# Patient Record
Sex: FEMALE | Race: WHITE
[De-identification: names, ages, dates, MRNs, and addresses within clinical notes are randomized per-mention and may not be internally consistent; named-entity substitution may affect disease eponyms.]

---

## 2017-09-21 ENCOUNTER — HOSPITAL ENCOUNTER (OUTPATIENT)
Dept: HOSPITAL 75 - PREOP | Age: 57
End: 2017-09-21
Attending: SURGERY
Payer: COMMERCIAL

## 2017-09-21 VITALS — HEIGHT: 67 IN | BODY MASS INDEX: 41.59 KG/M2 | WEIGHT: 265 LBS

## 2017-09-26 ENCOUNTER — HOSPITAL ENCOUNTER (OUTPATIENT)
Dept: HOSPITAL 75 - ENDO | Age: 57
Discharge: HOME | End: 2017-09-26
Attending: SURGERY
Payer: COMMERCIAL

## 2017-09-26 VITALS — DIASTOLIC BLOOD PRESSURE: 84 MMHG | SYSTOLIC BLOOD PRESSURE: 133 MMHG

## 2017-09-26 VITALS — SYSTOLIC BLOOD PRESSURE: 133 MMHG | DIASTOLIC BLOOD PRESSURE: 84 MMHG

## 2017-09-26 VITALS — SYSTOLIC BLOOD PRESSURE: 124 MMHG | DIASTOLIC BLOOD PRESSURE: 76 MMHG

## 2017-09-26 VITALS — BODY MASS INDEX: 41.59 KG/M2 | HEIGHT: 67 IN | WEIGHT: 265 LBS

## 2017-09-26 VITALS — SYSTOLIC BLOOD PRESSURE: 122 MMHG | DIASTOLIC BLOOD PRESSURE: 77 MMHG

## 2017-09-26 DIAGNOSIS — I10: ICD-10-CM

## 2017-09-26 DIAGNOSIS — Z86.010: ICD-10-CM

## 2017-09-26 DIAGNOSIS — E66.01: ICD-10-CM

## 2017-09-26 DIAGNOSIS — K62.1: ICD-10-CM

## 2017-09-26 DIAGNOSIS — F17.210: ICD-10-CM

## 2017-09-26 DIAGNOSIS — Z09: Primary | ICD-10-CM

## 2017-09-26 DIAGNOSIS — Z79.899: ICD-10-CM

## 2017-09-26 PROCEDURE — 88305 TISSUE EXAM BY PATHOLOGIST: CPT

## 2017-09-26 NOTE — XMS REPORT
[facility]

 Created on: 2016



Shereen Shafer

External Reference #: [id number]

: 1960

Sex: Female



Demographics







 Address  [address] [place] [address]  [address]

 

 Home Phone  [phone]

 

 Preferred Language  Unknown

 

 Marital Status  Unknown

 

 Mosque Affiliation  Unknown

 

 Race  White

 

 Ethnic Group  Not  or 





Author







 Author  JAVON DE LOS SANTOS

 

 Organization  [employer]

 

 Address  Unknown

 

 Phone  Unavailable







Care Team Providers







 Care Team Member Name  Role  Phone

 

 JAVON DE LOS SANTOS  CP  Unavailable



                                                                



Allergies

          No Known Allergies                                                   
                                     



Problems

          





 Problem Type  Condition  Code  Onset Dates  Condition Status

 

 Problem  Anxiety state, unspecified  300.00     Active

 

 Problem  Screening for malignant neoplasm of the cervix  V76.2     Active

 

 Problem  Esophageal reflux  530.81     Active

 

 Problem  Cellulitis and abscess of unspecified site  682.9     Active

 

 Problem  Abdominal pain, right lower quadrant  789.03     Active

 

 Problem  Plantar fascial fibromatosis  728.71     Active

 

 Problem  Pain in soft tissues of limb  729.5     Active

 

 Problem  Overweight  278.02     Active

 

 Problem  Dysthymia  F34.1     Active

 

 Problem  Knee pain  M25.569     Active

 

 Problem  Chest pain, unspecified  786.50     Active

 

 Problem  Tension headache  307.81     Active

 

 Problem  Hypertension  I10     Active

 

 Problem  Symptomatic menopausal or female climacteric states  627.2     Active

 

 Problem  Persistent disorder of initiating or maintaining sleep  307.42     
Active

 

 Problem  Personal history of tobacco use, presenting hazards to health  V15.82
     Active

 

 Problem  Eustachian tube dysfunction  H69.80     Active

 

 Problem  Other seborrheic keratosis  702.19     Active

 

 Problem  Palpitations  785.1     Active

 

 Problem  Other dyspnea and respiratory abnormalities  786.09     Active

 

 Problem  Family history of diabetes mellitus  V18.0     Active

 

 Problem  Unspecified alopecia  704.00     Active

 

 Problem  Unspecified breast screening  V76.10     Active

 

 Problem  Special screening examination, human papillomavirus [HPV]  V73.81     
Active

 

 Problem  Unspecified otalgia  388.70     Active

 

 Problem  Other psoriasis  696.1     Active



                                                                               
                                                                               
                                                                               
                                                                               
                      



Medications

          





 Medication  Code System  Code  Instructions  Start Date  End Date  Status  
Dosage

 

 Alprazolam  NDC  [address]-10  1 MG Orally Twice a day, and 1 tablet at 
bedtime  2015        0.5 Tablet as needed



                                                                              



Results

          No Known Results                                                     
               



Summary Purpose

          [employer]Clinical[employer] Submission

## 2017-09-26 NOTE — XMS REPORT
[facility]

 Created on: 10/05/2015



Shereen Shafer

External Reference #: [id number]

: 1960

Sex: Female



Demographics







 Address  [address] [place] [address]  [address]

 

 Home Phone  [phone]

 

 Preferred Language  Unknown

 

 Marital Status  Unknown

 

 Baptism Affiliation  Unknown

 

 Race  White

 

 Ethnic Group  Not  or 





Author







 Author  PRADIP WADE

 

 [facility]  [employer]

 

 Address  Unknown

 

 Phone  Unavailable







Care Team Providers







 Care Team Member Name  Role  Phone

 

 PRADIP WADE  CP  Unavailable



                                                                



Allergies, Adverse Reactions, Alerts

          





 Substance  Reaction  Event Type

 

 N.K.D.A.  Info Not Available  Non Drug Allergy



                                                                               
         



Problems

          





 Problem Type  Condition  Code  Onset Dates  Condition Status

 

 Problem  Unspecified breast screening  V76.10     Active

 

 Problem  Screening for malignant neoplasm of the cervix  V76.2     Active

 

 Problem  Special screening examination, human papillomavirus [HPV]  V73.81     
Active

 

 Problem  Persistent disorder of initiating or maintaining sleep  307.42     
Active

 

 Problem  Esophageal reflux  530.81     Active

 

 Problem  Personal history of tobacco use, presenting hazards to health  V15.82
     Active

 

 Problem  Anxiety state, unspecified  300.00     Active

 

 Assessment  Allergic rhinitis  477.9     Active

 

 Problem  Other seborrheic keratosis  702.19     Active

 

 Problem  Plantar fascial fibromatosis  728.71     Active

 

 Problem  Cellulitis and abscess of unspecified site  682.9     Active

 

 Problem  Pain in soft tissues of limb  729.5     Active

 

 Problem  Overweight  278.02     Active

 

 Problem  Tension headache  307.81     Active

 

 Problem  Chest pain, unspecified  786.50     Active

 

 Problem  Abdominal pain, right lower quadrant  789.03     Active

 

 Problem  Symptomatic menopausal or female climacteric states  627.2     Active

 

 Problem  Palpitations  785.1     Active

 

 Problem  Other dyspnea and respiratory abnormalities  786.09     Active

 

 Problem  Family history of diabetes mellitus  V18.0     Active

 

 Problem  Unspecified otalgia  388.70     Active

 

 Assessment  Chronic serous otitis media of both ears  381.10     Active

 

 Problem  Unspecified alopecia  704.00     Active

 

 Problem  Other psoriasis  696.1     Active



                                                                               
                                                                               
                                                                               
                                                                               
  



Medications

          





 Medication  Code System  Code  Instructions  Start Date  End Date  Status  
Dosage

 

 ProAir HFA  NDC  [address]-76  108 (90 Base) MCG/ACT Inhalation every 4 hrs  
Aug 08, 2015        2 puffs as needed

 

 Prozac  NDC  29287635785  20 MG             3 capsule by Oral route 1 time per 
day

 

 propranolol  NDC  0  20 mg    Oct 23, 2014        1 tablet by Oral route 2 
times per day

 

 tramadol  NDC  0  50 mg    2014        take 1 tablet by Oral route 
every 8 hours as needed  PRN pain

 

 Alprazolam  NDC  [address]-10  1 MG    2015        0.5 Tablet by 
Oral route 2 times per day 1/2 tab daily prn and 1mg at bedtime for sleep every 
nite



                                                                               
                                                 



Procedures

          





 Procedure  Coding System  Code  Date

 

 Office Visit, Est Pt., Level 3  CPT-4  78953  Sept 15, 2015



                                                                               
                   



Vital Signs

          





 Date/Time:  Sept 15, 2015

 

 Temperature  98.4 F

 

 Weight  258 lbs

 

 Height  67 in

 

 BMI  40.40 Index

 

 Blood Pressure Diastolic  82 mmHg

 

 Blood Pressure Systolic  120 mmHg

 

 Cardiac Monitoring Heart Rate  86 bpm



                                                                              



Results

          No Known Results                                                     
               



Summary Purpose

          [employer]Clinical[employer] Submission

## 2017-09-26 NOTE — XMS REPORT
[facility]

 Created on: 2016



Shereen Shafer

External Reference #: [id number]

: 1960

Sex: Female



Demographics







 Address  [address] [place] [address]  [address]

 

 Home Phone  [phone]

 

 Preferred Language  Unknown

 

 Marital Status  Unknown

 

 Sikh Affiliation  Unknown

 

 Race  White

 

 Ethnic Group  Not  or 





Author







 Author  JAVON DE LOS SANTOS

 

 Organization  [employer]

 

 Address  Unknown

 

 Phone  Unavailable







Care Team Providers







 Care Team Member Name  Role  Phone

 

 JAVON DE LOS SANTOS  CP  Unavailable



                                                                



Allergies

          No Known Allergies                                                   
                                     



Problems

          





 Problem Type  Condition  Code  Onset Dates  Condition Status

 

 Problem  Unspecified breast screening  V76.10     Active

 

 Problem  Screening for malignant neoplasm of the cervix  V76.2     Active

 

 Problem  Special screening examination, human papillomavirus [HPV]  V73.81     
Active

 

 Problem  Persistent disorder of initiating or maintaining sleep  307.42     
Active

 

 Problem  Esophageal reflux  530.81     Active

 

 Problem  Personal history of tobacco use, presenting hazards to health  V15.82
     Active

 

 Problem  Anxiety state, unspecified  300.00     Active

 

 Problem  Other seborrheic keratosis  702.19     Active

 

 Problem  Plantar fascial fibromatosis  728.71     Active

 

 Problem  Cellulitis and abscess of unspecified site  682.9     Active

 

 Problem  Pain in soft tissues of limb  729.5     Active

 

 Problem  Overweight  278.02     Active

 

 Problem  Tension headache  307.81     Active

 

 Problem  Chest pain, unspecified  786.50     Active

 

 Problem  Abdominal pain, right lower quadrant  789.03     Active

 

 Problem  Symptomatic menopausal or female climacteric states  627.2     Active

 

 Problem  Palpitations  785.1     Active

 

 Problem  Other dyspnea and respiratory abnormalities  786.09     Active

 

 Problem  Family history of diabetes mellitus  V18.0     Active

 

 Problem  Unspecified otalgia  388.70     Active

 

 Problem  Unspecified alopecia  704.00     Active

 

 Problem  Other psoriasis  696.1     Active



                                                                               
                                                                               
                                                                               
                                                             



Medications

          No Known Medications                                                 
                             



Results

          No Known Results                                                     
               



Summary Purpose

          eClinicalWorks Submission

## 2017-09-26 NOTE — XMS REPORT
[facility]

 Created on: 2015



Shereen Shafer

External Reference #: [id number]

: 1960

Sex: Female



Demographics







 Address  [address] [place] [address]  [address]

 

 Home Phone  [phone]

 

 Preferred Language  Unknown

 

 Marital Status  Unknown

 

 Congregation Affiliation  Unknown

 

 Race  White

 

 Ethnic Group  Not  or 





Author







 Author  JAVON DE LOS SANTOS

 

 Organization  [employer]

 

 Address  Unknown

 

 Phone  Unavailable







Care Team Providers







 Care Team Member Name  Role  Phone

 

 JAVON DE LOS SANTOS  CP  Unavailable



                                                                



Allergies

          No Known Allergies                                                   
                                     



Problems

          





 Problem Type  Condition  Code  Onset Dates  Condition Status

 

 Problem  Unspecified breast screening  V76.10     Active

 

 Problem  Screening for malignant neoplasm of the cervix  V76.2     Active

 

 Problem  Special screening examination, human papillomavirus [HPV]  V73.81     
Active

 

 Problem  Persistent disorder of initiating or maintaining sleep  307.42     
Active

 

 Problem  Esophageal reflux  530.81     Active

 

 Problem  Personal history of tobacco use, presenting hazards to health  V15.82
     Active

 

 Problem  Anxiety state, unspecified  300.00     Active

 

 Problem  Other seborrheic keratosis  702.19     Active

 

 Problem  Plantar fascial fibromatosis  728.71     Active

 

 Problem  Cellulitis and abscess of unspecified site  682.9     Active

 

 Problem  Pain in soft tissues of limb  729.5     Active

 

 Problem  Overweight  278.02     Active

 

 Problem  Tension headache  307.81     Active

 

 Problem  Chest pain, unspecified  786.50     Active

 

 Problem  Abdominal pain, right lower quadrant  789.03     Active

 

 Problem  Symptomatic menopausal or female climacteric states  627.2     Active

 

 Problem  Palpitations  785.1     Active

 

 Problem  Other dyspnea and respiratory abnormalities  786.09     Active

 

 Problem  Family history of diabetes mellitus  V18.0     Active

 

 Problem  Unspecified otalgia  388.70     Active

 

 Problem  Unspecified alopecia  704.00     Active

 

 Problem  Other psoriasis  696.1     Active



                                                                               
                                                                               
                                                                               
                                                             



Medications

          No Known Medications                                                 
                             



Results

          No Known Results                                                     
               



Summary Purpose

          eClinicalWorks Submission

## 2017-09-26 NOTE — XMS REPORT
[facility]

 Created on: 2015



Shereen Shafer

External Reference #: [id number]

: 1960

Sex: Female



Demographics







 Address  [address] [place] [address]  [address]

 

 Home Phone  [phone]

 

 Preferred Language  Unknown

 

 Marital Status  Unknown

 

 Holiness Affiliation  Unknown

 

 Race  White

 

 Ethnic Group  Not  or 





Author







 Author  JAVON DE LOS SANTOS

 

 Organization  [employer]

 

 Address  Unknown

 

 Phone  Unavailable







Care Team Providers







 Care Team Member Name  Role  Phone

 

 JAVON DE LOS SANTOS  CP  Unavailable



                                                                



Allergies

          No Known Allergies                                                   
                                     



Problems

          





 Problem Type  Condition  Code  Onset Dates  Condition Status

 

 Problem  Unspecified breast screening  V76.10     Active

 

 Problem  Screening for malignant neoplasm of the cervix  V76.2     Active

 

 Problem  Special screening examination, human papillomavirus [HPV]  V73.81     
Active

 

 Problem  Persistent disorder of initiating or maintaining sleep  307.42     
Active

 

 Problem  Esophageal reflux  530.81     Active

 

 Problem  Personal history of tobacco use, presenting hazards to health  V15.82
     Active

 

 Problem  Anxiety state, unspecified  300.00     Active

 

 Problem  Other seborrheic keratosis  702.19     Active

 

 Problem  Plantar fascial fibromatosis  728.71     Active

 

 Problem  Cellulitis and abscess of unspecified site  682.9     Active

 

 Problem  Pain in soft tissues of limb  729.5     Active

 

 Problem  Overweight  278.02     Active

 

 Problem  Tension headache  307.81     Active

 

 Problem  Chest pain, unspecified  786.50     Active

 

 Problem  Abdominal pain, right lower quadrant  789.03     Active

 

 Problem  Symptomatic menopausal or female climacteric states  627.2     Active

 

 Problem  Palpitations  785.1     Active

 

 Problem  Other dyspnea and respiratory abnormalities  786.09     Active

 

 Problem  Family history of diabetes mellitus  V18.0     Active

 

 Problem  Unspecified otalgia  388.70     Active

 

 Problem  Unspecified alopecia  704.00     Active

 

 Problem  Other psoriasis  696.1     Active



                                                                               
                                                                               
                                                                               
                                                             



Medications

          





 Medication  Code System  Code  Instructions  Start Date  End Date  Status  
Dosage

 

 Fluoxetine HCl  NDC  [address]-04  20 MG             TAKE 3 CAPSULES BY MOUTH 
DAILY



                                                                              



Results

          No Known Results                                                     
               



Summary Purpose

          [employer]ClinicalWorks Submission

## 2017-09-26 NOTE — PROGRESS NOTE-PRE OPERATIVE
Pre-Operative Progress Note


H&P Reviewed


The H&P was reviewed, patient examined and no changes noted.


Date Seen by Provider:  Sep 26, 2017


Time Seen by Provider:  10:29


Date H&P Reviewed:  Sep 26, 2017


Time H&P Reviewed:  10:29


Pre-Operative Diagnosis:  tubular adenoma history polyps











HUNTER HAMMONDS DO Sep 26, 2017 10:30 am

## 2017-09-26 NOTE — XMS REPORT
[facility]

 Created on: 01/15/2016



Shereen Shafer

External Reference #: [id number]

: 1960

Sex: Female



Demographics







 Address  [address] [place] [address]  [address]

 

 Home Phone  [phone]

 

 Preferred Language  Unknown

 

 Marital Status  Unknown

 

 Sabianism Affiliation  Unknown

 

 Race  White

 

 Ethnic Group  Not  or 





Author







 Author  JAVON DE LOS SANTOS

 

 Organization  [employer]

 

 Address  Unknown

 

 Phone  Unavailable







Care Team Providers







 Care Team Member Name  Role  Phone

 

 JAVON DE LOS SANTOS  CP  Unavailable



                                                                



Allergies

          No Known Allergies                                                   
                                     



Problems

          





 Problem Type  Condition  Code  Onset Dates  Condition Status

 

 Problem  Unspecified breast screening  V76.10     Active

 

 Problem  Screening for malignant neoplasm of the cervix  V76.2     Active

 

 Problem  Special screening examination, human papillomavirus [HPV]  V73.81     
Active

 

 Problem  Persistent disorder of initiating or maintaining sleep  307.42     
Active

 

 Problem  Esophageal reflux  530.81     Active

 

 Problem  Personal history of tobacco use, presenting hazards to health  V15.82
     Active

 

 Problem  Anxiety state, unspecified  300.00     Active

 

 Problem  Other seborrheic keratosis  702.19     Active

 

 Problem  Plantar fascial fibromatosis  728.71     Active

 

 Problem  Cellulitis and abscess of unspecified site  682.9     Active

 

 Problem  Pain in soft tissues of limb  729.5     Active

 

 Problem  Overweight  278.02     Active

 

 Problem  Tension headache  307.81     Active

 

 Problem  Chest pain, unspecified  786.50     Active

 

 Problem  Abdominal pain, right lower quadrant  789.03     Active

 

 Problem  Symptomatic menopausal or female climacteric states  627.2     Active

 

 Problem  Palpitations  785.1     Active

 

 Problem  Other dyspnea and respiratory abnormalities  786.09     Active

 

 Problem  Family history of diabetes mellitus  V18.0     Active

 

 Problem  Unspecified otalgia  388.70     Active

 

 Problem  Unspecified alopecia  704.00     Active

 

 Problem  Other psoriasis  696.1     Active



                                                                               
                                                                               
                                                                               
                                                             



Medications

          





 Medication  Code System  Code  Instructions  Start Date  End Date  Status  
Dosage

 

 Alprazolam  NDC  [address]-10  1 MG    2015        0.5 Tablet by 
Oral route 2 times per day 1/2 tab daily prn and 1mg at bedtime for sleep every 
nite



                                                                              



Results

          No Known Results                                                     
               



Summary Purpose

          eClinicalWorks Submission

## 2017-09-26 NOTE — XMS REPORT
[facility]

 Created on: 2016



Shereen Shafer

External Reference #: [id number]

: 1960

Sex: Female



Demographics







 Address  [address] [place] [address]  [address]

 

 Home Phone  [phone]

 

 Preferred Language  Unknown

 

 Marital Status  Unknown

 

 Alevism Affiliation  Unknown

 

 Race  White

 

 Ethnic Group  Not  or 





Author







 Author  JAVON DE LOS SANTOS

 

 Organization  [employer]

 

 Address  Unknown

 

 Phone  Unavailable







Care Team Providers







 Care Team Member Name  Role  Phone

 

 JAVON DE LOS SANTOS  CP  Unavailable



                                                                



Allergies

          No Known Allergies                                                   
                                     



Problems

          





 Problem Type  Condition  Code  Onset Dates  Condition Status

 

 Problem  Anxiety state, unspecified  300.00     Active

 

 Problem  Esophageal reflux  530.81     Active

 

 Problem  Cellulitis and abscess of unspecified site  682.9     Active

 

 Problem  Abdominal pain, right lower quadrant  789.03     Active

 

 Problem  Plantar fascial fibromatosis  728.71     Active

 

 Problem  Symptomatic menopausal or female climacteric states  627.2     Active

 

 Problem  Overweight  278.02     Active

 

 Problem  Personal history of tobacco use, presenting hazards to health  V15.82
     Active

 

 Problem  Pain in soft tissues of limb  729.5     Active

 

 Problem  Hypertension  I10     Active

 

 Problem  Dysthymia  F34.1     Active

 

 Problem  Family history of diabetes mellitus  V18.0     Active

 

 Problem  Chest pain, unspecified  786.50     Active

 

 Problem  Anxiety disorder, unspecified  F41.9     Active

 

 Problem  Tension headache  307.81     Active

 

 Problem  Other seborrheic keratosis  702.19     Active

 

 Problem  Persistent disorder of initiating or maintaining sleep  307.42     
Active

 

 Problem  Knee pain  M25.569     Active

 

 Problem  Eustachian tube dysfunction  H69.80     Active

 

 Problem  Other dyspnea and respiratory abnormalities  786.09     Active

 

 Problem  Unspecified otalgia  388.70     Active

 

 Problem  Unspecified alopecia  704.00     Active

 

 Problem  Palpitations  785.1     Active

 

 Problem  Special screening examination, human papillomavirus [HPV]  V73.81     
Active

 

 Problem  Screening for malignant neoplasm of the cervix  V76.2     Active

 

 Problem  Other psoriasis  696.1     Active

 

 Problem  Unspecified breast screening  V76.10     Active



                                                                               
                                                                               
                                                                               
                                                                               
                                



Medications

          No Known Medications                                                 
                             



Results

          No Known Results                                                     
               



Summary Purpose

          eClinicalWorks Submission

## 2017-09-26 NOTE — XMS REPORT
[facility]

 Created on: 2017



Shereen Shafer

External Reference #: [id number]

: 1960

Sex: Female



Demographics







 Address  [address] [place]  [address]

 

 Preferred Language  Unknown

 

 Marital Status  Unknown

 

 Gnosticism Affiliation  Unknown

 

 Race  Unknown

 

 Ethnic Group  Unknown





Author







 Author  JAVON DE LOS SANTOS

 

 [facility]

 

 Address  [address] [place]  [address]



 

 Phone  [phone]







Care Team Providers







 Care Team Member Name  Role  Phone

 

 JAVON DE LOS SANTOS  Unavailable  [phone]







PROBLEMS







 Type  Condition  ICD9-CM Code  AFO42-MY Code  Onset Dates  Condition Status  
SNOMED Code

 

 Problem  Pharyngitis, unspecified etiology     J02.9     Active  338289807

 

 Problem  Anxiety disorder, unspecified     F41.9     Active  544409856

 

 Problem  Eustachian tube dysfunction     H69.80     Active  21462732

 

 Problem  Knee pain     M25.569     Active  67292086

 

 Problem  Hypertension     I10     Active  00661931

 

 Problem  Dysthymia     F34.1     Active  05459117







ALLERGIES

Unknown Allergies



SOCIAL HISTORY

No smoking Hx information available



PLAN OF CARE





VITAL SIGNS





MEDICATIONS







 Medication  Instructions  Dosage  Frequency  Start Date  End Date  Duration  
Status

 

 Alprazolam 1 MG  Orally Twice a day, and 1 tablet at bedtime  0.5 Tablet as 
needed     03 Mar, 2015     28 days  Active







RESULTS

No Results



PROCEDURES

No Known procedures



IMMUNIZATIONS

No Known Immunizations

## 2017-09-26 NOTE — XMS REPORT
[facility]

 Created on: 2017



Shereen Shafer

External Reference #: [id number]

: 1960

Sex: Female



Demographics







 Address  [address]  [address]

 

 Preferred Language  Unknown

 

 Marital Status  Unknown

 

 Gnosticist Affiliation  Unknown

 

 Race  Unknown

 

 Ethnic Group  Unknown





Author







 Author  JAVON DE LOS SANTOS

 

 [facility]

 

 Address  [address] [place]  [address]



 

 Phone  [phone]







Care Team Providers







 Care Team Member Name  Role  Phone

 

 JAVON DE LOS SANTOS  Unavailable  [phone]







PROBLEMS







 Type  Condition  ICD9-CM Code  CVR68-PY Code  Onset Dates  Condition Status  
SNOMED Code

 

 Problem  Pharyngitis, unspecified etiology     J02.9     Active  849633890

 

 Problem  Anxiety disorder, unspecified     F41.9     Active  417853031

 

 Problem  Eustachian tube dysfunction     H69.80     Active  40919368

 

 Problem  Knee pain     M25.569     Active  61984085

 

 Problem  Hypertension     I10     Active  91187608

 

 Problem  Dysthymia     F34.1     Active  18905090







ALLERGIES

No Information



SOCIAL HISTORY

Never Assessed



PLAN OF CARE





VITAL SIGNS





MEDICATIONS







 Medication  Instructions  Dosage  Frequency  Start Date  End Date  Duration  
Status

 

 Alprazolam 1 MG  Orally Twice a day, and 1 tablet at bedtime  0.5 Tablet as 
needed     03 Mar, 2015     28 days  Active







RESULTS

No Results



PROCEDURES

No Known procedures



IMMUNIZATIONS

No Known Immunizations



MEDICAL (GENERAL) HISTORY







 Type  Description  Date

 

 Medical History  depression   

 

 Medical History  hx of anxiety   

 

 Medical History  mitral valve prolapse   

 

 Surgical History  tonsillectomy and adenoidectomy   

 

 Surgical History  partial hysterectomy   

 

 Surgical History  jaw surgery  1974

 

 Surgical History  colonoscopy  16

 

 Hospitalization History  for surgery

## 2017-09-26 NOTE — DISCHARGE INST-SIMPLE/STANDARD
Discharge Inst-Standard


Patient Instructions/Follow Up


Plan of Care/Instructions/FU:  


2 weeks bette


Activity as Tolerated:  Yes


Discharge Diet:  Regular Diet











HUNTER HAMMONDS DO Sep 26, 2017 11:56 am

## 2017-09-26 NOTE — XMS REPORT
[facility]

 Created on: 2016



Shereen Shafer

External Reference #: [id number]

: 1960

Sex: Female



Demographics







 Address  [address] [place] [address]  [address]

 

 Home Phone  [phone]

 

 Preferred Language  Unknown

 

 Marital Status  Unknown

 

 Nondenominational Affiliation  Unknown

 

 Race  White

 

 Ethnic Group  Not  or 





Author







 Author  JAVON DE LOS SANTOS

 

 [facility]  [employer]

 

 Address  Unknown

 

 Phone  Unavailable







Care Team Providers







 Care Team Member Name  Role  Phone

 

 JAVON DE LOS SANTOS  CP  Unavailable



                                                                



Allergies

          No Known Allergies                                                   
                                     



Problems

          





 Problem Type  Condition  Code  Onset Dates  Condition Status

 

 Assessment  Knee pain  M25.569     Active

 

 Problem  Anxiety state, unspecified  300.00     Active

 

 Problem  Esophageal reflux  530.81     Active

 

 Problem  Cellulitis and abscess of unspecified site  682.9     Active

 

 Problem  Abdominal pain, right lower quadrant  789.03     Active

 

 Problem  Plantar fascial fibromatosis  728.71     Active

 

 Problem  Symptomatic menopausal or female climacteric states  627.2     Active

 

 Problem  Overweight  278.02     Active

 

 Problem  Personal history of tobacco use, presenting hazards to health  V15.82
     Active

 

 Problem  Pain in soft tissues of limb  729.5     Active

 

 Problem  Hypertension  I10     Active

 

 Problem  Dysthymia  F34.1     Active

 

 Problem  Family history of diabetes mellitus  V18.0     Active

 

 Problem  Chest pain, unspecified  786.50     Active

 

 Problem  Anxiety disorder, unspecified  F41.9     Active

 

 Problem  Tension headache  307.81     Active

 

 Problem  Other seborrheic keratosis  702.19     Active

 

 Problem  Persistent disorder of initiating or maintaining sleep  307.42     
Active

 

 Problem  Knee pain  M25.569     Active

 

 Problem  Eustachian tube dysfunction  H69.80     Active

 

 Problem  Other dyspnea and respiratory abnormalities  786.09     Active

 

 Problem  Unspecified otalgia  388.70     Active

 

 Problem  Unspecified alopecia  704.00     Active

 

 Problem  Palpitations  785.1     Active

 

 Problem  Special screening examination, human papillomavirus [HPV]  V73.81     
Active

 

 Problem  Screening for malignant neoplasm of the cervix  V76.2     Active

 

 Problem  Other psoriasis  696.1     Active

 

 Problem  Unspecified breast screening  V76.10     Active



                                                                               
                                                                               
                                                                               
                                                                               
                                          



Medications

          





 Medication  Code System  Code  Instructions  Start Date  End Date  Status  
Dosage

 

 Alprazolam  NDC  [address]-10  1 MG Orally Twice a day, and 1 tablet at 
bedtime  2015        0.5 Tablet as needed

 

 Fluoxetine HCl  NDC  10220096280  20 MG             TAKE 3 CAPSULES BY MOUTH 
DAILY

 

 ProAir HFA  NDC  [address]-52  108 (90 Base) MCG/ACT Inhalation every 4 hrs  
Aug 08, 2015        2 puffs as needed

 

 tramadol  NDC  0  50 mg by oral route 3 times a day  2014        1 
tablet as needed

 

 Flonase Allergy Relief  NDC  [address]-27  50 MCG/ACT Nasally 2 times a day  
2016        1 spray in each nostril

 

 Propranolol HCl  NDC  [address]-77  20 MG Orally Twice a day  2016   
     1 tablet



                                                                               
                                                 



Results

          No Known Results                                                     
               



Summary Purpose

          [employer] Submission

## 2017-09-26 NOTE — XMS REPORT
[facility]

 Created on: 2016



Shereen Shafer

External Reference #: [id number]

: 1960

Sex: Female



Demographics







 Address  [address] [place] [address]  [address]

 

 Home Phone  [phone]

 

 Preferred Language  Unknown

 

 Marital Status  Unknown

 

 Latter-day Affiliation  Unknown

 

 Race  White

 

 Ethnic Group  Not  or 





Author







 Author  JAVON DE LOS SANTOS

 

 Organization  [employer]

 

 Address  Unknown

 

 Phone  Unavailable







Care Team Providers







 Care Team Member Name  Role  Phone

 

 JAVON DE LOS SANTOS  CP  Unavailable



                                                                



Allergies

          No Known Allergies                                                   
                                     



Problems

          





 Problem Type  Condition  Code  Onset Dates  Condition Status

 

 Problem  Anxiety state, unspecified  300.00     Active

 

 Problem  Esophageal reflux  530.81     Active

 

 Problem  Cellulitis and abscess of unspecified site  682.9     Active

 

 Problem  Abdominal pain, right lower quadrant  789.03     Active

 

 Problem  Plantar fascial fibromatosis  728.71     Active

 

 Problem  Symptomatic menopausal or female climacteric states  627.2     Active

 

 Problem  Overweight  278.02     Active

 

 Problem  Personal history of tobacco use, presenting hazards to health  V15.82
     Active

 

 Problem  Pain in soft tissues of limb  729.5     Active

 

 Problem  Hypertension  I10     Active

 

 Problem  Dysthymia  F34.1     Active

 

 Problem  Family history of diabetes mellitus  V18.0     Active

 

 Problem  Chest pain, unspecified  786.50     Active

 

 Problem  Anxiety disorder, unspecified  F41.9     Active

 

 Problem  Tension headache  307.81     Active

 

 Problem  Other seborrheic keratosis  702.19     Active

 

 Problem  Persistent disorder of initiating or maintaining sleep  307.42     
Active

 

 Problem  Knee pain  M25.569     Active

 

 Problem  Eustachian tube dysfunction  H69.80     Active

 

 Problem  Other dyspnea and respiratory abnormalities  786.09     Active

 

 Problem  Unspecified otalgia  388.70     Active

 

 Problem  Unspecified alopecia  704.00     Active

 

 Problem  Palpitations  785.1     Active

 

 Problem  Special screening examination, human papillomavirus [HPV]  V73.81     
Active

 

 Problem  Screening for malignant neoplasm of the cervix  V76.2     Active

 

 Problem  Other psoriasis  696.1     Active

 

 Problem  Unspecified breast screening  V76.10     Active



                                                                               
                                                                               
                                                                               
                                                                               
                                



Medications

          No Known Medications                                                 
                             



Results

          No Known Results                                                     
               



Summary Purpose

          [employer] Submission

## 2017-09-26 NOTE — XMS REPORT
[facility]

 Created on: 2015



Shereen Shafer

External Reference #: [id number]

: 1960

Sex: Female



Demographics







 Address  [address] [place] [address]  [address]

 

 Home Phone  [phone]

 

 Preferred Language  Unknown

 

 Marital Status  Unknown

 

 Sabianist Affiliation  Unknown

 

 Race  White

 

 Ethnic Group  Not  or 





Author







 Author  JAVON DE LOS SANTOS

 

 Organization  [employer]

 

 Address  Unknown

 

 Phone  Unavailable







Care Team Providers







 Care Team Member Name  Role  Phone

 

 JAVON DE LOS SANTOS  CP  Unavailable



                                                                



Allergies

          No Known Allergies                                                   
                                     



Problems

          





 Problem Type  Condition  ICD-9 Code  Onset Dates  Condition Status

 

 Problem  Unspecified breast screening  V76.10     Active

 

 Problem  Screening for malignant neoplasm of the cervix  V76.2     Active

 

 Problem  Special screening examination, human papillomavirus [HPV]  V73.81     
Active

 

 Problem  Persistent disorder of initiating or maintaining sleep  307.42     
Active

 

 Problem  Esophageal reflux  530.81     Active

 

 Problem  Personal history of tobacco use, presenting hazards to health  V15.82
     Active

 

 Problem  Anxiety state, unspecified  300.00     Active

 

 Problem  Other seborrheic keratosis  702.19     Active

 

 Problem  Plantar fascial fibromatosis  728.71     Active

 

 Problem  Cellulitis and abscess of unspecified site  682.9     Active

 

 Problem  Pain in soft tissues of limb  729.5     Active

 

 Problem  Overweight  278.02     Active

 

 Problem  Tension headache  307.81     Active

 

 Problem  Chest pain, unspecified  786.50     Active

 

 Problem  Abdominal pain, right lower quadrant  789.03     Active

 

 Problem  Symptomatic menopausal or female climacteric states  627.2     Active

 

 Problem  Palpitations  785.1     Active

 

 Problem  Other dyspnea and respiratory abnormalities  786.09     Active

 

 Problem  Family history of diabetes mellitus  V18.0     Active

 

 Problem  Unspecified otalgia  388.70     Active

 

 Problem  Unspecified alopecia  704.00     Active

 

 Problem  Other psoriasis  696.1     Active



                                                                               
                                                                               
                                                                               
                                                             



Medications

          





 Medication  Code System  Code  Instructions  Start Date  End Date  Status  
Dosage

 

 Alprazolam  NDC  [address]-10  1 MG    2015        0.5 Tablet by 
Oral route 2 times per day 1/2 tab daily prn and 1mg at bedtime for sleep every 
nite



                                                                              



Results

          No Known Results                                                     
               



Summary Purpose

          [employer] Submission

## 2017-09-26 NOTE — XMS REPORT
[facility]

 Created on: 2016



Shereen Shafer

External Reference #: [id number]

: 1960

Sex: Female



Demographics







 Address  [address] [place] [address]  [address]

 

 Home Phone  [phone]

 

 Preferred Language  Unknown

 

 Marital Status  Unknown

 

 Druze Affiliation  Unknown

 

 Race  White

 

 Ethnic Group  Not  or 





Author







 Author  JAVON DE LOS SANTOS

 

 Organization  [employer]

 

 Address  Unknown

 

 Phone  Unavailable







Care Team Providers







 Care Team Member Name  Role  Phone

 

 JAVON DE LOS SANTOS  CP  Unavailable



                                                                



Allergies

          No Known Allergies                                                   
                                     



Problems

          





 Problem Type  Condition  Code  Onset Dates  Condition Status

 

 Problem  Anxiety state, unspecified  300.00     Active

 

 Problem  Screening for malignant neoplasm of the cervix  V76.2     Active

 

 Problem  Esophageal reflux  530.81     Active

 

 Problem  Cellulitis and abscess of unspecified site  682.9     Active

 

 Problem  Abdominal pain, right lower quadrant  789.03     Active

 

 Problem  Plantar fascial fibromatosis  728.71     Active

 

 Problem  Pain in soft tissues of limb  729.5     Active

 

 Problem  Overweight  278.02     Active

 

 Problem  Dysthymia  F34.1     Active

 

 Problem  Knee pain  M25.569     Active

 

 Problem  Chest pain, unspecified  786.50     Active

 

 Problem  Tension headache  307.81     Active

 

 Problem  Hypertension  I10     Active

 

 Problem  Symptomatic menopausal or female climacteric states  627.2     Active

 

 Problem  Persistent disorder of initiating or maintaining sleep  307.42     
Active

 

 Problem  Personal history of tobacco use, presenting hazards to health  V15.82
     Active

 

 Problem  Eustachian tube dysfunction  H69.80     Active

 

 Problem  Other seborrheic keratosis  702.19     Active

 

 Problem  Palpitations  785.1     Active

 

 Problem  Other dyspnea and respiratory abnormalities  786.09     Active

 

 Problem  Family history of diabetes mellitus  V18.0     Active

 

 Problem  Unspecified alopecia  704.00     Active

 

 Problem  Unspecified breast screening  V76.10     Active

 

 Problem  Special screening examination, human papillomavirus [HPV]  V73.81     
Active

 

 Problem  Unspecified otalgia  388.70     Active

 

 Problem  Other psoriasis  696.1     Active



                                                                               
                                                                               
                                                                               
                                                                               
                      



Medications

          No Known Medications                                                 
                             



Results

          No Known Results                                                     
               



Summary Purpose

          [employer] Submission

## 2017-09-26 NOTE — XMS REPORT
[facility]

 Created on: 2016



Shereen Shafer

External Reference #: [id number]

: 1960

Sex: Female



Demographics







 Address  [address] [place] [address]  [address]

 

 Home Phone  [phone]

 

 Preferred Language  Unknown

 

 Marital Status  Unknown

 

 Gnosticism Affiliation  Unknown

 

 Race  White

 

 Ethnic Group  Not  or 





Author







 Author  JAVON DE LOS SANTOS

 

 Organization  [employer]

 

 Address  Unknown

 

 Phone  Unavailable







Care Team Providers







 Care Team Member Name  Role  Phone

 

 JAVON DE LOS SANTOS  CP  Unavailable



                                                                



Allergies

          No Known Allergies                                                   
                                     



Problems

          





 Problem Type  Condition  Code  Onset Dates  Condition Status

 

 Assessment  Anxiety disorder, unspecified  F41.9     Active

 

 Problem  Anxiety state, unspecified  300.00     Active

 

 Problem  Esophageal reflux  530.81     Active

 

 Problem  Cellulitis and abscess of unspecified site  682.9     Active

 

 Problem  Abdominal pain, right lower quadrant  789.03     Active

 

 Problem  Plantar fascial fibromatosis  728.71     Active

 

 Problem  Symptomatic menopausal or female climacteric states  627.2     Active

 

 Problem  Overweight  278.02     Active

 

 Problem  Personal history of tobacco use, presenting hazards to health  V15.82
     Active

 

 Problem  Pain in soft tissues of limb  729.5     Active

 

 Problem  Hypertension  I10     Active

 

 Problem  Dysthymia  F34.1     Active

 

 Problem  Family history of diabetes mellitus  V18.0     Active

 

 Problem  Chest pain, unspecified  786.50     Active

 

 Problem  Anxiety disorder, unspecified  F41.9     Active

 

 Problem  Tension headache  307.81     Active

 

 Problem  Other seborrheic keratosis  702.19     Active

 

 Problem  Persistent disorder of initiating or maintaining sleep  307.42     
Active

 

 Problem  Knee pain  M25.569     Active

 

 Problem  Eustachian tube dysfunction  H69.80     Active

 

 Problem  Other dyspnea and respiratory abnormalities  786.09     Active

 

 Problem  Unspecified otalgia  388.70     Active

 

 Problem  Unspecified alopecia  704.00     Active

 

 Problem  Palpitations  785.1     Active

 

 Problem  Special screening examination, human papillomavirus [HPV]  V73.81     
Active

 

 Problem  Screening for malignant neoplasm of the cervix  V76.2     Active

 

 Problem  Other psoriasis  696.1     Active

 

 Problem  Unspecified breast screening  V76.10     Active



                                                                               
                                                                               
                                                                               
                                                                               
                                          



Medications

          





 Medication  Code System  Code  Instructions  Start Date  End Date  Status  
Dosage

 

 Alprazolam  NDC  [address]-10  1 MG Orally Twice a day, and 1 tablet at 
bedtime  2015        0.5 Tablet as needed



                                                                              



Results

          No Known Results                                                     
               



Summary Purpose

          [employer] Submission

## 2017-09-26 NOTE — XMS REPORT
[facility]

 Created on: 2015



Shereen Shafer

External Reference #: [id number]

: 1960

Sex: Female



Demographics







 Address  [address] [place] [address]  [address]

 

 Home Phone  [phone]

 

 Preferred Language  Unknown

 

 Marital Status  Unknown

 

 Islam Affiliation  Unknown

 

 Race  White

 

 Ethnic Group  Not  or 





Author







 Author  JAVON DE LOS SANTOS

 

 Organization  [employer]

 

 Address  Unknown

 

 Phone  Unavailable







Care Team Providers







 Care Team Member Name  Role  Phone

 

 JAVON DE LOS SANTOS  CP  Unavailable



                                                                



Allergies

          No Known Allergies                                                   
                                     



Problems

          





 Problem Type  Condition  Code  Onset Dates  Condition Status

 

 Problem  Unspecified breast screening  V76.10     Active

 

 Problem  Screening for malignant neoplasm of the cervix  V76.2     Active

 

 Problem  Special screening examination, human papillomavirus [HPV]  V73.81     
Active

 

 Problem  Persistent disorder of initiating or maintaining sleep  307.42     
Active

 

 Problem  Esophageal reflux  530.81     Active

 

 Problem  Personal history of tobacco use, presenting hazards to health  V15.82
     Active

 

 Problem  Anxiety state, unspecified  300.00     Active

 

 Problem  Other seborrheic keratosis  702.19     Active

 

 Problem  Plantar fascial fibromatosis  728.71     Active

 

 Problem  Cellulitis and abscess of unspecified site  682.9     Active

 

 Problem  Pain in soft tissues of limb  729.5     Active

 

 Problem  Overweight  278.02     Active

 

 Problem  Tension headache  307.81     Active

 

 Problem  Chest pain, unspecified  786.50     Active

 

 Problem  Abdominal pain, right lower quadrant  789.03     Active

 

 Problem  Symptomatic menopausal or female climacteric states  627.2     Active

 

 Problem  Palpitations  785.1     Active

 

 Problem  Other dyspnea and respiratory abnormalities  786.09     Active

 

 Problem  Family history of diabetes mellitus  V18.0     Active

 

 Problem  Unspecified otalgia  388.70     Active

 

 Problem  Unspecified alopecia  704.00     Active

 

 Problem  Other psoriasis  696.1     Active



                                                                               
                                                                               
                                                                               
                                                             



Medications

          





 Medication  Code System  Code  Instructions  Start Date  End Date  Status  
Dosage

 

 Fluoxetine HCl  NDC  [address]-48  20 MG             TAKE 3 CAPSULES BY MOUTH 
DAILY



                                                                              



Results

          No Known Results                                                     
               



Summary Purpose

          [employer] Submission

## 2017-09-26 NOTE — XMS REPORT
[facility]

 Created on: 10/11/2016



Shereen Shafer

External Reference #: [id number]

: 1960

Sex: Female



Demographics







 Address  [address] [place] [address]  [address]

 

 Home Phone  [phone]

 

 Preferred Language  Unknown

 

 Marital Status  Unknown

 

 Buddhism Affiliation  Unknown

 

 Race  White

 

 Ethnic Group  Not  or 





Author







 Author  JAVON DE LOS SANTOS

 

 [facility]  eClinical[employer]

 

 Address  Unknown

 

 Phone  Unavailable







Care Team Providers







 Care Team Member Name  Role  Phone

 

 JAVON DE LOS SANTOS  CP  Unavailable



                                                                



Allergies, Adverse Reactions, Alerts

          





 Substance  Reaction  Event Type

 

 N.K.D.A.  Info Not Available  Non Drug Allergy



                                                                               
         



Problems

          





 Problem Type  Condition  Code  Onset Dates  Condition Status

 

 Assessment  Visual changes  H53.9     Active

 

 Assessment  Family history of diabetes mellitus  Z83.3     Active

 

 Assessment  Hypertension  I10     Active

 

 Assessment  Irritable bowel syndrome with diarrhea  K58.0     Active

 

 Problem  Anxiety state, unspecified  300.00     Active

 

 Problem  Esophageal reflux  530.81     Active

 

 Problem  Cellulitis and abscess of unspecified site  682.9     Active

 

 Problem  Abdominal pain, right lower quadrant  789.03     Active

 

 Problem  Plantar fascial fibromatosis  728.71     Active

 

 Problem  Symptomatic menopausal or female climacteric states  627.2     Active

 

 Problem  Overweight  278.02     Active

 

 Problem  Personal history of tobacco use, presenting hazards to health  V15.82
     Active

 

 Problem  Pain in soft tissues of limb  729.5     Active

 

 Problem  Hypertension  I10     Active

 

 Problem  Dysthymia  F34.1     Active

 

 Problem  Family history of diabetes mellitus  V18.0     Active

 

 Problem  Chest pain, unspecified  786.50     Active

 

 Problem  Anxiety disorder, unspecified  F41.9     Active

 

 Problem  Tension headache  307.81     Active

 

 Problem  Other seborrheic keratosis  702.19     Active

 

 Problem  Persistent disorder of initiating or maintaining sleep  307.42     
Active

 

 Problem  Knee pain  M25.569     Active

 

 Problem  Eustachian tube dysfunction  H69.80     Active

 

 Problem  Other dyspnea and respiratory abnormalities  786.09     Active

 

 Problem  Unspecified otalgia  388.70     Active

 

 Problem  Unspecified alopecia  704.00     Active

 

 Problem  Palpitations  785.1     Active

 

 Problem  Special screening examination, human papillomavirus [HPV]  V73.81     
Active

 

 Problem  Screening for malignant neoplasm of the cervix  V76.2     Active

 

 Problem  Other psoriasis  696.1     Active

 

 Problem  Unspecified breast screening  V76.10     Active



                                                                               
                                                                               
                                                                               
                                                                               
                                                                        



Medications

          





 Medication  Code System  Code  Instructions  Start Date  End Date  Status  
Dosage

 

 Bentyl  NDC  [address]-56  20 mg Orally 4 times a day before meals and 
bedtime  Aug 04, 2016        1 tablet

 

 Fluoxetine HCl  NDC  16500251867  20 MG             TAKE 3 CAPSULES BY MOUTH 
DAILY

 

 Fiber  NDC  0               not defined

 

 ProAir HFA  NDC  [address]-84  108 (90 Base) MCG/ACT Inhalation every 4 hrs  
Aug 08, 2015        2 puffs as needed

 

 Flonase Allergy Relief  NDC  [address]-58  50 MCG/ACT Nasally 2 times a day  
2016        1 spray in each nostril

 

 tramadol  NDC  0  50 mg by oral route 3 times a day  2014        1 
tablet as needed

 

 Alprazolam  NDC  [address]-10  1 MG Orally Twice a day, and 1 tablet at 
bedtime  2015        0.5 Tablet as needed

 

 Probiotic  NDC  19844-24573               not defined

 

 Propranolol HCl  NDC  [address]-87  20 MG Orally Twice a day  2016   
     1 tablet



                                                                               
                                                                               
          



Procedures

          





 Procedure  Coding System  Code  Date

 

 Office Visit, Est Pt., Level 3  CPT-4  32972  2016

 

 LIPID PANEL  CPT-4  98497  2016

 

 GLYCATED HEMOGLOBIN TEST  CPT-4  56989  2016

 

 VENIPUNCT, ROUTINE*  CPT-4  83748  2016

 

 COMPREHEN METABOLIC PANEL  CPT-4  60203  2016



                                                                               
                                                           



Vital Signs

          





 Date/Time:  2016

 

 Cardiac Monitoring Heart Rate  72 bpm

 

 Weight  260.2 lbs

 

 Height  67 in

 

 BMI  40.75 Index

 

 Blood Pressure Diastolic  86 mmHg

 

 Blood Pressure Systolic  122 mmHg



                                                                    



Results

          





 Name  Result  Date  Reference Range  Unit  Abnormality Flag

 

 CMP               

 

 ----Calcium, Serum  9.1  2016  8.7-10.2   mg/dL   

 

 ----Carbon Dioxide, Total  25  2016  18-29   mmol/L   

 

 ----ALT (SGPT)  17  2016  0-32   IU/L   

 

 ----Creatinine, Serum  0.61  2016  0.57-1.00   mg/dL   

 

 ----AST (SGOT)  15  2016  0-40   IU/L   

 

 ----eGFR If NonAfricn Am  102  [id number]      >59   mL/min/1.73   

 

 ----Alkaline Phosphatase, S  83  73063821     IU/L   

 

 ----eGFR If Africn Am  118  [id number]      >59   mL/min/1.73   

 

 ----Bilirubin, Total  0.9  [id number]  0.0-1.2   mg/dL   

 

 ----BUN/Creatinine Ratio  15  2016  9-23       

 

 ----A/G Ratio  1.7  2016  1.1-2.5       

 

 ----Sodium, Serum  140  2016  134-144   mmol/L   

 

 ----Globulin, Total  2.6  2016  1.5-4.5   g/dL   

 

 ----Potassium, Serum  4.2  2016  3.5-5.2   mmol/L   

 

 ----Glucose, Serum  91  2016  65-99   mg/dL   

 

 ----Chloride, Serum  98  2016     mmol/L   

 

 ----Albumin, Serum  4.4  2016  3.5-5.5   g/dL   

 

 ----BUN  9  2016  6-24   mg/dL   

 

 ----Protein, Total, Serum  7.0  2016  6.0-8.5   g/dL   

 

 No Test Indicated               

 

 A1C (IN HOUSE)               

 

 ----A1C IN HOUSE  4.9  2016  4.3 - 5.6 %       

 

 ----Previous A1c  N/A  2016         

 

 ----Lot   [id number]  [id number]         

 

 ----Exp date  2018         

 

 ROUTINE VENIPUNCTURE               

 

 LIPID PANEL               

 

 ----LDL Cholesterol Calc  101  2016  0-99   mg/dL  H

 

 ----VLDL Cholesterol Macario  19  2016  5-40   mg/dL   

 

 ----HDL Cholesterol  63  2016  >39   mg/dL   

 

 ----Triglycerides  94  2016  0-149   mg/dL   

 

 ----Cholesterol, Total  183  [id number]  100-199   mg/dL   



                                                                               
                             



Summary Purpose

          eClinicalWorks Submission

## 2017-09-26 NOTE — XMS REPORT
[facility]

 Created on: 10/30/2015



Shereen Shafer

External Reference #: [id number]

: 1960

Sex: Female



Demographics







 Address  [address] [place] [address]  [address]

 

 Home Phone  [phone]

 

 Preferred Language  Unknown

 

 Marital Status  Unknown

 

 Yarsani Affiliation  Unknown

 

 Race  White

 

 Ethnic Group  Not  or 





Author







 Author  JAVON DE LOS SANTOS

 

 Organization  [employer]

 

 Address  Unknown

 

 Phone  Unavailable







Care Team Providers







 Care Team Member Name  Role  Phone

 

 JAVON DE LOS SANTOS  CP  Unavailable



                                                                



Allergies

          No Known Allergies                                                   
                                     



Problems

          





 Problem Type  Condition  Code  Onset Dates  Condition Status

 

 Problem  Unspecified breast screening  V76.10     Active

 

 Problem  Screening for malignant neoplasm of the cervix  V76.2     Active

 

 Problem  Special screening examination, human papillomavirus [HPV]  V73.81     
Active

 

 Problem  Persistent disorder of initiating or maintaining sleep  307.42     
Active

 

 Problem  Esophageal reflux  530.81     Active

 

 Problem  Personal history of tobacco use, presenting hazards to health  V15.82
     Active

 

 Problem  Anxiety state, unspecified  300.00     Active

 

 Problem  Other seborrheic keratosis  702.19     Active

 

 Problem  Plantar fascial fibromatosis  728.71     Active

 

 Problem  Cellulitis and abscess of unspecified site  682.9     Active

 

 Problem  Pain in soft tissues of limb  729.5     Active

 

 Problem  Overweight  278.02     Active

 

 Problem  Tension headache  307.81     Active

 

 Problem  Chest pain, unspecified  786.50     Active

 

 Problem  Abdominal pain, right lower quadrant  789.03     Active

 

 Problem  Symptomatic menopausal or female climacteric states  627.2     Active

 

 Problem  Palpitations  785.1     Active

 

 Problem  Other dyspnea and respiratory abnormalities  786.09     Active

 

 Problem  Family history of diabetes mellitus  V18.0     Active

 

 Problem  Unspecified otalgia  388.70     Active

 

 Problem  Unspecified alopecia  704.00     Active

 

 Problem  Other psoriasis  696.1     Active



                                                                               
                                                                               
                                                                               
                                                             



Medications

          





 Medication  Code System  Code  Instructions  Start Date  End Date  Status  
Dosage

 

 Alprazolam  NDC  [address]-10  1 MG    2015        0.5 Tablet by 
Oral route 2 times per day 1/2 tab daily prn and 1mg at bedtime for sleep every 
nite



                                                                              



Results

          No Known Results                                                     
               



Summary Purpose

          eClinicalWorks Submission

## 2017-09-26 NOTE — PROGRESS NOTE-POST OPERATIVE
Post-Operative Progess Note


Surgeon (s)/Assistant (s)


Surgeon


HUNTER HAMMONDS DO


Assistant:  na





Pre-Operative Diagnosis


tubular adenoma history polyps





Post-Operative Diagnosis





rectal polyp x 2





Procedure & Operative Findings


Date of Procedure


9/26/17


Procedure Performed/Findings


colonoscopy c hot bx polypectomy x 2


Anesthesia Type


per crna





Estimated Blood Loss


Estimated blood loss (mL):  none





Specimens/Packing


Specimens Removed


rectal polyps











HUNTER HAMMONDS DO Sep 26, 2017 12:00 pm

## 2017-09-26 NOTE — XMS REPORT
[facility]

 Created on: 09/15/2017



Malathi Shaferanne

External Reference #: [id number]

: 1960

Sex: Female



Demographics







 Address  [address]  [address]

 

 Preferred Language  Unknown

 

 Marital Status  Unknown

 

 Quaker Affiliation  Unknown

 

 Race  Unknown

 

 Ethnic Group  Unknown





Author







 Author  PEGGY  EUGENIA

 

 Organization  [facility]

 

 Address  [address]  [place]  [address]



 

 Phone  [phone]







Care Team Providers







 Care Team Member Name  Role  Phone

 

 NASREEN WOODSE  Unavailable  [phone]







PROBLEMS







 Type  Condition  ICD9-CM Code  CKC90-EO Code  Onset Dates  Condition Status  
SNOMED Code

 

 Problem  Pharyngitis, unspecified etiology     J02.9     Active  771123439

 

 Problem  Anxiety disorder, unspecified     F41.9     Active  641730622

 

 Problem  Eustachian tube dysfunction     H69.80     Active  69453982

 

 Problem  Knee pain     M25.569     Active  21148247

 

 Problem  Hypertension     I10     Active  40288212

 

 Problem  Dysthymia     F34.1     Active  90166266







ALLERGIES







 Substance  Reaction  Event Type  Date  Status

 

 N.K.D.A.  Unknown  Non Drug Allergy    Unknown







SOCIAL HISTORY

No smoking Hx information available



PLAN OF CARE







 Activity  Details









  









 Follow Up  2 - 3 Days, prn Reason:







VITAL SIGNS







 Height  67 in  2017

 

 Weight  263.8 lbs  2017

 

 Temperature  98.2 degrees Fahrenheit  2017

 

 Heart Rate  84 bpm  2017

 

 Respiratory Rate  20   2017

 

 BMI  41.31 kg/m2  2017

 

 Blood pressure systolic  125 mmHg  2017

 

 Blood pressure diastolic  81 mmHg  2017







MEDICATIONS







 Medication  Instructions  Dosage  Frequency  Start Date  End Date  Duration  
Status

 

 Probiotic                    Active

 

 tramadol 50 mg  by oral route 3 times a day  1 tablet as needed  8h       28 days  Active

 

 ProAir  (90 Base) MCG/ACT  Inhalation every 4 hrs  2 puffs as needed  
4h  08 Aug, 2015        Active

 

 Fiber                    Active

 

 Azithromycin 250 MG  Orally Once a day  2 tablets  on the first day, then 1 
tablet daily for 4 days  24h    5 day(s)  Active

 

 Alprazolam 1 MG  Orally Twice a day, and 1 tablet at bedtime  0.5 Tablet as 
needed     03 Mar, 2015     28 days  Active

 

 Fluoxetine HCl 20 MG     TAKE 3 CAPSULES BY MOUTH ONCE DAILY           30  
Active

 

 Flonase Allergy Relief 50 MCG/ACT  Nasally 2 times a day  1 spray in each 
nostril  12h  25 2016        Active

 

 Propranolol HCl 20 MG     1 tablet Twice a day Orally           90  Active

 

 Bentyl 20 mg  Orally 4 times a day before meals and bedtime  1 tablet     04 
Aug, 2016        Active







RESULTS

No Results



PROCEDURES







 Procedure  Date Ordered  Related Diagnosis  Body Site

 

 Office Visit, Est Pt., Level 3  2017      







IMMUNIZATIONS

No Known Immunizations

## 2017-09-26 NOTE — XMS REPORT
[facility]

 Created on: 2017



Shereen Shafer

External Reference #: [id number]

: 1960

Sex: Female



Demographics







 Address  [address], [place]  [address]

 

 Preferred Language  Unknown

 

 Marital Status  Unknown

 

 Evangelical Affiliation  Unknown

 

 Race  Unknown

 

 Ethnic Group  Unknown





Author







 Author  JAVON DE LOS SANTOS

 

 [facility]

 

 Address  [address] [place]  [address]



 

 Phone  [phone]







Care Team Providers







 Care Team Member Name  Role  Phone

 

 JAVON DE LOS SANTOS  Unavailable  [phone]







PROBLEMS







 Type  Condition  ICD9-CM Code  UGE83-ND Code  Onset Dates  Condition Status  
SNOMED Code

 

 Problem  Anxiety state, unspecified  300.00        Active  892844319

 

 Problem  Esophageal reflux  530.81        Active  555296002

 

 Problem  Abdominal pain, right lower quadrant  789.03        Active  722893124

 

 Problem  Plantar fascial fibromatosis  728.71        Active  49464713

 

 Problem  Symptomatic menopausal or female climacteric states  627.2        
Active  34544112

 

 Problem  Overweight  278.02        Active  089609557

 

 Problem  Tension headache  307.81        Active  466700135

 

 Problem  Pain in soft tissues of limb  729.5        Active  78594094

 

 Problem  Persistent disorder of initiating or maintaining sleep  307.42        
Active  25236904

 

 Problem  Personal history of tobacco use, presenting hazards to health  V15.82
        Active  [id number]

 

 Problem  Anxiety disorder, unspecified     F41.9     Active  112024771

 

 Problem  Hypertension     I10     Active  65595334

 

 Problem  Unspecified alopecia  704.00        Active  89180963

 

 Problem  Family history of diabetes mellitus  V18.0        Active  729311457

 

 Problem  Chest pain, unspecified  786.50        Active  36615673

 

 Problem  Eustachian tube dysfunction     H69.80     Active  22634482

 

 Problem  Other seborrheic keratosis  702.19        Active  367799829

 

 Problem  Dysthymia     F34.1     Active  19425468

 

 Problem  Knee pain     M25.569     Active  16457602

 

 Problem  Unspecified otalgia  388.70        Active  58923615

 

 Problem  Other psoriasis  696.1        Active  4607629

 

 Problem  Palpitations  785.1        Active  76294606

 

 Problem  Other dyspnea and respiratory abnormalities  786.09        Active  
292390444

 

 Problem  Screening for malignant neoplasm of the cervix  V76.2        Active  
196391947

 

 Problem  Cellulitis and abscess of unspecified site  682.9        Active  
086481264

 

 Problem  Unspecified breast screening  V76.10        Active  639095733

 

 Problem  Special screening examination, human papillomavirus [HPV]  V73.81    
    Active  320700348







ALLERGIES

Unknown Allergies



SOCIAL HISTORY

No smoking Hx information available



PLAN OF CARE





VITAL SIGNS





MEDICATIONS







 Medication  Instructions  Dosage  Frequency  Start Date  End Date  Duration  
Status

 

 Alprazolam 1 MG  Orally Twice a day, and 1 tablet at bedtime  0.5 Tablet as 
needed     03 Mar, 2015        Active







RESULTS

No Results



PROCEDURES

No Known procedures



IMMUNIZATIONS

No Known Immunizations

## 2017-09-26 NOTE — XMS REPORT
[facility]

 Created on: 2016



Shereen Shafer

External Reference #: [id number]

: 1960

Sex: Female



Demographics







 Address  [address] [place] [address]  [address]

 

 Home Phone  [phone]

 

 Preferred Language  Unknown

 

 Marital Status  Unknown

 

 Christian Affiliation  Unknown

 

 Race  White

 

 Ethnic Group  Not  or 





Author







 Author  JAVON DE LOS SANTOS

 

 Organization  [employer]

 

 Address  Unknown

 

 Phone  Unavailable







Care Team Providers







 Care Team Member Name  Role  Phone

 

 JAVON DE LOS SANTOS  CP  Unavailable



                                                                



Allergies

          No Known Allergies                                                   
                                     



Problems

          





 Problem Type  Condition  Code  Onset Dates  Condition Status

 

 Assessment  Anxiety disorder, unspecified  F41.9     Active

 

 Problem  Anxiety state, unspecified  300.00     Active

 

 Problem  Esophageal reflux  530.81     Active

 

 Problem  Cellulitis and abscess of unspecified site  682.9     Active

 

 Problem  Abdominal pain, right lower quadrant  789.03     Active

 

 Problem  Plantar fascial fibromatosis  728.71     Active

 

 Problem  Symptomatic menopausal or female climacteric states  627.2     Active

 

 Problem  Overweight  278.02     Active

 

 Problem  Personal history of tobacco use, presenting hazards to health  V15.82
     Active

 

 Problem  Pain in soft tissues of limb  729.5     Active

 

 Problem  Hypertension  I10     Active

 

 Problem  Dysthymia  F34.1     Active

 

 Problem  Family history of diabetes mellitus  V18.0     Active

 

 Problem  Chest pain, unspecified  786.50     Active

 

 Problem  Anxiety disorder, unspecified  F41.9     Active

 

 Problem  Tension headache  307.81     Active

 

 Problem  Other seborrheic keratosis  702.19     Active

 

 Problem  Persistent disorder of initiating or maintaining sleep  307.42     
Active

 

 Problem  Knee pain  M25.569     Active

 

 Problem  Eustachian tube dysfunction  H69.80     Active

 

 Problem  Other dyspnea and respiratory abnormalities  786.09     Active

 

 Problem  Unspecified otalgia  388.70     Active

 

 Problem  Unspecified alopecia  704.00     Active

 

 Problem  Palpitations  785.1     Active

 

 Problem  Special screening examination, human papillomavirus [HPV]  V73.81     
Active

 

 Problem  Screening for malignant neoplasm of the cervix  V76.2     Active

 

 Problem  Other psoriasis  696.1     Active

 

 Problem  Unspecified breast screening  V76.10     Active



                                                                               
                                                                               
                                                                               
                                                                               
                                          



Medications

          





 Medication  Code System  Code  Instructions  Start Date  End Date  Status  
Dosage

 

 Alprazolam  NDC  [address]-10  1 MG Orally Twice a day, and 1 tablet at 
bedtime  2015        0.5 Tablet as needed



                                                                              



Results

          No Known Results                                                     
               



Summary Purpose

          eClinicalWorks Submission

## 2017-09-26 NOTE — XMS REPORT
Continuity of Care Document

 Created on: 2017



TAMEKA HOLM

External Reference #: [id number]

: 1960

Sex: Female



Demographics







 Address  [address] [place]  [address]

 

 Home Phone  [phone] x

 

 Preferred Language  Unknown

 

 Marital Status  Unknown

 

 Oriental orthodox Affiliation  Unknown

 

 Race  Unknown

 

 Ethnic Group  Unknown





Author







 Author  [facility]

 

 Organization  [facility]

 

 Address  Unknown

 

 Phone  Unavailable



                                                      



Allergies

                      





 Active                    Description                    Code                  
  Type                    Severity                    Reaction                  
  Onset                    Reported/Identified                    Relationship 
to Patient                    Clinical Status                

 

 Yes                    No Known Drug Allergies                    [id number]  
                  Drug Allergy                    Unknown                    N/
A                                         10/30/2014                           
                               



                                                                               
         



Medications

                                                                               
         



Problems

                      





 Date Dx Coded                    Attending                    Type            
        Code                    Diagnosis                    Diagnosed By      
          

 

 2008                    JAVON CHILEL                          
               401.1                    HYPERTENSION, BENIGN ESSENTIAL         
                            

 

 2008                    JAVON CHILEL S                          
               V58.69                    MEDICATION HIGH RISK                  
                   

 

 2008                                                              401.1 
                   HYPERTENSION, BENIGN ESSENTIAL                              
       

 

 2008                                                              V58.69
                    MEDICATION HIGH RISK                                     

 

 2008                    MARISELA CHILELA S                          
               401.1                    HYPERTENSION, BENIGN ESSENTIAL         
                            

 

 2008                    MARISELA CHILELA S                          
               V58.69                    MEDICATION HIGH RISK                  
                   

 

 2008                                                              401.1 
                   HYPERTENSION, BENIGN ESSENTIAL                              
       

 

 2008                                                              V58.69
                    MEDICATION HIGH RISK                                     

 

 2008                                                              401.1 
                   HYPERTENSION, BENIGN ESSENTIAL                              
       

 

 2008                                                              V58.69
                    MEDICATION HIGH RISK                                     

 

 2008                    ACEVES DO CECY K                                
         401.1                    HYPERTENSION, BENIGN ESSENTIAL               
                      

 

 2008                    ACEVES DO CECY K                                
         V58.69                    MEDICATION HIGH RISK                        
             

 

 2008                    ACEVES DO CECY K                                
         401.1                    HYPERTENSION, BENIGN ESSENTIAL               
                      

 

 2008                    ACEVES DO CECY K                                
         V58.69                    MEDICATION HIGH RISK                        
             

 

 2008                    MARISELA CHILELA S                          
               401.1                    HYPERTENSION, BENIGN ESSENTIAL         
                            

 

 2008                    MARISELA CHILELA S                          
               V58.69                    MEDICATION HIGH RISK                  
                   

 

 2008                    NAN CULLEN MD                                  
       401.1                    HYPERTENSION, BENIGN ESSENTIAL                 
                    

 

 2008                    NAN CULLEN MD                                  
       V58.69                    MEDICATION HIGH RISK                          
           

 

 2008                    ACEVES DO CECY K                                
         401.1                    HYPERTENSION, BENIGN ESSENTIAL               
                      

 

 2008                    ACEVES DO CECY K                                
         V58.69                    MEDICATION HIGH RISK                        
             

 

 2008                    MARISELA CHILELA S                          
               401.1                    HYPERTENSION, BENIGN ESSENTIAL         
                            

 

 2008                    FILIBERTORYLAN DELANEYHECTOR JAVON S                          
               V58.69                    MEDICATION HIGH RISK                  
                   

 

 2009                    FILIBERTORYLAN DELANEYREMA YOUNGNDA S                          
               528.9                    DISEASES OF THE ORAL SOFT TISSUES (
EXCEPT GINGIVA, TONGUE)                                     

 

 2009                    FILIBERTO REMA MYERSNDA S                          
               625.6                    FEMALE STRESS INCONTINENCE             
                        

 

 2009                    FILIBERTO REMA MYERSNDA S                          
               787.91                    diarrhea                              
       

 

 2009                    FILIBERTORYLAN DELANEYHECTOR JAVON S                          
               V72.3                    GYNECOLOGICAL EXAMINATION              
                       

 

 2009                                                              528.9 
                   DISEASES OF THE ORAL SOFT TISSUES (EXCEPT GINGIVA, TONGUE)  
                                   

 

 2009                                                              625.6 
                   FEMALE STRESS INCONTINENCE                                  
   

 

 2009                                                              787.91
                    diarrhea                                     

 

 2009                                                              V72.3 
                   GYNECOLOGICAL EXAMINATION                                   
  

 

 2009                    FILIBERTO REMA MYERSNDA S                          
               528.9                    DISEASES OF THE ORAL SOFT TISSUES (
EXCEPT GINGIVA, TONGUE)                                     

 

 2009                    MARISELA CHILELA S                          
               625.6                    FEMALE STRESS INCONTINENCE             
                        

 

 2009                    FILIBERTO APRREMA YOUNGNDA S                          
               787.91                    diarrhea                              
       

 

 2009                    FILIBERTORYLAN DELANEYHECTOR JAVON S                          
               V72.3                    GYNECOLOGICAL EXAMINATION              
                       

 

 2009                                                              528.9 
                   DISEASES OF THE ORAL SOFT TISSUES (EXCEPT GINGIVA, TONGUE)  
                                   

 

 2009                                                              625.6 
                   FEMALE STRESS INCONTINENCE                                  
   

 

 2009                                                              787.91
                    diarrhea                                     

 

 2009                                                              V72.3 
                   GYNECOLOGICAL EXAMINATION                                   
  

 

 2009                                                              528.9 
                   DISEASES OF THE ORAL SOFT TISSUES (EXCEPT GINGIVA, TONGUE)  
                                   

 

 2009                                                              625.6 
                   FEMALE STRESS INCONTINENCE                                  
   

 

 2009                                                              787.91
                    DIARRHEA                                     

 

 2009                                                              V72.3 
                   GYNECOLOGICAL EXAMINATION                                   
  

 

 2009                    ACEVES DO, CECY K                                
         528.9                    DISEASES OF THE ORAL SOFT TISSUES (EXCEPT 
GINGIVA, TONGUE)                                     

 

 2009                    ACEVES DO, CECY K                                
         625.6                    FEMALE STRESS INCONTINENCE                   
                  

 

 2009                    ACEVES DO, CECY K                                
         787.91                    DIARRHEA                                     

 

 2009                    ACEVES DO, CECY K                                
         V72.3                    GYNECOLOGICAL EXAMINATION                    
                 

 

 2009                    ACEVES DO, CECY K                                
         528.9                    DISEASES OF THE ORAL SOFT TISSUES (EXCEPT 
GINGIVA, TONGUE)                                     

 

 2009                    ACEVES DO, CECY K                                
         625.6                    FEMALE STRESS INCONTINENCE                   
                  

 

 2009                    ACEVES DO, CECY K                                
         787.91                    DIARRHEA                                     

 

 2009                    ACEVES DO, CECY K                                
         V72.3                    GYNECOLOGICAL EXAMINATION                    
                 

 

 2009                    REMA CHILELNDA S                          
               528.9                    DISEASES OF THE ORAL SOFT TISSUES (
EXCEPT GINGIVA, TONGUE)                                     

 

 2009                    FILIBERTO MYERS JAVON S                          
               625.6                    FEMALE STRESS INCONTINENCE             
                        

 

 2009                    FILIBERTO MYERS JAVON S                          
               787.91                    DIARRHEA                              
       

 

 2009                    FILIBERTO MYERS JAVON S                          
               V72.3                    GYNECOLOGICAL EXAMINATION              
                       

 

 2009                    ALYSE HODGES, ALI                                  
       528.9                    DISEASES OF THE ORAL SOFT TISSUES (EXCEPT 
GINGIVA, TONGUE)                                     

 

 2009                    NAN CULLEN MD                                  
       625.6                    FEMALE STRESS INCONTINENCE                     
                

 

 2009                    ALYSE HODGES ALI                                  
       787.91                    DIARRHEA                                     

 

 2009                    ALYSE HODGES ALI                                  
       V72.3                    GYNECOLOGICAL EXAMINATION                      
               

 

 2009                    ACEVES DO, CECY K                                
         528.9                    DISEASES OF THE ORAL SOFT TISSUES (EXCEPT 
GINGIVA, TONGUE)                                     

 

 2009                    ACEVES DO CECY K                                
         625.6                    FEMALE STRESS INCONTINENCE                   
                  

 

 2009                    ACEVES DO, CECY K                                
         787.91                    DIARRHEA                                     

 

 2009                    ACEVES DO, CECY K                                
         V72.3                    GYNECOLOGICAL EXAMINATION                    
                 

 

 2009                    REMA CHILELNDA S                          
               528.9                    DISEASES OF THE ORAL SOFT TISSUES (
EXCEPT GINGIVA, TONGUE)                                     

 

 2009                    REMA CHILELNDA S                          
               625.6                    FEMALE STRESS INCONTINENCE             
                        

 

 2009                    REMA HCILELNDA S                          
               787.91                    DIARRHEA                              
       

 

 2009                    FILIBERTO MYERS JAVON S                          
               V72.3                    GYNECOLOGICAL EXAMINATION              
                       

 

 04/15/2009                    REMA CHILELNDA S                          
               388.30                    TINNITUS UNSPECIFIED                  
                   

 

 04/15/2009                                                              388.30
                    TINNITUS UNSPECIFIED                                     

 

 04/15/2009                    FILIBERTO MYERS JAVON S                          
               388.30                    TINNITUS UNSPECIFIED                  
                   

 

 04/15/2009                                                              388.30
                    TINNITUS UNSPECIFIED                                     

 

 04/15/2009                                                              388.30
                    TINNITUS UNSPECIFIED                                     

 

 04/15/2009                    ACEVES DO CECY K                                
         388.30                    TINNITUS UNSPECIFIED                        
             

 

 04/15/2009                    ACEVES DO CECY K                                
         388.30                    TINNITUS UNSPECIFIED                        
             

 

 04/15/2009                    REMA CHILELNDA S                          
               388.30                    TINNITUS UNSPECIFIED                  
                   

 

 04/15/2009                    ALYSE HODGES, NAN                                  
       388.30                    TINNITUS UNSPECIFIED                          
           

 

 04/15/2009                    ACEVES DO, CECY K                                
         388.30                    TINNITUS UNSPECIFIED                        
             

 

 04/15/2009                    REMA CHILELNDA S                          
               388.30                    TINNITUS UNSPECIFIED                  
                   

 

 2009                    REMA CHILELNDA S                          
               706.2                    SEBACEOUS CYST                         
            

 

 2009                    REMA CHILELNDA S                          
               729.5                    PAIN IN LIMB                           
          

 

 2009                                                              706.2 
                   SEBACEOUS CYST                                     

 

 2009                                                              729.5 
                   PAIN IN LIMB                                     

 

 2009                    REMA CHILELNDA S                          
               706.2                    SEBACEOUS CYST                         
            

 

 2009                    REMA CHILELNDA S                          
               729.5                    PAIN IN LIMB                           
          

 

 2009                                                              706.2 
                   SEBACEOUS CYST                                     

 

 2009                                                              729.5 
                   PAIN IN LIMB                                     

 

 2009                                                              706.2 
                   SEBACEOUS CYST                                     

 

 2009                                                              729.5 
                   PAIN IN LIMB                                     

 

 2009                    ACEVES DO, CECY K                                
         706.2                    SEBACEOUS CYST                               
      

 

 2009                    ACEVES DO, CECY K                                
         729.5                    PAIN IN LIMB                                 
    

 

 2009                    ACEVES DO, CECY K                                
         706.2                    SEBACEOUS CYST                               
      

 

 2009                    ACEVES DO, CECY K                                
         729.5                    PAIN IN LIMB                                 
    

 

 2009                    REMA CHILELNDA S                          
               706.2                    SEBACEOUS CYST                         
            

 

 2009                    REMA CHILELNDA S                          
               729.5                    PAIN IN LIMB                           
          

 

 2009                    NAN CULLEN MD                                  
       706.2                    SEBACEOUS CYST                                 
    

 

 2009                    NAN CULLEN MD                                  
       729.5                    PAIN IN LIMB                                   
  

 

 2009                    ACEVES DO, CECY K                                
         706.2                    SEBACEOUS CYST                               
      

 

 2009                    ACEVES DO, CECY K                                
         729.5                    PAIN IN LIMB                                 
    

 

 2009                    REMA CHILELNDA S                          
               706.2                    SEBACEOUS CYST                         
            

 

 2009                    REMA CHILELNDA S                          
               729.5                    PAIN IN LIMB                           
          

 

 2010                    REMA CHILELNDA S                          
               333.94                    RESTLESS LEGS SYNDROME (RLS)          
                           

 

 2010                                                              333.94
                    RESTLESS LEGS SYNDROME (RLS)                               
      

 

 2010                    JAVON CHILEL S                          
               333.94                    RESTLESS LEGS SYNDROME (RLS)          
                           

 

 2010                                                              333.94
                    RESTLESS LEGS SYNDROME (RLS)                               
      

 

 2010                                                              333.94
                    RESTLESS LEGS SYNDROME (RLS)                               
      

 

 2010                    CECY ACEVES DO K                                
         333.94                    RESTLESS LEGS SYNDROME (RLS)                
                     

 

 2010                    CECY ACEVES DO K                                
         333.94                    RESTLESS LEGS SYNDROME (RLS)                
                     

 

 2010                    JAVON CHILEL S                          
               333.94                    RESTLESS LEGS SYNDROME (RLS)          
                           

 

 2010                    NAN CULLEN MD                                  
       333.94                    RESTLESS LEGS SYNDROME (RLS)                  
                   

 

 2010                    CECY ACEVES DO K                                
         333.94                    RESTLESS LEGS SYNDROME (RLS)                
                     

 

 2010                    JAVON CHILEL S                          
               333.94                    RESTLESS LEGS SYNDROME (RLS)          
                           

 

 2010                    JAVON CHILEL S                          
               681.10                    CELLULITIS AND ABSCESS OF TOE, 
UNSPECIFIED                                     

 

 2010                                                              681.10
                    CELLULITIS AND ABSCESS OF TOE, UNSPECIFIED                 
                    

 

 2010                    JAVON CHILEL S                          
               681.10                    CELLULITIS AND ABSCESS OF TOE, 
UNSPECIFIED                                     

 

 2010                                                              681.10
                    CELLULITIS AND ABSCESS OF TOE, UNSPECIFIED                 
                    

 

 2010                                                              681.10
                    CELLULITIS AND ABSCESS OF TOE, UNSPECIFIED                 
                    

 

 2010                    CECY ACEVES DO                                
         681.10                    CELLULITIS AND ABSCESS OF TOE, UNSPECIFIED  
                                   

 

 2010                    CECY ACEVES DO K                                
         681.10                    CELLULITIS AND ABSCESS OF TOE, UNSPECIFIED  
                                   

 

 2010                    JAVON CHILEL S                          
               681.10                    CELLULITIS AND ABSCESS OF TOE, 
UNSPECIFIED                                     

 

 2010                    NAN CULLEN MD                                  
       681.10                    CELLULITIS AND ABSCESS OF TOE, UNSPECIFIED    
                                 

 

 2010                    CECY ACEVES DO K                                
         681.10                    CELLULITIS AND ABSCESS OF TOE, UNSPECIFIED  
                                   

 

 2010                    JAVON CHILEL S                          
               681.10                    CELLULITIS AND ABSCESS OF TOE, 
UNSPECIFIED                                     

 

 05/10/2011                    JAVON CHILEL                          
               461.9                    SINUSITIS ACUTE                        
             

 

 05/10/2011                    JAVON CHILEL                          
               525.9                    UNSPECIFIED DISORDER OF THE TEETH AND 
SUPPORTING STRUCTURES                                     

 

 05/10/2011                    FILIBERTO APRN, JAVON S                          
               787.02                    NAUSEA ALONE                          
           

 

 05/10/2011                                                              461.9 
                   SINUSITIS ACUTE                                     

 

 05/10/2011                                                              525.9 
                   UNSPECIFIED DISORDER OF THE TEETH AND SUPPORTING STRUCTURES 
                                    

 

 05/10/2011                                                              787.02
                    NAUSEA ALONE                                     

 

 05/10/2011                    JAVON CHILEL S                          
               461.9                    SINUSITIS ACUTE                        
             

 

 05/10/2011                    JAVON CHILEL S                          
               525.9                    UNSPECIFIED DISORDER OF THE TEETH AND 
SUPPORTING STRUCTURES                                     

 

 05/10/2011                    JAVON CHILEL S                          
               787.02                    NAUSEA ALONE                          
           

 

 05/10/2011                                                              461.9 
                   SINUSITIS ACUTE                                     

 

 05/10/2011                                                              525.9 
                   UNSPECIFIED DISORDER OF THE TEETH AND SUPPORTING STRUCTURES 
                                    

 

 05/10/2011                                                              787.02
                    NAUSEA ALONE                                     

 

 05/10/2011                                                              461.9 
                   SINUSITIS ACUTE                                     

 

 05/10/2011                                                              525.9 
                   UNSPECIFIED DISORDER OF THE TEETH AND SUPPORTING STRUCTURES 
                                    

 

 05/10/2011                                                              787.02
                    NAUSEA ALONE                                     

 

 05/10/2011                    TROY ACEVES DOA K                                
         461.9                    SINUSITIS ACUTE                              
       

 

 05/10/2011                    CECY ACEVES DO K                                
         525.9                    UNSPECIFIED DISORDER OF THE TEETH AND 
SUPPORTING STRUCTURES                                     

 

 05/10/2011                    TROY ACEVES DOA K                                
         787.02                    NAUSEA ALONE                                
     

 

 05/10/2011                    TROY ACEVES DOA K                                
         461.9                    SINUSITIS ACUTE                              
       

 

 05/10/2011                    TROY ACEVES DOA K                                
         525.9                    UNSPECIFIED DISORDER OF THE TEETH AND 
SUPPORTING STRUCTURES                                     

 

 05/10/2011                    TROY ACEVES DOA K                                
         787.02                    NAUSEA ALONE                                
     

 

 05/10/2011                    JAVON CHILEL S                          
               461.9                    SINUSITIS ACUTE                        
             

 

 05/10/2011                    JAVON CHILEL S                          
               525.9                    UNSPECIFIED DISORDER OF THE TEETH AND 
SUPPORTING STRUCTURES                                     

 

 05/10/2011                    JAVON CHILEL S                          
               787.02                    NAUSEA ALONE                          
           

 

 05/10/2011                    NAN CULLEN MD                                  
       461.9                    SINUSITIS ACUTE                                
     

 

 05/10/2011                    NAN CULLEN MD                                  
       525.9                    UNSPECIFIED DISORDER OF THE TEETH AND 
SUPPORTING STRUCTURES                                     

 

 05/10/2011                    NAN CULLEN MD                                  
       787.02                    NAUSEA ALONE                                  
   

 

 05/10/2011                    KETAN MORENO CECY K                                
         461.9                    SINUSITIS ACUTE                              
       

 

 05/10/2011                    KETAN MORNEO CECY K                                
         525.9                    UNSPECIFIED DISORDER OF THE TEETH AND 
SUPPORTING STRUCTURES                                     

 

 05/10/2011                    TROY ACEVES DOA K                                
         787.02                    NAUSEA ALONE                                
     

 

 05/10/2011                    JAVON CHILEL S                          
               461.9                    SINUSITIS ACUTE                        
             

 

 05/10/2011                    JAVON CHILEL S                          
               525.9                    UNSPECIFIED DISORDER OF THE TEETH AND 
SUPPORTING STRUCTURES                                     

 

 05/10/2011                    MARISELA CHILELA S                          
               787.02                    NAUSEA ALONE                          
           

 

 2011                    MARISELA CHILELA S                          
               305.1                    current smoker                         
            

 

 2011                    JAVON CHILEL S                          
               382.00                    OTITIS MEDIA ACUTE SUPPURATIVE RIGHT 
EAR                                     

 

 2011                                                              305.1 
                   current smoker                                     

 

 2011                                                              382.00
                    OTITIS MEDIA ACUTE SUPPURATIVE RIGHT EAR                   
                  

 

 2011                    MARISELA CHILELA S                          
               305.1                    current smoker                         
            

 

 2011                    MARISELA CHILELA S                          
               382.00                    OTITIS MEDIA ACUTE SUPPURATIVE RIGHT 
EAR                                     

 

 2011                                                              305.1 
                   current smoker                                     

 

 2011                                                              382.00
                    OTITIS MEDIA ACUTE SUPPURATIVE RIGHT EAR                   
                  

 

 2011                                                              305.1 
                   current smoker                                     

 

 2011                                                              382.00
                    OTITIS MEDIA ACUTE SUPPURATIVE RIGHT EAR                   
                  

 

 2011                    KETAN MORENO CECY K                                
         305.1                    current smoker                               
      

 

 2011                    ACEVES DO, CECY K                                
         382.00                    OTITIS MEDIA ACUTE SUPPURATIVE RIGHT EAR    
                                 

 

 2011                    KETAN MORENO CECY K                                
         305.1                    current smoker                               
      

 

 2011                    KETAN MORENO CECY K                                
         382.00                    OTITIS MEDIA ACUTE SUPPURATIVE RIGHT EAR    
                                 

 

 2011                    MARISELA CHILELA S                          
               305.1                    current smoker                         
            

 

 2011                    JAVON CHILEL S                          
               382.00                    OTITIS MEDIA ACUTE SUPPURATIVE RIGHT 
EAR                                     

 

 2011                    NAN CULLEN MD                                  
       305.1                    current smoker                                 
    

 

 2011                    NAN CULLEN MD                                  
       382.00                    OTITIS MEDIA ACUTE SUPPURATIVE RIGHT EAR      
                               

 

 2011                    ACEVES DO CECY K                                
         305.1                    current smoker                               
      

 

 2011                    KETAN MORENO CECY K                                
         382.00                    OTITIS MEDIA ACUTE SUPPURATIVE RIGHT EAR    
                                 

 

 2011                    MARISELA CHILELA S                          
               305.1                    current smoker                         
            

 

 2011                    MARISELA CHILELA S                          
               382.00                    OTITIS MEDIA ACUTE SUPPURATIVE RIGHT 
EAR                                     

 

 2012                    JAVON CHILEL S                          
               300.00                    anxiety                               
      

 

 2012                    MARISELA CHILELA S                          
               530.81                    ESOPHAGEAL REFLUX                     
                

 

 2012                                                              300.00
                    anxiety                                     

 

 2012                                                              530.81
                    ESOPHAGEAL REFLUX                                     

 

 2012                    REMA CHILELNDA S                          
               300.00                    anxiety                               
      

 

 2012                    REMA CHILELNDA S                          
               530.81                    ESOPHAGEAL REFLUX                     
                

 

 2012                                                              300.00
                    anxiety                                     

 

 2012                                                              530.81
                    ESOPHAGEAL REFLUX                                     

 

 2012                                                              300.00
                    anxiety                                     

 

 2012                                                              530.81
                    ESOPHAGEAL REFLUX                                     

 

 2012                    ACEVES DO, CECY K                                
         300.00                    anxiety                                     

 

 2012                    ACEVES DO, CECY K                                
         530.81                    ESOPHAGEAL REFLUX                           
          

 

 2012                    ACEVES DO, CECY K                                
         300.00                    anxiety                                     

 

 2012                    ACEVES DO, CECY K                                
         530.81                    ESOPHAGEAL REFLUX                           
          

 

 2012                    REMA CHILELNDA S                          
               300.00                    anxiety                               
      

 

 2012                    REMA CHILELNDA S                          
               530.81                    ESOPHAGEAL REFLUX                     
                

 

 2012                    NAN CULLEN MD                                  
       300.00                    anxiety                                     

 

 2012                    NAN CULLEN MD                                  
       530.81                    ESOPHAGEAL REFLUX                             
        

 

 2012                    ACEVES DO, CECY K                                
         300.00                    anxiety                                     

 

 2012                    ACEVES DO, CECY K                                
         530.81                    ESOPHAGEAL REFLUX                           
          

 

 2012                    REMA CHILELNDA S                          
               300.00                    anxiety                               
      

 

 2012                    MARISELA CHILELA S                          
               530.81                    ESOPHAGEAL REFLUX                     
                

 

 2012                    MARISELA CHILELA S                          
               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            
                         

 

 2012                                                              702.19
                    OTHER SEBORRHEIC KERATOSIS                                 
    

 

 2012                    JAVON CHILEL S                          
               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            
                         

 

 2012                                                              702.19
                    OTHER SEBORRHEIC KERATOSIS                                 
    

 

 2012                                                              702.19
                    OTHER SEBORRHEIC KERATOSIS                                 
    

 

 2012                    ACEVES DO, CECY K                                
         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                  
                   

 

 2012                    ACEVES DO, CECY K                                
         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                  
                   

 

 2012                    MARISELA CHILELA S                          
               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            
                         

 

 2012                    NAN CULLEN MD                                  
       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                    
                 

 

 2012                    KETAN MORENO CECY K                                
         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                  
                   

 

 2012                    FILIBERTO APRN, JAVON S                          
               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            
                         

 

 2013                    FILIBERTO APRN, JAVON S                          
               682.9                    CELLULITIS AND ABSCESS OF UNSPECIFIED 
SITES                                     

 

 2013                    FILIBERTO APRHECTOR JAVON S                          
               728.71                    PLANTAR FASCIAL FIBROMATOSIS          
                           

 

 2013                                                              682.9 
                   CELLULITIS AND ABSCESS OF UNSPECIFIED SITES                 
                    

 

 2013                                                              728.71
                    PLANTAR FASCIAL FIBROMATOSIS                               
      

 

 2013                                                              682.9 
                   CELLULITIS AND ABSCESS OF UNSPECIFIED SITES                 
                    

 

 2013                                                              728.71
                    PLANTAR FASCIAL FIBROMATOSIS                               
      

 

 2013                    KETAN MORENO CECY K                                
         682.9                    CELLULITIS AND ABSCESS OF UNSPECIFIED SITES  
                                   

 

 2013                    KETAN MORENO CECY K                                
         728.71                    PLANTAR FASCIAL FIBROMATOSIS                
                     

 

 2013                    TROY ACEVES DOA K                                
         682.9                    CELLULITIS AND ABSCESS OF UNSPECIFIED SITES  
                                   

 

 2013                    TROY ACEVES DOA K                                
         728.71                    PLANTAR FASCIAL FIBROMATOSIS                
                     

 

 2013                    FILIBERTO APRHECTOR JAVON S                          
               682.9                    CELLULITIS AND ABSCESS OF UNSPECIFIED 
SITES                                     

 

 2013                    FILIBERTO APRHECTOR JAVON S                          
               728.71                    PLANTAR FASCIAL FIBROMATOSIS          
                           

 

 2013                    NAN CULLEN MD                                  
       682.9                    CELLULITIS AND ABSCESS OF UNSPECIFIED SITES    
                                 

 

 2013                    NAN CULLEN MD                                  
       728.71                    PLANTAR FASCIAL FIBROMATOSIS                  
                   

 

 2013                    TROY ACEVES DOA K                                
         682.9                    CELLULITIS AND ABSCESS OF UNSPECIFIED SITES  
                                   

 

 2013                    KETAN MORENO CECY K                                
         728.71                    PLANTAR FASCIAL FIBROMATOSIS                
                     

 

 2013                    FILIBERTO APRHECTRO JAVON S                          
               682.9                    CELLULITIS AND ABSCESS OF UNSPECIFIED 
SITES                                     

 

 2013                    FILIBERTO APRHECTOR JAVON S                          
               728.71                    PLANTAR FASCIAL FIBROMATOSIS          
                           

 

 2014                    CECY ACEVES DO K                                
         388.70                    OTALGIA                                     

 

 2014                    TROY ACEVES DOA K                                
         696.1                    PSORIASIS                                     

 

 2014                    CECY ACEVES DO K                                
         V73.81                    HPV SCREENING                               
      

 

 2014                    ACEVES DO, CECY K                                
         V76.10                    BREAST CANCER SCREENING                     
                

 

 2014                    ACEVES DO, CECY K                                
         V76.2                    CERVICAL CANCER SCREENING (PAP SMEAR)        
                             

 

 2014                    ACEVES DO, CECY K                                
         388.70                    OTALGIA                                     

 

 2014                    ACEVES DO, CECY K                                
         696.1                    PSORIASIS                                     

 

 2014                    ACEVES DO, CECY K                                
         V73.81                    HPV SCREENING                               
      

 

 2014                    ACEVES DO, CECY K                                
         V76.10                    BREAST CANCER SCREENING                     
                

 

 2014                    ACEVES DO, CECY K                                
         V76.2                    CERVICAL CANCER SCREENING (PAP SMEAR)        
                             

 

 2014                    FILIBERTO APRHECTOR JAVON S                          
               388.70                    OTALGIA                               
      

 

 2014                    FILIBERTO APRHECTOR JAVON S                          
               696.1                    PSORIASIS                              
       

 

 2014                    FILIBERTO APRHECTOR JAVON S                          
               V73.81                    HPV SCREENING                         
            

 

 2014                    FILIBERTO APRHECTOR JAVON S                          
               V76.10                    BREAST CANCER SCREENING               
                      

 

 2014                    FILIBERTO MYERS JAVON S                          
               V76.2                    CERVICAL CANCER SCREENING (PAP SMEAR)  
                                   

 

 2014                    ALYSE HODGES, ALI                                  
       388.70                    OTALGIA                                     

 

 2014                    ALYSE HODGES, NAN                                  
       696.1                    PSORIASIS                                     

 

 2014                    ALYSE HODGES, NAN                                  
       V73.81                    HPV SCREENING                                 
    

 

 2014                    ALYSE HODGES, NAN                                  
       V76.10                    BREAST CANCER SCREENING                       
              

 

 2014                    ALYSE HODGES, ALI                                  
       V76.2                    CERVICAL CANCER SCREENING (PAP SMEAR)          
                           

 

 2014                    ACEVES DO, CECY K                                
         388.70                    OTALGIA                                     

 

 2014                    ACEVES DO, CECY K                                
         696.1                    PSORIASIS                                     

 

 2014                    ACEVES DO, CECY K                                
         V73.81                    HPV SCREENING                               
      

 

 2014                    ACEVES DO, CECY K                                
         V76.10                    BREAST CANCER SCREENING                     
                

 

 2014                    ACEVES DO, CECY K                                
         V76.2                    CERVICAL CANCER SCREENING (PAP SMEAR)        
                             

 

 2014                    FILIBERTO APRHECTOR JAVON S                          
               388.70                    OTALGIA                               
      

 

 2014                    FILIBERTO APRHECTOR JAVON S                          
               696.1                    PSORIASIS                              
       

 

 2014                    FILIBERTO APRHECTOR JAVON S                          
               V73.81                    HPV SCREENING                         
            

 

 2014                    FILIBERTO APRHECTOR JAVON S                          
               V76.10                    BREAST CANCER SCREENING               
                      

 

 2014                    FILIBERTO APRN, JAVON S                          
               V76.2                    CERVICAL CANCER SCREENING (PAP SMEAR)  
                                   

 

 2014                    KETAN MORENO CECY K                                
         307.81                    TENSION HEADACHE                            
         

 

 2014                    KETAN MORENO CECY K                                
         704.00                    ALOPECIA UNSPECIFIED                        
             

 

 2014                    KETAN MORENO CECY K                                
         786.50                    CHEST PAIN                                  
   

 

 2014                    KETAN MORENO CECY K                                
         V18.0                    FAMILY HISTORY OF DIABETES MELLITUS          
                           

 

 2014                    REMA CHILELNDA S                          
               307.81                    TENSION HEADACHE                      
               

 

 2014                    REMA CHILELNDA S                          
               704.00                    ALOPECIA UNSPECIFIED                  
                   

 

 2014                    REMA CHILELNDA S                          
               786.50                    CHEST PAIN                            
         

 

 2014                    REMA CHILELNDA S                          
               V18.0                    FAMILY HISTORY OF DIABETES MELLITUS    
                                 

 

 2014                    NAN CULLEN MD                                  
       307.81                    TENSION HEADACHE                              
       

 

 2014                    ALYSE HODGES, NAN                                  
       704.00                    ALOPECIA UNSPECIFIED                          
           

 

 2014                    ALYSE HODGES, NAN                                  
       786.50                    CHEST PAIN                                     

 

 2014                    ALYSE HODGES, ALI                                  
       V18.0                    FAMILY HISTORY OF DIABETES MELLITUS            
                         

 

 2014                    KETAN TROY MORENOA K                                
         307.81                    TENSION HEADACHE                            
         

 

 2014                    KETAN MORENO CECY K                                
         704.00                    ALOPECIA UNSPECIFIED                        
             

 

 2014                    KETAN MORENO CECY K                                
         786.50                    CHEST PAIN                                  
   

 

 2014                    KETAN MORENO CECY K                                
         V18.0                    FAMILY HISTORY OF DIABETES MELLITUS          
                           

 

 2014                    REMA CHILELNDA S                          
               307.81                    TENSION HEADACHE                      
               

 

 2014                    REMA CHILELNDA S                          
               704.00                    ALOPECIA UNSPECIFIED                  
                   

 

 2014                    REMA CHILELNDA S                          
               786.50                    CHEST PAIN                            
         

 

 2014                    REMA CHILELNDA S                          
               V18.0                    FAMILY HISTORY OF DIABETES MELLITUS    
                                 

 

 09/10/2014                    REMA CHILELNDA S                          
               278.02                    OVERWEIGHT                            
         

 

 09/10/2014                    REMA CHILELNDA S                          
               307.42                    INSOMNIA, PSYCHOPHYSIOLOGICAL         
                            

 

 09/10/2014                    REMA CHILELNDA S                          
               729.5                    PAIN- LEG                              
       

 

 09/10/2014                    REMA CHILELNDA S                          
               V15.82                    NICOTINE ABUSE                        
             

 

 09/10/2014                    ALYSE HODGES, ALI                                  
       278.02                    OVERWEIGHT                                     

 

 09/10/2014                    NAN CULLEN MD                                  
       307.42                    INSOMNIA, PSYCHOPHYSIOLOGICAL                 
                    

 

 09/10/2014                    NAN CULLEN MD                                  
       729.5                    PAIN- LEG                                     

 

 09/10/2014                    NAN CULLEN MD                                  
       V15.82                    NICOTINE ABUSE                                
     

 

 09/10/2014                    KETAN MORENO CECY K                                
         278.02                    OVERWEIGHT                                  
   

 

 09/10/2014                    KETAN MORENO CECY K                                
         307.42                    INSOMNIA, PSYCHOPHYSIOLOGICAL               
                      

 

 09/10/2014                    KETAN MORENO CECY K                                
         729.5                    PAIN- LEG                                     

 

 09/10/2014                    ACEVES  CECY K                                
         V15.82                    NICOTINE ABUSE                              
       

 

 09/10/2014                    FILIBERTO MYERS JAVON S                          
               278.02                    OVERWEIGHT                            
         

 

 09/10/2014                    REMA CHILELNDA S                          
               307.42                    INSOMNIA, PSYCHOPHYSIOLOGICAL         
                            

 

 09/10/2014                    REMA CHILELNDA S                          
               729.5                    PAIN- LEG                              
       

 

 09/10/2014                    FILIBERTO MYERS JAVON S                          
               V15.82                    NICOTINE ABUSE                        
             

 

 10/15/2014                    NAN CULLEN MD                                  
       785.1                    PALPITATIONS                                   
  

 

 10/15/2014                    NAN CULLEN MD                                  
       786.09                    DYSPNEA                                     

 

 10/15/2014                    KETAN MORENOTRYOA K                                
         785.1                    PALPITATIONS                                 
    

 

 10/15/2014                    KETAN MORENO CECY K                                
         786.09                    DYSPNEA                                     

 

 10/15/2014                    FILIBERTO APRHECTOR, JAVON S                          
               785.1                    PALPITATIONS                           
          

 

 10/15/2014                    REMA CHILELNDA S                          
               786.09                    DYSPNEA                               
      

 

 2014                    ALYSE HODGES FACC, NAN FACP CCDS                    
Ot                    278.00                    OBESITY, NOS                   
                  

 

 2014                    ALYSE HODGES FACC, NAN FACP CCDS                    
Ot                    305.1                    TOBACCO USE DISORDER            
                         

 

 2014                    ALYSE HODGES FACC, NAN FACP CCDS                    
Ot                    785.1                    PALPITATIONS                    
                 

 

 2014                    ALYSE HODGES FACC, NAN FACP CCDS                    
Ot                    786.09                    RESPIRATORY ABNORM NEC         
                            

 

 2014                    ALYSE HODGES FACC, NAN FACP CCDS                    
Ot                    786.59                    CHEST PAIN NEC                 
                    

 

 2014                    ALYSE HODGES FACC, ALI FACP CCDS                    
Ot                    794.30                    ABN CARDIOVASC STUDY NOS       
                              

 

 2014                    ALYSE HODGES FACC, NAN FACP CCDS                    
Ot                    V17.3                    FAM HX-ISCHEM HEART DIS         
                            

 

 2014                    ALYSE HODGES FACC, NAN FACP CCDS                    
Ot                    V58.69                    OT MED,LT,CURRENT USE         
                            

 

 2014                    ALYSE HODGES FACC, ALI FACP CCDS                    
Ot                    V85.41                    BODY MASS INDEX 40.0-44.9, 
ADULT                                     

 

 2014                    JAVON CHILEL S                          
               627.2                    HOT FLASHES                            
         

 

 2014                    JAVON CHILEL S                          
               789.03                    ABDOMINAL PAIN RIGHT LOWER QUADRANT   
                                  

 

 2015                    JAVON DE LOS SANTOS ARNP                    Ot      
              789.03                                                          

 

 2015                    JAVON DE LOS SANTOS ARNP                    Ot      
              789.03                                                          

 

 2016                    IRENA LIU                    Ot       
             V76.12                    OTH SCREEN MAMMO-MALIGN NEOPLASM OF BRUNO
                                     

 

 2016                    ALYSE PRICEC, ALI FACP CCDS                    
Ot                    785.1                    PALPITATIONS                    
                 

 

 2016                    ALYSE PRICEC, ALI FACP CCDS                    
Ot                    786.09                    RESPIRATORY ABNORM NEC         
                            

 

 2016                    ALYSE HODGES FACC, ALI FACP CCDS                    
Ot                    785.1                    PALPITATIONS                    
                 

 

 2016                    ALYSE HODGES FACC, ALI FACP CCDS                    
Ot                    786.09                    RESPIRATORY ABNORM NEC         
                            

 

 2016                    JAVON DE LOS SANTOS ARNP                    Ot      
              789.03                    ABDOMINAL PAIN, RIGHT LOWER QUADRANT   
                                  

 

 2016                    JAVON DE LOS SANTOS ARNP                    Ot      
              789.03                    ABDOMINAL PAIN, RIGHT LOWER QUADRANT   
                                  

 

 2016                    JAVON DE LOS SANTOS ARNP                    Ot      
              R10.31                    RIGHT LOWER QUADRANT PAIN              
                       

 

 2016                    HAMMONDSHUNTER CRAWFORD DO D                    Ot        
            R19.7                    DIARRHEA, UNSPECIFIED                     
                

 

 2016                    HAMMONDS DOREMATT D                    Ot        
            Z01.818                    ENCOUNTER FOR OTHER PREPROCEDURAL EXAMIN
                                     

 

 2016                    HAMMONDS REMA MORENOTT D                    Ot        
            R19.7                    DIARRHEA, UNSPECIFIED                     
                

 

 2016                    HAMMONDS REMA MORENOTT D                    Ot        
            Z01.818                    ENCOUNTER FOR OTHER PREPROCEDURAL EXAMIN
                                     

 

 2016                    IRENA LIU                    Ot       
             V76.12                    OTH SCREEN MAMMO-MALIGN NEOPLASM OF BRUNO
                                     

 

 2016                    ALYSE HODGES FACC, ALI FACP CCDS                    
Ot                    785.1                    PALPITATIONS                    
                 

 

 2016                    ALYSE HODGES FACC, ALI FACP CCDS                    
Ot                    786.09                    RESPIRATORY ABNORM NEC         
                            

 

 2016                    ALYSE HODGES FACC, ALI FACP CCDS                    
Ot                    785.1                    PALPITATIONS                    
                 

 

 2016                    [facility]                    
Ot                    786.09                    RESPIRATORY ABNORM NEC         
                            

 

 2016                    JAVON DE LOS SANTOS ARNP                    Ot      
              789.03                    ABDOMINAL PAIN, RIGHT LOWER QUADRANT   
                                  

 

 2016                    JAVON DE LOS SANTOS ARNP                    Ot      
              789.03                    ABDOMINAL PAIN, RIGHT LOWER QUADRANT   
                                  

 

 2016                    JAVON DE LOS SANTOS ARNP                    Ot      
              R10.31                    RIGHT LOWER QUADRANT PAIN              
                       

 

 2016                    IRENA LIU                    Ot       
             V76.12                    OT SCREEN MAMMO-MALIGN NEOPLASM OF BRUNO
                                     

 

 2016                    [facility]                    
Ot                    785.1                    PALPITATIONS                    
                 

 

 2016                    ALYSE HODGES [facility], ALI FACP CCDS                    
Ot                    786.09                    RESPIRATORY ABNORM NEC         
                            

 

 2016                    ALYSE HODGES [facility], ALI FACP CCDS                    
Ot                    785.1                    PALPITATIONS                    
                 

 

 2016                    ALYSE HODGES [facility], ALI FACP CCDS                    
Ot                    786.09                    RESPIRATORY ABNORM NEC         
                            

 

 2016                    JAVON DE LOS SANTOS ARNP                    Ot      
              789.03                    ABDOMINAL PAIN, RIGHT LOWER QUADRANT   
                                  

 

 2016                    JAVON DE LOS SANTOS ARNP                    Ot      
              789.03                    ABDOMINAL PAIN, RIGHT LOWER QUADRANT   
                                  

 

 2016                    JAVON DE LOS SANTOS ARNP                    Ot      
              R10.31                    RIGHT LOWER QUADRANT PAIN              
                       

 

 2016                    JAVON DE LOS SANTOS ARNP                    Ot      
              789.03                    ABDOMINAL PAIN, RIGHT LOWER QUADRANT   
                                  

 

 2016                    HUNTER HAMMONDS DO                    Ot        
            D12.5                    BENIGN NEOPLASM OF SIGMOID COLON          
                           

 

 2016                    HUNTER HAMMONDS DO                    Ot        
            K52.9                    NONINFECTIVE GASTROENTERITIS AND COLITIS  
                                   

 

 2016                    HUNTER HAMMONDS DO                    Ot        
            K62.1                    RECTAL POLYP                              
       

 

 2016                    HUNTER HAMMONDS DO                    Ot        
            K63.5                    POLYP OF COLON                            
         

 

 2016                    JAVON DE LOS SANTOS                    Ot      
              789.03                    ABDOMINAL PAIN, RIGHT LOWER QUADRANT   
                                  

 

 2016                    HUNTER HAMMONDS DO                    Ot        
            R19.7                    DIARRHEA, UNSPECIFIED                     
                

 

 2016                    HUNTER HAMMONDS DO                    Ot        
            Z01.818                    ENCOUNTER FOR OTHER PREPROCEDURAL EXAMIN
                                     

 

 2016                    HUNTER HAMMONDS DO                    Ot        
            R19.7                    DIARRHEA, UNSPECIFIED                     
                

 

 2016                    HUNTER HAMMONDS DO                    Ot        
            Z01.818                    ENCOUNTER FOR OTHER PREPROCEDURAL EXAMIN
                                     

 

 2016                    CASSIUS MORENOHUNTER                    Ot        
            D12.5                    BENIGN NEOPLASM OF SIGMOID COLON          
                           

 

 2016                    HAMOMNDS HUNTER                    Ot        
            K52.9                    NONINFECTIVE GASTROENTERITIS AND COLITIS  
                                   

 

 2016                    HAMMONDS  HUNTER D                    Ot        
            K62.1                    RECTAL POLYP                              
       

 

 2016                    [employer] HUNTER                    Ot        
            K63.5                    POLYP OF COLON                            
         

 

 2016                    IRENA LIU                    Ot       
             V76.12                    OTH SCREEN MAMMO-MALIGN NEOPLASM OF BRUNO
                                     

 

 2016                    ALYSE HODGES FACC, NAN FACP CCDS                    
Ot                    785.1                    PALPITATIONS                    
                 

 

 2016                    ALYSE HODGES FACC, ALI FACP CCDS                    
Ot                    786.09                    RESPIRATORY ABNORM NEC         
                            

 

 2016                    ALYSE HODGES FACC, ALI FACP CCDS                    
Ot                    785.1                    PALPITATIONS                    
                 

 

 2016                    ALYSE HODGES FACC, ALI FACP CCDS                    
Ot                    786.09                    RESPIRATORY ABNORM NEC         
                            

 

 2016                    JAVON DE LOS SANTOS ARNP                    Ot      
              789.03                    ABDOMINAL PAIN, RIGHT LOWER QUADRANT   
                                  

 

 2016                    JAVON DE LOS SANTOS ARNP                    Ot      
              789.03                    ABDOMINAL PAIN, RIGHT LOWER QUADRANT   
                                  

 

 2016                    JAVON DE LOS SANTOS ARNP                    Ot      
              R10.31                    RIGHT LOWER QUADRANT PAIN              
                       

 

 2016                    FILIBERTOMARISELA FAGANA ARNP                    Ot      
              R10.31                    RIGHT LOWER QUADRANT PAIN              
                       

 

 2016                    FILIBERTOMARISELA FAGANA ARNP                    Ot      
              789.03                    ABDOMINAL PAIN, RIGHT LOWER QUADRANT   
                                  

 

 2017                    IRENA LIU                    Ot       
             V76.12                    OTH SCREEN MAMMO-MALIGN NEOPLASM OF BRUNO
                                     

 

 2017                    ALYSE HODGES FACC, NAN FACP CCDS                    
Ot                    785.1                    PALPITATIONS                    
                 

 

 2017                    ALYSE HODGES FACC, ALI FACP CCDS                    
Ot                    786.09                    RESPIRATORY ABNORM NEC         
                            

 

 2017                    ALYSE HODGES FACC, ALI FACP CCDS                    
Ot                    785.1                    PALPITATIONS                    
                 

 

 2017                    ALYSE HODGES FACC, ALI FACP CCDS                    
Ot                    786.09                    RESPIRATORY ABNORM NEC         
                            

 

 2017                    MARISELA DE LOS SANTOSA ARNP                    Ot      
              789.03                    ABDOMINAL PAIN, RIGHT LOWER QUADRANT   
                                  

 

 2017                    JAVON DE LOS SANTOS ARNP                    Ot      
              789.03                    ABDOMINAL PAIN, RIGHT LOWER QUADRANT   
                                  

 

 2017                    JAVON DE LOS SANTOS                    Ot      
              R10.31                    RIGHT LOWER QUADRANT PAIN              
                       



                                                                               
                                                                               
                                                                               
                                                                               
                                                                               
                                                                               
                                                                               
                                                                               
                                                                               
                                                                               
                                                                               
                                                                               
                                                                               
                                                                               
                                                                               
                                                                               
                                                                               
                                                                               
                                                                               
                                                                               
                                                                               
                                                                               
                                                                               
                                                                               
                                                                               
                                                                               
                                                                               
                                                                               
                                                                               
                                                                               
                                                                               
                                                                               
                                                                               
                                                                               
                                                                               
                                                                               
                                                                               
                                                                               
                                                                               
                                                                               
                                                                               
                                                                               
                                                                               
                                                                               
                                                                               
                                                                               
                                                                               
                                                                               
      



Procedures

                      





 Code                    Description                    Performed By           
         Performed On                

 

                                                                          
     P[employer] S[employer] OBTAIN SMEAR                                                    
       2014                

 

                     [id number]                                                     
     NUCLEAR STRESS TESTING                                                    
       2014                

 

                     [id number]                                                     
     ECHO 2D                                                           2014                

 

                     [id number]                                                     
     HEMOCCULT                                                           2014                

 

                     [id number]                                                     
     PAP SMEAR                                                           2014                

 

                     35106                                                     
     MAMMOGRAM, SCREENING                                                      
     2014                

 

                     58071                                                     
     EKG, TRACING (IN-HOUSE)                                                   
        2014                

 

                     48883                                                     
     UA LONG DIP                                                                           

 

                     24715                                                     
     A1C (IN-HOUSE)                                                                           

 

                     00252                                                     
     ROUTINE VENIPUNCTURE                                                      
     2014                

 

                     06429                                                     
     CBC                                                           2014  
              

 

                     [id number]                                                   
       GFR CALC (RESULT ONLY)                                                  
         2014                

 

                     [id number]                                                     
     CMP                                                           2014  
              

 

                     97854                                                     
     LIPID PANEL                                                                           

 

                     82150                                                     
     MAGNESIUM                                                           2014                

 

                     17554                                                     
     TSH                                                           2014  
              

 

                     25920                                                     
     INSULIN LEVEL                                                                           

 

                     CARDIOLOG                                                 
         NAN CULLEN                                                           
09/10/2014                

 

                     [id number]                                                     
     ROUTINE VENIPUNCTURE                                                      
     2014                

 

                     04960                                                     
     CT ABDOMEN AND PELVIS W/CONTRAST                                          
                 2014                

 

                     [id number]                                                     
     FSH                                                           2014  
              

 

                     75297                                                     
     US PELVIC COMPL (REFLEX CPT- 95482)                                       
                    2015                



                                                                               
                                                                               
                                                                               
                                                             



Results

                                                                    



Encounters

                      





 ACCT No.                    Visit Date/Time                    Discharge      
              Status                    Pt. Type                    Provider   
                 Facility                    Loc./Unit                    
Complaint                

 

 [id number]                    2017 08:30:00                    2017 23:59:59                    CLS                    Preadmit               
     HUNTER HAMMONDS DO                    [facility]    
                PREOP                    COLONOSCOPY                

 

 [id number]                    2016 09:46:00                    2016 15:00:00                    DIS                    Outpatient             
       HUNTER HAMMONDS DO                    [facility]  
                  SDC                    SCREENING                

 

 [id number]                    2016 05:39:00                    2016 10:46:00                    DIS                    Outpatient             
       HUNTER HAMMONDS DO                    [facility]  
                  PREOP                    SCREENING                

 

 [id number]                    08/15/2016 08:34:00                    08/15/
2016 23:59:59                    CLS                    Outpatient             
       JAVON DE LOS SANTOS                    [facility]
                    RAD                    RLQ ABD PAIN                

 

 [id number]                    2015 13:21:00                    01/12/
2015 23:59:59                    CLS                    Outpatient             
       JAVON DE LOS SANTOS                    [facility]
                    RAD                    ABD PAIN, RLQ                

 

 [id number]                    2015 11:11:00                    2015 23:59:59                    CLS                    Outpatient             
       JAVON DE LOS SANTOS                    [facility]
                    RAD                    RLQ PAIN                

 

 [id number]                    2014 08:16:00                    2014 17:11:00                    DIS                    Outpatient             
       NAN CULLEN MD, FACC, FACP CCDS                    [facility]                    CATH                    ABN STRESS TEST           
     

 

 [id number]                    10/30/2014 07:42:00                    10/30/
2014 23:59:59                    CLS                    Outpatient             
       NAN CULLEN MD, FACC, FACP CCDS                    [facility]                    CARD                    ANGINA,SOB,FATIGUE        
        

 

 [id number]                    10/23/2014 10:44:00                    10/23/
2014 23:59:59                    CLS                    Outpatient             
       NAN CULLEN MD, FACC, FACP CCDS                    [facility]                    CARD                    ANGINA,SOB,FATIGUE        
        

 

 [id number]                    2014 10:08:00                    2014 23:59:59                    CLS                    Outpatient             
       IRENA LIU                    [facility] 
                   RAD                    SCREENING                

 

 [id number]                    2017 11:00:00                           
              PEN                    Preadmit                    HUNTER HAMMONDS DO                    [facility]                    
ENDO                    HISTORY POLYPS                

 

 [id number]                    2014 16:18:00                    2014 23:
59:59                    CLS                    Outpatient                    
JAVON CHILEL                                                         
                      

 

 [id number]                    10/15/2014 08:55:00                    10/15/2014 23:
59:59                    CLS                    Outpatient                    
NAN CULLEN MD                                                                 
              

 

 [id number]                    09/10/2014 10:15:00                    09/10/2014 23:
59:59                    CLS                    Outpatient                    
JAVON CHILEL                                                         
                      

 

 [id number]                    2014 08:54:00                    2014 23:
59:59                    CLS                    Outpatient                    
CECY ACEVES DO                                                               
                

 

 [id number]                    2014 13:00:00                    2014 23:
59:59                    CLS                    Outpatient                    
CECY ACEVES DO                                                               
                

 

 [id number]                    2014 13:00:00                    2014 23:
59:59                    CLS                    Outpatient                    
CECY ACEVES DO                                                               
                

 

 [id number]                    2013 12:48:00                    2013 23:
59:59                    CLS                    Outpatient                     
                                                                               

 

 [id number]                    2013 10:09:00                    2013 23:
59:59                    CLS                    Outpatient                    
JAVON CHILEL                                                         
                      

 

 [id number]                    2012 13:33:00                    2012 23:
59:59                    CLS                    Outpatient                     
                                                                               

 

 [id number]                    2012 13:33:00                    2012 23:
59:59                    CLS                    Outpatient                    
JAVON CHILEL                                                         
                      

 

 [id number]                    2013 15:19:00                                 
                             Document Registration

## 2017-09-26 NOTE — XMS REPORT
[facility]

 Created on: 2015



Shereen Shafer

External Reference #: [id number]

: 1960

Sex: Female



Demographics







 Address  [address] [place] [address]  [address]

 

 Home Phone  [phone]

 

 Preferred Language  Unknown

 

 Marital Status  Unknown

 

 Nondenominational Affiliation  Unknown

 

 Race  White

 

 Ethnic Group  Not  or 





Author







 Author  JAVON DE LOS SANTOS

 

 Organization  [employer]

 

 Address  Unknown

 

 Phone  Unavailable







Care Team Providers







 Care Team Member Name  Role  Phone

 

 JAVON DE LOS SANTOS  CP  Unavailable



                                                                



Allergies

          No Known Allergies                                                   
                                     



Problems

          





 Problem Type  Condition  Code  Onset Dates  Condition Status

 

 Problem  Unspecified breast screening  V76.10     Active

 

 Problem  Screening for malignant neoplasm of the cervix  V76.2     Active

 

 Problem  Special screening examination, human papillomavirus [HPV]  V73.81     
Active

 

 Problem  Persistent disorder of initiating or maintaining sleep  307.42     
Active

 

 Problem  Esophageal reflux  530.81     Active

 

 Problem  Personal history of tobacco use, presenting hazards to health  V15.82
     Active

 

 Problem  Anxiety state, unspecified  300.00     Active

 

 Problem  Other seborrheic keratosis  702.19     Active

 

 Problem  Plantar fascial fibromatosis  728.71     Active

 

 Problem  Cellulitis and abscess of unspecified site  682.9     Active

 

 Problem  Pain in soft tissues of limb  729.5     Active

 

 Problem  Overweight  278.02     Active

 

 Problem  Tension headache  307.81     Active

 

 Problem  Chest pain, unspecified  786.50     Active

 

 Problem  Abdominal pain, right lower quadrant  789.03     Active

 

 Problem  Symptomatic menopausal or female climacteric states  627.2     Active

 

 Problem  Palpitations  785.1     Active

 

 Problem  Other dyspnea and respiratory abnormalities  786.09     Active

 

 Problem  Family history of diabetes mellitus  V18.0     Active

 

 Problem  Unspecified otalgia  388.70     Active

 

 Problem  Unspecified alopecia  704.00     Active

 

 Problem  Other psoriasis  696.1     Active



                                                                               
                                                                               
                                                                               
                                                             



Medications

          





 Medication  Code System  Code  Instructions  Start Date  End Date  Status  
Dosage

 

 Alprazolam  NDC  [address]-10  1 MG    2015        0.5 Tablet by 
Oral route 2 times per day 1/2 tab daily prn and 1mg at bedtime for sleep every 
nite



                                                                              



Results

          No Known Results                                                     
               



Summary Purpose

          [employer] Submission

## 2017-09-27 NOTE — OPERATIVE REPORT
DATE OF SERVICE:  09/26/2017



PREOPERATIVE DIAGNOSIS:

Tubular adenoma polyps, history of polyps.



POSTOPERATIVE DIAGNOSIS:

Rectal polyps times two.



PROCEDURE:

Colonoscopy with hot biopsy polypectomy times two.



SURGEON:

Hunter Brodreick DO



ANESTHESIA:

Per CRNA.



ESTIMATED BLOOD LOSS:

None.



COMPLICATIONS:

None.



INDICATIONS:

The patient is a 56-year-old female with history of polyps.  She understands the

risks and benefits of procedure and wished to proceed with procedure.  Consent

was signed and on the chart.



DESCRIPTION OF PROCEDURE:

The patient was taken to the endoscopy suite, placed in left lateral recumbent

position.  Timeout was performed.  Digital rectal exam was performed.  There

were no palpable polyps, masses or ulcerations.  Scope was inserted in the

rectum and advanced all the way to the cecum with minimal difficulty.  Prep was

adequate with irrigation and suction.  The scope was then slowly retracted back.

 There were no polyps, masses or ulcerations visualized within the cecum,

ascending, transverse, descending colon and sigmoid colon.  Once in the rectum,

there were two small polyps for which hot biopsy polypectomy was performed. 

Scope was also retroflexed within the rectum, noting no other pathology.  Scope

was returned to its normal position, slowly withdrawn until completely removed. 

The patient tolerated procedure well without any complications.  She was taken

to the recovery room in stable condition.



RECOMMENDATIONS:

The patient will follow up in my office in approximately two to three weeks. 

She would be recommended to repeat colonoscopy in 5 years unless she has any

changes prior to that and should be reevaluated at that time.





Job ID: [id number]

DocumentID: [id number]

Dictated Date:  09/26/2017 13:43:54

Transcription Date: 09/27/2017 05:17:26

Dictated By: HUNTER BRODERICK DO

## 2017-12-12 ENCOUNTER — HOSPITAL ENCOUNTER (OUTPATIENT)
Dept: HOSPITAL 75 - RAD | Age: 57
End: 2017-12-12
Attending: NURSE PRACTITIONER
Payer: SELF-PAY

## 2017-12-12 DIAGNOSIS — Z12.31: Primary | ICD-10-CM

## 2017-12-12 PROCEDURE — 77067 SCR MAMMO BI INCL CAD: CPT

## 2017-12-13 NOTE — DIAGNOSTIC IMAGING REPORT
EXAMINATION:

Bilateral screening mammogram 2D views with tomosynthesis. The

current study was also evaluated with a Computer Aided Detection

(CAD) system.



INDICATION: 

Screening.



PERSONAL HISTORY: 

No current complaints stated on the questionnaire.



COMPARISON: 

02/24/2014.



FINDINGS:

The breasts are composed of scattered fibroglandular densities.

Occasional benign-appearing calcifications are noted. Allowing

for technique and positional differences, no suspicious change is

seen.



IMPRESSION: 

No significant change.



ACR BI-RADS Category 2: Benign findings.

Result letter will be mailed to the patient.

Note: At least 10% of breast cancer is not imaged by mammography.



 



Dictated by: 



  Dictated on workstation # [id number]

## 2018-09-25 ENCOUNTER — HOSPITAL ENCOUNTER (OUTPATIENT)
Dept: HOSPITAL 75 - CARD | Age: 58
End: 2018-09-25
Attending: NURSE PRACTITIONER
Payer: COMMERCIAL

## 2018-09-25 VITALS — BODY MASS INDEX: 43.16 KG/M2 | WEIGHT: 275 LBS | HEIGHT: 67 IN

## 2018-09-25 VITALS — DIASTOLIC BLOOD PRESSURE: 75 MMHG | SYSTOLIC BLOOD PRESSURE: 146 MMHG

## 2018-09-25 DIAGNOSIS — I73.9: ICD-10-CM

## 2018-09-25 DIAGNOSIS — R06.09: ICD-10-CM

## 2018-09-25 DIAGNOSIS — Z72.0: ICD-10-CM

## 2018-09-25 DIAGNOSIS — I10: Primary | ICD-10-CM

## 2018-09-25 DIAGNOSIS — I25.119: ICD-10-CM

## 2018-09-25 DIAGNOSIS — R29.818: ICD-10-CM

## 2018-09-25 DIAGNOSIS — I34.0: ICD-10-CM

## 2018-09-25 PROCEDURE — 78452 HT MUSCLE IMAGE SPECT MULT: CPT

## 2018-09-25 PROCEDURE — 93017 CV STRESS TEST TRACING ONLY: CPT

## 2018-09-26 NOTE — STRESS TEST
DATE OF SERVICE:  09/25/2018



RESTING AND POST REGADENOSON TECHNETIUM-99M TETROFOSMIN SPECT CT IMAGING



ORDERING PHYSICIAN:

LOUIS Wang.



PRIMARY PHYSICIAN:

Dr. Samayoa.



OTHER PHYSICIAN:

LOUIS Burgos.



CLINICAL DIAGNOSIS:

Shortness of breath.



Baseline images were carried out after injection of 10.53 mCi of technetium-99m

Tetrofosmin.  This was followed by 0.4 mg of Regadenoson and 29.6 mCi of

technetium-99m Tetrofosmin for stress imaging.  The electrocardiogram showed

sinus rhythm with sinus arrhythmia at baseline.  The electrocardiogram did not

change significantly with the Regadenoson infusion.  Review of images at rest

and following her stress does not indicate any distinct perfusion defect

consistent with significant myocardial ischemia or infarction.  Some degree of

breast attenuation is seen in image acquisition both at rest and following

Regadenoson infusion.  Gated images show normal global left ventricular systolic

function and normal regional wall motion.  Left ventricular ejection fraction is

calculated to be 82%.  Left ventricular end diastolic volume is 51 mL.  TID is

absent (1.1).



CONCLUSIONS:

1.  No evidence of any significant myocardial ischemia or infarction on this

study.

2.  Normal regional wall motion.

3.  Normal global left ventricular systolic function with a calculated ejection

fraction of 82%.





Job ID: [id number]

DocumentID: [id number]

Dictated Date:  09/26/2018 08:55:26

Transcription Date: 09/26/2018 09:23:44

Dictated By: NAN CULLEN MD, MA, FACP, FACC,

## 2018-10-09 ENCOUNTER — HOSPITAL ENCOUNTER (OUTPATIENT)
Dept: HOSPITAL 75 - CARD | Age: 58
End: 2018-10-09
Attending: NURSE PRACTITIONER
Payer: COMMERCIAL

## 2018-10-09 DIAGNOSIS — I10: ICD-10-CM

## 2018-10-09 DIAGNOSIS — R06.09: Primary | ICD-10-CM

## 2018-10-09 DIAGNOSIS — I25.119: ICD-10-CM

## 2018-10-09 DIAGNOSIS — Z72.0: ICD-10-CM

## 2018-10-09 DIAGNOSIS — I34.0: ICD-10-CM

## 2018-10-09 PROCEDURE — 93306 TTE W/DOPPLER COMPLETE: CPT

## 2018-10-09 PROCEDURE — 93922 UPR/L XTREMITY ART 2 LEVELS: CPT

## 2018-10-17 ENCOUNTER — HOSPITAL ENCOUNTER (OUTPATIENT)
Dept: HOSPITAL 75 - RAD | Age: 58
End: 2018-10-17
Attending: NURSE PRACTITIONER
Payer: COMMERCIAL

## 2018-10-17 DIAGNOSIS — R06.02: Primary | ICD-10-CM

## 2018-10-17 DIAGNOSIS — R16.1: ICD-10-CM

## 2018-10-17 DIAGNOSIS — Z72.0: ICD-10-CM

## 2018-10-17 PROCEDURE — 71250 CT THORAX DX C-: CPT

## 2018-10-17 PROCEDURE — 94060 EVALUATION OF WHEEZING: CPT

## 2018-10-17 PROCEDURE — 94640 AIRWAY INHALATION TREATMENT: CPT

## 2018-10-17 PROCEDURE — 94726 PLETHYSMOGRAPHY LUNG VOLUMES: CPT

## 2018-10-17 PROCEDURE — 94729 DIFFUSING CAPACITY: CPT

## 2018-10-17 NOTE — DIAGNOSTIC IMAGING REPORT
PROCEDURE: CT chest without contrast.



TECHNIQUE: Multiple contiguous axial images were obtained through

the chest without the use of intravenous contrast.



INDICATION: Shortness of breath.



There are no prior studies available for comparison.



FINDINGS: The heart size is within normal limits. There are no

coronary artery calcifications identified. The aorta is not

abnormally dilated. There is no obvious mediastinal or hilar

adenopathy. There is a 0.8 cm calcified nodule in the left lobe

of the thyroid. This is most likely a benign process but

ultrasound would be recommended to better characterize this

density.



The lungs are generally clear. There is no evidence for failure,

pneumonia or for pleural effusion.



The sections through the upper abdomen show that the spleen is

mildly enlarged. The spleen is not visualized in its entirety but

measures approximately 11.3 x 8.7 x 15.6 cm in maximum

longitudinal transverse and AP dimensions. The liver is also

prominent.



The bone windows show no sign of a fracture or of a destructive

lesion.



There is no obvious breast mass.



IMPRESSION:

1. There is no evidence for an acute cardiopulmonary abnormality.

There is no parenchymal lung mass identified either.

2. Ultrasound would be recommended for further evaluation of the

calcified nodule in the left lobe of the thyroid.

3. There is mild splenomegaly. This is of uncertain etiology.



Dictated by: 



  Dictated on workstation # [id number]

## 2018-10-31 ENCOUNTER — HOSPITAL ENCOUNTER (OUTPATIENT)
Dept: HOSPITAL 75 - SLEEP | Age: 58
Discharge: HOME | End: 2018-10-31
Attending: NURSE PRACTITIONER
Payer: COMMERCIAL

## 2018-10-31 DIAGNOSIS — G47.10: ICD-10-CM

## 2018-10-31 DIAGNOSIS — G47.00: ICD-10-CM

## 2018-10-31 DIAGNOSIS — G47.50: ICD-10-CM

## 2018-10-31 DIAGNOSIS — G47.33: Primary | ICD-10-CM

## 2018-12-13 ENCOUNTER — HOSPITAL ENCOUNTER (OUTPATIENT)
Dept: HOSPITAL 75 - RAD | Age: 58
End: 2018-12-13
Attending: NURSE PRACTITIONER
Payer: COMMERCIAL

## 2018-12-13 DIAGNOSIS — E04.2: Primary | ICD-10-CM

## 2018-12-13 PROCEDURE — 76536 US EXAM OF HEAD AND NECK: CPT

## 2018-12-13 NOTE — DIAGNOSTIC IMAGING REPORT
INDICATION: 

Thyroid nodule.



TECHNIQUE: 

Thyroid sonography was performed in the routine fashion.



COMPARISON:  

There is no prior ultrasound for comparison. Correlation is made

to a chest CT of 10/17/2018.



FINDINGS:

The right thyroid lobe measures 4.5 x 1.5 x 1.6 cm. The right

thyroid lobe contains a small hypoechoic nodule measuring 4 x 6 x

3 mm. The thyroid isthmus measures 5 mm. The thyroid isthmus

shows a small nodule measuring 4 mm.  



The left thyroid lobe measures 5.1 x 1.9 x 2.3 cm. The left

thyroid lobe shows multiple nodules. There is a nodule in the

inferior portion of the left thyroid lobe measuring 1.9 x 1.5 x

1.0 cm. There is a nodule posteriorly measuring about 8 x 5 x 8

mm. There is a partially calcified nodule in the left thyroid

lobe in the midportion of the gland measuring 1.2 x 1.2 x 1.0 cm.



IMPRESSION:

Multiple left-sided thyroid nodules as described above. The

dominant nodule could be biopsied under ultrasound guidance as

clinically warranted. There are minimal nodules in the right

thyroid lobe and thyroid isthmus.



Dictated by: 



  Dictated on workstation # [id number]

## 2019-02-28 ENCOUNTER — HOSPITAL ENCOUNTER (OUTPATIENT)
Dept: HOSPITAL 75 - LAB | Age: 59
End: 2019-02-28
Attending: SURGERY
Payer: COMMERCIAL

## 2019-02-28 DIAGNOSIS — E04.1: Primary | ICD-10-CM

## 2019-02-28 LAB — T4 FREE SERPL-MCNC: 1.04 NG/DL (ref 0.7–1.48)

## 2019-02-28 PROCEDURE — 84439 ASSAY OF FREE THYROXINE: CPT

## 2019-02-28 PROCEDURE — 84443 ASSAY THYROID STIM HORMONE: CPT

## 2019-02-28 PROCEDURE — 36415 COLL VENOUS BLD VENIPUNCTURE: CPT

## 2019-03-05 ENCOUNTER — HOSPITAL ENCOUNTER (OUTPATIENT)
Dept: HOSPITAL 75 - PREOP | Age: 59
Discharge: HOME | End: 2019-03-05
Attending: SURGERY
Payer: COMMERCIAL

## 2019-03-05 VITALS — WEIGHT: 275 LBS | HEIGHT: 67 IN | BODY MASS INDEX: 43.16 KG/M2

## 2019-03-05 DIAGNOSIS — Z01.818: Primary | ICD-10-CM

## 2019-03-06 ENCOUNTER — HOSPITAL ENCOUNTER (INPATIENT)
Dept: HOSPITAL 75 - SDC | Age: 59
LOS: 2 days | Discharge: HOME | DRG: 982 | End: 2019-03-08
Attending: SURGERY | Admitting: SURGERY
Payer: COMMERCIAL

## 2019-03-06 VITALS — HEIGHT: 67 IN | WEIGHT: 272.5 LBS | BODY MASS INDEX: 42.77 KG/M2

## 2019-03-06 VITALS — DIASTOLIC BLOOD PRESSURE: 63 MMHG | SYSTOLIC BLOOD PRESSURE: 140 MMHG

## 2019-03-06 VITALS — DIASTOLIC BLOOD PRESSURE: 63 MMHG | SYSTOLIC BLOOD PRESSURE: 132 MMHG

## 2019-03-06 VITALS — DIASTOLIC BLOOD PRESSURE: 57 MMHG | SYSTOLIC BLOOD PRESSURE: 129 MMHG

## 2019-03-06 DIAGNOSIS — I10: ICD-10-CM

## 2019-03-06 DIAGNOSIS — R11.2: ICD-10-CM

## 2019-03-06 DIAGNOSIS — G47.33: ICD-10-CM

## 2019-03-06 DIAGNOSIS — I34.1: ICD-10-CM

## 2019-03-06 DIAGNOSIS — E66.01: ICD-10-CM

## 2019-03-06 DIAGNOSIS — E83.51: ICD-10-CM

## 2019-03-06 DIAGNOSIS — L76.32: ICD-10-CM

## 2019-03-06 DIAGNOSIS — E89.89: Primary | ICD-10-CM

## 2019-03-06 DIAGNOSIS — E04.1: ICD-10-CM

## 2019-03-06 DIAGNOSIS — F17.210: ICD-10-CM

## 2019-03-06 LAB
BASOPHILS # BLD AUTO: 0.1 10^3/UL (ref 0–0.1)
BASOPHILS NFR BLD AUTO: 2 % (ref 0–10)
BUN/CREAT SERPL: 19
CALCIUM SERPL-MCNC: 9.4 MG/DL (ref 8.5–10.1)
CHLORIDE SERPL-SCNC: 102 MMOL/L (ref 98–107)
CO2 SERPL-SCNC: 25 MMOL/L (ref 21–32)
CREAT SERPL-MCNC: 0.64 MG/DL (ref 0.6–1.3)
EOSINOPHIL # BLD AUTO: 0.2 10^3/UL (ref 0–0.3)
EOSINOPHIL NFR BLD AUTO: 3 % (ref 0–10)
ERYTHROCYTE [DISTWIDTH] IN BLOOD BY AUTOMATED COUNT: 17.6 % (ref 10–14.5)
GFR SERPLBLD BASED ON 1.73 SQ M-ARVRAT: > 60 ML/MIN
GLUCOSE SERPL-MCNC: 95 MG/DL (ref 70–105)
HCT VFR BLD CALC: 37 % (ref 35–52)
HGB BLD-MCNC: 13.1 G/DL (ref 11.5–16)
LYMPHOCYTES # BLD AUTO: 1.5 X 10^3 (ref 1–4)
LYMPHOCYTES NFR BLD AUTO: 23 % (ref 12–44)
MANUAL DIFFERENTIAL PERFORMED BLD QL: NO
MCH RBC QN AUTO: 31 PG (ref 25–34)
MCHC RBC AUTO-ENTMCNC: 35 G/DL (ref 32–36)
MCV RBC AUTO: 87 FL (ref 80–99)
MONOCYTES # BLD AUTO: 0.4 X 10^3 (ref 0–1)
MONOCYTES NFR BLD AUTO: 7 % (ref 0–12)
NEUTROPHILS # BLD AUTO: 4.5 X 10^3 (ref 1.8–7.8)
NEUTROPHILS NFR BLD AUTO: 67 % (ref 42–75)
PLATELET # BLD: 251 10^3/UL (ref 130–400)
PMV BLD AUTO: 8.9 FL (ref 7.4–10.4)
POTASSIUM SERPL-SCNC: 4.1 MMOL/L (ref 3.6–5)
SODIUM SERPL-SCNC: 135 MMOL/L (ref 135–145)
WBC # BLD AUTO: 6.8 10^3/UL (ref 4.3–11)

## 2019-03-06 PROCEDURE — 80048 BASIC METABOLIC PNL TOTAL CA: CPT

## 2019-03-06 PROCEDURE — 0GTH0ZZ RESECTION OF RIGHT THYROID GLAND LOBE, OPEN APPROACH: ICD-10-PCS | Performed by: SURGERY

## 2019-03-06 PROCEDURE — 85025 COMPLETE CBC W/AUTO DIFF WBC: CPT

## 2019-03-06 PROCEDURE — 90686 IIV4 VACC NO PRSV 0.5 ML IM: CPT

## 2019-03-06 PROCEDURE — 0GMM0ZZ REATTACHMENT OF LEFT SUPERIOR PARATHYROID GLAND, OPEN APPROACH: ICD-10-PCS | Performed by: SURGERY

## 2019-03-06 PROCEDURE — 88331 PATH CONSLTJ SURG 1 BLK 1SPC: CPT

## 2019-03-06 PROCEDURE — 88311 DECALCIFY TISSUE: CPT

## 2019-03-06 PROCEDURE — 0GTG0ZZ RESECTION OF LEFT THYROID GLAND LOBE, OPEN APPROACH: ICD-10-PCS | Performed by: SURGERY

## 2019-03-06 PROCEDURE — 82330 ASSAY OF CALCIUM: CPT

## 2019-03-06 PROCEDURE — 88307 TISSUE EXAM BY PATHOLOGIST: CPT

## 2019-03-06 PROCEDURE — 88305 TISSUE EXAM BY PATHOLOGIST: CPT

## 2019-03-06 PROCEDURE — 90471 IMMUNIZATION ADMIN: CPT

## 2019-03-06 PROCEDURE — 36415 COLL VENOUS BLD VENIPUNCTURE: CPT

## 2019-03-06 PROCEDURE — 87081 CULTURE SCREEN ONLY: CPT

## 2019-03-06 RX ADMIN — SODIUM CHLORIDE, SODIUM LACTATE, POTASSIUM CHLORIDE, AND CALCIUM CHLORIDE PRN MLS/HR: 600; 310; 30; 20 INJECTION, SOLUTION INTRAVENOUS at 13:00

## 2019-03-06 RX ADMIN — FENTANYL CITRATE PRN MCG: 50 INJECTION, SOLUTION INTRAMUSCULAR; INTRAVENOUS at 22:08

## 2019-03-06 RX ADMIN — FLUOXETINE SCH MG: 20 CAPSULE ORAL at 20:09

## 2019-03-06 RX ADMIN — FENTANYL CITRATE PRN MCG: 50 INJECTION, SOLUTION INTRAMUSCULAR; INTRAVENOUS at 23:47

## 2019-03-06 RX ADMIN — FENTANYL CITRATE PRN MCG: 50 INJECTION, SOLUTION INTRAMUSCULAR; INTRAVENOUS at 17:24

## 2019-03-06 RX ADMIN — SODIUM CHLORIDE, SODIUM LACTATE, POTASSIUM CHLORIDE, AND CALCIUM CHLORIDE PRN MLS/HR: 600; 310; 30; 20 INJECTION, SOLUTION INTRAVENOUS at 11:33

## 2019-03-06 RX ADMIN — SODIUM CHLORIDE, SODIUM LACTATE, POTASSIUM CHLORIDE, AND CALCIUM CHLORIDE SCH MLS/HR: 600; 310; 30; 20 INJECTION, SOLUTION INTRAVENOUS at 17:42

## 2019-03-06 RX ADMIN — ONDANSETRON PRN MG: 2 INJECTION, SOLUTION INTRAMUSCULAR; INTRAVENOUS at 20:22

## 2019-03-06 RX ADMIN — FENTANYL CITRATE PRN MCG: 50 INJECTION, SOLUTION INTRAMUSCULAR; INTRAVENOUS at 19:04

## 2019-03-06 NOTE — DISCHARGE INST-SIMPLE/STANDARD
Discharge Inst-Standard


Discharge Medications


New, Converted or Re-Newed RX:  RX on Chart





Patient Instructions/Follow Up


Plan of Care/Instructions/FU:  


Dressings off in a.m.  Follow-up in 2 weeks


Activity as Tolerated:  Yes


Discharge Diet:  No Restrictions











MICHELLE MENDIOLA MD Mar 6, 2019 14:54

## 2019-03-06 NOTE — PROGRESS NOTE-PRE OPERATIVE
Pre-Operative Progress Note


H&P Reviewed


The H&P was reviewed, patient examined and no changes noted.


Date Seen by Provider:  Feb 28, 2019


Time Seen by Provider:  11:00


Date H&P Reviewed:  Mar 6, 2019


Time H&P Reviewed:  12:11


Pre-Operative Diagnosis:  bilateral thyroid nodules











MICHELLE MENDIOLA MD Mar 6, 2019 12:12

## 2019-03-06 NOTE — OPERATIVE REPORT
Operative Report


Date of Procedure/Surgery


Mar 6, 2019


Surgeon (s)


MICHELLE MENDIOLA MD


Assistant (s):  Mee Jama ( Med Student III)





Post-Operative Diagnosis





Same





Procedure Performed





1.  Total thyroidectomy


2.  Intraoperative nerve monitoring


3.  Reimplantation of left superior parathyroid gland in sternomastoid


muscle





Description of Procedure


Anesthesia Type:  General


Estimated blood loss (mL):  50 mL


Specimen(s) collected/removed


Both lobes of thyroid


Description of the Procedure


Indication for the procedure: This lady was found to have bilateral, large and 

calcified thyroid nodules.  Fine needle aspiration was negative.  Initially, 

she was reassured with recommendations for a follow-up ultrasound in a year.  

However, she returned, requesting thyroidectomy to establish definitive 

diagnosis.  This appeared to be a reasonable approach.  Informed consent was 

obtained after reviewing the operative details and complications postoperative 

hematoma, transient hoarseness of voice and hypocalcemia.





Description of the procedure: She was placed supine on the operating table and 

general anesthesia induced.  2 g of Ancef were administered intravenously as 

prophylaxis against wound infection.  Sequential compression devices were 

placed around her legs, to minimize the risk of venous thrombosis.





Her neck and upper chest were prepared and draped in the usual sterile manner.  

Preemptive analgesia was established using 0.5 percent Marcaine with 

epinephrine.  A 5 cm transverse incision was made and platysma incised 

transversely.  Flaps were raised superiorly to the level of the thyroid 

cartilage and inferiorly to the sternal notch.  Cervical fascia was incised 

vertically and the strap muscles were retracted laterally.





I began the dissection on the left side.  Thyroid lobe was retracted medially 

displaying a distinct middle thyroid vein.  It was controlled using the 

Harmonic scalpel.  Superior thyroid artery was controlled between 0 silk sutures

, reinforced with a Ligaclip.  During this maneuver, superior parathyroid gland 

became devascularized and therefore, it was placed in saline with the wedge 

being sent for frozen section for confirmation.  The pathologist confirmed the 

tissue to be off parathyroid margin and at the end of the operation, the tissue 

was implanted in the sternocleidomastoid muscle.





Branches of the inferior thyroid artery were controlled using ligaclips and  

minimal use of Harmonic scalpel, identifying and protecting the recurrent 

laryngeal nerve.  The nerve was found coursing between the branches of the 

inferior thyroid artery and protected by constant visual inspection and 

intermittent nerve stimulation technique.  The inferior parathyroid gland was 

identified and preserved along with this blood supply.  The isthmus of the 

thyroid gland was transected using the Harmonic scalpel on the left lobe sent 

separately for histological examination.





On the right side, a similar dissection was performed.  The recurrent laryngeal 

nerve was found in its conventional position, densely adherent to the posterior 

surface of the thyroid lobe.  It was carefully  and confirmed to have 

satisfactory EMG signals.  Both parathyroid glands were identified and 

preserved along with their blood supply.  Right lobe was sent for histologic 

examination separately.








We had the CRNA conduct Valsalva maneuver, looking there wasn't any.  Thrombin 

was placed along the tracheoesophageal groove to optimize hemostasis.





Neck was then flexed, in preparation for closure.  Cervical fascia was 

approximated using 3-0 Vicryl and platysma using the same material.  Skin was 

closed using 4-0 Vicryl, in a subcuticular fashion.





Reimplantation of left superior parathyroid gland: The 20 gland was sliced into 

small fragments and placed into the left sternomastoid muscle, being marked 

with a 2-0 Prolene suture.





She tolerated the procedure well, was extubated in the operating room and taken 

to the recovery room in a stable condition.


Findings of the Procedure


See op report





Allergies and Home Medications


Allergies


Coded Allergies:  


     No Known Drug Allergies (Unverified , 10/30/14)





Home Medications


Fluoxetine HCl 20 Mg Tablet, 60 MG PO HS, (Reported)


   take 3 (20mg) tabs 


Gabapentin 400 Mg Capsule, 400 MG PO HS, (Reported)


Melatonin 10 Mg Tablet, 10 MG PO HS, (Reported)


Oxybutynin Chloride 5 Mg Tablet, 5 MG PO HS, (Reported)


Propranolol HCl 20 Mg Tablet, 20 MG PO BID, (Reported)


   med is bid but current prescription got changed to daily in error, talked 

with PCP and med is suppose to be bid and will be fixed with next prescription 





Patient Home Medication List


Home Medication List Reviewed:  Yes











MICHELLE MENDIOLA MD Mar 6, 2019 14:49

## 2019-03-07 VITALS — SYSTOLIC BLOOD PRESSURE: 134 MMHG | DIASTOLIC BLOOD PRESSURE: 63 MMHG

## 2019-03-07 VITALS — SYSTOLIC BLOOD PRESSURE: 143 MMHG | DIASTOLIC BLOOD PRESSURE: 66 MMHG

## 2019-03-07 VITALS — SYSTOLIC BLOOD PRESSURE: 151 MMHG | DIASTOLIC BLOOD PRESSURE: 84 MMHG

## 2019-03-07 VITALS — SYSTOLIC BLOOD PRESSURE: 116 MMHG | DIASTOLIC BLOOD PRESSURE: 72 MMHG

## 2019-03-07 VITALS — DIASTOLIC BLOOD PRESSURE: 77 MMHG | SYSTOLIC BLOOD PRESSURE: 132 MMHG

## 2019-03-07 VITALS — DIASTOLIC BLOOD PRESSURE: 81 MMHG | SYSTOLIC BLOOD PRESSURE: 147 MMHG

## 2019-03-07 LAB
BUN/CREAT SERPL: 20
CALCIUM SERPL-MCNC: 7.8 MG/DL (ref 8.5–10.1)
CHLORIDE SERPL-SCNC: 97 MMOL/L (ref 98–107)
CO2 SERPL-SCNC: 25 MMOL/L (ref 21–32)
CREAT SERPL-MCNC: 0.64 MG/DL (ref 0.6–1.3)
GFR SERPLBLD BASED ON 1.73 SQ M-ARVRAT: > 60 ML/MIN
GLUCOSE SERPL-MCNC: 114 MG/DL (ref 70–105)
POTASSIUM SERPL-SCNC: 3.7 MMOL/L (ref 3.6–5)
SODIUM SERPL-SCNC: 135 MMOL/L (ref 135–145)

## 2019-03-07 RX ADMIN — ACETAMINOPHEN AND CODEINE PHOSPHATE PRN TAB: 300; 30 TABLET ORAL at 14:08

## 2019-03-07 RX ADMIN — ONDANSETRON PRN MG: 2 INJECTION, SOLUTION INTRAMUSCULAR; INTRAVENOUS at 13:09

## 2019-03-07 RX ADMIN — FENTANYL CITRATE PRN MCG: 50 INJECTION, SOLUTION INTRAMUSCULAR; INTRAVENOUS at 02:38

## 2019-03-07 RX ADMIN — ONDANSETRON PRN MG: 2 INJECTION, SOLUTION INTRAMUSCULAR; INTRAVENOUS at 02:38

## 2019-03-07 RX ADMIN — FENTANYL CITRATE PRN MCG: 50 INJECTION, SOLUTION INTRAMUSCULAR; INTRAVENOUS at 06:38

## 2019-03-07 RX ADMIN — METOCLOPRAMIDE PRN MG: 5 INJECTION, SOLUTION INTRAMUSCULAR; INTRAVENOUS at 12:15

## 2019-03-07 RX ADMIN — PANTOPRAZOLE SODIUM SCH MG: 40 INJECTION, POWDER, FOR SOLUTION INTRAVENOUS at 09:36

## 2019-03-07 RX ADMIN — PROPRANOLOL HYDROCHLORIDE SCH MG: 20 TABLET ORAL at 19:42

## 2019-03-07 RX ADMIN — ACETAMINOPHEN AND CODEINE PHOSPHATE PRN TAB: 300; 30 TABLET ORAL at 19:43

## 2019-03-07 RX ADMIN — ONDANSETRON PRN MG: 2 INJECTION, SOLUTION INTRAMUSCULAR; INTRAVENOUS at 19:44

## 2019-03-07 RX ADMIN — SODIUM CHLORIDE, SODIUM LACTATE, POTASSIUM CHLORIDE, AND CALCIUM CHLORIDE SCH MLS/HR: 600; 310; 30; 20 INJECTION, SOLUTION INTRAVENOUS at 02:38

## 2019-03-07 RX ADMIN — ACETAMINOPHEN AND CODEINE PHOSPHATE PRN TAB: 300; 30 TABLET ORAL at 09:36

## 2019-03-07 RX ADMIN — PANTOPRAZOLE SODIUM SCH MG: 40 INJECTION, POWDER, FOR SOLUTION INTRAVENOUS at 19:43

## 2019-03-07 RX ADMIN — PROPRANOLOL HYDROCHLORIDE SCH MG: 20 TABLET ORAL at 09:36

## 2019-03-07 RX ADMIN — FLUOXETINE SCH MG: 20 CAPSULE ORAL at 19:42

## 2019-03-07 RX ADMIN — SODIUM CHLORIDE, SODIUM LACTATE, POTASSIUM CHLORIDE, AND CALCIUM CHLORIDE SCH MLS/HR: 600; 310; 30; 20 INJECTION, SOLUTION INTRAVENOUS at 13:05

## 2019-03-07 NOTE — PROGRESS NOTE-STANDARD
Standard Progress Note


Progress Notes/Assess & Plan


Date Seen by a Provider:  Mar 7, 2019


Time Seen by a Provider:  09:16


Progress/Assessment & Plan


Ongoing postoperative nausea vomiting all night.  I became aware of this about 6

:30 am when I called the night nurse.  Reglan has been used with some 

improvement.  Subcutaneous hematoma over the left side of the neck, soft.  No 

hoarseness of voice.  Calcium slightly decreased at 7.8.  Ionized calcium 

pending.  In view of vomiting and headache, reasonable to treat low, total 

calcium with IV calcium gluconate.  Due to unresolved postoperative vomiting 

and hypocalcemia, it is essential that she be kept in the hospital until 

symptoms improve.


Final Diagnosis


bilateral thyroid nodules.  Postoperative nausea and vomiting.  Postoperative 

hypocalcemia











MICHELLE MENDIOLA MD Mar 7, 2019 09:18

## 2019-03-07 NOTE — NUR
Patient ambulated to end of pantoja and back without difficulty. Walked her without oxygen for 
trial and pulse ox was 89% and then O2 per NC @1Liter the pulse ox was then 92%. Patient 
tolerated well.

## 2019-03-07 NOTE — ANESTHESIA-GENERAL POST-OP
General


Patient Condition


Mental Status/LOC:  Same as Preop


Cardiovascular:  Satisfactory


Nausea/Vomiting:  Absent


Respiratory:  Satisfactory


Pain:  Controlled


Complications:  Absent





Post Op Complications


Complications


None





Follow Up Care/Instructions


Patient Instructions


None needed.





Anesthesia/Patient Condition


Patient Condition


Patient is doing well, no complaints, stable vital signs, no apparent adverse 

anesthesia problems.   


No complications reported per nursing.











KUSH GREEN CRNA Mar 7, 2019 07:47

## 2019-03-08 VITALS — SYSTOLIC BLOOD PRESSURE: 157 MMHG | DIASTOLIC BLOOD PRESSURE: 74 MMHG

## 2019-03-08 VITALS — SYSTOLIC BLOOD PRESSURE: 134 MMHG | DIASTOLIC BLOOD PRESSURE: 65 MMHG

## 2019-03-08 VITALS — DIASTOLIC BLOOD PRESSURE: 68 MMHG | SYSTOLIC BLOOD PRESSURE: 140 MMHG

## 2019-03-08 VITALS — SYSTOLIC BLOOD PRESSURE: 138 MMHG | DIASTOLIC BLOOD PRESSURE: 67 MMHG

## 2019-03-08 LAB
BUN/CREAT SERPL: 19
CALCIUM SERPL-MCNC: 7.1 MG/DL (ref 8.5–10.1)
CHLORIDE SERPL-SCNC: 94 MMOL/L (ref 98–107)
CO2 SERPL-SCNC: 25 MMOL/L (ref 21–32)
CREAT SERPL-MCNC: 0.64 MG/DL (ref 0.6–1.3)
GFR SERPLBLD BASED ON 1.73 SQ M-ARVRAT: > 60 ML/MIN
GLUCOSE SERPL-MCNC: 109 MG/DL (ref 70–105)
POTASSIUM SERPL-SCNC: 3.8 MMOL/L (ref 3.6–5)
SODIUM SERPL-SCNC: 130 MMOL/L (ref 135–145)

## 2019-03-08 RX ADMIN — METOCLOPRAMIDE PRN MG: 5 INJECTION, SOLUTION INTRAMUSCULAR; INTRAVENOUS at 10:47

## 2019-03-08 RX ADMIN — ONDANSETRON PRN MG: 2 INJECTION, SOLUTION INTRAMUSCULAR; INTRAVENOUS at 08:44

## 2019-03-08 RX ADMIN — ACETAMINOPHEN AND CODEINE PHOSPHATE PRN TAB: 300; 30 TABLET ORAL at 10:47

## 2019-03-08 RX ADMIN — PROPRANOLOL HYDROCHLORIDE SCH MG: 20 TABLET ORAL at 08:32

## 2019-03-08 RX ADMIN — PANTOPRAZOLE SODIUM SCH MG: 40 INJECTION, POWDER, FOR SOLUTION INTRAVENOUS at 08:32

## 2019-03-08 NOTE — DISCHARGE SUMMARY
Diagnosis/Chief Complaint


Date of Admission


Mar 7, 2019 at 11:32


Date of Discharge


3/8/19


Discharge Date:  Mar 7, 2019


Discharge Time:  11:42


Admission Diagnosis


Admission Diagnosis


Bilateral thyroid nodules.





Discharge Diagnosis


Same plus subcutaneous hematoma





Reason Hospital Visit


Elective admission for total thyroidectomy. Developed severe post-op vomiting, 

requiring slightly longer hospital stay. Subcutaneous hematoma of no 

consequence. Responded to drug therapy and able to tolerate a soft diet.





Discharge Summary


Discharge Physical Examination


Allergies:  


Coded Allergies:  


     No Known Drug Allergies (Unverified , 10/30/14)


Vitals & I&Os





Vital Signs








  Date Time  Temp Pulse Resp B/P (MAP) Pulse Ox O2 Delivery O2 Flow Rate FiO2


 


3/8/19 08:00 97.0 82 20 157/74 (101) 93 Nasal Cannula 4.00 











Hospital Course


Labs (last 24 hrs)


Laboratory Tests


3/6/19 10:35: 


White Blood Count 6.8, Red Blood Count 4.27L, Hemoglobin 13.1, Hematocrit 37, 

Mean Corpuscular Volume 87, Mean Corpuscular Hemoglobin 31, Mean Corpuscular 

Hemoglobin Concent 35, Red Cell Distribution Width 17.6H, Platelet Count 251, 

Mean Platelet Volume 8.9, Neutrophils (%) (Auto) 67, Lymphocytes (%) (Auto) 23, 

Monocytes (%) (Auto) 7, Eosinophils (%) (Auto) 3, Basophils (%) (Auto) 2, 

Neutrophils # (Auto) 4.5, Lymphocytes # (Auto) 1.5, Monocytes # (Auto) 0.4, 

Eosinophils # (Auto) 0.2, Basophils # (Auto) 0.1, Sodium Level 135, Potassium 

Level 4.1, Chloride Level 102, Carbon Dioxide Level 25, Anion Gap 8, Blood Urea 

Nitrogen 12, Creatinine 0.64, Estimat Glomerular Filtration Rate > 60, BUN/

Creatinine Ratio 19, Glucose Level 95, Calcium Level 9.4


3/7/19 06:27: 


Sodium Level 135, Potassium Level 3.7, Chloride Level 97L, Carbon Dioxide Level 

25, Anion Gap 13, Blood Urea Nitrogen 13, Creatinine 0.64, Estimat Glomerular 

Filtration Rate > 60, BUN/Creatinine Ratio 20, Glucose Level 114H, Calcium 

Level 7.8L, Ionized Calcium (Measured) 0.94L, Ionized Calcium pH 7.45, Ionized 

Calcium (Corrected) 0.97L


3/8/19 05:57: 


Sodium Level 130L, Potassium Level 3.8, Chloride Level 94L, Carbon Dioxide 

Level 25, Anion Gap 11, Blood Urea Nitrogen 12, Creatinine 0.64, Estimat 

Glomerular Filtration Rate > 60, BUN/Creatinine Ratio 19, Glucose Level 109H, 

Calcium Level 7.1L





Microbiology


3/6/19 MRSA Screen - Final, Complete


         MRSA not isolated





Pending Labs





Microbiology








 Date/Time


Source Procedure


Growth Status





 


 3/6/19 10:35


Nasal MRSA Screen - Final


MRSA not isolated Complete





Laboratory Tests


3/6/19 10:35: 


White Blood Count 6.8, Red Blood Count 4.27, Hemoglobin 13.1, Hematocrit 37, 

Mean Corpuscular Volume 87, Mean Corpuscular Hemoglobin 31, Mean Corpuscular 

Hemoglobin Concent 35, Red Cell Distribution Width 17.6, Platelet Count 251, 

Mean Platelet Volume 8.9, Neutrophils (%) (Auto) 67, Lymphocytes (%) (Auto) 23, 

Monocytes (%) (Auto) 7, Eosinophils (%) (Auto) 3, Basophils (%) (Auto) 2, 

Neutrophils # (Auto) 4.5, Lymphocytes # (Auto) 1.5, Monocytes # (Auto) 0.4, 

Eosinophils # (Auto) 0.2, Basophils # (Auto) 0.1, Sodium Level 135, Potassium 

Level 4.1, Chloride Level 102, Carbon Dioxide Level 25, Anion Gap 8, Blood Urea 

Nitrogen 12, Creatinine 0.64, Estimat Glomerular Filtration Rate > 60, BUN/

Creatinine Ratio 19, Glucose Level 95, Calcium Level 9.4


3/7/19 06:27: 


Sodium Level 135, Potassium Level 3.7, Chloride Level 97, Carbon Dioxide Level 

25, Anion Gap 13, Blood Urea Nitrogen 13, Creatinine 0.64, Estimat Glomerular 

Filtration Rate > 60, BUN/Creatinine Ratio 20, Glucose Level 114, Calcium Level 

7.8, Ionized Calcium (Measured) 0.94, Ionized Calcium pH 7.45, Ionized Calcium (

Corrected) 0.97


3/8/19 05:57: 


Sodium Level 130, Potassium Level 3.8, Chloride Level 94, Carbon Dioxide Level 

25, Anion Gap 11, Blood Urea Nitrogen 12, Creatinine 0.64, Estimat Glomerular 

Filtration Rate > 60, BUN/Creatinine Ratio 19, Glucose Level 109, Calcium Level 

7.1








Discharge


Home Medications:





Active Scripts


Active


Tramadol HCl 50 Mg Tablet 50 Mg PO Q12H PRN


Levothyroxine Sodium 112 Mcg Tablet 112 Mcg PO DAILY 30 Days


Reported


Melatonin 10 Mg Tablet 10 Mg PO HS


Gabapentin 400 Mg Capsule 400 Mg PO HS


Oxybutynin Chloride 5 Mg Tablet 5 Mg PO HS


Fluoxetine HCl 20 Mg Tablet 60 Mg PO HS


     take 3 (20mg) tabs


Propranolol HCl 20 Mg Tablet 20 Mg PO BID


     med is bid but current prescription got changed to daily in error,


     talked with PCP and med is suppose to be bid and will be fixed with


     next prescription





Instructions to patient/family


Please see electronic discharge instructions given to patient.





Clinical Quality Measures


DVT/VTE Risk/Contraindication:


Risk Factor Score Per Nursin


RFS Level Per Nursing on Admit:  2=Moderate











MICHELLE MENDIOLA MD Mar 8, 2019 11:47

## 2019-03-08 NOTE — NUR
Dr. Kohli notified of NA at 130 and Calcium at 7.1.  New orders rec for Calcium Carbonate 
600mg po TID and Levothyroxine 112 mcg x 1 dose now.

## 2019-03-10 ENCOUNTER — HOSPITAL ENCOUNTER (INPATIENT)
Dept: HOSPITAL 75 - ER | Age: 59
LOS: 2 days | Discharge: HOME | DRG: 641 | End: 2019-03-12
Attending: SURGERY | Admitting: SURGERY
Payer: COMMERCIAL

## 2019-03-10 VITALS — WEIGHT: 268.06 LBS | HEIGHT: 67 IN | BODY MASS INDEX: 42.07 KG/M2

## 2019-03-10 DIAGNOSIS — I10: ICD-10-CM

## 2019-03-10 DIAGNOSIS — F17.210: ICD-10-CM

## 2019-03-10 DIAGNOSIS — I34.1: ICD-10-CM

## 2019-03-10 DIAGNOSIS — F32.9: ICD-10-CM

## 2019-03-10 DIAGNOSIS — E83.51: Primary | ICD-10-CM

## 2019-03-10 DIAGNOSIS — G89.18: ICD-10-CM

## 2019-03-10 DIAGNOSIS — E89.0: ICD-10-CM

## 2019-03-10 DIAGNOSIS — L76.21: ICD-10-CM

## 2019-03-10 DIAGNOSIS — G47.30: ICD-10-CM

## 2019-03-10 DIAGNOSIS — F41.9: ICD-10-CM

## 2019-03-10 LAB
BASOPHILS # BLD AUTO: 0.1 10^3/UL (ref 0–0.1)
BASOPHILS NFR BLD AUTO: 1 % (ref 0–10)
EOSINOPHIL # BLD AUTO: 0.2 10^3/UL (ref 0–0.3)
EOSINOPHIL NFR BLD AUTO: 2 % (ref 0–10)
ERYTHROCYTE [DISTWIDTH] IN BLOOD BY AUTOMATED COUNT: 17.2 % (ref 10–14.5)
HCT VFR BLD CALC: 33 % (ref 35–52)
HGB BLD-MCNC: 11.8 G/DL (ref 11.5–16)
LYMPHOCYTES # BLD AUTO: 1.8 X 10^3 (ref 1–4)
LYMPHOCYTES NFR BLD AUTO: 18 % (ref 12–44)
MANUAL DIFFERENTIAL PERFORMED BLD QL: NO
MCH RBC QN AUTO: 31 PG (ref 25–34)
MCHC RBC AUTO-ENTMCNC: 36 G/DL (ref 32–36)
MCV RBC AUTO: 86 FL (ref 80–99)
MONOCYTES # BLD AUTO: 0.8 X 10^3 (ref 0–1)
MONOCYTES NFR BLD AUTO: 8 % (ref 0–12)
NEUTROPHILS # BLD AUTO: 7.1 X 10^3 (ref 1.8–7.8)
NEUTROPHILS NFR BLD AUTO: 71 % (ref 42–75)
PLATELET # BLD: 348 10^3/UL (ref 130–400)
PMV BLD AUTO: 8.8 FL (ref 7.4–10.4)
WBC # BLD AUTO: 10.1 10^3/UL (ref 4.3–11)

## 2019-03-10 PROCEDURE — 70491 CT SOFT TISSUE NECK W/DYE: CPT

## 2019-03-10 PROCEDURE — 71260 CT THORAX DX C+: CPT

## 2019-03-10 PROCEDURE — 85610 PROTHROMBIN TIME: CPT

## 2019-03-10 PROCEDURE — 84484 ASSAY OF TROPONIN QUANT: CPT

## 2019-03-10 PROCEDURE — 83880 ASSAY OF NATRIURETIC PEPTIDE: CPT

## 2019-03-10 PROCEDURE — 85730 THROMBOPLASTIN TIME PARTIAL: CPT

## 2019-03-10 PROCEDURE — 83874 ASSAY OF MYOGLOBIN: CPT

## 2019-03-10 PROCEDURE — 83735 ASSAY OF MAGNESIUM: CPT

## 2019-03-10 PROCEDURE — 80053 COMPREHEN METABOLIC PANEL: CPT

## 2019-03-10 PROCEDURE — 71045 X-RAY EXAM CHEST 1 VIEW: CPT

## 2019-03-10 PROCEDURE — 85025 COMPLETE CBC W/AUTO DIFF WBC: CPT

## 2019-03-10 PROCEDURE — 93005 ELECTROCARDIOGRAM TRACING: CPT

## 2019-03-10 PROCEDURE — 84443 ASSAY THYROID STIM HORMONE: CPT

## 2019-03-10 PROCEDURE — 36415 COLL VENOUS BLD VENIPUNCTURE: CPT

## 2019-03-10 NOTE — XMS REPORT
Continuity of Care Document

 Created on: 03/10/2019



ANTOINETTE HOLM

External Reference #: [id number]

: 1960

Sex: Female



Demographics







 Address  [address] [place] [address]  [address]

 

 Home Phone  [phone] x

 

 Preferred Language  Unknown

 

 Marital Status  Unknown

 

 Buddhist Affiliation  Unknown

 

 Race  Unknown

 

 Ethnic Group  Unknown





Author







 Author  [facility]

 

 Address  Unknown

 

 Phone  Unavailable



              



Allergies

      





 Active            Description            Code            Type            
Severity            Reaction            Onset            Reported/Identified   
         Relationship to Patient            Clinical Status        

 

 Yes            No Known Drug Allergies            [id number]            Drug 
Allergy            Unknown            N/A                         10/30/2014   
                               



                  



Medications

      



There is no data.                  



Problems

      





 Date Dx Coded            Attending            Type            Code            
Diagnosis            Diagnosed By        

 

 2008            JAVON CHILEL                         401.1    
        HYPERTENSION, BENIGN ESSENTIAL                     

 

 2008            JAVON CHILEL S                         V58.69   
         MEDICATION HIGH RISK                     

 

 2008                                      401.1            HYPERTENSION, 
BENIGN ESSENTIAL                     

 

 2008                                      V58.69            MEDICATION 
HIGH RISK                     

 

 2008            JAVON CHILEL S                         401.1    
        HYPERTENSION, BENIGN ESSENTIAL                     

 

 2008            MARISELA CHILELA S                         V58.69   
         MEDICATION HIGH RISK                     

 

 2008                                      401.1            HYPERTENSION, 
BENIGN ESSENTIAL                     

 

 2008                                      V58.69            MEDICATION 
HIGH RISK                     

 

 2008                                      401.1            HYPERTENSION, 
BENIGN ESSENTIAL                     

 

 2008                                      V58.69            MEDICATION 
HIGH RISK                     

 

 2008            KETAN MORENO CECY K                         401.1          
  HYPERTENSION, BENIGN ESSENTIAL                     

 

 2008            ACEVES DO CECY K                         V58.69         
   MEDICATION HIGH RISK                     

 

 2008            KETAN MORENO CECY K                         401.1          
  HYPERTENSION, BENIGN ESSENTIAL                     

 

 2008            ACEVES DO CECY K                         V58.69         
   MEDICATION HIGH RISK                     

 

 2008            JAVON CHILEL S                         401.1    
        HYPERTENSION, BENIGN ESSENTIAL                     

 

 2008            MARISELA CHILELA S                         V58.69   
         MEDICATION HIGH RISK                     

 

 2008            NAN CULLEN MD                         401.1            
HYPERTENSION, BENIGN ESSENTIAL                     

 

 2008            NAN CULLEN MD                         V58.69            
MEDICATION HIGH RISK                     

 

 2008            ACEVES DO CECY K                         401.1          
  HYPERTENSION, BENIGN ESSENTIAL                     

 

 2008            ACEVES DO CECY K                         V58.69         
   MEDICATION HIGH RISK                     

 

 2008            JAVON CHILEL S                         401.1    
        HYPERTENSION, BENIGN ESSENTIAL                     

 

 2008            JAVON CHILEL S                         V58.69   
         MEDICATION HIGH RISK                     

 

 2009            JAVON CHILEL S                         528.9    
        DISEASES OF THE ORAL SOFT TISSUES (EXCEPT GINGIVA, TONGUE)             
        

 

 2009            MARISELA CHILELA S                         625.6    
        FEMALE STRESS INCONTINENCE                     

 

 2009            REMA CHILELNDA S                         787.91   
         diarrhea                     

 

 2009            MARISELA CHILELA S                         V72.3    
        GYNECOLOGICAL EXAMINATION                     

 

 2009                                      528.9            DISEASES OF 
THE ORAL SOFT TISSUES (EXCEPT GINGIVA, TONGUE)                     

 

 2009                                      625.6            FEMALE STRESS 
INCONTINENCE                     

 

 2009                                      787.91            diarrhea    
                 

 

 2009                                      V72.3            GYNECOLOGICAL 
EXAMINATION                     

 

 2009            JAVON CHILEL S                         528.9    
        DISEASES OF THE ORAL SOFT TISSUES (EXCEPT GINGIVA, TONGUE)             
        

 

 2009            JAVON CHILEL S                         625.6    
        FEMALE STRESS INCONTINENCE                     

 

 2009            MARISELA CHILELA S                         787.91   
         diarrhea                     

 

 2009            MARISELA CHILELA S                         V72.3    
        GYNECOLOGICAL EXAMINATION                     

 

 2009                                      528.9            DISEASES OF 
THE ORAL SOFT TISSUES (EXCEPT GINGIVA, TONGUE)                     

 

 2009                                      625.6            FEMALE STRESS 
INCONTINENCE                     

 

 2009                                      787.91            diarrhea    
                 

 

 2009                                      V72.3            GYNECOLOGICAL 
EXAMINATION                     

 

 2009                                      528.9            DISEASES OF 
THE ORAL SOFT TISSUES (EXCEPT GINGIVA, TONGUE)                     

 

 2009                                      625.6            FEMALE STRESS 
INCONTINENCE                     

 

 2009                                      787.91            DIARRHEA    
                 

 

 2009                                      V72.3            GYNECOLOGICAL 
EXAMINATION                     

 

 2009            ACEVES DO, CECY K                         528.9          
  DISEASES OF THE ORAL SOFT TISSUES (EXCEPT GINGIVA, TONGUE)                   
  

 

 2009            ACEVES DO, CECY K                         625.6          
  FEMALE STRESS INCONTINENCE                     

 

 2009            ACEVES DO, CECY K                         787.91         
   DIARRHEA                     

 

 2009            ACEVES DO, CECY K                         V72.3          
  GYNECOLOGICAL EXAMINATION                     

 

 2009            ACEVES DO, CECY K                         528.9          
  DISEASES OF THE ORAL SOFT TISSUES (EXCEPT GINGIVA, TONGUE)                   
  

 

 2009            ACEVES DO, CECY K                         625.6          
  FEMALE STRESS INCONTINENCE                     

 

 2009            ACEVES DO, CECY K                         787.91         
   DIARRHEA                     

 

 2009            ACEVES DO, CECY K                         V72.3          
  GYNECOLOGICAL EXAMINATION                     

 

 2009            REMA CHILELNDA S                         528.9    
        DISEASES OF THE ORAL SOFT TISSUES (EXCEPT GINGIVA, TONGUE)             
        

 

 2009            REMA CHILELNDA S                         625.6    
        FEMALE STRESS INCONTINENCE                     

 

 2009            REMA CHILELNDA S                         787.91   
         DIARRHEA                     

 

 2009            REMA CHILELNDA S                         V72.3    
        GYNECOLOGICAL EXAMINATION                     

 

 2009            ALYSE HODGES ALI                         528.9            
DISEASES OF THE ORAL SOFT TISSUES (EXCEPT GINGIVA, TONGUE)                     

 

 2009            NAN CULLEN MD                         625.6            
FEMALE STRESS INCONTINENCE                     

 

 2009            ALYSE HODGES ALI                         787.91            
DIARRHEA                     

 

 2009            ALYSE HODGES ALI                         V72.3            
GYNECOLOGICAL EXAMINATION                     

 

 2009            ACEVES DO CECY K                         528.9          
  DISEASES OF THE ORAL SOFT TISSUES (EXCEPT GINGIVA, TONGUE)                   
  

 

 2009            KETAN MORENO CECY K                         625.6          
  FEMALE STRESS INCONTINENCE                     

 

 2009            ACEVES DO CECY K                         787.91         
   DIARRHEA                     

 

 2009            ACEVES DO CECY K                         V72.3          
  GYNECOLOGICAL EXAMINATION                     

 

 2009            REMA CHILELNDA S                         528.9    
        DISEASES OF THE ORAL SOFT TISSUES (EXCEPT GINGIVA, TONGUE)             
        

 

 2009            REMA CHILELNDA S                         625.6    
        FEMALE STRESS INCONTINENCE                     

 

 2009            REMA CHILELNDA S                         787.91   
         DIARRHEA                     

 

 2009            REMA CHILELNDA S                         V72.3    
        GYNECOLOGICAL EXAMINATION                     

 

 04/15/2009            REMA CHILELNDA S                         388.30   
         TINNITUS UNSPECIFIED                     

 

 04/15/2009                                      388.30            TINNITUS 
UNSPECIFIED                     

 

 04/15/2009            REMA CHILELNDA S                         388.30   
         TINNITUS UNSPECIFIED                     

 

 04/15/2009                                      388.30            TINNITUS 
UNSPECIFIED                     

 

 04/15/2009                                      388.30            TINNITUS 
UNSPECIFIED                     

 

 04/15/2009            KETAN MORENO CECY K                         388.30         
   TINNITUS UNSPECIFIED                     

 

 04/15/2009            TROY ACEVES DOA K                         388.30         
   TINNITUS UNSPECIFIED                     

 

 04/15/2009            REMA CHILELNDA S                         388.30   
         TINNITUS UNSPECIFIED                     

 

 04/15/2009            NAN CULLEN MD                         388.30            
TINNITUS UNSPECIFIED                     

 

 04/15/2009            ACEVES DO, CECY K                         388.30         
   TINNITUS UNSPECIFIED                     

 

 04/15/2009            FILIBERTO MYERS JAVON S                         388.30   
         TINNITUS UNSPECIFIED                     

 

 2009            REMA CHILELNDA S                         706.2    
        SEBACEOUS CYST                     

 

 2009            REMA CHILELNDA S                         729.5    
        PAIN IN LIMB                     

 

 2009                                      706.2            SEBACEOUS 
CYST                     

 

 2009                                      729.5            PAIN IN LIMB 
                    

 

 2009            FILIBERTO MYERS JAVON S                         706.2    
        SEBACEOUS CYST                     

 

 2009            REMA CHILELNDA S                         729.5    
        PAIN IN LIMB                     

 

 2009                                      706.2            SEBACEOUS 
CYST                     

 

 2009                                      729.5            PAIN IN LIMB 
                    

 

 2009                                      706.2            SEBACEOUS 
CYST                     

 

 2009                                      729.5            PAIN IN LIMB 
                    

 

 2009            ACEVES DO, CECY K                         706.2          
  SEBACEOUS CYST                     

 

 2009            ACEVES DO, CECY K                         729.5          
  PAIN IN LIMB                     

 

 2009            ACEVES DO, CECY K                         706.2          
  SEBACEOUS CYST                     

 

 2009            ACEVES DO, CECY K                         729.5          
  PAIN IN LIMB                     

 

 2009            REMA CHILELNDA S                         706.2    
        SEBACEOUS CYST                     

 

 2009            FILIBERTO MYERS JAVON S                         729.5    
        PAIN IN LIMB                     

 

 2009            NAN CULLEN MD                         706.2            
SEBACEOUS CYST                     

 

 2009            NAN CULLEN MD                         729.5            
PAIN IN LIMB                     

 

 2009            ACEVES DO, CECY K                         706.2          
  SEBACEOUS CYST                     

 

 2009            ACEVES DO, CECY K                         729.5          
  PAIN IN LIMB                     

 

 2009            REMA CHILELNDA S                         706.2    
        SEBACEOUS CYST                     

 

 2009            FILIBERTO MYERS JAVON S                         729.5    
        PAIN IN LIMB                     

 

 2010            REMA CHILELNDA S                         333.94   
         RESTLESS LEGS SYNDROME (RLS)                     

 

 2010                                      333.94            RESTLESS 
LEGS SYNDROME (RLS)                     

 

 2010            JAVON CHILEL                         333.94   
         RESTLESS LEGS SYNDROME (RLS)                     

 

 2010                                      333.94            RESTLESS 
LEGS SYNDROME (RLS)                     

 

 2010                                      333.94            RESTLESS 
LEGS SYNDROME (RLS)                     

 

 2010            CECY ACEVES DO K                         333.94         
   RESTLESS LEGS SYNDROME (RLS)                     

 

 2010            CECY ACEVES DO K                         333.94         
   RESTLESS LEGS SYNDROME (RLS)                     

 

 2010            JAVON CHILEL S                         333.94   
         RESTLESS LEGS SYNDROME (RLS)                     

 

 2010            NAN CULLEN MD                         333.94            
RESTLESS LEGS SYNDROME (RLS)                     

 

 2010            CECY ACEVES DO K                         333.94         
   RESTLESS LEGS SYNDROME (RLS)                     

 

 2010            JAVON CHILEL S                         333.94   
         RESTLESS LEGS SYNDROME (RLS)                     

 

 2010            JAVON CHILEL S                         681.10   
         CELLULITIS AND ABSCESS OF TOE, UNSPECIFIED                     

 

 2010                                      681.10            CELLULITIS 
AND ABSCESS OF TOE, UNSPECIFIED                     

 

 2010            JAVON CHILEL S                         681.10   
         CELLULITIS AND ABSCESS OF TOE, UNSPECIFIED                     

 

 2010                                      681.10            CELLULITIS 
AND ABSCESS OF TOE, UNSPECIFIED                     

 

 2010                                      681.10            CELLULITIS 
AND ABSCESS OF TOE, UNSPECIFIED                     

 

 2010            CECY ACEVES DO K                         681.10         
   CELLULITIS AND ABSCESS OF TOE, UNSPECIFIED                     

 

 2010            CECY ACEVES DO K                         681.10         
   CELLULITIS AND ABSCESS OF TOE, UNSPECIFIED                     

 

 2010            JAVON CHILEL S                         681.10   
         CELLULITIS AND ABSCESS OF TOE, UNSPECIFIED                     

 

 2010            NAN CULLEN MD                         681.10            
CELLULITIS AND ABSCESS OF TOE, UNSPECIFIED                     

 

 2010            CECY ACEVES DO K                         681.10         
   CELLULITIS AND ABSCESS OF TOE, UNSPECIFIED                     

 

 2010            JAVON CHILEL S                         681.10   
         CELLULITIS AND ABSCESS OF TOE, UNSPECIFIED                     

 

 05/10/2011            JAVON CHILEL S                         461.9    
        SINUSITIS ACUTE                     

 

 05/10/2011            JAVON CHILEL S                         525.9    
        UNSPECIFIED DISORDER OF THE TEETH AND SUPPORTING STRUCTURES            
         

 

 05/10/2011            REMA CHILELNDA S                         787.02   
         NAUSEA ALONE                     

 

 05/10/2011                                      461.9            SINUSITIS 
ACUTE                     

 

 05/10/2011                                      525.9            UNSPECIFIED 
DISORDER OF THE TEETH AND SUPPORTING STRUCTURES                     

 

 05/10/2011                                      787.02            NAUSEA ALONE
                     

 

 05/10/2011            REMA CHILELNDA S                         461.9    
        SINUSITIS ACUTE                     

 

 05/10/2011            REMA CHILELNDA S                         525.9    
        UNSPECIFIED DISORDER OF THE TEETH AND SUPPORTING STRUCTURES            
         

 

 05/10/2011            JAVON CHILEL S                         787.02   
         NAUSEA ALONE                     

 

 05/10/2011                                      461.9            SINUSITIS 
ACUTE                     

 

 05/10/2011                                      525.9            UNSPECIFIED 
DISORDER OF THE TEETH AND SUPPORTING STRUCTURES                     

 

 05/10/2011                                      787.02            NAUSEA ALONE
                     

 

 05/10/2011                                      461.9            SINUSITIS 
ACUTE                     

 

 05/10/2011                                      525.9            UNSPECIFIED 
DISORDER OF THE TEETH AND SUPPORTING STRUCTURES                     

 

 05/10/2011                                      787.02            NAUSEA ALONE
                     

 

 05/10/2011            CECY ACEVES DO K                         461.9          
  SINUSITIS ACUTE                     

 

 05/10/2011            CECY ACEVES DO K                         525.9          
  UNSPECIFIED DISORDER OF THE TEETH AND SUPPORTING STRUCTURES                  
   

 

 05/10/2011            TROY ACEVES DOA K                         787.02         
   NAUSEA ALONE                     

 

 05/10/2011            TROY ACEVES DOA K                         461.9          
  SINUSITIS ACUTE                     

 

 05/10/2011            CECY ACEVES DO K                         525.9          
  UNSPECIFIED DISORDER OF THE TEETH AND SUPPORTING STRUCTURES                  
   

 

 05/10/2011            TROY ACEVES DOA K                         787.02         
   NAUSEA ALONE                     

 

 05/10/2011            REMA CHILELNDA S                         461.9    
        SINUSITIS ACUTE                     

 

 05/10/2011            JAVON CHILEL S                         525.9    
        UNSPECIFIED DISORDER OF THE TEETH AND SUPPORTING STRUCTURES            
         

 

 05/10/2011            MARISELA CHILELA S                         787.02   
         NAUSEA ALONE                     

 

 05/10/2011            NAN CULLEN MD                         461.9            
SINUSITIS ACUTE                     

 

 05/10/2011            NAN CULLEN MD                         525.9            
UNSPECIFIED DISORDER OF THE TEETH AND SUPPORTING STRUCTURES                     

 

 05/10/2011            NAN CULLEN MD                         787.02            
NAUSEA ALONE                     

 

 05/10/2011            KETAN MORENO CECY K                         461.9          
  SINUSITIS ACUTE                     

 

 05/10/2011            TROY ACEVES DOA K                         525.9          
  UNSPECIFIED DISORDER OF THE TEETH AND SUPPORTING STRUCTURES                  
   

 

 05/10/2011            TROY ACEVES DOA K                         787.02         
   NAUSEA ALONE                     

 

 05/10/2011            JAVON CHILEL S                         461.9    
        SINUSITIS ACUTE                     

 

 05/10/2011            JAVON CHILEL S                         525.9    
        UNSPECIFIED DISORDER OF THE TEETH AND SUPPORTING STRUCTURES            
         

 

 05/10/2011            JAVON CHILEL S                         787.02   
         NAUSEA ALONE                     

 

 2011            MARISELA CHILELA S                         305.1    
        current smoker                     

 

 2011            JAVON CHILEL S                         382.00   
         OTITIS MEDIA ACUTE SUPPURATIVE RIGHT EAR                     

 

 2011                                      305.1            current 
smoker                     

 

 2011                                      382.00            OTITIS MEDIA 
ACUTE SUPPURATIVE RIGHT EAR                     

 

 2011            JAVON CHILEL S                         305.1    
        current smoker                     

 

 2011            JAVON CHILEL S                         382.00   
         OTITIS MEDIA ACUTE SUPPURATIVE RIGHT EAR                     

 

 2011                                      305.1            current 
smoker                     

 

 2011                                      382.00            OTITIS MEDIA 
ACUTE SUPPURATIVE RIGHT EAR                     

 

 2011                                      305.1            current 
smoker                     

 

 2011                                      382.00            OTITIS MEDIA 
ACUTE SUPPURATIVE RIGHT EAR                     

 

 2011            TROY ACEVES DOA K                         305.1          
  current smoker                     

 

 2011            ACEVES DO CECY K                         382.00         
   OTITIS MEDIA ACUTE SUPPURATIVE RIGHT EAR                     

 

 2011            KETAN MORENO CECY K                         305.1          
  current smoker                     

 

 2011            KETAN MORENO CECY K                         382.00         
   OTITIS MEDIA ACUTE SUPPURATIVE RIGHT EAR                     

 

 2011            JAVON CHILEL S                         305.1    
        current smoker                     

 

 2011            JAVON CHILEL S                         382.00   
         OTITIS MEDIA ACUTE SUPPURATIVE RIGHT EAR                     

 

 2011            NAN CULLEN MD                         305.1            
current smoker                     

 

 2011            NAN CULLEN MD                         382.00            
OTITIS MEDIA ACUTE SUPPURATIVE RIGHT EAR                     

 

 2011            KETAN MORENO CECY K                         305.1          
  current smoker                     

 

 2011            KETAN MORENO CECY K                         382.00         
   OTITIS MEDIA ACUTE SUPPURATIVE RIGHT EAR                     

 

 2011            JAVON CHILEL S                         305.1    
        current smoker                     

 

 2011            JAVON CHILEL S                         382.00   
         OTITIS MEDIA ACUTE SUPPURATIVE RIGHT EAR                     

 

 2012            FILIBERTO APRN, JAVON S                         300.00   
         anxiety                     

 

 2012            FILIBERTO MYERS, JAVON S                         530.81   
         ESOPHAGEAL REFLUX                     

 

 2012                                      300.00            anxiety     
                

 

 2012                                      530.81            ESOPHAGEAL 
REFLUX                     

 

 2012            FILIBERTO MYERS JAVON S                         300.00   
         anxiety                     

 

 2012            FILIBERTO MYERS JAVON S                         530.81   
         ESOPHAGEAL REFLUX                     

 

 2012                                      300.00            anxiety     
                

 

 2012                                      530.81            ESOPHAGEAL 
REFLUX                     

 

 2012                                      300.00            anxiety     
                

 

 2012                                      530.81            ESOPHAGEAL 
REFLUX                     

 

 2012            ACEVES DO, CECY K                         300.00         
   anxiety                     

 

 2012            ACEVES DO, CECY K                         530.81         
   ESOPHAGEAL REFLUX                     

 

 2012            ACEVES DO, CECY K                         300.00         
   anxiety                     

 

 2012            ACEVES DO, CECY K                         530.81         
   ESOPHAGEAL REFLUX                     

 

 2012            FILIBERTO MYERS JAVON S                         300.00   
         anxiety                     

 

 2012            FILIBERTO MYERS JAVON S                         530.81   
         ESOPHAGEAL REFLUX                     

 

 2012            NAN CULLEN MD                         300.00            
anxiety                     

 

 2012            NAN CULLEN MD                         530.81            
ESOPHAGEAL REFLUX                     

 

 2012            ACEVES DO, CECY K                         300.00         
   anxiety                     

 

 2012            ACEVES DO, CECY K                         530.81         
   ESOPHAGEAL REFLUX                     

 

 2012            FILIBERTO MYERS JAVON S                         300.00   
         anxiety                     

 

 2012            REMA CHILELNDA S                         530.81   
         ESOPHAGEAL REFLUX                     

 

 2012            REMA CHILELNDA S                         702.19   
         OTHER SEBORRHEIC KERATOSIS                     

 

 2012                                      702.19            OTHER 
SEBORRHEIC KERATOSIS                     

 

 2012            MARISELA CHILELA S                         702.19   
         OTHER SEBORRHEIC KERATOSIS                     

 

 2012                                      702.19            OTHER 
SEBORRHEIC KERATOSIS                     

 

 2012                                      702.19            OTHER 
SEBORRHEIC KERATOSIS                     

 

 2012            ACEVES DO, CECY K                         702.19         
   OTHER SEBORRHEIC KERATOSIS                     

 

 2012            ACEVES DO, CECY K                         702.19         
   OTHER SEBORRHEIC KERATOSIS                     

 

 2012            MARISELA CHILELA S                         702.19   
         OTHER SEBORRHEIC KERATOSIS                     

 

 2012            ALYSE HODGES, ALI                         702.19            
OTHER SEBORRHEIC KERATOSIS                     

 

 2012            KETAN MORENO CECY K                         702.19         
   OTHER SEBORRHEIC KERATOSIS                     

 

 2012            FILIBERTO MYERS JAVON S                         702.19   
         OTHER SEBORRHEIC KERATOSIS                     

 

 2013            FILIBERTO APRHECTOR JAVON S                         682.9    
        CELLULITIS AND ABSCESS OF UNSPECIFIED SITES                     

 

 2013            FILIBERTO MYERS JAVON S                         728.71   
         PLANTAR FASCIAL FIBROMATOSIS                     

 

 2013                                      682.9            CELLULITIS 
AND ABSCESS OF UNSPECIFIED SITES                     

 

 2013                                      728.71            PLANTAR 
FASCIAL FIBROMATOSIS                     

 

 2013                                      682.9            CELLULITIS 
AND ABSCESS OF UNSPECIFIED SITES                     

 

 2013                                      728.71            PLANTAR 
FASCIAL FIBROMATOSIS                     

 

 2013            KETAN MORENO CECY K                         682.9          
  CELLULITIS AND ABSCESS OF UNSPECIFIED SITES                     

 

 2013            KETAN MORENO CECY K                         728.71         
   PLANTAR FASCIAL FIBROMATOSIS                     

 

 2013            TROY ACEVES DOA K                         682.9          
  CELLULITIS AND ABSCESS OF UNSPECIFIED SITES                     

 

 2013            KETAN MORENO CECY K                         728.71         
   PLANTAR FASCIAL FIBROMATOSIS                     

 

 2013            FILIBERTO APRHECTOR JAVON S                         682.9    
        CELLULITIS AND ABSCESS OF UNSPECIFIED SITES                     

 

 2013            FILIBERTO MYERS JAVON S                         728.71   
         PLANTAR FASCIAL FIBROMATOSIS                     

 

 2013            ALYSE HODGES, ALI                         682.9            
CELLULITIS AND ABSCESS OF UNSPECIFIED SITES                     

 

 2013            ALYSE HODGES, ALI                         728.71            
PLANTAR FASCIAL FIBROMATOSIS                     

 

 2013            KETAN MORENO CECY K                         682.9          
  CELLULITIS AND ABSCESS OF UNSPECIFIED SITES                     

 

 2013            KETAN MORENO CECY K                         728.71         
   PLANTAR FASCIAL FIBROMATOSIS                     

 

 2013            FILIBERTO APRHECTOR JAVON S                         682.9    
        CELLULITIS AND ABSCESS OF UNSPECIFIED SITES                     

 

 2013            FILIBERTO MYERS JAVON S                         728.71   
         PLANTAR FASCIAL FIBROMATOSIS                     

 

 2014            CECY ACEVES DO K                         388.70         
   OTALGIA                     

 

 2014            TROY ACEVES DOA K                         696.1          
  PSORIASIS                     

 

 2014            CECY ACEVES DO K                         V73.81         
   HPV SCREENING                     

 

 2014            ACEVES DO, CECY K                         V76.10         
   BREAST CANCER SCREENING                     

 

 2014            ACEVES , CECY K                         V76.2          
  CERVICAL CANCER SCREENING (PAP SMEAR)                     

 

 2014            KETAN MORENO, CECY K                         388.70         
   OTALGIA                     

 

 2014            ACEVES DO, CECY K                         696.1          
  PSORIASIS                     

 

 2014            ACEVES , CECY K                         V73.81         
   HPV SCREENING                     

 

 2014            KETAN MORENO, CECY K                         V76.10         
   BREAST CANCER SCREENING                     

 

 2014            KETAN MORENO, CECY K                         V76.2          
  CERVICAL CANCER SCREENING (PAP SMEAR)                     

 

 2014            REMA CHILELNDA S                         388.70   
         OTALGIA                     

 

 2014            FILIBERTO MYERS JAVON S                         696.1    
        PSORIASIS                     

 

 2014            REMA CHILELNDA S                         V73.81   
         HPV SCREENING                     

 

 2014            REMA CHILELNDA S                         V76.10   
         BREAST CANCER SCREENING                     

 

 2014            REMA CHILELNDA S                         V76.2    
        CERVICAL CANCER SCREENING (PAP SMEAR)                     

 

 2014            ALYSE HODGES, ALI                         388.70            
OTALGIA                     

 

 2014            ALYSE HODGES, NAN                         696.1            
PSORIASIS                     

 

 2014            NAN CULLEN MD                         V73.81            
HPV SCREENING                     

 

 2014            NAN CULLEN MD                         V76.10            
BREAST CANCER SCREENING                     

 

 2014            ALYSE HODGES, NAN                         V76.2            
CERVICAL CANCER SCREENING (PAP SMEAR)                     

 

 2014            KETAN MORENO, CECY K                         388.70         
   OTALGIA                     

 

 2014            KETAN MORENO, CECY K                         696.1          
  PSORIASIS                     

 

 2014            KETAN MORENO, CECY K                         V73.81         
   HPV SCREENING                     

 

 2014            KETAN MORENO, CECY K                         V76.10         
   BREAST CANCER SCREENING                     

 

 2014            KETAN MORENO, CECY K                         V76.2          
  CERVICAL CANCER SCREENING (PAP SMEAR)                     

 

 2014            FILIBERTO MYERS JAVON S                         388.70   
         OTALGIA                     

 

 2014            FILIBERTO MYERS JAVON S                         696.1    
        PSORIASIS                     

 

 2014            FILIBERTO APRHECTOR JAVON S                         V73.81   
         HPV SCREENING                     

 

 2014            REMA CHILELNDA S                         V76.10   
         BREAST CANCER SCREENING                     

 

 2014            REMA CHILELNDA S                         V76.2    
        CERVICAL CANCER SCREENING (PAP SMEAR)                     

 

 2014            KETAN DO CECY K                         307.81         
   TENSION HEADACHE                     

 

 2014            KETAN MORENO CECY K                         704.00         
   ALOPECIA UNSPECIFIED                     

 

 2014            KETAN MORENO CECY K                         786.50         
   CHEST PAIN                     

 

 2014            KETAN MORENO CECY K                         V18.0          
  FAMILY HISTORY OF DIABETES MELLITUS                     

 

 2014            REMA CHILELNDA S                         307.81   
         TENSION HEADACHE                     

 

 2014            REMA CHILELNDA S                         704.00   
         ALOPECIA UNSPECIFIED                     

 

 2014            REMA CHILELNDA S                         786.50   
         CHEST PAIN                     

 

 2014            FILIBERTO MYERS JAVON S                         V18.0    
        FAMILY HISTORY OF DIABETES MELLITUS                     

 

 2014            NAN CULLEN MD                         307.81            
TENSION HEADACHE                     

 

 2014            NAN CULLEN MD                         704.00            
ALOPECIA UNSPECIFIED                     

 

 2014            NAN CULLEN MD                         786.50            
CHEST PAIN                     

 

 2014            NAN CULLEN MD                         V18.0            
FAMILY HISTORY OF DIABETES MELLITUS                     

 

 2014            KETAN TROY MORENOA K                         307.81         
   TENSION HEADACHE                     

 

 2014            KETAN MORENO CECY K                         704.00         
   ALOPECIA UNSPECIFIED                     

 

 2014            KETAN MORENO CECY K                         786.50         
   CHEST PAIN                     

 

 2014            KETAN MORENO CECY K                         V18.0          
  FAMILY HISTORY OF DIABETES MELLITUS                     

 

 2014            REMA CHILELNDA S                         307.81   
         TENSION HEADACHE                     

 

 2014            REMA CHILELNDA S                         704.00   
         ALOPECIA UNSPECIFIED                     

 

 2014            REMA CHILELNDA S                         786.50   
         CHEST PAIN                     

 

 2014            REMA CHILELNDA S                         V18.0    
        FAMILY HISTORY OF DIABETES MELLITUS                     

 

 09/10/2014            REMA CHILELNDA S                         278.02   
         OVERWEIGHT                     

 

 09/10/2014            REMA CHILELNDA S                         307.42   
         INSOMNIA, PSYCHOPHYSIOLOGICAL                     

 

 09/10/2014            REMA CHILELNDA S                         729.5    
        PAIN- LEG                     

 

 09/10/2014            REMA CHILELNDA S                         V15.82   
         NICOTINE ABUSE                     

 

 09/10/2014            NAN CULLEN MD                         278.02            
OVERWEIGHT                     

 

 09/10/2014            NAN CULLEN MD                         307.42            
INSOMNIA, PSYCHOPHYSIOLOGICAL                     

 

 09/10/2014            NAN CULLEN MD                         729.5            
PAIN- LEG                     

 

 09/10/2014            NAN CULLEN MD                         V15.82            
NICOTINE ABUSE                     

 

 09/10/2014            KETAN MORENOTROYA K                         278.02         
   OVERWEIGHT                     

 

 09/10/2014            KETAN MORENO CECY K                         307.42         
   INSOMNIA, PSYCHOPHYSIOLOGICAL                     

 

 09/10/2014            KETAN MORENOTROYA K                         729.5          
  PAIN- LEG                     

 

 09/10/2014            KETAN MORENO CECY K                         V15.82         
   NICOTINE ABUSE                     

 

 09/10/2014            FILIBERTO MYERS JAVON S                         278.02   
         OVERWEIGHT                     

 

 09/10/2014            REMA CHILELNDA S                         307.42   
         INSOMNIA, PSYCHOPHYSIOLOGICAL                     

 

 09/10/2014            REMA CHILELNDA S                         729.5    
        PAIN- LEG                     

 

 09/10/2014            REMA CHILELNDA S                         V15.82   
         NICOTINE ABUSE                     

 

 10/15/2014            NAN CULLEN MD                         785.1            
PALPITATIONS                     

 

 10/15/2014            NAN CULLEN MD                         786.09            
DYSPNEA                     

 

 10/15/2014            KETAN MORENOCECY K                         785.1          
  PALPITATIONS                     

 

 10/15/2014            KETAN MORENOTROYA K                         786.09         
   DYSPNEA                     

 

 10/15/2014            FILIBERTO APRREMA YOUNGNDA S                         785.1    
        PALPITATIONS                     

 

 10/15/2014            REMA CHILELNDA S                         786.09   
         DYSPNEA                     

 

 2014            ALYSE HODGES FACC, NAN FACP CCDS            Ot            
278.00            OBESITY, NOS                     

 

 2014            ALYSE HODGES FACC, ALI FACP CCDS            Ot            
305.1            TOBACCO USE DISORDER                     

 

 2014            ALYSE HODGES FACC, NAN FACP CCDS            Ot            
785.1            PALPITATIONS                     

 

 2014            ALYSE HODGES FACC, NAN FACP CCDS            Ot            
786.09            RESPIRATORY ABNORM NEC                     

 

 2014            ALYSE HODGES FACC, ALI FACP CCDS            Ot            
786.59            CHEST PAIN NEC                     

 

 2014            ALYSE HODGES FACC, NAN FACP CCDS            Ot            
794.30            ABN CARDIOVASC STUDY NOS                     

 

 2014            ALYSE HODGES FACC, NAN FACP CCDS            Ot            
V17.3            FAM HX-ISCHEM HEART DIS                     

 

 2014            NAN CULLEN MD, FACC FACP CCDS            Ot            
V58.69            OTH MED,LT,CURRENT USE                     

 

 2014            ALYSE HODGES FACC, ALI FACP CCDS            Ot            
V85.41            BODY MASS INDEX 40.0-44.9, ADULT                     

 

 2014            JAVON CHILEL S                         627.2    
        HOT FLASHES                     

 

 2014            JAVON CHILEL S                         789.03   
         ABDOMINAL PAIN RIGHT LOWER QUADRANT                     

 

 2015            JAVON DE LOS SANTOS ARNP            Ot            789.03    
                              

 

 2015            JAVON DE LOS SANTOS ARNP            Ot            789.03    
                              

 

 2016            IRENA LIU            Ot            V76.12     
       OTH SCREEN MAMMO-MALIGN NEOPLASM OF BRUNO                     

 

 2016            ALYSE HODGES FACC, NAN FACP CCDS            Ot            
785.1            PALPITATIONS                     

 

 2016            ALYSE HODGES FACC, NAN FACP CCDS            Ot            
786.09            RESPIRATORY ABNORM NEC                     

 

 2016            ALYSE HODGES FACC, ALI FACP CCDS            Ot            
785.1            PALPITATIONS                     

 

 2016            ALYSE HODGES FACC, ALI FACP CCDS            Ot            
786.09            RESPIRATORY ABNORM NEC                     

 

 2016            JAVON DE LOS SANTOS ARNP            Ot            789.03    
        ABDOMINAL PAIN, RIGHT LOWER QUADRANT                     

 

 2016            JAVON DE LOS SANTOS ARNP            Ot            789.03    
        ABDOMINAL PAIN, RIGHT LOWER QUADRANT                     

 

 2016            JAVON DE LOS SANTOS ARNP            Ot            R10.31    
        RIGHT LOWER QUADRANT PAIN                     

 

 2016            HAMMONDSHUNTER CRAWFORD DO D            Ot            R19.7       
     DIARRHEA, UNSPECIFIED                     

 

 2016            HAMMONDSHUNTER CRAWFORD DO D            Ot            Z01.818     
       ENCOUNTER FOR OTHER PREPROCEDURAL EXAMIN                     

 

 2016            HAMMONDSREMA CRAWFORD DOTT D            Ot            R19.7       
     DIARRHEA, UNSPECIFIED                     

 

 2016            HAMMONDS REMA MORENOTT D            Ot            Z01.818     
       ENCOUNTER FOR OTHER PREPROCEDURAL EXAMIN                     

 

 2016            IRENA LIU            Ot            V76.12     
       OTH SCREEN MAMMO-MALIGN NEOPLASM OF BRUNO                     

 

 2016            ALYSE HODGES FACC, ALI FACP CCDS            Ot            
785.1            PALPITATIONS                     

 

 2016            ALYSE HODGES FACC, ALI FACP CCDS            Ot            
786.09            RESPIRATORY ABNORM NEC                     

 

 2016            ALYSE HODGES FACC, ALI FACP CCDS            Ot            
785.1            PALPITATIONS                     

 

 2016            [facility]            Ot            
786.09            RESPIRATORY ABNORM NEC                     

 

 2016            JAVON DE LOS SANTOS ARNP            Ot            789.03    
        ABDOMINAL PAIN, RIGHT LOWER QUADRANT                     

 

 2016            JAVON DE LOS SANTOS ARNP            Ot            789.03    
        ABDOMINAL PAIN, RIGHT LOWER QUADRANT                     

 

 2016            JAVON DE LOS SANTOS ARNP            Ot            R10.31    
        RIGHT LOWER QUADRANT PAIN                     

 

 2016            IRENA LIU            Ot            V76.12     
       OT SCREEN MAMMO-MALIGN NEOPLASM OF BRUNO                     

 

 2016            [facility]ODGES [facility]            Ot            
785.1            PALPITATIONS                     

 

 2016            ALYSE HODGES [facility], ALI FACP CCDS            Ot            
786.09            RESPIRATORY ABNORM NEC                     

 

 2016            ALYSE HODGES [facility], ALI FACP CCDS            Ot            
785.1            PALPITATIONS                     

 

 2016            [facility]            Ot            
786.09            RESPIRATORY ABNORM NEC                     

 

 2016            JAVON DE LOS SANTOS ARNP            Ot            789.03    
        ABDOMINAL PAIN, RIGHT LOWER QUADRANT                     

 

 2016            JAVON DE LOS SANTOS ARNP            Ot            789.03    
        ABDOMINAL PAIN, RIGHT LOWER QUADRANT                     

 

 2016            JAVON DE LOS SANTOS            Ot            R10.31    
        RIGHT LOWER QUADRANT PAIN                     

 

 2016            JAVON DE LOS SANTOS ARNP            Ot            789.03    
        ABDOMINAL PAIN, RIGHT LOWER QUADRANT                     

 

 2016            HUNTER HAMMONDS DO            Ot            D12.5       
     BENIGN NEOPLASM OF SIGMOID COLON                     

 

 2016            HUNTER HAMMONDS DO            Ot            K52.9       
     NONINFECTIVE GASTROENTERITIS AND COLITIS                     

 

 2016            HUNTER HAMMODNS DO            Ot            K62.1       
     RECTAL POLYP                     

 

 2016            HUNTER HAMMONDS DO            Ot            K63.5       
     POLYP OF COLON                     

 

 2016            JAVON DE LOS SANTOS            Ot            789.03    
        ABDOMINAL PAIN, RIGHT LOWER QUADRANT                     

 

 2016            HUNTER HAMMONDS DO            Ot            R19.7       
     DIARRHEA, UNSPECIFIED                     

 

 2016            HUNTER HAMMONDS DO            Ot            Z01.818     
       ENCOUNTER FOR OTHER PREPROCEDURAL EXAMIN                     

 

 2016            HUNTER HAMMONDS DO            Ot            R19.7       
     DIARRHEA, UNSPECIFIED                     

 

 2016            HUNTER HAMMONDS DO            Ot            Z01.818     
       ENCOUNTER FOR OTHER PREPROCEDURAL EXAMIN                     

 

 2016            CASSIUS MORENOHUNTER            Ot            D12.5       
     BENIGN NEOPLASM OF SIGMOID COLON                     

 

 2016            HAMMONDS HUNTER            Ot            K52.9       
     NONINFECTIVE GASTROENTERITIS AND COLITIS                     

 

 2016            HAMMONDS HUNTER            Ot            K62.1       
     RECTAL POLYP                     

 

 2016            Wayne HUNTER            Ot            K63.5       
     POLYP OF COLON                     

 

 2016            IRENA LIU            Ot            V76.12     
       OTH SCREEN MAMMO-MALIGN NEOPLASM OF BRUNO                     

 

 2016            ALYSE HODGES FACC, NAN FACP CCDS            Ot            
785.1            PALPITATIONS                     

 

 2016            ALYSE HODGES FACC, ALI FACP CCDS            Ot            
786.09            RESPIRATORY ABNORM NEC                     

 

 2016            ALYSE HODGES FACC, ALI FACP CCDS            Ot            
785.1            PALPITATIONS                     

 

 2016            ALYSE HODGES FACC, ALI FACP CCDS            Ot            
786.09            RESPIRATORY ABNORM NEC                     

 

 2016            JAVON DE LOS SANTOS ARNP            Ot            789.03    
        ABDOMINAL PAIN, RIGHT LOWER QUADRANT                     

 

 2016            JAVON DE LOS SANTOS ARNP            Ot            789.03    
        ABDOMINAL PAIN, RIGHT LOWER QUADRANT                     

 

 2016            JAVON DE LOS SANTOS ARNP            Ot            R10.31    
        RIGHT LOWER QUADRANT PAIN                     

 

 2016            FILIBERTOJAVON FAGAN ARNP            Ot            R10.31    
        RIGHT LOWER QUADRANT PAIN                     

 

 2016            JAVON DE LOS SANTOS ARNP            Ot            789.03    
        ABDOMINAL PAIN, RIGHT LOWER QUADRANT                     

 

 2017            IRENA LIU            Ot            V76.12     
       OTH SCREEN MAMMO-MALIGN NEOPLASM OF BRUNO                     

 

 2017            ALYSE HODGES FACC, NAN FACP CCDS            Ot            
785.1            PALPITATIONS                     

 

 2017            ALYSE HODGES FACC, ALI FACP CCDS            Ot            
786.09            RESPIRATORY ABNORM NEC                     

 

 2017            ALYSE HODGES FACC, ALI FACP CCDS            Ot            
785.1            PALPITATIONS                     

 

 2017            ALYSE HODGES FACC, ALI FACP CCDS            Ot            
786.09            RESPIRATORY ABNORM NEC                     

 

 2017            JAVON DE LOS SANTOS ARNP            Ot            789.03    
        ABDOMINAL PAIN, RIGHT LOWER QUADRANT                     

 

 2017            JAVON DE LOS SANTOS            Ot            789.03    
        ABDOMINAL PAIN, RIGHT LOWER QUADRANT                     

 

 2017            JAVON DE LOS SANTOS            Ot            R10.31    
        RIGHT LOWER QUADRANT PAIN                     

 

 2017            HUNTER HAMMONDS DO            Ot            Z01.818     
       ENCOUNTER FOR OTHER PREPROCEDURAL EXAMIN                     

 

 2017            HUNTER HAMMONDS DO            Ot            Z86.010     
       PERSONAL HISTORY OF COLONIC POLYPS                     

 

 2017            HUNTER HAMMONDS DO            Ot            E66.01      
      MORBID (SEVERE) OBESITY DUE TO EXCESS CA                     

 

 2017            HUNTER HAMMONDS DO            Ot            F17.210     
       NICOTINE DEPENDENCE, CIGARETTES, UNCOMPL                     

 

 2017            HUNTRE HAMMONDS DO            Ot            I10         
   ESSENTIAL (PRIMARY) HYPERTENSION                     

 

 2017            HUNTER HAMMONDS DO            Ot            K62.1       
     RECTAL POLYP                     

 

 2017            HUNTER HAMMONDS DO            Ot            Z09         
   ENCNTR FOR F/U EXAM AFT TRTMT FOR COND O                     

 

 2017            HUNTER HAMMONDS DO            Ot            Z68.41      
      BODY MASS INDEX (BMI) 40.0-44.9, ADULT                     

 

 2017            HUNTER HAMMONDS DO            Ot            Z79.899     
       OTHER LONG TERM (CURRENT) DRUG THERAPY                     

 

 2017            HUNTER HAMMONDS DO            Ot            Z86.010     
       PERSONAL HISTORY OF COLONIC POLYPS                     

 

 2018            JAVON DE LOS SANTOS            Ot            Z12.31    
        ENCNTR SCREEN MAMMOGRAM FOR MALIGNANT NE                     

 

 2018            IRENA LIU            Ot            V76.12     
       OTH SCREEN MAMMO-MALIGN NEOPLASM OF BRUNO                     

 

 2018            ALYSE HODGES FACC, ALI FACP CCDS            Ot            
785.1            PALPITATIONS                     

 

 2018            ALYSE HODGES FACC, ALI FACP CCDS            Ot            
786.09            RESPIRATORY ABNORM NEC                     

 

 2018            ALYSE HODGES FACC, ALI FACP CCDS            Ot            
785.1            PALPITATIONS                     

 

 2018            ALYSE HODGES FACYENNI, ALI FACP CCDS            Ot            
786.09            RESPIRATORY ABNORM NEC                     

 

 2018            JAVON DE LOS SANTOS ARNP            Ot            789.03    
        ABDOMINAL PAIN, RIGHT LOWER QUADRANT                     

 

 2018            JAVON DE LOS SANTOS            Ot            789.03    
        ABDOMINAL PAIN, RIGHT LOWER QUADRANT                     

 

 2018            FILIBERTO, JAVON ARNP            Ot            R10.31    
        RIGHT LOWER QUADRANT PAIN                     

 

 2018            JAVON DE LOS SANTOS ARNP            Ot            Z12.31    
        ENCNTR SCREEN MAMMOGRAM FOR MALIGNANT NE                     

 

 2018            BAIMA, JAHAIRA L ARNP            Ot            I10      
      ESSENTIAL (PRIMARY) HYPERTENSION                     

 

 2018            BAIMA, JAHAIRA L ARNP            Ot            I25.119  
          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                     

 

 2018            BAIMA, JAHAIRA L ARNP            Ot            I34.0    
        NONRHEUMATIC MITRAL (VALVE) INSUFFICIENC                     

 

 2018            BAIMA, JAHAIRA L ARNP            Ot            I73.9    
        PERIPHERAL VASCULAR DISEASE, UNSPECIFIED                     

 

 2018            BAIMA, JAHAIRA L ARNP            Ot            R06.09   
         OTHER FORMS OF DYSPNEA                     

 

 2018            BAIMA, JAHAIRA L ARNP            Ot            R29.818  
          OTHER SYMPTOMS AND SIGNS INVOLVING THE N                     

 

 2018            BAIMA, JAHAIRA L ARNP            Ot            Z72.0    
        TOBACCO USE                     

 

 10/13/2018            BAIMA, JAHAIRA L ARNP            Ot            I10      
      ESSENTIAL (PRIMARY) HYPERTENSION                     

 

 10/13/2018            BAIMA, JAHAIRA L ARNP            Ot            I25.119  
          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                     

 

 10/13/2018            BAIMA, JAHAIRA L ARNP            Ot            I34.0    
        NONRHEUMATIC MITRAL (VALVE) INSUFFICIENC                     

 

 10/13/2018            BAIMA, JAHAIRA L ARNP            Ot            R06.09   
         OTHER FORMS OF DYSPNEA                     

 

 10/13/2018            BAIMA, JAHAIRA L ARNP            Ot            Z72.0    
        TOBACCO USE                     

 

 10/15/2018            BAIMA, JAHAIRA L ARNP            Ot            I10      
      ESSENTIAL (PRIMARY) HYPERTENSION                     

 

 10/15/2018            BAIMA, JAHAIRA L ARNP            Ot            I25.119  
          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                     

 

 10/15/2018            BAIMA, JAHAIRA L ARNP            Ot            I34.0    
        NONRHEUMATIC MITRAL (VALVE) INSUFFICIENC                     

 

 10/15/2018            BAIMA, JAHAIRA L ARNP            Ot            I73.9    
        PERIPHERAL VASCULAR DISEASE, UNSPECIFIED                     

 

 10/15/2018            BAIMA, JAHAIRA L ARNP            Ot            R06.09   
         OTHER FORMS OF DYSPNEA                     

 

 10/15/2018            BAIMA, JAHAIRA L ARNP            Ot            R29.818  
          OTHER SYMPTOMS AND SIGNS INVOLVING THE N                     

 

 10/15/2018            BAIMA, JAHAIRA L ARNP            Ot            Z72.0    
        TOBACCO USE                     

 

 10/15/2018            BAIMA, JAHAIRA L ARNP            Ot            I10      
      ESSENTIAL (PRIMARY) HYPERTENSION                     

 

 10/15/2018            YOUSIFMA, JAHAIRA L ARNP            Ot            I25.119  
          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                     

 

 10/15/2018            BAIAURE DE JESUSHER L ARNP            Ot            I34.0    
        NONRHEUMATIC MITRAL (VALVE) INSUFFICIENC                     

 

 10/15/2018            BAIMA, JAHAIRA L ARNP            Ot            I73.9    
        PERIPHERAL VASCULAR DISEASE, UNSPECIFIED                     

 

 10/15/2018            BAIMA JAHAIRA L ARNP            Ot            R06.09   
         OTHER FORMS OF DYSPNEA                     

 

 10/15/2018            YOUSIFMA JAHAIRA L ARNP            Ot            R29.818  
          OTHER SYMPTOMS AND SIGNS INVOLVING THE N                     

 

 10/15/2018            SIDJAHAIRA L ARNP            Ot            Z72.0    
        TOBACCO USE                     

 

 10/17/2018            IRENA LIU            Ot            V76.12     
       OTH SCREEN MAMMO-MALIGN NEOPLASM OF BRUNO                     

 

 10/17/2018            ALYSE HODGES FACC, ALI FACP CCDS            Ot            
785.1            PALPITATIONS                     

 

 10/17/2018            ALYSE HODGES FACC, ALI FACP CCDS            Ot            
786.09            RESPIRATORY ABNORM NEC                     

 

 10/17/2018            ALYSE HODGES FACC, ALI FACP CCDS            Ot            
785.1            PALPITATIONS                     

 

 10/17/2018            ALYSE MD FACC, ALI FACP CCDS            Ot            
786.09            RESPIRATORY ABNORM NEC                     

 

 10/17/2018            JAVON DE LOS SANTOS ARNP            Ot            789.03    
        ABDOMINAL PAIN, RIGHT LOWER QUADRANT                     

 

 10/17/2018            JAVON DE LOS SANTOS ARNP            Ot            789.03    
        ABDOMINAL PAIN, RIGHT LOWER QUADRANT                     

 

 10/17/2018            JAVON DE LOS SANTOS ARNP            Ot            R10.31    
        RIGHT LOWER QUADRANT PAIN                     

 

 10/17/2018            JAVON DE LOS SANTOS ARNP            Ot            Z12.31    
        ENCNTR SCREEN MAMMOGRAM FOR MALIGNANT NE                     

 

 10/17/2018            SID JAHAIRA L ARNP            Ot            I10      
      ESSENTIAL (PRIMARY) HYPERTENSION                     

 

 10/17/2018            SID JAHAIRA L ARNP            Ot            I25.119  
          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                     

 

 10/17/2018            YOUSIFAURE DE JESUSHER L ARNP            Ot            I34.0    
        NONRHEUMATIC MITRAL (VALVE) INSUFFICIENC                     

 

 10/17/2018            SID JAHAIRA L ARNP            Ot            I73.9    
        PERIPHERAL VASCULAR DISEASE, UNSPECIFIED                     

 

 10/17/2018            BAIMA, JAHAIRA L ARNP            Ot            R06.09   
         OTHER FORMS OF DYSPNEA                     

 

 10/17/2018            BAIMA, JAHAIRA L ARNP            Ot            R29.818  
          OTHER SYMPTOMS AND SIGNS INVOLVING THE N                     

 

 10/17/2018            BAIMA, JAHAIRA L ARNP            Ot            Z72.0    
        TOBACCO USE                     

 

 10/17/2018            BAIMA, JAHAIRA L ARNP            Ot            I10      
      ESSENTIAL (PRIMARY) HYPERTENSION                     

 

 10/17/2018            BAIMA, JAHAIRA L ARNP            Ot            I25.119  
          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                     

 

 10/17/2018            BAIMA, JAHAIRA L ARNP            Ot            I34.0    
        NONRHEUMATIC MITRAL (VALVE) INSUFFICIENC                     

 

 10/17/2018            BAIMA, JAHAIRA L ARNP            Ot            R06.09   
         OTHER FORMS OF DYSPNEA                     

 

 10/17/2018            BAIMA, JAHAIRA L ARNP            Ot            Z72.0    
        TOBACCO USE                     

 

 10/17/2018            BAIMA, JAHAIRA L ARNP            Ot            I10      
      ESSENTIAL (PRIMARY) HYPERTENSION                     

 

 10/17/2018            BAIMA, JAHAIRA L ARNP            Ot            I25.119  
          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                     

 

 10/17/2018            BAIMA, JAHAIRA L ARNP            Ot            I34.0    
        NONRHEUMATIC MITRAL (VALVE) INSUFFICIENC                     

 

 10/17/2018            BAIMA, JAHAIRA L ARNP            Ot            R06.09   
         OTHER FORMS OF DYSPNEA                     

 

 10/17/2018            BAIMA, JAHAIRA L ARNP            Ot            Z72.0    
        TOBACCO USE                     

 

 10/31/2018            SEGUNDO SIU E APRN            Ot            G47.00
            INSOMNIA, UNSPECIFIED                     

 

 10/31/2018            SHERRON SEGUNDO E APRN            Ot            G47.10
            HYPERSOMNIA, UNSPECIFIED                     

 

 10/31/2018            SHERRON SEGUNDO E APRN            Ot            G47.33
            OBSTRUCTIVE SLEEP APNEA (ADULT) (PEDIATR                     

 

 10/31/2018            SHERRON SEGUNDO E APRN            Ot            G47.50
            PARASOMNIA, UNSPECIFIED                     

 

 2018            SHERRON SEGUNDO E APRN            Ot            G47.00
            INSOMNIA, UNSPECIFIED                     

 

 2018            SHERRON SEGUNDO E APRN            Ot            G47.10
            HYPERSOMNIA, UNSPECIFIED                     

 

 2018            SHERRON SEGUNDO E APRN            Ot            G47.33
            OBSTRUCTIVE SLEEP APNEA (ADULT) (PEDIATR                     

 

 2018            RALPH SIUINE E APRN            Ot            G47.50
            PARASOMNIA, UNSPECIFIED                     

 

 12/10/2018            BAIMA, JAHAIRA L ARNP            Ot            I10      
      ESSENTIAL (PRIMARY) HYPERTENSION                     

 

 12/10/2018            SIDJAHARIA ARNP            Ot            I25.119  
          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                     

 

 12/10/2018            JAHAIRA LAU EVETTE ARNP            Ot            I34.0    
        NONRHEUMATIC MITRAL (VALVE) INSUFFICIENC                     

 

 12/10/2018            YOUSIFMA JAHAIRA L ARNP            Ot            R06.09   
         OTHER FORMS OF DYSPNEA                     

 

 12/10/2018            JAHAIRA LAU ARNP            Ot            Z72.0    
        TOBACCO USE                     

 

 12/10/2018            SHERRONSEGUNDO MUNOZ APRN            Ot            R06.02
            SHORTNESS OF BREATH                     

 

 12/10/2018            SHERRONRALPH MUNOZINE DEDE APRN            Ot            R16.1 
           SPLENOMEGALY, NOT ELSEWHERE CLASSIFIED                     

 

 12/10/2018            SHERRONSEGUNDO MUNOZ APRN            Ot            Z72.0 
           TOBACCO USE                     

 

 2018            SHERRONSEGUNDO MUNOZ APRN            Ot            R06.02
            SHORTNESS OF BREATH                     

 

 2018            SHERRONSEGUNDO MUNOZ APRN            Ot            R16.1 
           SPLENOMEGALY, NOT ELSEWHERE CLASSIFIED                     

 

 2018            SHERRONRALPH MUNOZINE DEDE APRN            Ot            Z72.0 
           TOBACCO USE                     

 

 2018            GRAY CHEATHAM, IRENA IVERSON            Ot            V76.12     
       OTH SCREEN MAMMO-MALIGN NEOPLASM OF BRUNO                     

 

 2018            ALYSE HODGES FACC, ALI FACP CCDS            Ot            
785.1            PALPITATIONS                     

 

 2018            ALYSE HODGES FACC, ALI FACP CCDS            Ot            
786.09            RESPIRATORY ABNORM NEC                     

 

 2018            ALYSE HODGES FACC, ALI FACP CCDS            Ot            
785.1            PALPITATIONS                     

 

 2018            ALYSE HODGES FACC, ALI FACP CCDS            Ot            
786.09            RESPIRATORY ABNORM NEC                     

 

 2018            JAVON DE LOS SANTOS ARNP            Ot            789.03    
        ABDOMINAL PAIN, RIGHT LOWER QUADRANT                     

 

 2018            JAVON DE LOS SANTOS ARNP            Ot            789.03    
        ABDOMINAL PAIN, RIGHT LOWER QUADRANT                     

 

 2018            JAVON DE LOS SANTOS ARNP            Ot            R10.31    
        RIGHT LOWER QUADRANT PAIN                     

 

 2018            JAVON DE LOS SANTOS ARNP            Ot            Z12.31    
        ENCNTR SCREEN MAMMOGRAM FOR MALIGNANT NE                     

 

 2018            JAHAIRA LAU ARNP            Ot            I10      
      ESSENTIAL (PRIMARY) HYPERTENSION                     

 

 2018            SID JAHAIRA EVETTE ARNP            Ot            I25.119  
          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                     

 

 2018            BAIMAJAHAIRA L ARNP            Ot            I34.0    
        NONRHEUMATIC MITRAL (VALVE) INSUFFICIENC                     

 

 2018            BAIMA JAHAIRA L ARNP            Ot            I73.9    
        PERIPHERAL VASCULAR DISEASE, UNSPECIFIED                     

 

 2018            BAIMA, JAHAIRA L ARNP            Ot            R06.09   
         OTHER FORMS OF DYSPNEA                     

 

 2018            BAIMA, JAHAIRA L ARNP            Ot            R29.818  
          OTHER SYMPTOMS AND SIGNS INVOLVING THE N                     

 

 2018            BAIMA JAHAIRA L ARNP            Ot            Z72.0    
        TOBACCO USE                     

 

 2018            BAIMA, JAHAIRA L ARNP            Ot            I10      
      ESSENTIAL (PRIMARY) HYPERTENSION                     

 

 2018            BAIMA, JAHAIRA L ARNP            Ot            I25.119  
          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                     

 

 2018            BAIMA, JAHAIRA L ARNP            Ot            I34.0    
        NONRHEUMATIC MITRAL (VALVE) INSUFFICIENC                     

 

 2018            BAIMA, JAHAIRA L ARNP            Ot            R06.09   
         OTHER FORMS OF DYSPNEA                     

 

 2018            BAIMA, JAHAIRA L ARNP            Ot            Z72.0    
        TOBACCO USE                     

 

 2018            SEGUNDO SIU            Ot            R06.02
            SHORTNESS OF BREATH                     

 

 2018            SEGUNDO SIU            Ot            R16.1 
           SPLENOMEGALY, NOT ELSEWHERE CLASSIFIED                     

 

 2018            SEGUNDO SIU APRHECTOR            Ot            Z72.0 
           TOBACCO USE                     

 

 2018            JAVON DE LOS SANTOS ARNP            Ot            E04.2     
       NONTOXIC MULTINODULAR GOITER                     

 

 2018            JAVON DE LOS SANTOS ARNP            Ot            E04.2     
       NONTOXIC MULTINODULAR GOITER                     

 

 2019            MAURY HODGES, MICHELLE IVERSON            Ot            E04.1     
       NONTOXIC SINGLE THYROID NODULE                     

 

 2019            JAVON DE LOS SANTOS ARNP            Ot            E04.2     
       NONTOXIC MULTINODULAR GOITER                     

 

 2019            MAURY HODGES, MICHELLE IVERSON            Ot            E04.1     
       NONTOXIC SINGLE THYROID NODULE                     

 

 2019            JAVON DE LOS SANTOS ARNP            Ot            E04.2     
       NONTOXIC MULTINODULAR GOITER                     

 

 2019            GRAY CHEATHAM, IRENA IVERSON            Ot            V76.12     
       OTH SCREEN MAMMO-MALIGN NEOPLASM OF BRUNO                     

 

 2019            ALYSE HODGES FACC, ALI FACP CCDS            Ot            
785.1            PALPITATIONS                     

 

 2019            ALYSE HODGES FACC, NAN FACP CCDS            Ot            
786.09            RESPIRATORY ABNORM NEC                     

 

 2019            ALYSE HODGES FACC, ALI FACP CCDS            Ot            
785.1            PALPITATIONS                     

 

 2019            ALYSE HODGES FACYENNI, ALI FACP CCDS            Ot            
786.09            RESPIRATORY ABNORM NEC                     

 

 2019            JAVON DE LOS SANTOS ARNP            Ot            789.03    
        ABDOMINAL PAIN, RIGHT LOWER QUADRANT                     

 

 2019            JAVON DE LOS SANTOS ARNP            Ot            789.03    
        ABDOMINAL PAIN, RIGHT LOWER QUADRANT                     

 

 2019            JAVON DE LOS SANTOS ARNP            Ot            R10.31    
        RIGHT LOWER QUADRANT PAIN                     

 

 2019            JAVON DE LOS SANTOS ARNP            Ot            Z12.31    
        ENCNTR SCREEN MAMMOGRAM FOR MALIGNANT NE                     

 

 2019            JAHAIRA LAU L ARNP            Ot            I10      
      ESSENTIAL (PRIMARY) HYPERTENSION                     

 

 2019            SID JAHAIRA L ARNP            Ot            I25.119  
          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                     

 

 2019            SID JAHAIRA L ARNP            Ot            I34.0    
        NONRHEUMATIC MITRAL (VALVE) INSUFFICIENC                     

 

 2019            SID JAHAIRA L ARNP            Ot            I73.9    
        PERIPHERAL VASCULAR DISEASE, UNSPECIFIED                     

 

 2019            SID JAHAIRA L ARNP            Ot            R06.09   
         OTHER FORMS OF DYSPNEA                     

 

 2019            SID JAHAIRA L ARNP            Ot            R29.818  
          OTHER SYMPTOMS AND SIGNS INVOLVING THE N                     

 

 2019            JAHAIRA LAU L ARNP            Ot            Z72.0    
        TOBACCO USE                     

 

 2019            AURE LAUHER L ARNP            Ot            I10      
      ESSENTIAL (PRIMARY) HYPERTENSION                     

 

 2019            SID JAHAIRA L ARNP            Ot            I25.119  
          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                     

 

 2019            YOUSIFMA JAHAIRA L ARNP            Ot            I34.0    
        NONRHEUMATIC MITRAL (VALVE) INSUFFICIENC                     

 

 2019            YOUSIFMA JAHAIRA L ARNP            Ot            R06.09   
         OTHER FORMS OF DYSPNEA                     

 

 2019            AURE LAUHER L ARNP            Ot            Z72.0    
        TOBACCO USE                     

 

 2019            SEGUNDO SIU            Ot            R06.02
            SHORTNESS OF BREATH                     

 

 2019            SEGUNDO SIU            Ot            R16.1 
           SPLENOMEGALY, NOT ELSEWHERE CLASSIFIED                     

 

 2019            SEGUNDO SIU            Ot            Z72.0 
           TOBACCO USE                     

 

 2019            JAVON DE LOS SANTOS            Ot            E04.2     
       NONTOXIC MULTINODULAR GOITER                     

 

 2019            MAURY HODGES, MICHELLE IVERSON            Ot            E04.1     
       NONTOXIC SINGLE THYROID NODULE                     



                                                                               
                                                                               
                                                                               
                                                                               
                                                                               
                                                                               
                                                                               
                                                                               
                                                                               
                                                                               
                                                                               
                                                                               
                                                                               
                           



Procedures

      





 Code            Description            Performed By            Performed On   
     

 

                                               PAP SMEAR OBTAIN SMEAR     
                              2014        

 

             32460                                  NUCLEAR STRESS TESTING     
                              2014        

 

             87332                                  ECHO 2D                    
               2014        

 

             [id number]                                  HEMOCCULT                  
                 2014        

 

             [id number]                                  PAP SMEAR                  
                 2014        

 

             [id number]                                  MAMMOGRAM, SCREENING       
                            2014        

 

             [id number]                                  EKG, TRACING (IN-HOUSE)    
                               2014        

 

             [id number]                                  UA LONG DIP                
                   2014        

 

             73692                                  A1C (IN-HOUSE)             
                      2014        

 

             74663                                  ROUTINE VENIPUNCTURE       
                            2014        

 

             79169                                  CBC                        
           2014        

 

             [id number]                                  GFR CALC (RESULT ONLY)   
                                2014        

 

             26500                                  CMP                        
           2014        

 

             99785                                  LIPID PANEL                
                   2014        

 

             73550                                  MAGNESIUM                  
                 2014        

 

             59160                                  TSH                        
           2014        

 

             06072                                  INSULIN LEVEL              
                     2014        

 

             CARDIOLOG                                  NAN CULLEN            
                       09/10/2014        

 

             45806                                  ROUTINE VENIPUNCTURE       
                            2014        

 

             60006                                  CT ABDOMEN AND PELVIS W/
CONTRAST                                   2014        

 

             31869                                  FSH                        
           2014        

 

             83850                                  US PELVIC COMPL (REFLEX CPT
- 43409)                                   2015        



                                                            



Results

      





 Test            Result            Range        









 No Test Indicated - 16 11:33         









 .            Comment                      

 

 Dear Doctor,            Comment                      









 Comp. Metabolic Panel (14) - 16 11:33         









 Glucose, Serum            91 mg/dL            65-99        

 

 BUN            9 mg/dL            6-24        

 

 Creatinine, Serum            0.61 mg/dL            0.57-1.00        

 

 eGFR If NonAfricn Am            102 mL/min/1.73                >59        

 

 eGFR If Africn Am            118 mL/min/1.73                >59        

 

 BUN/Creatinine Ratio            15             9-23        

 

 Sodium, Serum            140 mmol/L            134-144        

 

 Potassium, Serum            4.2 mmol/L            3.5-5.2        

 

 Chloride, Serum            98 mmol/L                    

 

 Carbon Dioxide, Total            25 mmol/L            18-29        

 

 Calcium, Serum            9.1 mg/dL            8.7-10.2        

 

 Protein, Total, Serum            7.0 g/dL            6.0-8.5        

 

 Albumin, Serum            4.4 g/dL            3.5-5.5        

 

 Globulin, Total            2.6 g/dL            1.5-4.5        

 

 A/G Ratio            1.7             1.1-2.5        

 

 Bilirubin, Total            0.9 mg/dL            0.0-1.2        

 

 Alkaline Phosphatase, S            83 IU/L                    

 

 AST (SGOT)            15 IU/L            0-40        

 

 ALT (SGPT)            17 IU/L            0-32        









 Lipid Panel - 16 11:33         









 Cholesterol, Total            183 mg/dL            100-199        

 

 Triglycerides            94 mg/dL            0-149        

 

 HDL Cholesterol            63 mg/dL            >39        

 

 VLDL Cholesterol Macario            19 mg/dL            5-40        

 

 LDL Cholesterol Calc            101 mg/dL            0-99        









 Pap Lb, HPV-hr - 17 15:13         









 HPV, high-risk            Negative             Negative        

 

 DIAGNOSIS:            Comment                      

 

 Specimen adequacy:            Comment                      

 

 Clinician provided ICD10:            Comment                      

 

 Performed by:            Comment                      

 

 QC reviewed by:            Comment                      

 

 .            .                      

 

 Pathologist provided ICD10:            Comment                      

 

 Note:            Comment                      









 CULTURE, URINE - 17 12:04         









 CULTURE, URINE, ROUTINE            SEE NOTE             NRG        









 PDM - 09 PANEL (PROFILE 1) - 18 13:15         









 Prescribed Drug 1            Xanax(TM)             NRG        

 

 Creatinine            53.3 mg/dL            > or=20.0        

 

 pH            6.95             4.5 - 9.0        

 

 Oxidant            NEGATIVE mcg/mL            <200        

 

 Amphetamines            NEGATIVE ng/mL            <500        

 

 medMATCH Amphetamines            CONSISTENT             NRG        

 

 Benzodiazepines            POSITIVE ng/mL            <100        

 

 Marijuana Metabolite            POSITIVE ng/mL            <20        

 

 Cocaine Metabolite            NEGATIVE ng/mL            <150        

 

 medMATCH Cocaine Metab            CONSISTENT             NRG        

 

 Opiates            NEGATIVE ng/mL            <100        

 

 medMATCH Opiates            CONSISTENT             NRG        

 

 Oxycodone            NEGATIVE ng/mL            <100        

 

 medMATCH Oxycodone            CONSISTENT             NRG        

 

 COMMENT                         NRG        

 

   Alphahydroxyalprazolam            74 ng/mL            <25        

 

 medMATCH aOH alprazolam            CONSISTENT             NRG        

 

   Alphahydroxymidazolam            NEGATIVE ng/mL            <50        

 

 medMATCH aOH midazolam            CONSISTENT             NRG        

 

   Alphahydroxytriazolam            NEGATIVE ng/mL            <50        

 

 medMATCH aOH triazolam            CONSISTENT             NRG        

 

   Aminoclonazepam            NEGATIVE ng/mL            <25        

 

 medMATCH Aminoclonazepam            CONSISTENT             NRG        

 

   Hydroxyethylflurazepam            NEGATIVE ng/mL            <50        

 

 medMATCH OH,Et flurazepam            CONSISTENT             NRG        

 

   Lorazepam            NEGATIVE ng/mL            <50        

 

 medMATCH Lorazepam            CONSISTENT             NRG        

 

   Nordiazepam            NEGATIVE ng/mL            <50        

 

 medMATCH Nordiazepam            CONSISTENT             NRG        

 

   Oxazepam            NEGATIVE ng/mL            <50        

 

 medMATCH Oxazepam            CONSISTENT             NRG        

 

   Temazepam            NEGATIVE ng/mL            <50        

 

 medMATCH Temazepam            CONSISTENT             NRG        

 

   Marijuana Metabolite            66 ng/mL            <5        

 

 medMATCH Marijuana Metab            INCONSISTENT             NRG        

 

 Barbiturates            NEGATIVE ng/mL            <300        

 

 medMATCH Barbiturates            CONSISTENT             NRG        

 

 Methadone Metabolite            NEGATIVE ng/mL            <100        

 

 medMATCH Methadone Metab            CONSISTENT             NRG        

 

 Phencyclidine            NEGATIVE ng/mL            <25        

 

 medMATCH Phencyclidine            CONSISTENT             NRG        









 TSH - 18 12:32         









 TSH            0.79 mIU/L            0.40-4.50        









 Complete blood count (CBC) with automated white blood cell (WBC) differential 
- 19 10:35         









 Blood leukocytes automated count (number/volume)            6.8 10*3/uL       
     4.3-11.0        

 

 Blood erythrocytes automated count (number/volume)            4.27 10*6/uL    
        4.35-5.85        

 

 Venous blood hemoglobin measurement (mass/volume)            13.1 g/dL        
    11.5-16.0        

 

 Blood hematocrit (volume fraction)            37 %            35-52        

 

 Automated erythrocyte mean corpuscular volume            87 [foz_us]          
  80-99        

 

 Automated erythrocyte mean corpuscular hemoglobin (mass per erythrocyte)      
      31 pg            25-34        

 

 Automated erythrocyte mean corpuscular hemoglobin concentration measurement (
mass/volume)            35 g/dL            32-36        

 

 Automated erythrocyte distribution width ratio            17.6 %            
10.0-14.5        

 

 Automated blood platelet count (count/volume)            251 10*3/uL          
  130-400        

 

 Automated blood platelet mean volume measurement            8.9 [foz_us]      
      7.4-10.4        

 

 Automated blood neutrophils/100 leukocytes            67 %            42-75   
     

 

 Automated blood lymphocytes/100 leukocytes            23 %            12-44   
     

 

 Blood monocytes/100 leukocytes            7 %            0-12        

 

 Automated blood eosinophils/100 leukocytes            3 %            0-10     
   

 

 Automated blood basophils/100 leukocytes            2 %            0-10        

 

 Blood neutrophils automated count (number/volume)            4.5 10*3         
   1.8-7.8        

 

 Blood lymphocytes automated count (number/volume)            1.5 10*3         
   1.0-4.0        

 

 Blood monocytes automated count (number/volume)            0.4 10*3            
0.0-1.0        

 

 Automated eosinophil count            0.2 10*3/uL            0.0-0.3        

 

 Automated blood basophil count (count/volume)            0.1 10*3/uL          
  0.0-0.1        









 Whole blood basic metabolic panel - 19 10:35         









 Serum or plasma sodium measurement (moles/volume)            135 mmol/L       
     135-145        

 

 Serum or plasma potassium measurement (moles/volume)            4.1 mmol/L    
        3.6-5.0        

 

 Serum or plasma chloride measurement (moles/volume)            102 mmol/L     
               

 

 Carbon dioxide            25 mmol/L            21-32        

 

 Serum or plasma anion gap determination (moles/volume)            8 mmol/L    
        5-14        

 

 Serum or plasma urea nitrogen measurement (mass/volume)            12 mg/dL   
         7-18        

 

 Serum or plasma creatinine measurement (mass/volume)            0.64 mg/dL    
        0.60-1.30        

 

 Serum or plasma urea nitrogen/creatinine mass ratio            19             
NRG        

 

 Serum or plasma creatinine measurement with calculation of estimated 
glomerular filtration rate            >             NRG        

 

 Serum or plasma glucose measurement (mass/volume)            95 mg/dL         
           

 

 Serum or plasma calcium measurement (mass/volume)            9.4 mg/dL        
    8.5-10.1        









 Methicillin resistant Staphylococcus aureus (MRSA) screening culture -  10:35         









 Methicillin resistant Staphylococcus aureus (MRSA) screening culture          
  NEG             NRG        









 Whole blood basic metabolic panel - 19 06:27         









 Serum or plasma sodium measurement (moles/volume)            135 mmol/L       
     135-145        

 

 Serum or plasma potassium measurement (moles/volume)            3.7 mmol/L    
        3.6-5.0        

 

 Serum or plasma chloride measurement (moles/volume)            97 mmol/L      
              

 

 Carbon dioxide            25 mmol/L            21-32        

 

 Serum or plasma anion gap determination (moles/volume)            13 mmol/L   
         5-14        

 

 Serum or plasma urea nitrogen measurement (mass/volume)            13 mg/dL   
         7-18        

 

 Serum or plasma creatinine measurement (mass/volume)            0.64 mg/dL    
        0.60-1.30        

 

 Serum or plasma urea nitrogen/creatinine mass ratio            20             
NRG        

 

 Serum or plasma creatinine measurement with calculation of estimated 
glomerular filtration rate            >             NRG        

 

 Serum or plasma glucose measurement (mass/volume)            114 mg/dL        
            

 

 Serum or plasma calcium measurement (mass/volume)            7.8 mg/dL        
    8.5-10.1        









 IONIZED CALCIUM (SEND OFF) - 19 06:27         









 Blood ionized calcium measurement (mass/volume)            0.94 %            
1.16-1.32        

 

 Venous blood ionized calcium measurement adjusted to pH 7.4 (moles/volume)    
        0.97 %            1.16-1.32        

 

 pH measurement            7.45             NRG        









 Whole blood basic metabolic panel - 19 05:57         









 Serum or plasma sodium measurement (moles/volume)            130 mmol/L       
     135-145        

 

 Serum or plasma potassium measurement (moles/volume)            3.8 mmol/L    
        3.6-5.0        

 

 Serum or plasma chloride measurement (moles/volume)            94 mmol/L      
              

 

 Carbon dioxide            25 mmol/L            21-32        

 

 Serum or plasma anion gap determination (moles/volume)            11 mmol/L   
         5-14        

 

 Serum or plasma urea nitrogen measurement (mass/volume)            12 mg/dL   
         7-18        

 

 Serum or plasma creatinine measurement (mass/volume)            0.64 mg/dL    
        0.60-1.30        

 

 Serum or plasma urea nitrogen/creatinine mass ratio            19             
NRG        

 

 Serum or plasma creatinine measurement with calculation of estimated 
glomerular filtration rate            >             NRG        

 

 Serum or plasma glucose measurement (mass/volume)            109 mg/dL        
            

 

 Serum or plasma calcium measurement (mass/volume)            7.1 mg/dL        
    8.5-10.1        



                                        



Encounters

      





 ACCT No.            Visit Date/Time            Discharge            Status    
        Pt. Type            Provider            Facility            Loc./Unit  
          Complaint        

 

 [id number]            2014 16:18:00            2014 23:59:59          
  [facility]            Outpatient            JAVON CHILEL                  
                             

 

 [id number]            10/15/2014 08:55:00            10/15/2014 23:59:59          
  [facility]            Outpatient            NAN CULLEN MD                          
                     

 

 [id number]            09/10/2014 10:15:00            09/10/2014 23:59:59          
  [facility]            Outpatient            JAVON CHILEL                  
                             

 

 [id number]            2014 08:54:00            2014 23:59:59          
  [facility]            Outpatient            CECY ACEVES DO                        
                       

 

 [id number]            2014 13:00:00            2014 23:59:59          
  [facility]            Outpatient            CECY ACEVES DO                        
                       

 

 [id number]            2014 13:00:00            2014 23:59:59          
  [facility]            Outpatient            CECY ACEVES DO                        
                       

 

 [id number]            2013 12:48:00            2013 23:59:59          
  [facility]            Outpatient                                                    
        

 

 [id number]            2013 10:09:00            2013 23:59:59          
  [facility]            Outpatient            JAVON CHILEL                  
                             

 

 [id number]            2012 13:33:00            2012 23:59:59          
  [facility]            Outpatient                                                    
        

 

 [id number]            2012 13:33:00            2012 23:59:59            
[facility]            Outpatient            JAVON CHILEL                    
                           

 

 [id number]            2013 15:19:00                                      
Document Registration                                                          
  

 

 [id number]            2017 17:07:00                                   
   Document Registration                                                       
     

 

 [id number]            2016 10:06:00                                   
   Document Registration                                                       
     

 

 [id number]            2019 11:20:00            2019 23:59:59            
[facility]            Outpatient            [facility]AV[facility]ILEL S                    
     [facility]                     

 

 [id number]            2018 12:20:00                                      
Document Registration                                                          
  

 

 [id number]            2018 13:00:00                                      
Document Registration                                                          
  

 

 [id number]            2017 11:40:00                                      
Document Registration                                                          
  

 

 [id number]            2015 13:21:52                         [place]            
Document Registration                                                          
  

 

 [id number]            2016 10:06:00                                   
   Document Registration                                                       
     

 

 [id number]            2019 11:32:00            2019 12:20:00    
        DIS            Inpatient            MICHELLE MENDIOLA MD            [facility]            4TH            POST OP NAUSEA,VOMITING,
HYPOCALCEMIA        

 

 [id number]            2019 10:15:00            2019 10:48:00    
        DIS            Outpatient            MICHELLE MENDIOLA MD            
[facility]            PREOP            THYROID NODULES   
     

 

 [id number]            2019 12:59:00            2019 23:59:59    
        [facility]            Outpatient            MICHELLE MENDIOLA MD            
[facility]            LAB            THYROID NODULE      
  

 

 [id number]            2019 09:54:00            2019 23:59:59    
        [facility]            Outpatient            MICHELLE MENDIOLA MD            
[facility]            RAD            THYROID NODULE      
  

 

 [id number]            2018 13:06:00            2018 23:59:59    
        [facility]            Outpatient            JAVON DE LOS SANTOS            
[facility]            RAD            THYROID NODULE      
  

 

 [id number]            10/31/2018 13:44:00            10/31/2018 13:52:00    
        DIS            Outpatient            SEGUNDO SIU          
  [facility]            SLEEP            SNORING,EDS     
   

 

 [id number]            10/17/2018 11:47:00            10/17/2018 23:59:59    
        [facility]            Outpatient            SEGUNDO SIU          
  [facility]            RAD            TOBACCO USER,
DYSPNEA        

 

 [id number]            10/09/2018 13:27:00            10/09/2018 23:59:59    
        [facility]            Outpatient            JAHAIRA LAU ARNP            
[facility]            CARD            DYSPNEA ON EXERTION
        

 

 [id number]            2018 10:49:00            2018 23:59:59    
        [facility]            Outpatient            JAHAIRA LAU ARNP            
[facility]            CARD            DYSPNEA ON EXERTION
        

 

 [id number]            2017 12:54:00            2017 23:59:59    
        [facility]            Outpatient            JAVON DE LOS SANTOS            
[facility]            RAD            Z12.31 SCREENING 
MAMMO        

 

 [id number]            2017 08:45:00            2017 12:33:00    
        DIS            Outpatient            HUNTER HAMMONDS DO            [facility] 
[facility]            ENDO            HISTORY POLYPS        

 

 [id number]            2017 05:33:00            2017 15:11:00    
        DIS            Outpatient            HUNTER HAMMONDS DO            [facility]            PREOP            COLONOSCOPY        

 

 [id number]            2016 09:46:00            2016 15:00:00    
        DIS            Outpatient            HUNTER HAMMONDS DO            [facility]            SDC            SCREENING        

 

 [id number]            2016 05:39:00            2016 10:46:00    
        DIS            Outpatient            HUNTER HAMMONDS DO            [facility]            PREOP            SCREENING        

 

 [id number]            08/15/2016 08:34:00            08/15/2016 23:59:59    
        [facility]            Outpatient            JAVON DE LOS SANTOS            
[facility]            RAD            RLQ ABD PAIN        

 

 [id number]            2015 13:21:00            2015 23:59:59    
        [facility]            Outpatient            JAVON DE LOS SANTOS            
[facility]            RAD            ABD PAIN, RLQ        

 

 [id number]            2015 11:11:00            2015 23:59:59    
        [facility]            Outpatient            JAVON DE LOS SANTOS            
[facility]            RAD            RLQ PAIN        

 

 [id number]            2014 08:16:00            2014 17:11:00    
        DIS            Outpatient            NAN CULLEN MD, FACC FACP CCDS     
       [facility]            CATH            ABN STRESS 
TEST        

 

 [id number]            10/30/2014 07:42:00            10/30/2014 23:59:59    
        [facility]            Outpatient            NAN CULLEN MD, FACC FACKEVIN CCDS     
       [facility]            CARD            ANGINA,SOB,
FATIGUE        

 

 [id number]            10/23/2014 10:44:00            10/23/2014 23:59:59    
        [facility]            Outpatient            NAN CULLEN MD, FACC FACP CCDS     
       [facility]            CARD            ANGINA,SOB,
FATIGUE        

 

 [id number]            2014 10:08:00            2014 23:59:59    
        [facility]            Outpatient            IRENA LIU            [facility] 
[facility]            RAD            SCREENING

## 2019-03-10 NOTE — ED GENERAL
General


Chief Complaint:  General Problems/Pain


Stated Complaint:  HAD THYROID SURGERY ON 6TH, NOT FEELING WELL


Nursing Triage Note:  


pt had thyroid removed 4days ago and now c/o increased shakiness, weakness, and 


intermittent numbness around lips and fingers. pt also has significant bruising 


to chest.


Nursing Sepsis Screen:  No Definite Risk


Source of Information:  Patient


Exam Limitations:  No Limitations


 (QIAN ALLISON)





History of Present Illness


Date Seen by Provider:  Mar 10, 2019


Time Seen by Provider:  23:40


Initial Comments


 58-year-old female who presents to the emergency room with complaints of 

shakiness, weakness, numbness to her lips and around and her fingers bilaterally

, intermittent chest pain that started the day after she had her thyroid 

removed. She is 4 days postop from a complete thyroidectomy by Dr. Mendiola. She 

has significant swelling and ecchymosis around the incision site that extends 

down to her mid chest and covers both breasts bilaterally. She denies pain at 

the surgical site. Surgery was on 3/5/19


Timing/Duration:  4-5 Days


Associated Systoms:  Chest Pain, Malaise, Weakness (QIAN ALLISON)





Allergies and Home Medications


Allergies


Coded Allergies:  


     No Known Drug Allergies (Unverified , 10/30/14)





Home Medications


Fluoxetine HCl 20 Mg Tablet, 60 MG PO HS, (Reported)


   take 3 (20mg) tabs 


Gabapentin 400 Mg Capsule, 400 MG PO HS, (Reported)


Levothyroxine Sodium 112 Mcg Tablet, 112 MCG PO DAILY


   Prescribed by: MICHELLE MENDIOLA on 3/6/19 1454


Melatonin 10 Mg Tablet, 10 MG PO HS, (Reported)


Oxybutynin Chloride 5 Mg Tablet, 5 MG PO HS, (Reported)


Propranolol HCl 20 Mg Tablet, 20 MG PO BID, (Reported)


   med is bid but current prescription got changed to daily in error, talked 

with PCP and med is suppose to be bid and will be fixed with next prescription 


Tramadol HCl 50 Mg Tablet, 50 MG PO Q12H PRN for PAIN-MODERATE


   Prescribed by: MICHELLE MENDIOLA on 3/8/19 1137





Patient Home Medication List


Home Medication List Reviewed:  Yes


 (QIAN ALLISON)





Review of Systems


Review of Systems


Constitutional:  no symptoms reported, see HPI


EENTM:  throat swelling (moderate swelling and ecchymosis around thyroidectomy 

incision site)


Cardiovascular:  see HPI, chest pain


Skin:  see HPI, other (ecchymosis spreading from thyroidectomy site to mid 

chest and bilateral breasts) (QIAN ALLISON)





All Other Systems Reviewed


Negative Unless Noted:  Yes


 (QIAN ALLISON)





Past Medical-Social-Family Hx


Past Med/Social Hx:  Reviewed Nursing Past Med/Soc Hx


 (QIAN ALLISON)





Patient Social History


Alcohol Use:  Denies Use


Recreational Drug Use:  No


Smoking Status:  Current Everyday Smoker


Type Used:  Cigarettes


Recent Foreign Travel:  No


Contact w/Someone Who Travel:  No


Recent Infectious Disease Expo:  No


Recent Hopitalizations:  No


 (QIAN ALLISON)





Immunizations Up To Date


Date of Influenza Vaccine:  Nov 18, 2014


 (QIAN ALLISON)





Past Medical History


Surgeries:  Yes (jaw sx, )


Bowel Surgery, Hysterectomy, Thyroidectomy, Tonsillectomy


Respiratory:  Yes


Sleep Apnea


Currently Using CPAP:  Yes


Currently Using BIPAP:  No


Cardiac:  Yes (mitral valve prolapse)


Heart Murmur, Hypertension


Neurological:  No


GYN History:  Hysterectomy


Genitourinary:  No


Bladder Infection, Dialysis


Gastrointestinal:  No


Polyps


Musculoskeletal:  No


Arthritis


Endocrine:  No


HEENT:  No


Cancer:  No


Psychosocial:  Yes


Anxiety, Depression


Integumentary:  No


Blood Disorders:  No


 (QIAN ALLISON)





Family Medical History


Reviewed Nursing Family Hx


 (QIAN ALLISON)





Physical Exam


Vital Signs





Vital Signs - First Documented








 3/10/19





 23:05


 


Temp 99.0


 


Pulse 80


 


Resp 18


 


B/P (MAP) 152/82 (105)


 


Pulse Ox 96


 


O2 Delivery Room Air





 (JOHANA TINEO MD)


Vital Signs


Capillary Refill : Less Than 3 Seconds 


 (QIAN ALLISON)


Height, Weight, BMI


Height: 5'7.00"


Weight: 270lbs. oz. 122.941229zx; 42.7 BMI


Method:Stated


General Appearance:  No Apparent Distress, WD/WN


Neck:  Full Range of Motion, Other (significant swelling and ecchymosis around 

thyroidectomy site.)


Respiratory:  Chest Non Tender, Lungs Clear, Normal Breath Sounds, No Accessory 

Muscle Use, No Respiratory Distress, Other (ecchymosis across chest)


Cardiovascular:  Regular Rate, Rhythm, No Edema, No Gallop, No JVD, No Murmur, 

Normal Peripheral Pulses


Gastrointestinal:  Normal Bowel Sounds, No Organomegaly, No Pulsatile Mass, Non 

Tender, Soft


Extremity:  Normal Capillary Refill


Neurologic/Psychiatric:  Alert, Oriented x3, Normal Mood/Affect


Skin:  Normal Color, Warm/Dry (QIAN ALLISON)





Progress/Results/Core Measures


Suspected Sepsis


Recent Fever Within 48 Hours:  No


Infection Criteria Present:  None


New/Unexplained  Altered Menta:  No


Sepsis Screen:  No Definite Risk


SIRS


Temperature:99.0 


Pulse: 80 


Respiratory Rate: 18


 


Laboratory Tests


3/10/19 23:40: White Blood Count 10.1


Blood Pressure 152 /82 


Mean: 105


 


Laboratory Tests


3/10/19 23:40: 


Creatinine 0.66, INR Comment 1.1, Platelet Count 348, Total Bilirubin 1.5H


 (QIAN ALLISON)





Results/Orders


Lab Results





Laboratory Tests








Test


 3/10/19


23:40 Range/Units


 


 


White Blood Count


 10.1 


 4.3-11.0


10^3/uL


 


Red Blood Count


 3.80 L


 4.35-5.85


10^6/uL


 


Hemoglobin 11.8  11.5-16.0  G/DL


 


Hematocrit 33 L 35-52  %


 


Mean Corpuscular Volume 86  80-99  FL


 


Mean Corpuscular Hemoglobin 31  25-34  PG


 


Mean Corpuscular Hemoglobin


Concent 36 


 32-36  G/DL





 


Red Cell Distribution Width 17.2 H 10.0-14.5  %


 


Platelet Count


 348 


 130-400


10^3/uL


 


Mean Platelet Volume 8.8  7.4-10.4  FL


 


Neutrophils (%) (Auto) 71  42-75  %


 


Lymphocytes (%) (Auto) 18  12-44  %


 


Monocytes (%) (Auto) 8  0-12  %


 


Eosinophils (%) (Auto) 2  0-10  %


 


Basophils (%) (Auto) 1  0-10  %


 


Neutrophils # (Auto) 7.1  1.8-7.8  X 10^3


 


Lymphocytes # (Auto) 1.8  1.0-4.0  X 10^3


 


Monocytes # (Auto) 0.8  0.0-1.0  X 10^3


 


Eosinophils # (Auto)


 0.2 


 0.0-0.3


10^3/uL


 


Basophils # (Auto)


 0.1 


 0.0-0.1


10^3/uL


 


Prothrombin Time 14.1  12.2-14.7  SEC


 


INR Comment 1.1  0.8-1.4  


 


Activated Partial


Thromboplast Time 30 


 24-35  SEC





 


Sodium Level 139  135-145  MMOL/L


 


Potassium Level 3.4 L 3.6-5.0  MMOL/L


 


Chloride Level 98    MMOL/L


 


Carbon Dioxide Level 25  21-32  MMOL/L


 


Anion Gap 16 H 5-14  MMOL/L


 


Blood Urea Nitrogen 7  7-18  MG/DL


 


Creatinine


 0.66 


 0.60-1.30


MG/DL


 


Estimat Glomerular Filtration


Rate > 60 


  





 


BUN/Creatinine Ratio 11   


 


Glucose Level 102    MG/DL


 


Calcium Level 6.4 L 8.5-10.1  MG/DL


 


Corrected Calcium 6.3 L 8.5-10.1  MG/DL


 


Magnesium Level 2.0  1.8-2.4  MG/DL


 


Total Bilirubin 1.5 H 0.1-1.0  MG/DL


 


Aspartate Amino Transf


(AST/SGOT) 20 


 5-34  U/L





 


Alanine Aminotransferase


(ALT/SGPT) 28 


 0-55  U/L





 


Alkaline Phosphatase 68    U/L


 


Myoglobin


 32.3 


 10.0-92.0


NG/ML


 


Troponin I < 0.028  <0.028  NG/ML


 


B-Type Natriuretic Peptide 148.9 H <100.0  PG/ML


 


Total Protein 7.1  6.4-8.2  GM/DL


 


Albumin 4.1  3.2-4.5  GM/DL


 


TSH Cascade Testing


 2.31 


 0.35-4.94


UIU/ML





 (JOHANA TINEO MD)


My Orders





Orders - JOHANA TINEO MD


Ns Iv 1000 Ml (Sodium Chloride 0.9%) (3/11/19 02:35)


Calcium  Gluconate 10% Inj (Calcium  Glu (3/11/19 02:45)


 (JOHANA TINEO MD)


Vital Signs/I&O











 3/10/19





 23:05


 


Temp 99.0


 


Pulse 80


 


Resp 18


 


B/P (MAP) 152/82 (105)


 


Pulse Ox 96


 


O2 Delivery Room Air





 (JOHANA TNIEO MD)


Vital Signs/I&O


Capillary Refill : Less Than 3 Seconds 


 (QIAN ALLISON)








Blood Pressure Mean:  105








Progress Note :  


Progress Note


0045: Assumed care from [facility]. Pending CT of the neck and chest. 

0150: Dr. Mendiola paged. I have reexamined the patient. She does have mass 

effect to anterior neck. She does have bruising that cascade stand the anterior 

chest from the thyroid surgical wound. Patient's greatest concern is that the 

discoloration has increased over time. She states that the size of the mass in 

her neck has not changed. CT results reviewed and noted as below. I will 

discuss with Dr. Mendiola for further instructions. Calcium is noted to be low 

and is lower than at discharge. Patient states that she has taken multiple 

calcium carbonate tablet since discharge. 0245: I did discuss the case with Dr. Mendiola. We will give calcium gluconate 3 A IV now and patient is to be 

admitted. I did discuss this with the patient and family who agree with plan. 

Patient does have hypocalcemia and postoperative pain. Admit, observation 

status.


 (JOHANA TINEO MD)





ECG


Initial ECG Impression Date:  Mar 10, 2019


Initial ECG Impression Time:  23:47


Initial ECG Rate:  66


Initial ECG Rhythm:  Normal Sinus


Comment


Sinus rhythm with normal axis. No evidence of ST elevation MI. Similar to 18 November 2014. Interpreted by me.


 (JOHANA TINEO MD)





Diagnostic Imaging





   Diagonstic Imaging:  CT


   Plain Films/CT/US/NM/MRI:  chest, other


Comments


CT soft tissue of the neck shows postsurgical changes including a relatively 

dense fluid collection with a few foci of air along the anterior neck measuring 

approximately 6.1 x 6.1 x 2 cm, likely representing a postsurgical seroma/blood 

products. There is edema within the thyroidectomy bed. Airways patent 

throughout. CT of the chest shows no acute findings.


   Reviewed:  [facility]


 (JOHANA TINEO MD)





Departure


Communication (Admissions)


Time/Spoke to Admitting Phy:  02:45


 (JOHANA TINEO MD)





Impression





 Primary Impression:  


 Hypocalcemia


 Additional Impression:  


 Postoperative pain


Disposition:  09 ADMITTED AS INPATIENT


Condition:  Stable





Admissions


Decision to Admit Reason:  Admit from ER (General)


Decision to Admit/Date:  Mar 11, 2019


Time/Decision to Admit Time:  02:45


 (JOHANA TINEO MD)





Departure-Patient Inst.


Referrals:  


[facility][1]/K (PCP/Family)


Primary Care Physician











QIAN ALLISON Mar 10, 2019 23:55


JOHANA TINEO MD Mar 11, 2019 01:55

## 2019-03-11 VITALS — SYSTOLIC BLOOD PRESSURE: 150 MMHG | DIASTOLIC BLOOD PRESSURE: 67 MMHG

## 2019-03-11 VITALS — DIASTOLIC BLOOD PRESSURE: 88 MMHG | SYSTOLIC BLOOD PRESSURE: 131 MMHG

## 2019-03-11 VITALS — DIASTOLIC BLOOD PRESSURE: 94 MMHG | SYSTOLIC BLOOD PRESSURE: 158 MMHG

## 2019-03-11 VITALS — SYSTOLIC BLOOD PRESSURE: 134 MMHG | DIASTOLIC BLOOD PRESSURE: 65 MMHG

## 2019-03-11 LAB
ALBUMIN SERPL-MCNC: 3.9 GM/DL (ref 3.2–4.5)
ALBUMIN SERPL-MCNC: 4.1 GM/DL (ref 3.2–4.5)
ALP SERPL-CCNC: 67 U/L (ref 40–136)
ALP SERPL-CCNC: 68 U/L (ref 40–136)
ALT SERPL-CCNC: 25 U/L (ref 0–55)
ALT SERPL-CCNC: 28 U/L (ref 0–55)
APTT BLD: 30 SEC (ref 24–35)
BASOPHILS # BLD AUTO: 0.1 10^3/UL (ref 0–0.1)
BASOPHILS NFR BLD AUTO: 1 % (ref 0–10)
BILIRUB SERPL-MCNC: 1.3 MG/DL (ref 0.1–1)
BILIRUB SERPL-MCNC: 1.5 MG/DL (ref 0.1–1)
BUN/CREAT SERPL: 11
BUN/CREAT SERPL: 9
CALCIUM SERPL-MCNC: 6.4 MG/DL (ref 8.5–10.1)
CALCIUM SERPL-MCNC: 6.6 MG/DL (ref 8.5–10.1)
CHLORIDE SERPL-SCNC: 101 MMOL/L (ref 98–107)
CHLORIDE SERPL-SCNC: 98 MMOL/L (ref 98–107)
CO2 SERPL-SCNC: 25 MMOL/L (ref 21–32)
CO2 SERPL-SCNC: 26 MMOL/L (ref 21–32)
CREAT SERPL-MCNC: 0.64 MG/DL (ref 0.6–1.3)
CREAT SERPL-MCNC: 0.66 MG/DL (ref 0.6–1.3)
EOSINOPHIL # BLD AUTO: 0.2 10^3/UL (ref 0–0.3)
EOSINOPHIL NFR BLD AUTO: 3 % (ref 0–10)
ERYTHROCYTE [DISTWIDTH] IN BLOOD BY AUTOMATED COUNT: 17.3 % (ref 10–14.5)
GFR SERPLBLD BASED ON 1.73 SQ M-ARVRAT: > 60 ML/MIN
GFR SERPLBLD BASED ON 1.73 SQ M-ARVRAT: > 60 ML/MIN
GLUCOSE SERPL-MCNC: 102 MG/DL (ref 70–105)
GLUCOSE SERPL-MCNC: 106 MG/DL (ref 70–105)
HCT VFR BLD CALC: 31 % (ref 35–52)
HGB BLD-MCNC: 11.2 G/DL (ref 11.5–16)
INR PPP: 1.1 (ref 0.8–1.4)
LYMPHOCYTES # BLD AUTO: 1.7 X 10^3 (ref 1–4)
LYMPHOCYTES NFR BLD AUTO: 18 % (ref 12–44)
MAGNESIUM SERPL-MCNC: 2 MG/DL (ref 1.8–2.4)
MANUAL DIFFERENTIAL PERFORMED BLD QL: NO
MCH RBC QN AUTO: 31 PG (ref 25–34)
MCHC RBC AUTO-ENTMCNC: 36 G/DL (ref 32–36)
MCV RBC AUTO: 87 FL (ref 80–99)
MONOCYTES # BLD AUTO: 0.7 X 10^3 (ref 0–1)
MONOCYTES NFR BLD AUTO: 8 % (ref 0–12)
MYOGLOBIN SERPL-MCNC: 32.3 NG/ML (ref 10–92)
NEUTROPHILS # BLD AUTO: 6.5 X 10^3 (ref 1.8–7.8)
NEUTROPHILS NFR BLD AUTO: 70 % (ref 42–75)
PLATELET # BLD: 330 10^3/UL (ref 130–400)
PMV BLD AUTO: 8.8 FL (ref 7.4–10.4)
POTASSIUM SERPL-SCNC: 3.4 MMOL/L (ref 3.6–5)
POTASSIUM SERPL-SCNC: 3.4 MMOL/L (ref 3.6–5)
PROT SERPL-MCNC: 6.6 GM/DL (ref 6.4–8.2)
PROT SERPL-MCNC: 7.1 GM/DL (ref 6.4–8.2)
PROTHROMBIN TIME: 14.1 SEC (ref 12.2–14.7)
SODIUM SERPL-SCNC: 139 MMOL/L (ref 135–145)
SODIUM SERPL-SCNC: 142 MMOL/L (ref 135–145)
WBC # BLD AUTO: 9.2 10^3/UL (ref 4.3–11)

## 2019-03-11 RX ADMIN — MAGNESIUM SULFATE IN DEXTROSE SCH MLS/HR: 10 INJECTION, SOLUTION INTRAVENOUS at 03:50

## 2019-03-11 RX ADMIN — POTASSIUM CHLORIDE SCH MLS/HR: 200 INJECTION, SOLUTION INTRAVENOUS at 11:07

## 2019-03-11 RX ADMIN — MAGNESIUM SULFATE IN DEXTROSE SCH MLS/HR: 10 INJECTION, SOLUTION INTRAVENOUS at 05:10

## 2019-03-11 RX ADMIN — PROPRANOLOL HYDROCHLORIDE SCH MG: 20 TABLET ORAL at 21:09

## 2019-03-11 RX ADMIN — PROPRANOLOL HYDROCHLORIDE SCH MG: 20 TABLET ORAL at 09:11

## 2019-03-11 RX ADMIN — Medication SCH MG: at 13:18

## 2019-03-11 RX ADMIN — Medication SCH MG: at 09:12

## 2019-03-11 RX ADMIN — POTASSIUM CHLORIDE SCH MLS/HR: 200 INJECTION, SOLUTION INTRAVENOUS at 09:30

## 2019-03-11 RX ADMIN — MAGNESIUM SULFATE IN DEXTROSE SCH MLS/HR: 10 INJECTION, SOLUTION INTRAVENOUS at 07:33

## 2019-03-11 RX ADMIN — LEVOTHYROXINE SODIUM SCH MCG: 0.11 TABLET ORAL at 06:20

## 2019-03-11 RX ADMIN — POTASSIUM CHLORIDE SCH MLS/HR: 200 INJECTION, SOLUTION INTRAVENOUS at 09:11

## 2019-03-11 RX ADMIN — Medication SCH MG: at 17:33

## 2019-03-11 NOTE — DIAGNOSTIC IMAGING REPORT
Examination: CT soft tissue neck and chest with contrast.



Indication: Neck pain and swelling. Patient is status post recent

thyroidectomy.



Technique: CT scan was performed through the soft tissues of the

neck and chest after the intravenous administration of nonionic

iodinated contrast, formatted in 3 planes.



Comparison: Chest CT performed on 10/27/2018.



Findings:

Chest:

The trachea and main airways are patent, without evidence of

tracheal or endobronchial lesion. There is minimal subsegmental

dependent atelectasis in both lower lobes. No focal consolidation

or pulmonary mass is demonstrated.



There is no mediastinal or hilar lymphadenopathy. The esophagus

is nondistended. The heart is normal in size. Thoracic aorta is

nonaneurysmal. No pericardial effusion.



The chest wall is unremarkable. No abnormality in the visualized

upper abdomen. The diaphragm is unremarkable. Degenerative

changes are noted in the spine. No acute osseous abnormality.



Soft tissue neck:

The patient is status post thyroidectomy. Surgical clips and low

density fluid are demonstrated in the thyroid surgical bed. There

is no mass effect on the trachea. Very lobulated hyperdense

collection containing scattered foci of gas in the anterior neck

at and above the level of the thyroid surgical bed, this measures

up to approximately 7.1 x 2.8 x 6.1 (transverse x AP x

superior-inferior) cm. At the inferior aspect of this collection,

this appears to connect to the low density collection in the

thyroid bed. 



The visualized segments of the orbits, globes, and intracranial

compartment are unremarkable. The visualized paranasal sinuses

and mastoid air cells are clear.



There is no cervical lymphadenopathy. The ,

parapharyngeal, retropharyngeal spaces appear normal. The

structures of the hypopharynx and larynx are symmetric. The

parotid, submandibular, and sublingual glands are normal.

Vascular structures appear normal, without evidence of occlusion

or thrombosis.



No acute fracture or osseous abnormality is demonstrated.

Cervical spinal alignment is maintained. No prevertebral soft

tissue abnormality is demonstrated.



IMPRESSION:

Status post thyroidectomy. There is low-density fluid in the

surgical bed, without significant surrounding mass effect or

compromise of the airway. Anterior and superior to the surgical

bed in the more superficial neck soft tissues is a large

lobulated hyperdense collection with scattered foci of gas. This

measures up to 7 cm in transverse diameter and likely represents

postsurgical change/hematoma. Infection should be excluded on a

clinical basis.



No acute findings in the chest.



Findings are in agreement with initial teleradiology report.



Dictated by: 



  Dictated on workstation # [id number]

## 2019-03-11 NOTE — NUR
ANTOINETTE HOLM admitted to room 417-1, with an admitting diagnosis of POST OP PAIN AND 
HYPOCALCEMIA, on 03/11/19 from ED via WC, accompanied by  AND STAFF.ANTOINETTE HOLM 
introduced to surroundings, call light, bed controls, phone, TV, temperature control, 
lights, meal times, smoking policy, visitor policy, side rail policy, bathrooms and showers. 
 Patient Rights given to patient in the handbook. ANTOINETTE HOLM verbalizes understanding 
that [facility] is not responsible for the loss or damage to any personal effects or 
valuables that are kept in the patients possession during their hospitalization.  PLANS OF 
CARE DISCUSSED WITH PT AND VERBALIZES UNDERSTANDING. ANTOINETTE HOLM verbalizes 
understanding of Interdisciplinary Patient Education. Patient and/or family were informed 
about the Rapid Response Team and its purpose.

## 2019-03-11 NOTE — NUR
SPOKE WITH THE PATIENT ABOUT HER MEDICATIONS. WE WENT OVER THE LIST ON FILE AND SHE VERIFIED 
HOW SHE TAKES THEM. SHE WAS RECENTLY HERE FOR A SURGERY AND HER LIST WAS ENTERED BY [facility].



I CALLED [facility] TO VERIFY WHAT HAS BEEN FILLED RECENTLY:

3-5-19 PROPRANOLOL 20MG BID

2-12-19 OXYBUTYNIN 5MG HS

2-12-19 GABAPENTIN 400MG HS

2-11-19 FLUOXETINE 20MG 3 DAILY (TAKES AT HS)



SHE WAS PRESCRIBED LEVOTHYROXINE AND TRAMADOL AT HER DISCHARGE FROM HER SURGERY, THEY WERE 
PRINTED. SHE DID NOT FILL THEM AT [facility] BUT STATES SHE IS TAKING THEM. SHE MUST HAVE 
FILLED THEM AT ANOTHER PHARMACY.



SHE TAKES MELATONIN 10MG HS AND TYLENOL PRN OTC.

## 2019-03-11 NOTE — HISTORY & PHYSICIAL
History of Present Illness


History of Present Illness


Reason for visit/HPI


concern about subcutaneous hematoma and slight tingling of fingers due to severe

, post thyroidectomy hypocalcemia


Date of Admission


Mar 11, 2019 at 02:45


Date Seen by a Provider:  Mar 11, 2019


Time Seen by a Provider:  06:45


I consulted on this patient on


3/11/19


 08:50


Attending Physician


Michelle Mendiola MD


Admitting Physician


Section/[facility]


Consult








Allergies and Home Medications


Allergies


Coded Allergies:  


     No Known Drug Allergies (Unverified , 10/30/14)





Home Medications


Fluoxetine HCl 20 Mg Tablet, 60 MG PO HS, (Reported)


   take 3 (20mg) tabs 


Gabapentin 400 Mg Capsule, 400 MG PO HS, (Reported)


Levothyroxine Sodium 112 Mcg Tablet, 112 MCG PO DAILY


   Prescribed by: MICHELLE MENDIOLA on 3/6/19 8694


Melatonin 10 Mg Tablet, 10 MG PO HS, (Reported)


Oxybutynin Chloride 5 Mg Tablet, 5 MG PO HS, (Reported)


Propranolol HCl 20 Mg Tablet, 20 MG PO BID, (Reported)


   med is bid but current prescription got changed to daily in error, talked 

with PCP and med is suppose to be bid and will be fixed with next prescription 


Tramadol HCl 50 Mg Tablet, 50 MG PO Q12H PRN for PAIN-MODERATE


   Prescribed by: MICHELLE MENDIOLA on 3/8/19 1137





Patient Home Medication List


Home Medication List Reviewed:  Yes





Past Medical-Social-Family Hx


Patient Social History


Marrital Status:  


Alcohol Use:  Denies Use


Recreational Drug Use:  No


Smoking Status:  Current Everyday Smoker


Type Used:  Cigarettes


Recent Foreign Travel:  No


Contact w/other who traveled:  No


Recent Hopitalizations:  No


Recent Infectious Disease Expo:  No





Immunizations Up To Date


Date of Influenza Vaccine:  Nov 18, 2014





Surgeries


Yes (jaw sx, )


Bowel Surgery, Hysterectomy, Thyroidectomy, Tonsillectomy





Respiratory


Yes


Currently Using CPAP:  Yes


Currently Using BIPAP:  No





Cardiovascular


Yes (mitral valve prolapse)


Heart Murmur, Hypertension





Neurological


No





Reproductive System


GYN History:  Hysterectomy





Genitourinary


No


Bladder Infection, Dialysis





Gastrointestinal


No


Polyps





Musculoskeletal


No


Arthritis





Endocrine


History of Endocrine Disorders:  No





HEENT


History of HEENT Disorders:  No





Cancer


No





Psychosocial


History of Psychiatric Problem:  Yes


Behavioral Health Disorders:  Anxiety, Depression





Integumentary


History of Skin or Integumenta:  No





Blood Transfusions


History of Blood Disorders:  No





Review of Systems


Constitutional:  see HPI


EENTM:  no symptoms reported


Respiratory:  no symptoms reported


Cardiovascular:  no symptoms reported


Gastrointestinal:  no symptoms reported


Genitourinary:  no symptoms reported


Musculoskeletal:  see HPI


Skin:  see HPI


Psychiatric/Neurological:  No Symptoms Reported





Physical Exam


Vital Signs





Vital Signs - First Documented








 3/10/19





 23:05


 


Temp 99.0


 


Pulse 80


 


Resp 18


 


B/P (MAP) 152/82 (105)


 


Pulse Ox 96


 


O2 Delivery Room Air





Capillary Refill : Less Than 3 Seconds


Height, Weight, BMI


Height: 5'7.00"


Weight: 268lbs. 1.0oz. 121.525757jb; 42.0 BMI


Method:Stated


General Appearance:  Anxious


HEENT:  Other


Neck:  Other


Respiratory:  Lungs Clear


Cardiovascular:  Regular Rate, Rhythm


Gastrointestinal:  Non Tender


Neurologic/Psychiatric:  Alert, Oriented x3


Comments


severe ecchymosis of the neck and the upper chest due to diffuse progression of 

the subcutaneous hematoma.  No stridor.  Incision clean and dry.





Assessment/Plan


Assessment and Plan


lady with post-thyroidectomy hypocalcemia.  Calcium gluconate and magnesium 

being infused intravenously.  We will initiate oral calcium with vitamin D and 

recheck calcium later this afternoon





Admission Diagnosis


Admission Status:  Observation





Clinical Quality Measures


DVT/VTE Risk/Contraindication:


Risk Factor Score Per Nursing:  3


RFS Level Per Nursing on Admit:  3=High











MICHELLE MENDIOLA MD Mar 11, 2019 08:52

## 2019-03-11 NOTE — NUR
pharmacy put in duplicate order for magnesium iv that was already given in the ed. non 
administered these orders

## 2019-03-11 NOTE — DIAGNOSTIC IMAGING REPORT
INDICATION: Weakness



COMPARISON: None



FINDINGS: Single view of the chest demonstrates slight cardiac

enlargement without overt pulmonary edema. Lungs are clear. There

is no pneumothorax or effusion. Osseous structures normal.



IMPRESSION: Minimal cardiac enlargement without pulmonary edema

or infiltrate.



Dictated by: 



  Dictated on workstation # [id number]

## 2019-03-11 NOTE — XMS REPORT
Continuity of Care Document

 Created on: 2019



ANTOINETTE HOLM

External Reference #: [id number]

: 1960

Sex: Female



Demographics







 Address  [address] [place] [address]  [address]

 

 Home Phone  [phone] x

 

 Preferred Language  Unknown

 

 Marital Status  Unknown

 

 Cheondoism Affiliation  Unknown

 

 Race  Unknown

 

 Ethnic Group  Unknown





Author







 Author  [facility]

 

 Address  Unknown

 

 Phone  Unavailable



              



Allergies

      





 Active            Description            Code            Type            
Severity            Reaction            Onset            Reported/Identified   
         Relationship to Patient            Clinical Status        

 

 Yes            No Known Drug Allergies            [id number]            Drug 
Allergy            Unknown            N/A                         10/30/2014   
                               



                  



Medications

      



There is no data.                  



Problems

      





 Date Dx Coded            Attending            Type            Code            
Diagnosis            Diagnosed By        

 

 2008            JAVON CHILEL                         401.1    
        HYPERTENSION, BENIGN ESSENTIAL                     

 

 2008            JAVON CHILEL S                         V58.69   
         MEDICATION HIGH RISK                     

 

 2008                                      401.1            HYPERTENSION, 
BENIGN ESSENTIAL                     

 

 2008                                      V58.69            MEDICATION 
HIGH RISK                     

 

 2008            JAVON CHILEL S                         401.1    
        HYPERTENSION, BENIGN ESSENTIAL                     

 

 2008            MARISELA CHILELA S                         V58.69   
         MEDICATION HIGH RISK                     

 

 2008                                      401.1            HYPERTENSION, 
BENIGN ESSENTIAL                     

 

 2008                                      V58.69            MEDICATION 
HIGH RISK                     

 

 2008                                      401.1            HYPERTENSION, 
BENIGN ESSENTIAL                     

 

 2008                                      V58.69            MEDICATION 
HIGH RISK                     

 

 2008            KETAN MORENO CECY K                         401.1          
  HYPERTENSION, BENIGN ESSENTIAL                     

 

 2008            ACEVES DO CECY K                         V58.69         
   MEDICATION HIGH RISK                     

 

 2008            KETAN MORENO CECY K                         401.1          
  HYPERTENSION, BENIGN ESSENTIAL                     

 

 2008            ACEVES DO CECY K                         V58.69         
   MEDICATION HIGH RISK                     

 

 2008            JAVON CHILEL S                         401.1    
        HYPERTENSION, BENIGN ESSENTIAL                     

 

 2008            MARISELA CHILELA S                         V58.69   
         MEDICATION HIGH RISK                     

 

 2008            NAN CULLEN MD                         401.1            
HYPERTENSION, BENIGN ESSENTIAL                     

 

 2008            NAN CULLEN MD                         V58.69            
MEDICATION HIGH RISK                     

 

 2008            ACEVES DO CECY K                         401.1          
  HYPERTENSION, BENIGN ESSENTIAL                     

 

 2008            ACEVES DO CECY K                         V58.69         
   MEDICATION HIGH RISK                     

 

 2008            JAVON CHILEL S                         401.1    
        HYPERTENSION, BENIGN ESSENTIAL                     

 

 2008            JAVON CHILEL S                         V58.69   
         MEDICATION HIGH RISK                     

 

 2009            JAVON CHILEL S                         528.9    
        DISEASES OF THE ORAL SOFT TISSUES (EXCEPT GINGIVA, TONGUE)             
        

 

 2009            MARISELA CHILELA S                         625.6    
        FEMALE STRESS INCONTINENCE                     

 

 2009            REMA CHILELNDA S                         787.91   
         diarrhea                     

 

 2009            MARISELA CHILELA S                         V72.3    
        GYNECOLOGICAL EXAMINATION                     

 

 2009                                      528.9            DISEASES OF 
THE ORAL SOFT TISSUES (EXCEPT GINGIVA, TONGUE)                     

 

 2009                                      625.6            FEMALE STRESS 
INCONTINENCE                     

 

 2009                                      787.91            diarrhea    
                 

 

 2009                                      V72.3            GYNECOLOGICAL 
EXAMINATION                     

 

 2009            JAVON CHILEL S                         528.9    
        DISEASES OF THE ORAL SOFT TISSUES (EXCEPT GINGIVA, TONGUE)             
        

 

 2009            JAVON CHILEL S                         625.6    
        FEMALE STRESS INCONTINENCE                     

 

 2009            MARISELA CHILELA S                         787.91   
         diarrhea                     

 

 2009            MARISELA CHILELA S                         V72.3    
        GYNECOLOGICAL EXAMINATION                     

 

 2009                                      528.9            DISEASES OF 
THE ORAL SOFT TISSUES (EXCEPT GINGIVA, TONGUE)                     

 

 2009                                      625.6            FEMALE STRESS 
INCONTINENCE                     

 

 2009                                      787.91            diarrhea    
                 

 

 2009                                      V72.3            GYNECOLOGICAL 
EXAMINATION                     

 

 2009                                      528.9            DISEASES OF 
THE ORAL SOFT TISSUES (EXCEPT GINGIVA, TONGUE)                     

 

 2009                                      625.6            FEMALE STRESS 
INCONTINENCE                     

 

 2009                                      787.91            DIARRHEA    
                 

 

 2009                                      V72.3            GYNECOLOGICAL 
EXAMINATION                     

 

 2009            ACEVES DO, CECY K                         528.9          
  DISEASES OF THE ORAL SOFT TISSUES (EXCEPT GINGIVA, TONGUE)                   
  

 

 2009            ACEVES DO, CECY K                         625.6          
  FEMALE STRESS INCONTINENCE                     

 

 2009            ACEVES DO, CECY K                         787.91         
   DIARRHEA                     

 

 2009            ACEVES DO, CECY K                         V72.3          
  GYNECOLOGICAL EXAMINATION                     

 

 2009            ACEVES DO, CECY K                         528.9          
  DISEASES OF THE ORAL SOFT TISSUES (EXCEPT GINGIVA, TONGUE)                   
  

 

 2009            ACEVES DO, CECY K                         625.6          
  FEMALE STRESS INCONTINENCE                     

 

 2009            ACEVES DO, CECY K                         787.91         
   DIARRHEA                     

 

 2009            ACEVES DO, CECY K                         V72.3          
  GYNECOLOGICAL EXAMINATION                     

 

 2009            REMA CHILELNDA S                         528.9    
        DISEASES OF THE ORAL SOFT TISSUES (EXCEPT GINGIVA, TONGUE)             
        

 

 2009            REMA CHILELNDA S                         625.6    
        FEMALE STRESS INCONTINENCE                     

 

 2009            REMA CHILELNDA S                         787.91   
         DIARRHEA                     

 

 2009            REMA CHILELNDA S                         V72.3    
        GYNECOLOGICAL EXAMINATION                     

 

 2009            ALYSE HODGES ALI                         528.9            
DISEASES OF THE ORAL SOFT TISSUES (EXCEPT GINGIVA, TONGUE)                     

 

 2009            NAN CULLEN MD                         625.6            
FEMALE STRESS INCONTINENCE                     

 

 2009            ALYSE HODGES ALI                         787.91            
DIARRHEA                     

 

 2009            ALYSE HODGES ALI                         V72.3            
GYNECOLOGICAL EXAMINATION                     

 

 2009            ACEVES DO CECY K                         528.9          
  DISEASES OF THE ORAL SOFT TISSUES (EXCEPT GINGIVA, TONGUE)                   
  

 

 2009            KETAN MORENO CECY K                         625.6          
  FEMALE STRESS INCONTINENCE                     

 

 2009            ACEVES DO CECY K                         787.91         
   DIARRHEA                     

 

 2009            ACEVES DO CECY K                         V72.3          
  GYNECOLOGICAL EXAMINATION                     

 

 2009            REMA CHILELNDA S                         528.9    
        DISEASES OF THE ORAL SOFT TISSUES (EXCEPT GINGIVA, TONGUE)             
        

 

 2009            REMA CHILELNDA S                         625.6    
        FEMALE STRESS INCONTINENCE                     

 

 2009            REMA CHILELNDA S                         787.91   
         DIARRHEA                     

 

 2009            REMA CHILELNDA S                         V72.3    
        GYNECOLOGICAL EXAMINATION                     

 

 04/15/2009            REMA CHILELNDA S                         388.30   
         TINNITUS UNSPECIFIED                     

 

 04/15/2009                                      388.30            TINNITUS 
UNSPECIFIED                     

 

 04/15/2009            REMA CHILELNDA S                         388.30   
         TINNITUS UNSPECIFIED                     

 

 04/15/2009                                      388.30            TINNITUS 
UNSPECIFIED                     

 

 04/15/2009                                      388.30            TINNITUS 
UNSPECIFIED                     

 

 04/15/2009            KETAN MORENO CECY K                         388.30         
   TINNITUS UNSPECIFIED                     

 

 04/15/2009            TROY ACEVES DOA K                         388.30         
   TINNITUS UNSPECIFIED                     

 

 04/15/2009            REMA CHILELNDA S                         388.30   
         TINNITUS UNSPECIFIED                     

 

 04/15/2009            NAN CULLEN MD                         388.30            
TINNITUS UNSPECIFIED                     

 

 04/15/2009            ACEVES DO, CECY K                         388.30         
   TINNITUS UNSPECIFIED                     

 

 04/15/2009            FILIBERTO MYERS JAVON S                         388.30   
         TINNITUS UNSPECIFIED                     

 

 2009            REMA CHILELNDA S                         706.2    
        SEBACEOUS CYST                     

 

 2009            REMA CHILELNDA S                         729.5    
        PAIN IN LIMB                     

 

 2009                                      706.2            SEBACEOUS 
CYST                     

 

 2009                                      729.5            PAIN IN LIMB 
                    

 

 2009            FILIBERTO MYERS JAVON S                         706.2    
        SEBACEOUS CYST                     

 

 2009            REMA CHILELNDA S                         729.5    
        PAIN IN LIMB                     

 

 2009                                      706.2            SEBACEOUS 
CYST                     

 

 2009                                      729.5            PAIN IN LIMB 
                    

 

 2009                                      706.2            SEBACEOUS 
CYST                     

 

 2009                                      729.5            PAIN IN LIMB 
                    

 

 2009            ACEVES DO, CECY K                         706.2          
  SEBACEOUS CYST                     

 

 2009            ACEVES DO, CECY K                         729.5          
  PAIN IN LIMB                     

 

 2009            ACEVES DO, CECY K                         706.2          
  SEBACEOUS CYST                     

 

 2009            ACEVES DO, CECY K                         729.5          
  PAIN IN LIMB                     

 

 2009            REMA CHILELNDA S                         706.2    
        SEBACEOUS CYST                     

 

 2009            FILIBERTO MYERS JAVON S                         729.5    
        PAIN IN LIMB                     

 

 2009            NAN CULLEN MD                         706.2            
SEBACEOUS CYST                     

 

 2009            NAN CULLEN MD                         729.5            
PAIN IN LIMB                     

 

 2009            ACEVES DO, CECY K                         706.2          
  SEBACEOUS CYST                     

 

 2009            ACEVES DO, CECY K                         729.5          
  PAIN IN LIMB                     

 

 2009            REMA CHILELNDA S                         706.2    
        SEBACEOUS CYST                     

 

 2009            FILIBERTO MYERS JAVON S                         729.5    
        PAIN IN LIMB                     

 

 2010            REMA CHILELNDA S                         333.94   
         RESTLESS LEGS SYNDROME (RLS)                     

 

 2010                                      333.94            RESTLESS 
LEGS SYNDROME (RLS)                     

 

 2010            JAVON CHILEL                         333.94   
         RESTLESS LEGS SYNDROME (RLS)                     

 

 2010                                      333.94            RESTLESS 
LEGS SYNDROME (RLS)                     

 

 2010                                      333.94            RESTLESS 
LEGS SYNDROME (RLS)                     

 

 2010            CECY ACEVES DO K                         333.94         
   RESTLESS LEGS SYNDROME (RLS)                     

 

 2010            CECY ACEVES DO K                         333.94         
   RESTLESS LEGS SYNDROME (RLS)                     

 

 2010            JAVON CHILEL S                         333.94   
         RESTLESS LEGS SYNDROME (RLS)                     

 

 2010            NAN CULLEN MD                         333.94            
RESTLESS LEGS SYNDROME (RLS)                     

 

 2010            CECY ACEVES DO K                         333.94         
   RESTLESS LEGS SYNDROME (RLS)                     

 

 2010            JAVON CHILEL S                         333.94   
         RESTLESS LEGS SYNDROME (RLS)                     

 

 2010            JAVON CHILEL S                         681.10   
         CELLULITIS AND ABSCESS OF TOE, UNSPECIFIED                     

 

 2010                                      681.10            CELLULITIS 
AND ABSCESS OF TOE, UNSPECIFIED                     

 

 2010            JAVON CHILEL S                         681.10   
         CELLULITIS AND ABSCESS OF TOE, UNSPECIFIED                     

 

 2010                                      681.10            CELLULITIS 
AND ABSCESS OF TOE, UNSPECIFIED                     

 

 2010                                      681.10            CELLULITIS 
AND ABSCESS OF TOE, UNSPECIFIED                     

 

 2010            CECY ACEVES DO K                         681.10         
   CELLULITIS AND ABSCESS OF TOE, UNSPECIFIED                     

 

 2010            CECY ACEVES DO K                         681.10         
   CELLULITIS AND ABSCESS OF TOE, UNSPECIFIED                     

 

 2010            JAVON CHILEL S                         681.10   
         CELLULITIS AND ABSCESS OF TOE, UNSPECIFIED                     

 

 2010            NAN CULLEN MD                         681.10            
CELLULITIS AND ABSCESS OF TOE, UNSPECIFIED                     

 

 2010            CECY ACEVES DO K                         681.10         
   CELLULITIS AND ABSCESS OF TOE, UNSPECIFIED                     

 

 2010            JAVON CHILEL S                         681.10   
         CELLULITIS AND ABSCESS OF TOE, UNSPECIFIED                     

 

 05/10/2011            JAVON CHILEL S                         461.9    
        SINUSITIS ACUTE                     

 

 05/10/2011            JAVON CHILEL S                         525.9    
        UNSPECIFIED DISORDER OF THE TEETH AND SUPPORTING STRUCTURES            
         

 

 05/10/2011            REMA CHILELNDA S                         787.02   
         NAUSEA ALONE                     

 

 05/10/2011                                      461.9            SINUSITIS 
ACUTE                     

 

 05/10/2011                                      525.9            UNSPECIFIED 
DISORDER OF THE TEETH AND SUPPORTING STRUCTURES                     

 

 05/10/2011                                      787.02            NAUSEA ALONE
                     

 

 05/10/2011            REMA CHILELNDA S                         461.9    
        SINUSITIS ACUTE                     

 

 05/10/2011            REMA CHILELNDA S                         525.9    
        UNSPECIFIED DISORDER OF THE TEETH AND SUPPORTING STRUCTURES            
         

 

 05/10/2011            JAVON CHILEL S                         787.02   
         NAUSEA ALONE                     

 

 05/10/2011                                      461.9            SINUSITIS 
ACUTE                     

 

 05/10/2011                                      525.9            UNSPECIFIED 
DISORDER OF THE TEETH AND SUPPORTING STRUCTURES                     

 

 05/10/2011                                      787.02            NAUSEA ALONE
                     

 

 05/10/2011                                      461.9            SINUSITIS 
ACUTE                     

 

 05/10/2011                                      525.9            UNSPECIFIED 
DISORDER OF THE TEETH AND SUPPORTING STRUCTURES                     

 

 05/10/2011                                      787.02            NAUSEA ALONE
                     

 

 05/10/2011            CECY ACEVES DO K                         461.9          
  SINUSITIS ACUTE                     

 

 05/10/2011            CECY ACEVES DO K                         525.9          
  UNSPECIFIED DISORDER OF THE TEETH AND SUPPORTING STRUCTURES                  
   

 

 05/10/2011            TROY ACEVES DOA K                         787.02         
   NAUSEA ALONE                     

 

 05/10/2011            TROY ACEVES DOA K                         461.9          
  SINUSITIS ACUTE                     

 

 05/10/2011            CECY ACEVES DO K                         525.9          
  UNSPECIFIED DISORDER OF THE TEETH AND SUPPORTING STRUCTURES                  
   

 

 05/10/2011            TROY ACEVES DOA K                         787.02         
   NAUSEA ALONE                     

 

 05/10/2011            REMA CHILELNDA S                         461.9    
        SINUSITIS ACUTE                     

 

 05/10/2011            JAVON CHILEL S                         525.9    
        UNSPECIFIED DISORDER OF THE TEETH AND SUPPORTING STRUCTURES            
         

 

 05/10/2011            MARISELA CHILELA S                         787.02   
         NAUSEA ALONE                     

 

 05/10/2011            NAN CULLEN MD                         461.9            
SINUSITIS ACUTE                     

 

 05/10/2011            NAN CULLEN MD                         525.9            
UNSPECIFIED DISORDER OF THE TEETH AND SUPPORTING STRUCTURES                     

 

 05/10/2011            NAN CULLEN MD                         787.02            
NAUSEA ALONE                     

 

 05/10/2011            KETAN MORENO CECY K                         461.9          
  SINUSITIS ACUTE                     

 

 05/10/2011            TROY ACEVES DOA K                         525.9          
  UNSPECIFIED DISORDER OF THE TEETH AND SUPPORTING STRUCTURES                  
   

 

 05/10/2011            TROY ACEVES DOA K                         787.02         
   NAUSEA ALONE                     

 

 05/10/2011            JAVON CHILEL S                         461.9    
        SINUSITIS ACUTE                     

 

 05/10/2011            JAVON CHILEL S                         525.9    
        UNSPECIFIED DISORDER OF THE TEETH AND SUPPORTING STRUCTURES            
         

 

 05/10/2011            JAVON CHILEL S                         787.02   
         NAUSEA ALONE                     

 

 2011            MARISELA CHILELA S                         305.1    
        current smoker                     

 

 2011            JAVON CHILEL S                         382.00   
         OTITIS MEDIA ACUTE SUPPURATIVE RIGHT EAR                     

 

 2011                                      305.1            current 
smoker                     

 

 2011                                      382.00            OTITIS MEDIA 
ACUTE SUPPURATIVE RIGHT EAR                     

 

 2011            JAVON CHILEL S                         305.1    
        current smoker                     

 

 2011            JAVON CHILEL S                         382.00   
         OTITIS MEDIA ACUTE SUPPURATIVE RIGHT EAR                     

 

 2011                                      305.1            current 
smoker                     

 

 2011                                      382.00            OTITIS MEDIA 
ACUTE SUPPURATIVE RIGHT EAR                     

 

 2011                                      305.1            current 
smoker                     

 

 2011                                      382.00            OTITIS MEDIA 
ACUTE SUPPURATIVE RIGHT EAR                     

 

 2011            TROY ACEVES DOA K                         305.1          
  current smoker                     

 

 2011            ACEVES DO CECY K                         382.00         
   OTITIS MEDIA ACUTE SUPPURATIVE RIGHT EAR                     

 

 2011            KETAN MORENO CECY K                         305.1          
  current smoker                     

 

 2011            KETAN MORENO CECY K                         382.00         
   OTITIS MEDIA ACUTE SUPPURATIVE RIGHT EAR                     

 

 2011            JAVON CHILEL S                         305.1    
        current smoker                     

 

 2011            JAVON CHILEL S                         382.00   
         OTITIS MEDIA ACUTE SUPPURATIVE RIGHT EAR                     

 

 2011            NAN CULLEN MD                         305.1            
current smoker                     

 

 2011            NAN CULLEN MD                         382.00            
OTITIS MEDIA ACUTE SUPPURATIVE RIGHT EAR                     

 

 2011            KETAN MORENO CECY K                         305.1          
  current smoker                     

 

 2011            KETAN MORENO CECY K                         382.00         
   OTITIS MEDIA ACUTE SUPPURATIVE RIGHT EAR                     

 

 2011            JAVON CHILEL S                         305.1    
        current smoker                     

 

 2011            JAVON CHILEL S                         382.00   
         OTITIS MEDIA ACUTE SUPPURATIVE RIGHT EAR                     

 

 2012            FILIBERTO APRN, JAVON S                         300.00   
         anxiety                     

 

 2012            FILIBERTO MYERS, JAVON S                         530.81   
         ESOPHAGEAL REFLUX                     

 

 2012                                      300.00            anxiety     
                

 

 2012                                      530.81            ESOPHAGEAL 
REFLUX                     

 

 2012            FILIBERTO MYERS JAVON S                         300.00   
         anxiety                     

 

 2012            FILIBERTO MYERS JAVON S                         530.81   
         ESOPHAGEAL REFLUX                     

 

 2012                                      300.00            anxiety     
                

 

 2012                                      530.81            ESOPHAGEAL 
REFLUX                     

 

 2012                                      300.00            anxiety     
                

 

 2012                                      530.81            ESOPHAGEAL 
REFLUX                     

 

 2012            ACEVES DO, CECY K                         300.00         
   anxiety                     

 

 2012            ACEVES DO, CECY K                         530.81         
   ESOPHAGEAL REFLUX                     

 

 2012            ACEVES DO, CECY K                         300.00         
   anxiety                     

 

 2012            ACEVES DO, CECY K                         530.81         
   ESOPHAGEAL REFLUX                     

 

 2012            FILIBERTO MYERS JAVON S                         300.00   
         anxiety                     

 

 2012            FILIBERTO MYERS JAVON S                         530.81   
         ESOPHAGEAL REFLUX                     

 

 2012            NAN CULLEN MD                         300.00            
anxiety                     

 

 2012            NAN CULLEN MD                         530.81            
ESOPHAGEAL REFLUX                     

 

 2012            ACEVES DO, CECY K                         300.00         
   anxiety                     

 

 2012            ACEVES DO, CECY K                         530.81         
   ESOPHAGEAL REFLUX                     

 

 2012            FILIBERTO MYERS JAVON S                         300.00   
         anxiety                     

 

 2012            REMA CHILELNDA S                         530.81   
         ESOPHAGEAL REFLUX                     

 

 2012            REMA CHILELNDA S                         702.19   
         OTHER SEBORRHEIC KERATOSIS                     

 

 2012                                      702.19            OTHER 
SEBORRHEIC KERATOSIS                     

 

 2012            MARISELA CHILELA S                         702.19   
         OTHER SEBORRHEIC KERATOSIS                     

 

 2012                                      702.19            OTHER 
SEBORRHEIC KERATOSIS                     

 

 2012                                      702.19            OTHER 
SEBORRHEIC KERATOSIS                     

 

 2012            ACEVES DO, CECY K                         702.19         
   OTHER SEBORRHEIC KERATOSIS                     

 

 2012            ACEVES DO, CECY K                         702.19         
   OTHER SEBORRHEIC KERATOSIS                     

 

 2012            MARISELA CHILELA S                         702.19   
         OTHER SEBORRHEIC KERATOSIS                     

 

 2012            ALYSE HODGES, ALI                         702.19            
OTHER SEBORRHEIC KERATOSIS                     

 

 2012            KETAN MORENO CECY K                         702.19         
   OTHER SEBORRHEIC KERATOSIS                     

 

 2012            FILIBERTO MYERS JAVON S                         702.19   
         OTHER SEBORRHEIC KERATOSIS                     

 

 2013            FILIBERTO APRHECTOR JAVON S                         682.9    
        CELLULITIS AND ABSCESS OF UNSPECIFIED SITES                     

 

 2013            FILIBERTO MEYRS JAVON S                         728.71   
         PLANTAR FASCIAL FIBROMATOSIS                     

 

 2013                                      682.9            CELLULITIS 
AND ABSCESS OF UNSPECIFIED SITES                     

 

 2013                                      728.71            PLANTAR 
FASCIAL FIBROMATOSIS                     

 

 2013                                      682.9            CELLULITIS 
AND ABSCESS OF UNSPECIFIED SITES                     

 

 2013                                      728.71            PLANTAR 
FASCIAL FIBROMATOSIS                     

 

 2013            KETAN MORENO CECY K                         682.9          
  CELLULITIS AND ABSCESS OF UNSPECIFIED SITES                     

 

 2013            KETAN MORENO CECY K                         728.71         
   PLANTAR FASCIAL FIBROMATOSIS                     

 

 2013            TROY ACEVES DOA K                         682.9          
  CELLULITIS AND ABSCESS OF UNSPECIFIED SITES                     

 

 2013            KETAN MORENO CECY K                         728.71         
   PLANTAR FASCIAL FIBROMATOSIS                     

 

 2013            FILIBERTO APRHECTOR JAVON S                         682.9    
        CELLULITIS AND ABSCESS OF UNSPECIFIED SITES                     

 

 2013            FILIBERTO MYERS JAVON S                         728.71   
         PLANTAR FASCIAL FIBROMATOSIS                     

 

 2013            ALYSE HODGES, ALI                         682.9            
CELLULITIS AND ABSCESS OF UNSPECIFIED SITES                     

 

 2013            ALYSE HODGES, ALI                         728.71            
PLANTAR FASCIAL FIBROMATOSIS                     

 

 2013            KETAN MORENO CECY K                         682.9          
  CELLULITIS AND ABSCESS OF UNSPECIFIED SITES                     

 

 2013            KETAN MORENO CECY K                         728.71         
   PLANTAR FASCIAL FIBROMATOSIS                     

 

 2013            FILIBERTO APRHECTOR JAVON S                         682.9    
        CELLULITIS AND ABSCESS OF UNSPECIFIED SITES                     

 

 2013            FILIBERTO MYERS JAVON S                         728.71   
         PLANTAR FASCIAL FIBROMATOSIS                     

 

 2014            CECY ACEVES DO K                         388.70         
   OTALGIA                     

 

 2014            TROY ACEVES DOA K                         696.1          
  PSORIASIS                     

 

 2014            CECY ACEVES DO K                         V73.81         
   HPV SCREENING                     

 

 2014            ACEVES DO, CECY K                         V76.10         
   BREAST CANCER SCREENING                     

 

 2014            ACEVES , CECY K                         V76.2          
  CERVICAL CANCER SCREENING (PAP SMEAR)                     

 

 2014            KETAN MORENO, CECY K                         388.70         
   OTALGIA                     

 

 2014            ACEVES DO, CECY K                         696.1          
  PSORIASIS                     

 

 2014            ACEVES , CECY K                         V73.81         
   HPV SCREENING                     

 

 2014            KETAN MORENO, CECY K                         V76.10         
   BREAST CANCER SCREENING                     

 

 2014            KETAN MORENO, CECY K                         V76.2          
  CERVICAL CANCER SCREENING (PAP SMEAR)                     

 

 2014            REMA CHILELNDA S                         388.70   
         OTALGIA                     

 

 2014            FILIBERTO MYERS JAVON S                         696.1    
        PSORIASIS                     

 

 2014            REMA CHILELNDA S                         V73.81   
         HPV SCREENING                     

 

 2014            REMA CHILELNDA S                         V76.10   
         BREAST CANCER SCREENING                     

 

 2014            REMA CHILELNDA S                         V76.2    
        CERVICAL CANCER SCREENING (PAP SMEAR)                     

 

 2014            ALYSE HODGES, ALI                         388.70            
OTALGIA                     

 

 2014            ALYSE HODGES, NAN                         696.1            
PSORIASIS                     

 

 2014            NAN CULLEN MD                         V73.81            
HPV SCREENING                     

 

 2014            NAN CULLEN MD                         V76.10            
BREAST CANCER SCREENING                     

 

 2014            ALYSE HODGES, NAN                         V76.2            
CERVICAL CANCER SCREENING (PAP SMEAR)                     

 

 2014            KETAN MORENO, CECY K                         388.70         
   OTALGIA                     

 

 2014            KETAN MORENO, CECY K                         696.1          
  PSORIASIS                     

 

 2014            KETAN MORENO, CECY K                         V73.81         
   HPV SCREENING                     

 

 2014            KETAN MORENO, CECY K                         V76.10         
   BREAST CANCER SCREENING                     

 

 2014            KETAN MORENO, CECY K                         V76.2          
  CERVICAL CANCER SCREENING (PAP SMEAR)                     

 

 2014            FILIBERTO MYERS JAVON S                         388.70   
         OTALGIA                     

 

 2014            FILIBERTO MYERS JAVON S                         696.1    
        PSORIASIS                     

 

 2014            FILIBERTO APRHECTOR JAVON S                         V73.81   
         HPV SCREENING                     

 

 2014            REMA CHILELNDA S                         V76.10   
         BREAST CANCER SCREENING                     

 

 2014            REMA CHILELNDA S                         V76.2    
        CERVICAL CANCER SCREENING (PAP SMEAR)                     

 

 2014            KETAN DO CECY K                         307.81         
   TENSION HEADACHE                     

 

 2014            KETAN MORENO CECY K                         704.00         
   ALOPECIA UNSPECIFIED                     

 

 2014            KETAN MORENO CECY K                         786.50         
   CHEST PAIN                     

 

 2014            KETAN MORENO CECY K                         V18.0          
  FAMILY HISTORY OF DIABETES MELLITUS                     

 

 2014            REMA CHILELNDA S                         307.81   
         TENSION HEADACHE                     

 

 2014            REMA CHILELNDA S                         704.00   
         ALOPECIA UNSPECIFIED                     

 

 2014            REMA CHILELNDA S                         786.50   
         CHEST PAIN                     

 

 2014            FILIBERTO MYERS JAVON S                         V18.0    
        FAMILY HISTORY OF DIABETES MELLITUS                     

 

 2014            NAN CULLEN MD                         307.81            
TENSION HEADACHE                     

 

 2014            NAN CULLEN MD                         704.00            
ALOPECIA UNSPECIFIED                     

 

 2014            NAN CULLEN MD                         786.50            
CHEST PAIN                     

 

 2014            NAN CULLEN MD                         V18.0            
FAMILY HISTORY OF DIABETES MELLITUS                     

 

 2014            KETAN TROY MORENOA K                         307.81         
   TENSION HEADACHE                     

 

 2014            KETAN MORENO CECY K                         704.00         
   ALOPECIA UNSPECIFIED                     

 

 2014            KETAN MORENO CECY K                         786.50         
   CHEST PAIN                     

 

 2014            KETAN MORENO CECY K                         V18.0          
  FAMILY HISTORY OF DIABETES MELLITUS                     

 

 2014            REMA CHILELNDA S                         307.81   
         TENSION HEADACHE                     

 

 2014            REMA CHILELNDA S                         704.00   
         ALOPECIA UNSPECIFIED                     

 

 2014            REMA CHILELNDA S                         786.50   
         CHEST PAIN                     

 

 2014            REMA CHILELNDA S                         V18.0    
        FAMILY HISTORY OF DIABETES MELLITUS                     

 

 09/10/2014            REMA CHIELLNDA S                         278.02   
         OVERWEIGHT                     

 

 09/10/2014            REMA CHILELNDA S                         307.42   
         INSOMNIA, PSYCHOPHYSIOLOGICAL                     

 

 09/10/2014            REMA CHILELNDA S                         729.5    
        PAIN- LEG                     

 

 09/10/2014            REMA CHILELNDA S                         V15.82   
         NICOTINE ABUSE                     

 

 09/10/2014            NAN CULLEN MD                         278.02            
OVERWEIGHT                     

 

 09/10/2014            NAN CULLEN MD                         307.42            
INSOMNIA, PSYCHOPHYSIOLOGICAL                     

 

 09/10/2014            NAN CULLEN MD                         729.5            
PAIN- LEG                     

 

 09/10/2014            NAN CULLEN MD                         V15.82            
NICOTINE ABUSE                     

 

 09/10/2014            KETAN MORENOTROYA K                         278.02         
   OVERWEIGHT                     

 

 09/10/2014            KETAN MORENO CECY K                         307.42         
   INSOMNIA, PSYCHOPHYSIOLOGICAL                     

 

 09/10/2014            KETAN MORENOTROYA K                         729.5          
  PAIN- LEG                     

 

 09/10/2014            KETAN MORENO CECY K                         V15.82         
   NICOTINE ABUSE                     

 

 09/10/2014            FILIBERTO MYERS JAVON S                         278.02   
         OVERWEIGHT                     

 

 09/10/2014            REMA CHILELNDA S                         307.42   
         INSOMNIA, PSYCHOPHYSIOLOGICAL                     

 

 09/10/2014            REMA CHILELNDA S                         729.5    
        PAIN- LEG                     

 

 09/10/2014            REMA CHILELNDA S                         V15.82   
         NICOTINE ABUSE                     

 

 10/15/2014            NAN CULLEN MD                         785.1            
PALPITATIONS                     

 

 10/15/2014            NAN CULLEN MD                         786.09            
DYSPNEA                     

 

 10/15/2014            KETAN MORENOCECY K                         785.1          
  PALPITATIONS                     

 

 10/15/2014            KETAN MORENOTROYA K                         786.09         
   DYSPNEA                     

 

 10/15/2014            FILIBERTO APRREMA YOUNGNDA S                         785.1    
        PALPITATIONS                     

 

 10/15/2014            REMA CHILELNDA S                         786.09   
         DYSPNEA                     

 

 2014            ALYSE HODGES FACC, NAN FACP CCDS            Ot            
278.00            OBESITY, NOS                     

 

 2014            ALYSE HODGES FACC, ALI FACP CCDS            Ot            
305.1            TOBACCO USE DISORDER                     

 

 2014            ALYSE HODGES FACC, NAN FACP CCDS            Ot            
785.1            PALPITATIONS                     

 

 2014            ALYSE HODGES FACC, NAN FACP CCDS            Ot            
786.09            RESPIRATORY ABNORM NEC                     

 

 2014            ALYSE HODGES FACC, ALI FACP CCDS            Ot            
786.59            CHEST PAIN NEC                     

 

 2014            ALYSE HODGES FACC, NAN FACP CCDS            Ot            
794.30            ABN CARDIOVASC STUDY NOS                     

 

 2014            ALYSE HODGES FACC, NAN FACP CCDS            Ot            
V17.3            FAM HX-ISCHEM HEART DIS                     

 

 2014            NAN CULLEN MD, FACC FACP CCDS            Ot            
V58.69            OTH MED,LT,CURRENT USE                     

 

 2014            ALYSE HODGES FACC, ALI FACP CCDS            Ot            
V85.41            BODY MASS INDEX 40.0-44.9, ADULT                     

 

 2014            JAVON CHILEL S                         627.2    
        HOT FLASHES                     

 

 2014            JAVON CHILEL S                         789.03   
         ABDOMINAL PAIN RIGHT LOWER QUADRANT                     

 

 2015            JAVON DE LOS SANTOS ARNP            Ot            789.03    
                              

 

 2015            JAVON DE LOS SANTOS ARNP            Ot            789.03    
                              

 

 2016            IRENA LIU            Ot            V76.12     
       OTH SCREEN MAMMO-MALIGN NEOPLASM OF BRUNO                     

 

 2016            ALYSE HODGES FACC, NAN FACP CCDS            Ot            
785.1            PALPITATIONS                     

 

 2016            ALYSE HODGES FACC, NAN FACP CCDS            Ot            
786.09            RESPIRATORY ABNORM NEC                     

 

 2016            ALYSE HODGES FACC, ALI FACP CCDS            Ot            
785.1            PALPITATIONS                     

 

 2016            ALYSE HODGES FACC, ALI FACP CCDS            Ot            
786.09            RESPIRATORY ABNORM NEC                     

 

 2016            JAVON DE LOS SANTOS ARNP            Ot            789.03    
        ABDOMINAL PAIN, RIGHT LOWER QUADRANT                     

 

 2016            JAVON DE LOS SANTOS ARNP            Ot            789.03    
        ABDOMINAL PAIN, RIGHT LOWER QUADRANT                     

 

 2016            JAVON DE LOS SANTOS ARNP            Ot            R10.31    
        RIGHT LOWER QUADRANT PAIN                     

 

 2016            HAMMONDSHUNTER CRAWFORD DO D            Ot            R19.7       
     DIARRHEA, UNSPECIFIED                     

 

 2016            HAMMONDSHUNTER CRAWFORD DO D            Ot            Z01.818     
       ENCOUNTER FOR OTHER PREPROCEDURAL EXAMIN                     

 

 2016            HAMMONDSREMA CRAWFORD DOTT D            Ot            R19.7       
     DIARRHEA, UNSPECIFIED                     

 

 2016            HAMMONDS REMA MORENOTT D            Ot            Z01.818     
       ENCOUNTER FOR OTHER PREPROCEDURAL EXAMIN                     

 

 2016            IRENA LIU            Ot            V76.12     
       OTH SCREEN MAMMO-MALIGN NEOPLASM OF BRUNO                     

 

 2016            ALYSE HODGES FACC, ALI FACP CCDS            Ot            
785.1            PALPITATIONS                     

 

 2016            ALYSE HODGES FACC, ALI FACP CCDS            Ot            
786.09            RESPIRATORY ABNORM NEC                     

 

 2016            ALYSE HODGES FACC, ALI FACP CCDS            Ot            
785.1            PALPITATIONS                     

 

 2016            [facility]            [facility]            
786.09            RESPIRATORY ABNORM NEC                     

 

 2016            JAVON DE LOS SANTOS ARNP            Ot            789.03    
        ABDOMINAL PAIN, RIGHT LOWER QUADRANT                     

 

 2016            JAVON DE LOS SANTOS ARNP            Ot            789.03    
        ABDOMINAL PAIN, RIGHT LOWER QUADRANT                     

 

 2016            JAVON DE LOS SANTOS ARNP            Ot            R10.31    
        RIGHT LOWER QUADRANT PAIN                     

 

 2016            IRENA LIU            Ot            V76.12     
       OT SCREEN MAMMO-MALIGN NEOPLASM OF BRUNO                     

 

 2016            [facility]            Ot            
785.1            PALPITATIONS                     

 

 2016            ALYSE HODGES [facility], ALI FACP CCDS            Ot            
786.09            RESPIRATORY ABNORM NEC                     

 

 2016            ALYSE HODGES [facility], ALI FACP CCDS            Ot            
785.1            PALPITATIONS                     

 

 2016            [facility]            Ot            
786.09            RESPIRATORY ABNORM NEC                     

 

 2016            JAVON DE LOS SANTOS ARNP            Ot            789.03    
        ABDOMINAL PAIN, RIGHT LOWER QUADRANT                     

 

 2016            JAVON DE LOS SANTOS ARNP            Ot            789.03    
        ABDOMINAL PAIN, RIGHT LOWER QUADRANT                     

 

 2016            JAVNO DE LOS SANTOS            Ot            R10.31    
        RIGHT LOWER QUADRANT PAIN                     

 

 2016            JAVON DE LOS SANTOS ARNP            Ot            789.03    
        ABDOMINAL PAIN, RIGHT LOWER QUADRANT                     

 

 2016            HUNTER HAMMONDS DO            Ot            D12.5       
     BENIGN NEOPLASM OF SIGMOID COLON                     

 

 2016            HUNTER HAMMONDS DO            Ot            K52.9       
     NONINFECTIVE GASTROENTERITIS AND COLITIS                     

 

 2016            HUNTER HAMMONDS DO            Ot            K62.1       
     RECTAL POLYP                     

 

 2016            HUNTER HAMMONDS DO            Ot            K63.5       
     POLYP OF COLON                     

 

 2016            JAVON DE LOS SANTOS            Ot            789.03    
        ABDOMINAL PAIN, RIGHT LOWER QUADRANT                     

 

 2016            HUNTER HAMMONDS DO            Ot            R19.7       
     DIARRHEA, UNSPECIFIED                     

 

 2016            HUNTER HAMMONDS DO            Ot            Z01.818     
       ENCOUNTER FOR OTHER PREPROCEDURAL EXAMIN                     

 

 2016            HUNTER HAMMONDS DO            Ot            R19.7       
     DIARRHEA, UNSPECIFIED                     

 

 2016            HUNTER HAMMONDS DO            Ot            Z01.818     
       ENCOUNTER FOR OTHER PREPROCEDURAL EXAMIN                     

 

 2016            CASSIUS MORENOHUNTER            Ot            D12.5       
     BENIGN NEOPLASM OF SIGMOID COLON                     

 

 2016            HAMMONDS HUNTER            Ot            K52.9       
     NONINFECTIVE GASTROENTERITIS AND COLITIS                     

 

 2016            HAMMONDS HUNTER            Ot            K62.1       
     RECTAL POLYP                     

 

 2016            [place] HUNTER            Ot            K63.5       
     POLYP OF COLON                     

 

 2016            IRENA LIU            Ot            V76.12     
       OTH SCREEN MAMMO-MALIGN NEOPLASM OF BRUNO                     

 

 2016            ALYSE HODGES FACC, NAN FACP CCDS            Ot            
785.1            PALPITATIONS                     

 

 2016            ALYSE HODGES FACC, ALI FACP CCDS            Ot            
786.09            RESPIRATORY ABNORM NEC                     

 

 2016            ALYSE HODGES FACC, ALI FACP CCDS            Ot            
785.1            PALPITATIONS                     

 

 2016            ALYSE HODGES FACC, ALI FACP CCDS            Ot            
786.09            RESPIRATORY ABNORM NEC                     

 

 2016            JAVON DE LOS SANTOS ARNP            Ot            789.03    
        ABDOMINAL PAIN, RIGHT LOWER QUADRANT                     

 

 2016            JAVON DE LOS SANTOS ARNP            Ot            789.03    
        ABDOMINAL PAIN, RIGHT LOWER QUADRANT                     

 

 2016            JAVON DE [place] SANTOS ARNP            Ot            R10.31    
        RIGHT LOWER QUADRANT PAIN                     

 

 2016            FILIBERTOJAVON FAGAN ARNP            Ot            R10.31    
        RIGHT LOWER QUADRANT PAIN                     

 

 2016            JAVON DE LOS SANTOS ARNP            Ot            789.03    
        ABDOMINAL PAIN, RIGHT LOWER QUADRANT                     

 

 2017            IRENA LIU            Ot            V76.12     
       OTH SCREEN MAMMO-MALIGN NEOPLASM OF BRUNO                     

 

 2017            ALYSE HODGES FACC, NAN FACP CCDS            Ot            
785.1            PALPITATIONS                     

 

 2017            ALYSE HODGES FACC, ALI FACP CCDS            Ot            
786.09            RESPIRATORY ABNORM NEC                     

 

 2017            ALYSE HODGES FACC, ALI FACP CCDS            Ot            
785.1            PALPITATIONS                     

 

 2017            ALYSE HODGES FACC, ALI FACP CCDS            Ot            
786.09            RESPIRATORY ABNORM NEC                     

 

 2017            JAVON DE LOS SANTOS ARNP            Ot            789.03    
        ABDOMINAL PAIN, RIGHT LOWER QUADRANT                     

 

 2017            JAVON DE OLS SANTOS            Ot            789.03    
        ABDOMINAL PAIN, RIGHT LOWER QUADRANT                     

 

 2017            JAVON DE LOS SANTOS            Ot            R10.31    
        RIGHT LOWER QUADRANT PAIN                     

 

 2017            HUNTER HAMMONDS DO            Ot            Z01.818     
       ENCOUNTER FOR OTHER PREPROCEDURAL EXAMIN                     

 

 2017            HUNTER HAMMONDS DO            Ot            Z86.010     
       PERSONAL HISTORY OF COLONIC POLYPS                     

 

 2017            HUNTER HAMMONDS DO            Ot            E66.01      
      MORBID (SEVERE) OBESITY DUE TO EXCESS CA                     

 

 2017            HUNTER HAMMONDS DO            Ot            F17.210     
       NICOTINE DEPENDENCE, CIGARETTES, UNCOMPL                     

 

 2017            HUNTER HAMMONDS DO            Ot            I10         
   ESSENTIAL (PRIMARY) HYPERTENSION                     

 

 2017            HUNTER HAMMONDS DO            Ot            K62.1       
     RECTAL POLYP                     

 

 2017            HUNTER HAMMONDS DO            Ot            Z09         
   ENCNTR FOR F/U EXAM AFT TRTMT FOR COND O                     

 

 2017            HUNTER HAMMONDS DO            Ot            Z68.41      
      BODY MASS INDEX (BMI) 40.0-44.9, ADULT                     

 

 2017            HUNTER HAMMONDS DO            Ot            Z79.899     
       OTHER LONG TERM (CURRENT) DRUG THERAPY                     

 

 2017            HUNTER HAMMONDS DO            Ot            Z86.010     
       PERSONAL HISTORY OF COLONIC POLYPS                     

 

 2018            JAVON DE LOS SANTOS            Ot            Z12.31    
        ENCNTR SCREEN MAMMOGRAM FOR MALIGNANT NE                     

 

 2018            IRENA LIU            Ot            V76.12     
       OTH SCREEN MAMMO-MALIGN NEOPLASM OF BRUNO                     

 

 2018            ALYSE HODGES FACC, ALI FACP CCDS            Ot            
785.1            PALPITATIONS                     

 

 2018            ALYSE HODGES FACC, ALI FACP CCDS            Ot            
786.09            RESPIRATORY ABNORM NEC                     

 

 2018            ALYSE HODGES FACC, ALI FACP CCDS            Ot            
785.1            PALPITATIONS                     

 

 2018            ALYSE HODGES FACYENNI, ALI FACP CCDS            Ot            
786.09            RESPIRATORY ABNORM NEC                     

 

 2018            JAVON DE LOS SANTOS ARNP            Ot            789.03    
        ABDOMINAL PAIN, RIGHT LOWER QUADRANT                     

 

 2018            JAVON DE LOS SANTOS            Ot            789.03    
        ABDOMINAL PAIN, RIGHT LOWER QUADRANT                     

 

 2018            FILIBERTO, JAVON ARNP            Ot            R10.31    
        RIGHT LOWER QUADRANT PAIN                     

 

 2018            JAVON DE LOS SANTOS ARNP            Ot            Z12.31    
        ENCNTR SCREEN MAMMOGRAM FOR MALIGNANT NE                     

 

 2018            BAIMA, JAHAIRA L ARNP            Ot            I10      
      ESSENTIAL (PRIMARY) HYPERTENSION                     

 

 2018            BAIMA, JAHAIRA L ARNP            Ot            I25.119  
          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                     

 

 2018            BAIMA, JAHAIRA L ARNP            Ot            I34.0    
        NONRHEUMATIC MITRAL (VALVE) INSUFFICIENC                     

 

 2018            BAIMA, JAHAIRA L ARNP            Ot            I73.9    
        PERIPHERAL VASCULAR DISEASE, UNSPECIFIED                     

 

 2018            BAIMA, JAHAIRA L ARNP            Ot            R06.09   
         OTHER FORMS OF DYSPNEA                     

 

 2018            BAIMA, JAHAIRA L ARNP            Ot            R29.818  
          OTHER SYMPTOMS AND SIGNS INVOLVING THE N                     

 

 2018            BAIMA, JAHAIRA L ARNP            Ot            Z72.0    
        TOBACCO USE                     

 

 10/13/2018            BAIMA, JAHAIRA L ARNP            Ot            I10      
      ESSENTIAL (PRIMARY) HYPERTENSION                     

 

 10/13/2018            BAIMA, JAHAIRA L ARNP            Ot            I25.119  
          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                     

 

 10/13/2018            BAIMA, JAHAIRA L ARNP            Ot            I34.0    
        NONRHEUMATIC MITRAL (VALVE) INSUFFICIENC                     

 

 10/13/2018            BAIMA, JAHAIRA L ARNP            Ot            R06.09   
         OTHER FORMS OF DYSPNEA                     

 

 10/13/2018            BAIMA, JAHAIRA L ARNP            Ot            Z72.0    
        TOBACCO USE                     

 

 10/15/2018            BAIMA, JAHAIRA L ARNP            Ot            I10      
      ESSENTIAL (PRIMARY) HYPERTENSION                     

 

 10/15/2018            BAIMA, JAHAIRA L ARNP            Ot            I25.119  
          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                     

 

 10/15/2018            BAIMA, JAHAIRA L ARNP            Ot            I34.0    
        NONRHEUMATIC MITRAL (VALVE) INSUFFICIENC                     

 

 10/15/2018            BAIMA, JAHAIRA L ARNP            Ot            I73.9    
        PERIPHERAL VASCULAR DISEASE, UNSPECIFIED                     

 

 10/15/2018            BAIMA, JAHAIRA L ARNP            Ot            R06.09   
         OTHER FORMS OF DYSPNEA                     

 

 10/15/2018            BAIMA, JAHAIRA L ARNP            Ot            R29.818  
          OTHER SYMPTOMS AND SIGNS INVOLVING THE N                     

 

 10/15/2018            BAIMA, JAHAIRA L ARNP            Ot            Z72.0    
        TOBACCO USE                     

 

 10/15/2018            BAIMA, JAHAIRA L ARNP            Ot            I10      
      ESSENTIAL (PRIMARY) HYPERTENSION                     

 

 10/15/2018            YOUSIFMA, JAHAIRA L ARNP            Ot            I25.119  
          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                     

 

 10/15/2018            BAIAURE DE JESUSHER L ARNP            Ot            I34.0    
        NONRHEUMATIC MITRAL (VALVE) INSUFFICIENC                     

 

 10/15/2018            BAIMA, JAHAIRA L ARNP            Ot            I73.9    
        PERIPHERAL VASCULAR DISEASE, UNSPECIFIED                     

 

 10/15/2018            BAIMA JAHAIRA L ARNP            Ot            R06.09   
         OTHER FORMS OF DYSPNEA                     

 

 10/15/2018            YOUSIFMA JAHAIRA L ARNP            Ot            R29.818  
          OTHER SYMPTOMS AND SIGNS INVOLVING THE N                     

 

 10/15/2018            SIDJAHAIRA L ARNP            Ot            Z72.0    
        TOBACCO USE                     

 

 10/17/2018            IRENA LIU            Ot            V76.12     
       OTH SCREEN MAMMO-MALIGN NEOPLASM OF BRUNO                     

 

 10/17/2018            ALYSE HODGES FACC, ALI FACP CCDS            Ot            
785.1            PALPITATIONS                     

 

 10/17/2018            ALYSE HODGES FACC, ALI FACP CCDS            Ot            
786.09            RESPIRATORY ABNORM NEC                     

 

 10/17/2018            ALYSE HODGES FACC, ALI FACP CCDS            Ot            
785.1            PALPITATIONS                     

 

 10/17/2018            ALYSE MD FACC, ALI FACP CCDS            Ot            
786.09            RESPIRATORY ABNORM NEC                     

 

 10/17/2018            JAVON DE LOS SANTOS ARNP            Ot            789.03    
        ABDOMINAL PAIN, RIGHT LOWER QUADRANT                     

 

 10/17/2018            JAVON DE LOS SANTOS ARNP            Ot            789.03    
        ABDOMINAL PAIN, RIGHT LOWER QUADRANT                     

 

 10/17/2018            JAVON DEL OS SANTOS ARNP            Ot            R10.31    
        RIGHT LOWER QUADRANT PAIN                     

 

 10/17/2018            JAVON DE LOS SANTOS ARNP            Ot            Z12.31    
        ENCNTR SCREEN MAMMOGRAM FOR MALIGNANT NE                     

 

 10/17/2018            SID JAHAIRA L ARNP            Ot            I10      
      ESSENTIAL (PRIMARY) HYPERTENSION                     

 

 10/17/2018            SID JAHAIRA L ARNP            Ot            I25.119  
          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                     

 

 10/17/2018            YOUSIFAURE DE JESUSHER L ARNP            Ot            I34.0    
        NONRHEUMATIC MITRAL (VALVE) INSUFFICIENC                     

 

 10/17/2018            SID JAHAIRA L ARNP            Ot            I73.9    
        PERIPHERAL VASCULAR DISEASE, UNSPECIFIED                     

 

 10/17/2018            BAIMA, JAHAIRA L ARNP            Ot            R06.09   
         OTHER FORMS OF DYSPNEA                     

 

 10/17/2018            BAIMA, JAHAIRA L ARNP            Ot            R29.818  
          OTHER SYMPTOMS AND SIGNS INVOLVING THE N                     

 

 10/17/2018            BAIMA, JAHAIRA L ARNP            Ot            Z72.0    
        TOBACCO USE                     

 

 10/17/2018            BAIMA, JAHAIRA L ARNP            Ot            I10      
      ESSENTIAL (PRIMARY) HYPERTENSION                     

 

 10/17/2018            BAIMA, JAHAIRA L ARNP            Ot            I25.119  
          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                     

 

 10/17/2018            BAIMA, JAHAIRA L ARNP            Ot            I34.0    
        NONRHEUMATIC MITRAL (VALVE) INSUFFICIENC                     

 

 10/17/2018            BAIMA, JAHAIRA L ARNP            Ot            R06.09   
         OTHER FORMS OF DYSPNEA                     

 

 10/17/2018            BAIMA, JAHAIRA L ARNP            Ot            Z72.0    
        TOBACCO USE                     

 

 10/17/2018            BAIMA, JAHAIRA L ARNP            Ot            I10      
      ESSENTIAL (PRIMARY) HYPERTENSION                     

 

 10/17/2018            BAIMA, JAHAIRA L ARNP            Ot            I25.119  
          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                     

 

 10/17/2018            BAIMA, JAHAIRA L ARNP            Ot            I34.0    
        NONRHEUMATIC MITRAL (VALVE) INSUFFICIENC                     

 

 10/17/2018            BAIMA, JAHAIRA L ARNP            Ot            R06.09   
         OTHER FORMS OF DYSPNEA                     

 

 10/17/2018            BAIMA, JAHAIRA L ARNP            Ot            Z72.0    
        TOBACCO USE                     

 

 10/31/2018            SEGUNDO SIU E APRN            Ot            G47.00
            INSOMNIA, UNSPECIFIED                     

 

 10/31/2018            SHERRON SEGUNDO E APRN            Ot            G47.10
            HYPERSOMNIA, UNSPECIFIED                     

 

 10/31/2018            SHERRON SEGUNDO E APRN            Ot            G47.33
            OBSTRUCTIVE SLEEP APNEA (ADULT) (PEDIATR                     

 

 10/31/2018            SHERRON SEGUNDO E APRN            Ot            G47.50
            PARASOMNIA, UNSPECIFIED                     

 

 2018            SHERRON SEGUNDO E APRN            Ot            G47.00
            INSOMNIA, UNSPECIFIED                     

 

 2018            SHERRON SEGUNDO E APRN            Ot            G47.10
            HYPERSOMNIA, UNSPECIFIED                     

 

 2018            SHERRON SEGUNDO E APRN            Ot            G47.33
            OBSTRUCTIVE SLEEP APNEA (ADULT) (PEDIATR                     

 

 2018            RALPH SIUINE E APRN            Ot            G47.50
            PARASOMNIA, UNSPECIFIED                     

 

 12/10/2018            BAIMA, JAHAIRA L ARNP            Ot            I10      
      ESSENTIAL (PRIMARY) HYPERTENSION                     

 

 12/10/2018            SIDJAHAIRA ARNP            Ot            I25.119  
          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                     

 

 12/10/2018            JAHAIRA LAU EVETTE ARNP            Ot            I34.0    
        NONRHEUMATIC MITRAL (VALVE) INSUFFICIENC                     

 

 12/10/2018            YOUSIFMA JAHAIRA L ARNP            Ot            R06.09   
         OTHER FORMS OF DYSPNEA                     

 

 12/10/2018            JAHAIRA LAU ARNP            Ot            Z72.0    
        TOBACCO USE                     

 

 12/10/2018            SHERRONSEGUNDO MUNOZ APRN            Ot            R06.02
            SHORTNESS OF BREATH                     

 

 12/10/2018            SHERRONRALPH MUNOZINE DEDE APRN            Ot            R16.1 
           SPLENOMEGALY, NOT ELSEWHERE CLASSIFIED                     

 

 12/10/2018            SHERRONSEGUNDO MUNOZ APRN            Ot            Z72.0 
           TOBACCO USE                     

 

 2018            SHERRONSGEUNDO MUNOZ APRN            Ot            R06.02
            SHORTNESS OF BREATH                     

 

 2018            SHERRONSEGUNDO MUNOZ APRN            Ot            R16.1 
           SPLENOMEGALY, NOT ELSEWHERE CLASSIFIED                     

 

 2018            SHERRONRALPH MUNOZINE DEDE APRN            Ot            Z72.0 
           TOBACCO USE                     

 

 2018            GRAY CHEATHAM, IRENA IVERSON            Ot            V76.12     
       OTH SCREEN MAMMO-MALIGN NEOPLASM OF BRUNO                     

 

 2018            ALYSE HODGES FACC, ALI FACP CCDS            Ot            
785.1            PALPITATIONS                     

 

 2018            ALYSE HODGES FACC, ALI FACP CCDS            Ot            
786.09            RESPIRATORY ABNORM NEC                     

 

 2018            ALYSE HODGES FACC, ALI FACP CCDS            Ot            
785.1            PALPITATIONS                     

 

 2018            ALYSE HODGES FACC, ALI FACP CCDS            Ot            
786.09            RESPIRATORY ABNORM NEC                     

 

 2018            JAVON DE LOS SANTOS ARNP            Ot            789.03    
        ABDOMINAL PAIN, RIGHT LOWER QUADRANT                     

 

 2018            JAVON DE LOS SANTOS ARNP            Ot            789.03    
        ABDOMINAL PAIN, RIGHT LOWER QUADRANT                     

 

 2018            JAVON DE LOS SANTOS ARNP            Ot            R10.31    
        RIGHT LOWER QUADRANT PAIN                     

 

 2018            JAVON DE LOS SANTOS ARNP            Ot            Z12.31    
        ENCNTR SCREEN MAMMOGRAM FOR MALIGNANT NE                     

 

 2018            JAHAIRA LAU ARNP            Ot            I10      
      ESSENTIAL (PRIMARY) HYPERTENSION                     

 

 2018            SID JAHAIRA EVETTE ARNP            Ot            I25.119  
          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                     

 

 2018            BAIMAJAHAIRA L ARNP            Ot            I34.0    
        NONRHEUMATIC MITRAL (VALVE) INSUFFICIENC                     

 

 2018            BAIMA JAHAIRA L ARNP            Ot            I73.9    
        PERIPHERAL VASCULAR DISEASE, UNSPECIFIED                     

 

 2018            BAIMA, JAHAIRA L ARNP            Ot            R06.09   
         OTHER FORMS OF DYSPNEA                     

 

 2018            BAIMA, JAHAIRA L ARNP            Ot            R29.818  
          OTHER SYMPTOMS AND SIGNS INVOLVING THE N                     

 

 2018            BAIMA JAHAIRA L ARNP            Ot            Z72.0    
        TOBACCO USE                     

 

 2018            BAIMA, JAHAIRA L ARNP            Ot            I10      
      ESSENTIAL (PRIMARY) HYPERTENSION                     

 

 2018            BAIMA, JAHAIRA L ARNP            Ot            I25.119  
          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                     

 

 2018            BAIMA, JAHAIRA L ARNP            Ot            I34.0    
        NONRHEUMATIC MITRAL (VALVE) INSUFFICIENC                     

 

 2018            BAIMA, JAHAIRA L ARNP            Ot            R06.09   
         OTHER FORMS OF DYSPNEA                     

 

 2018            BAIMA, JAHAIRA L ARNP            Ot            Z72.0    
        TOBACCO USE                     

 

 2018            SEGUNDO SIU            Ot            R06.02
            SHORTNESS OF BREATH                     

 

 2018            SEGUNDO SIU            Ot            R16.1 
           SPLENOMEGALY, NOT ELSEWHERE CLASSIFIED                     

 

 2018            SEGUNDO SIU APRHECTOR            Ot            Z72.0 
           TOBACCO USE                     

 

 2018            JAVON DE LOS SANTOS ARNP            Ot            E04.2     
       NONTOXIC MULTINODULAR GOITER                     

 

 2018            JAVON DE LOS SANTOS ARNP            Ot            E04.2     
       NONTOXIC MULTINODULAR GOITER                     

 

 2019            MAURY HODGES, MICHELLE IVERSON            Ot            E04.1     
       NONTOXIC SINGLE THYROID NODULE                     

 

 2019            JAVON DE LOS SANTOS ARNP            Ot            E04.2     
       NONTOXIC MULTINODULAR GOITER                     

 

 2019            MAURY HODGES, MICHELLE IVERSON            Ot            E04.1     
       NONTOXIC SINGLE THYROID NODULE                     

 

 2019            JAVON DE LOS SANTOS ARNP            Ot            E04.2     
       NONTOXIC MULTINODULAR GOITER                     

 

 2019            GRAY CHEATHAM, IRENA IVERSON            Ot            V76.12     
       OTH SCREEN MAMMO-MALIGN NEOPLASM OF BRUNO                     

 

 2019            ALYSE HODGES FACC, ALI FACP CCDS            Ot            
785.1            PALPITATIONS                     

 

 2019            ALYSE HODGES FACC, NAN FACP CCDS            Ot            
786.09            RESPIRATORY ABNORM NEC                     

 

 2019            ALYSE HODGES FACC, ALI FACP CCDS            Ot            
785.1            PALPITATIONS                     

 

 2019            ALYSE HODGES FACYENNI, ALI FACP CCDS            Ot            
786.09            RESPIRATORY ABNORM NEC                     

 

 2019            JAVON DE LOS SANTOS ARNP            Ot            789.03    
        ABDOMINAL PAIN, RIGHT LOWER QUADRANT                     

 

 2019            JAVON DE LOS SANTOS ARNP            Ot            789.03    
        ABDOMINAL PAIN, RIGHT LOWER QUADRANT                     

 

 2019            JAVON DE LOS SANTOS ARNP            Ot            R10.31    
        RIGHT LOWER QUADRANT PAIN                     

 

 2019            JAVON DE LOS SANTOS ARNP            Ot            Z12.31    
        ENCNTR SCREEN MAMMOGRAM FOR MALIGNANT NE                     

 

 2019            JAHAIRA LAU L ARNP            Ot            I10      
      ESSENTIAL (PRIMARY) HYPERTENSION                     

 

 2019            SID JAHAIRA L ARNP            Ot            I25.119  
          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                     

 

 2019            SID JAHAIRA L ARNP            Ot            I34.0    
        NONRHEUMATIC MITRAL (VALVE) INSUFFICIENC                     

 

 2019            SID JAHAIRA L ARNP            Ot            I73.9    
        PERIPHERAL VASCULAR DISEASE, UNSPECIFIED                     

 

 2019            SID JAHAIRA L ARNP            Ot            R06.09   
         OTHER FORMS OF DYSPNEA                     

 

 2019            SID JAHAIRA L ARNP            Ot            R29.818  
          OTHER SYMPTOMS AND SIGNS INVOLVING THE N                     

 

 2019            JAHAIRA LAU L ARNP            Ot            Z72.0    
        TOBACCO USE                     

 

 2019            AURE LAUHER L ARNP            Ot            I10      
      ESSENTIAL (PRIMARY) HYPERTENSION                     

 

 2019            SID JAHAIRA L ARNP            Ot            I25.119  
          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                     

 

 2019            YOUSIFMA JAHAIRA L ARNP            Ot            I34.0    
        NONRHEUMATIC MITRAL (VALVE) INSUFFICIENC                     

 

 2019            YOUSIFMA JAHAIRA L ARNP            Ot            R06.09   
         OTHER FORMS OF DYSPNEA                     

 

 2019            AURE LAUHER L ARNP            Ot            Z72.0    
        TOBACCO USE                     

 

 2019            SEGUNDO SIU            Ot            R06.02
            SHORTNESS OF BREATH                     

 

 2019            SEGUNDO SIU            Ot            R16.1 
           SPLENOMEGALY, NOT ELSEWHERE CLASSIFIED                     

 

 2019            SEGUNDO SIU            Ot            Z72.0 
           TOBACCO USE                     

 

 2019            JAVON DE LOS SANTOS            Ot            E04.2     
       NONTOXIC MULTINODULAR GOITER                     

 

 2019            MAURY HODGES, MICHELLE IVERSON            Ot            E04.1     
       NONTOXIC SINGLE THYROID NODULE                     



                                                                               
                                                                               
                                                                               
                                                                               
                                                                               
                                                                               
                                                                               
                                                                               
                                                                               
                                                                               
                                                                               
                                                                               
                                                                               
                           



Procedures

      





 Code            Description            Performed By            Performed On   
     

 

                                               PAP SMEAR OBTAIN SMEAR     
                              2014        

 

             34227                                  NUCLEAR STRESS TESTING     
                              2014        

 

             [id number]                                  ECHO 2D                    
               2014        

 

             [id number]                                  HEMOCCULT                  
                 2014        

 

             [id number]                                  PAP SMEAR                  
                 2014        

 

             [id number]                                  MAMMOGRAM, SCREENING       
                            2014        

 

             [id number]                                  EKG, TRACING (IN-HOUSE)    
                               2014        

 

             [id number]                                  UA LONG DIP                
                   2014        

 

             [id number]                                  A1C (IN-HOUSE)             
                      2014        

 

             [id number]                                  ROUTINE VENIPUNCTURE       
                            2014        

 

             [id number]                                  CBC                        
           2014        

 

             [id number]                                  GFR CALC (RESULT ONLY)   
                                2014        

 

             [id number]                                  CMP                        
           2014        

 

             16795                                  LIPID PANEL                
                   2014        

 

             [id number]                                  MAGNESIUM                  
                 2014        

 

             50858                                  TSH                        
           2014        

 

             18732                                  INSULIN LEVEL              
                     2014        

 

             CARDIOLOG                                  NAN CULLEN            
                       09/10/2014        

 

             [id number]                                  ROUTINE VENIPUNCTURE       
                            2014        

 

             [id number]                                  CT ABDOMEN AND PELVIS W/
CONTRAST                                   2014        

 

             [id number]                                  FSH                        
           2014        

 

             [id number]                                  US PELVIC COMPL (REFLEX CPT
- 26964)                                   2015        



                                                            



Results

      





 Test            Result            Range        









 No Test Indicated - 16 11:33         









 .            Comment                      

 

 Dear Doctor,            Comment                      









 Comp. Metabolic Panel (14) - 16 11:33         









 Glucose, Serum            91 mg/dL            65-99        

 

 BUN            9 mg/dL            6-24        

 

 Creatinine, Serum            0.61 mg/dL            0.57-1.00        

 

 eGFR If NonAfricn Am            102 mL/min/1.73                >59        

 

 eGFR If Africn Am            118 mL/min/1.73                >59        

 

 BUN/Creatinine Ratio            15             9-23        

 

 Sodium, Serum            140 mmol/L            134-144        

 

 Potassium, Serum            4.2 mmol/L            3.5-5.2        

 

 Chloride, Serum            98 mmol/L                    

 

 Carbon Dioxide, Total            25 mmol/L            18-29        

 

 Calcium, Serum            9.1 mg/dL            8.7-10.2        

 

 Protein, Total, Serum            7.0 g/dL            6.0-8.5        

 

 Albumin, Serum            4.4 g/dL            3.5-5.5        

 

 Globulin, Total            2.6 g/dL            1.5-4.5        

 

 A/G Ratio            1.7             1.1-2.5        

 

 Bilirubin, Total            0.9 mg/dL            0.0-1.2        

 

 Alkaline Phosphatase, S            83 IU/L                    

 

 AST (SGOT)            15 IU/L            0-40        

 

 ALT (SGPT)            17 IU/L            0-32        









 Lipid Panel - 16 11:33         









 Cholesterol, Total            183 mg/dL            100-199        

 

 Triglycerides            94 mg/dL            0-149        

 

 HDL Cholesterol            63 mg/dL            >39        

 

 VLDL Cholesterol Macario            19 mg/dL            5-40        

 

 LDL Cholesterol Calc            101 mg/dL            0-99        









 Pap Lb, HPV-hr - 17 15:13         









 HPV, high-risk            Negative             Negative        

 

 DIAGNOSIS:            Comment                      

 

 Specimen adequacy:            Comment                      

 

 Clinician provided ICD10:            Comment                      

 

 Performed by:            Comment                      

 

 QC reviewed by:            Comment                      

 

 .            .                      

 

 Pathologist provided ICD10:            Comment                      

 

 Note:            Comment                      









 CULTURE, URINE - 17 12:04         









 CULTURE, URINE, ROUTINE            SEE NOTE             NRG        









 PDM - 09 PANEL (PROFILE 1) - 18 13:15         









 Prescribed Drug 1            Xanax(TM)             NRG        

 

 Creatinine            53.3 mg/dL            > or=20.0        

 

 pH            6.95             4.5 - 9.0        

 

 Oxidant            NEGATIVE mcg/mL            <200        

 

 Amphetamines            NEGATIVE ng/mL            <500        

 

 medMATCH Amphetamines            CONSISTENT             NRG        

 

 Benzodiazepines            POSITIVE ng/mL            <100        

 

 Marijuana Metabolite            POSITIVE ng/mL            <20        

 

 Cocaine Metabolite            NEGATIVE ng/mL            <150        

 

 medMATCH Cocaine Metab            CONSISTENT             NRG        

 

 Opiates            NEGATIVE ng/mL            <100        

 

 medMATCH Opiates            CONSISTENT             NRG        

 

 Oxycodone            NEGATIVE ng/mL            <100        

 

 medMATCH Oxycodone            CONSISTENT             NRG        

 

 COMMENT                         NRG        

 

   Alphahydroxyalprazolam            74 ng/mL            <25        

 

 medMATCH aOH alprazolam            CONSISTENT             NRG        

 

   Alphahydroxymidazolam            NEGATIVE ng/mL            <50        

 

 medMATCH aOH midazolam            CONSISTENT             NRG        

 

   Alphahydroxytriazolam            NEGATIVE ng/mL            <50        

 

 medMATCH aOH triazolam            CONSISTENT             NRG        

 

   Aminoclonazepam            NEGATIVE ng/mL            <25        

 

 medMATCH Aminoclonazepam            CONSISTENT             NRG        

 

   Hydroxyethylflurazepam            NEGATIVE ng/mL            <50        

 

 medMATCH OH,Et flurazepam            CONSISTENT             NRG        

 

   Lorazepam            NEGATIVE ng/mL            <50        

 

 medMATCH Lorazepam            CONSISTENT             NRG        

 

   Nordiazepam            NEGATIVE ng/mL            <50        

 

 medMATCH Nordiazepam            CONSISTENT             NRG        

 

   Oxazepam            NEGATIVE ng/mL            <50        

 

 medMATCH Oxazepam            CONSISTENT             NRG        

 

   Temazepam            NEGATIVE ng/mL            <50        

 

 medMATCH Temazepam            CONSISTENT             NRG        

 

   Marijuana Metabolite            66 ng/mL            <5        

 

 medMATCH Marijuana Metab            INCONSISTENT             NRG        

 

 Barbiturates            NEGATIVE ng/mL            <300        

 

 medMATCH Barbiturates            CONSISTENT             NRG        

 

 Methadone Metabolite            NEGATIVE ng/mL            <100        

 

 medMATCH Methadone Metab            CONSISTENT             NRG        

 

 Phencyclidine            NEGATIVE ng/mL            <25        

 

 medMATCH Phencyclidine            CONSISTENT             NRG        









 TSH - 18 12:32         









 TSH            0.79 mIU/L            0.40-4.50        









 Complete blood count (CBC) with automated white blood cell (WBC) differential 
- 19 10:35         









 Blood leukocytes automated count (number/volume)            6.8 10*3/uL       
     4.3-11.0        

 

 Blood erythrocytes automated count (number/volume)            4.27 10*6/uL    
        4.35-5.85        

 

 Venous blood hemoglobin measurement (mass/volume)            13.1 g/dL        
    11.5-16.0        

 

 Blood hematocrit (volume fraction)            37 %            35-52        

 

 Automated erythrocyte mean corpuscular volume            87 [foz_us]          
  80-99        

 

 Automated erythrocyte mean corpuscular hemoglobin (mass per erythrocyte)      
      31 pg            25-34        

 

 Automated erythrocyte mean corpuscular hemoglobin concentration measurement (
mass/volume)            35 g/dL            32-36        

 

 Automated erythrocyte distribution width ratio            17.6 %            
10.0-14.5        

 

 Automated blood platelet count (count/volume)            251 10*3/uL          
  130-400        

 

 Automated blood platelet mean volume measurement            8.9 [foz_us]      
      7.4-10.4        

 

 Automated blood neutrophils/100 leukocytes            67 %            42-75   
     

 

 Automated blood lymphocytes/100 leukocytes            23 %            12-44   
     

 

 Blood monocytes/100 leukocytes            7 %            0-12        

 

 Automated blood eosinophils/100 leukocytes            3 %            0-10     
   

 

 Automated blood basophils/100 leukocytes            2 %            0-10        

 

 Blood neutrophils automated count (number/volume)            4.5 10*3         
   1.8-7.8        

 

 Blood lymphocytes automated count (number/volume)            1.5 10*3         
   1.0-4.0        

 

 Blood monocytes automated count (number/volume)            0.4 10*3            
0.0-1.0        

 

 Automated eosinophil count            0.2 10*3/uL            0.0-0.3        

 

 Automated blood basophil count (count/volume)            0.1 10*3/uL          
  0.0-0.1        









 Whole blood basic metabolic panel - 19 10:35         









 Serum or plasma sodium measurement (moles/volume)            135 mmol/L       
     135-145        

 

 Serum or plasma potassium measurement (moles/volume)            4.1 mmol/L    
        3.6-5.0        

 

 Serum or plasma chloride measurement (moles/volume)            102 mmol/L     
               

 

 Carbon dioxide            25 mmol/L            21-32        

 

 Serum or plasma anion gap determination (moles/volume)            8 mmol/L    
        5-14        

 

 Serum or plasma urea nitrogen measurement (mass/volume)            12 mg/dL   
         7-18        

 

 Serum or plasma creatinine measurement (mass/volume)            0.64 mg/dL    
        0.60-1.30        

 

 Serum or plasma urea nitrogen/creatinine mass ratio            19             
NRG        

 

 Serum or plasma creatinine measurement with calculation of estimated 
glomerular filtration rate            >             NRG        

 

 Serum or plasma glucose measurement (mass/volume)            95 mg/dL         
           

 

 Serum or plasma calcium measurement (mass/volume)            9.4 mg/dL        
    8.5-10.1        









 Methicillin resistant Staphylococcus aureus (MRSA) screening culture -  10:35         









 Methicillin resistant Staphylococcus aureus (MRSA) screening culture          
  NEG             NRG        









 Whole blood basic metabolic panel - 19 06:27         









 Serum or plasma sodium measurement (moles/volume)            135 mmol/L       
     135-145        

 

 Serum or plasma potassium measurement (moles/volume)            3.7 mmol/L    
        3.6-5.0        

 

 Serum or plasma chloride measurement (moles/volume)            97 mmol/L      
              

 

 Carbon dioxide            25 mmol/L            21-32        

 

 Serum or plasma anion gap determination (moles/volume)            13 mmol/L   
         5-14        

 

 Serum or plasma urea nitrogen measurement (mass/volume)            13 mg/dL   
         7-18        

 

 Serum or plasma creatinine measurement (mass/volume)            0.64 mg/dL    
        0.60-1.30        

 

 Serum or plasma urea nitrogen/creatinine mass ratio            20             
NRG        

 

 Serum or plasma creatinine measurement with calculation of estimated 
glomerular filtration rate            >             NRG        

 

 Serum or plasma glucose measurement (mass/volume)            114 mg/dL        
            

 

 Serum or plasma calcium measurement (mass/volume)            7.8 mg/dL        
    8.5-10.1        









 IONIZED CALCIUM (SEND OFF) - 19 06:27         









 Blood ionized calcium measurement (mass/volume)            0.94 %            
1.16-1.32        

 

 Venous blood ionized calcium measurement adjusted to pH 7.4 (moles/volume)    
        0.97 %            1.16-1.32        

 

 pH measurement            7.45             NRG        









 Whole blood basic metabolic panel - 19 05:57         









 Serum or plasma sodium measurement (moles/volume)            130 mmol/L       
     135-145        

 

 Serum or plasma potassium measurement (moles/volume)            3.8 mmol/L    
        3.6-5.0        

 

 Serum or plasma chloride measurement (moles/volume)            94 mmol/L      
              

 

 Carbon dioxide            25 mmol/L            21-32        

 

 Serum or plasma anion gap determination (moles/volume)            11 mmol/L   
         5-14        

 

 Serum or plasma urea nitrogen measurement (mass/volume)            12 mg/dL   
         7-18        

 

 Serum or plasma creatinine measurement (mass/volume)            0.64 mg/dL    
        0.60-1.30        

 

 Serum or plasma urea nitrogen/creatinine mass ratio            19             
NRG        

 

 Serum or plasma creatinine measurement with calculation of estimated 
glomerular filtration rate            >             NRG        

 

 Serum or plasma glucose measurement (mass/volume)            109 mg/dL        
            

 

 Serum or plasma calcium measurement (mass/volume)            7.1 mg/dL        
    8.5-10.1        



                                        



Encounters

      





 ACCT No.            Visit Date/Time            Discharge            Status    
        Pt. Type            Provider            Facility            Loc./Unit  
          Complaint        

 

 [id number]            2014 16:18:00            2014 23:59:59          
  [facility]            Outpatient            JAVON CHILEL                  
                             

 

 [id number]            10/15/2014 08:55:00            10/15/2014 23:59:59          
  [facility]            Outpatient            NAN CULLEN MD                          
                     

 

 [id number]            09/10/2014 10:15:00            09/10/2014 23:59:59          
  [facility]            Outpatient            JAVON CHILEL                  
                             

 

 [id number]            2014 08:54:00            2014 23:59:59          
  [facility]            Outpatient            CECY ACEVES DO                        
                       

 

 [id number]            2014 13:00:00            2014 23:59:59          
  [facility]            Outpatient            CECY ACEVES DO                        
                       

 

 [id number]            2014 13:00:00            2014 23:59:59          
  [facility]            Outpatient            CECY ACEVES DO                        
                       

 

 [id number]            2013 12:48:00            2013 23:59:59          
  [facility]            Outpatient                                                    
        

 

 [id number]            2013 10:09:00            2013 23:59:59          
  [facility]            Outpatient            JAVON CHILEL                  
                             

 

 [id number]            2012 13:33:00            2012 23:59:59          
  [facility]            Outpatient                                                    
        

 

 [id number]            2012 13:33:00            2012 23:59:59            
[facility]            Outpatient            JAVON CHILEL                    
                           

 

 [id number]            2013 15:19:00                                      
Document Registration                                                          
  

 

 [id number]            2017 17:07:00                                   
   Document Registration                                                       
     

 

 [id number]            2016 10:06:00                                   
   Document Registration                                                       
     

 

 [id number]            2019 11:20:00            2019 23:59:59            
[facility]            Outpatient            [facility]ILEL S                    
     [facility]                     

 

 [id number]            2018 12:20:00                                      
Document Registration                                                          
  

 

 [id number]            2018 13:00:00                                      
Document Registration                                                          
  

 

 [id number]            2017 11:40:00                                      
Document Registration                                                          
  

 

 [id number]            2015 13:21:52                         [place]            
Document Registration                                                          
  

 

 [id number]            2016 10:06:00                                   
   Document Registration                                                       
     

 

 [id number]            2019 11:32:00            2019 12:20:00    
        DIS            Inpatient            MICHELLE MENDIOLA MD            [facility] 
[facility]            4TH            POST OP NAUSEA,VOMITING,
HYPOCALCEMIA        

 

 [id number]            2019 10:15:00            2019 10:48:00    
        DIS            Outpatient            MICHELLE MENDIOLA MD            
[facility]            PREOP            THYROID NODULES   
     

 

 [id number]            2019 12:59:00            2019 23:59:59    
        [facility]            Outpatient            MICHELLE MENDIOLA MD            
[facility]            LAB            THYROID NODULE      
  

 

 [id number]            2019 09:54:00            2019 23:59:59    
        [facility]            Outpatient            MICHELLE MENDIOLA MD            
[facility]            RAD            THYROID NODULE      
  

 

 [id number]            2018 13:06:00            2018 23:59:59    
        [facility]            Outpatient            JAVON DE LOS SANTOS            
[facility]            RAD            THYROID NODULE      
  

 

 [id number]            10/31/2018 13:44:00            10/31/2018 13:52:00    
        DIS            Outpatient            SEGUNDO SIU          
  [facility]            SLEEP            SNORING,EDS     
   

 

 [id number]            10/17/2018 11:47:00            10/17/2018 23:59:59    
        [facility]            Outpatient            SEGUNDO SIU          
  [facility]            RAD            TOBACCO USER,
DYSPNEA        

 

 [id number]            10/09/2018 13:27:00            10/09/2018 23:59:59    
        [facility]            Outpatient            JAHAIRA LAU ARNP            
[facility]            CARD            DYSPNEA ON EXERTION
        

 

 [id number]            2018 10:49:00            2018 23:59:59    
        [facility]            Outpatient            JAHAIRA LAU ARNP            
[facility]            CARD            DYSPNEA ON EXERTION
        

 

 [id number]            2017 12:54:00            2017 23:59:59    
        [facility]            Outpatient            JAVON DE LOS SANTOS            
[facility]            RAD            Z12.31 SCREENING 
MAMMO        

 

 [id number]            2017 08:45:00            2017 12:33:00    
        DIS            Outpatient            HUNTER HAMMONDS DO            [facility]ia 
[facility]            ENDO            HISTORY POLYPS        

 

 [id number]            2017 05:33:00            2017 15:11:00    
        DIS            Outpatient            HUNTER HAMMONDS DO            [facility]ia 
[facility]            PREOP            COLONOSCOPY        

 

 [id number]            2016 09:46:00            2016 15:00:00    
        DIS            Outpatient            HUNTER HAMMONDS DO            [facility] 
[facility]            SDC            SCREENING        

 

 [id number]            2016 05:39:00            2016 10:46:00    
        DIS            Outpatient            HUNTER HAMMONDS DO            [facility] 
[facility]            PREOP            SCREENING        

 

 [id number]            08/15/2016 08:34:00            08/15/2016 23:59:59    
        [facility]            Outpatient            JAVON DE LOS SANTOS            
[facility]            RAD            RLQ ABD PAIN        

 

 [id number]            2015 13:21:00            2015 23:59:59    
        [facility]            Outpatient            JAVON DE LOS SANTOS            
[facility]            RAD            ABD PAIN, RLQ        

 

 [id number]            2015 11:11:00            2015 23:59:59    
        [facility]            Outpatient            JAVON DE LOS SANTOS            
[facility]            RAD            RLQ PAIN        

 

 [id number]            2014 08:16:00            2014 17:11:00    
        DIS            Outpatient            NAN CULLEN MD, FACC FACP CCDS     
       [facility]            CATH            ABN STRESS 
TEST        

 

 [id number]            10/30/2014 07:42:00            10/30/2014 23:59:59    
        [facility]            Outpatient            NAN CULLEN MD, FACC FACKEVIN CCDS     
       [facility]            CARD            ANGINA,SOB,
FATIGUE        

 

 [id number]            10/23/2014 10:44:00            10/23/2014 23:59:59    
        [facility]            Outpatient            NAN CULLEN MD, FACC FACP CCDS     
       [facility]            CARD            ANGINA,SOB,
FATIGUE        

 

 [id number]            2014 10:08:00            2014 23:59:59    
        [facility]            Outpatient            IRENA LIU            Via 
[facility]            RAD            SCREENING

## 2019-03-12 VITALS — DIASTOLIC BLOOD PRESSURE: 62 MMHG | SYSTOLIC BLOOD PRESSURE: 132 MMHG

## 2019-03-12 VITALS — SYSTOLIC BLOOD PRESSURE: 144 MMHG | DIASTOLIC BLOOD PRESSURE: 76 MMHG

## 2019-03-12 VITALS — DIASTOLIC BLOOD PRESSURE: 72 MMHG | SYSTOLIC BLOOD PRESSURE: 123 MMHG

## 2019-03-12 VITALS — SYSTOLIC BLOOD PRESSURE: 132 MMHG | DIASTOLIC BLOOD PRESSURE: 62 MMHG

## 2019-03-12 LAB
ALBUMIN SERPL-MCNC: 3.9 GM/DL (ref 3.2–4.5)
ALP SERPL-CCNC: 65 U/L (ref 40–136)
ALT SERPL-CCNC: 21 U/L (ref 0–55)
BASOPHILS # BLD AUTO: 0.1 10^3/UL (ref 0–0.1)
BASOPHILS NFR BLD AUTO: 1 % (ref 0–10)
BILIRUB SERPL-MCNC: 1.3 MG/DL (ref 0.1–1)
BUN/CREAT SERPL: 10
CALCIUM SERPL-MCNC: 6.7 MG/DL (ref 8.5–10.1)
CHLORIDE SERPL-SCNC: 99 MMOL/L (ref 98–107)
CO2 SERPL-SCNC: 26 MMOL/L (ref 21–32)
CREAT SERPL-MCNC: 0.68 MG/DL (ref 0.6–1.3)
EOSINOPHIL # BLD AUTO: 0.2 10^3/UL (ref 0–0.3)
EOSINOPHIL NFR BLD AUTO: 2 % (ref 0–10)
ERYTHROCYTE [DISTWIDTH] IN BLOOD BY AUTOMATED COUNT: 17.4 % (ref 10–14.5)
GFR SERPLBLD BASED ON 1.73 SQ M-ARVRAT: > 60 ML/MIN
GLUCOSE SERPL-MCNC: 96 MG/DL (ref 70–105)
HCT VFR BLD CALC: 32 % (ref 35–52)
HGB BLD-MCNC: 11.3 G/DL (ref 11.5–16)
LYMPHOCYTES # BLD AUTO: 2.1 X 10^3 (ref 1–4)
LYMPHOCYTES NFR BLD AUTO: 23 % (ref 12–44)
MANUAL DIFFERENTIAL PERFORMED BLD QL: NO
MCH RBC QN AUTO: 31 PG (ref 25–34)
MCHC RBC AUTO-ENTMCNC: 35 G/DL (ref 32–36)
MCV RBC AUTO: 87 FL (ref 80–99)
MONOCYTES # BLD AUTO: 0.9 X 10^3 (ref 0–1)
MONOCYTES NFR BLD AUTO: 9 % (ref 0–12)
NEUTROPHILS # BLD AUTO: 6.1 X 10^3 (ref 1.8–7.8)
NEUTROPHILS NFR BLD AUTO: 65 % (ref 42–75)
PLATELET # BLD: 318 10^3/UL (ref 130–400)
PMV BLD AUTO: 8.8 FL (ref 7.4–10.4)
POTASSIUM SERPL-SCNC: 3.6 MMOL/L (ref 3.6–5)
PROT SERPL-MCNC: 6.5 GM/DL (ref 6.4–8.2)
SODIUM SERPL-SCNC: 141 MMOL/L (ref 135–145)
WBC # BLD AUTO: 9.4 10^3/UL (ref 4.3–11)

## 2019-03-12 RX ADMIN — Medication SCH MG: at 17:02

## 2019-03-12 RX ADMIN — Medication SCH MG: at 12:56

## 2019-03-12 RX ADMIN — LEVOTHYROXINE SODIUM SCH MCG: 0.11 TABLET ORAL at 06:48

## 2019-03-12 RX ADMIN — Medication SCH MG: at 06:48

## 2019-03-12 RX ADMIN — PROPRANOLOL HYDROCHLORIDE SCH MG: 20 TABLET ORAL at 08:57

## 2019-03-12 NOTE — PROGRESS NOTE-STANDARD
Standard Progress Note


Progress Notes/Assess & Plan


Date Seen by a Provider:  Mar 12, 2019


Time Seen by a Provider:  15:20


Progress/Assessment & Plan


Echymosis imporved. Hypocalcemic symptoms better. Could be discharged later 

this pm


Final Diagnosis


Post-thyroidectomy hypocalcemia











MICHELLE MENDIOLA MD Mar 12, 2019 16:09

## 2019-03-12 NOTE — NUR
ANTOINETTE HOLM demonstrates understanding of discharge instructions and accurately 
returns instructions upon questioning.  Copy of Post-Discharge Instructions given to PT. 
ANTOINETTE HOLM is able to manage continuing needs after discharge.  Patients belongings 
returned to PT.  Patient discharged from [facility] on 3/12/19 at 1700. ANTOINETTE HOLM left 
floor via W/C, accompanied by STAFF AND  PER AUTO.

## 2019-03-12 NOTE — NUR
NOTIFIED DR. MENDIOLA ABOUT FAILED IV ATTEMPTS AND UNABLE TO GIVE IV MAG AND IV CALCIUM THAT 
WAS ORDERED. NEW ORDER TO STOP IV MEDS AND SEND PT. HOME.

## 2019-03-12 NOTE — DISCHARGE INST-SIMPLE/STANDARD
Discharge Inst-Standard


Discharge Medications


New, Converted or Re-Newed RX:  Other





Patient Instructions/Follow Up


Plan of Care/Instructions/FU:  


To take calcium with vit d 4 tablets every 4 hours. May take more if


tingling of hands or numbness around the mouth develops. F/U on 3/28


Activity as Tolerated:  Yes


Discharge Diet:  No Restrictions











MICHELLE MENDIOLA MD Mar 12, 2019 16:11

## 2019-04-01 ENCOUNTER — HOSPITAL ENCOUNTER (EMERGENCY)
Dept: HOSPITAL 75 - ER | Age: 59
LOS: 1 days | Discharge: HOME | End: 2019-04-02
Payer: SELF-PAY

## 2019-04-01 VITALS — WEIGHT: 270 LBS | BODY MASS INDEX: 42.38 KG/M2 | HEIGHT: 67 IN

## 2019-04-01 DIAGNOSIS — Z87.448: ICD-10-CM

## 2019-04-01 DIAGNOSIS — Z98.890: ICD-10-CM

## 2019-04-01 DIAGNOSIS — Z90.710: ICD-10-CM

## 2019-04-01 DIAGNOSIS — Z86.010: ICD-10-CM

## 2019-04-01 DIAGNOSIS — F17.210: ICD-10-CM

## 2019-04-01 DIAGNOSIS — T81.40XA: Primary | ICD-10-CM

## 2019-04-01 DIAGNOSIS — E03.2: ICD-10-CM

## 2019-04-01 DIAGNOSIS — Z90.89: ICD-10-CM

## 2019-04-01 DIAGNOSIS — E66.9: ICD-10-CM

## 2019-04-01 DIAGNOSIS — F41.9: ICD-10-CM

## 2019-04-01 DIAGNOSIS — F32.9: ICD-10-CM

## 2019-04-01 DIAGNOSIS — I10: ICD-10-CM

## 2019-04-01 DIAGNOSIS — G47.30: ICD-10-CM

## 2019-04-01 LAB
ALBUMIN SERPL-MCNC: 4.4 GM/DL (ref 3.2–4.5)
ALP SERPL-CCNC: 87 U/L (ref 40–136)
ALT SERPL-CCNC: 21 U/L (ref 0–55)
BASOPHILS # BLD AUTO: 0.1 10^3/UL (ref 0–0.1)
BASOPHILS NFR BLD AUTO: 1 % (ref 0–10)
BILIRUB SERPL-MCNC: 1.3 MG/DL (ref 0.1–1)
BUN/CREAT SERPL: 15
CALCIUM SERPL-MCNC: 8.5 MG/DL (ref 8.5–10.1)
CHLORIDE SERPL-SCNC: 99 MMOL/L (ref 98–107)
CO2 SERPL-SCNC: 25 MMOL/L (ref 21–32)
CREAT SERPL-MCNC: 0.68 MG/DL (ref 0.6–1.3)
EOSINOPHIL # BLD AUTO: 0.2 10^3/UL (ref 0–0.3)
EOSINOPHIL NFR BLD AUTO: 2 % (ref 0–10)
ERYTHROCYTE [DISTWIDTH] IN BLOOD BY AUTOMATED COUNT: 18 % (ref 10–14.5)
GFR SERPLBLD BASED ON 1.73 SQ M-ARVRAT: > 60 ML/MIN
GLUCOSE SERPL-MCNC: 96 MG/DL (ref 70–105)
HCT VFR BLD CALC: 35 % (ref 35–52)
HGB BLD-MCNC: 12.3 G/DL (ref 11.5–16)
LYMPHOCYTES # BLD AUTO: 1.5 X 10^3 (ref 1–4)
LYMPHOCYTES NFR BLD AUTO: 14 % (ref 12–44)
MAGNESIUM SERPL-MCNC: 2.6 MG/DL (ref 1.8–2.4)
MANUAL DIFFERENTIAL PERFORMED BLD QL: NO
MCH RBC QN AUTO: 30 PG (ref 25–34)
MCHC RBC AUTO-ENTMCNC: 35 G/DL (ref 32–36)
MCV RBC AUTO: 86 FL (ref 80–99)
MONOCYTES # BLD AUTO: 0.6 X 10^3 (ref 0–1)
MONOCYTES NFR BLD AUTO: 6 % (ref 0–12)
NEUTROPHILS # BLD AUTO: 8.5 X 10^3 (ref 1.8–7.8)
NEUTROPHILS NFR BLD AUTO: 78 % (ref 42–75)
PLATELET # BLD: 358 10^3/UL (ref 130–400)
PMV BLD AUTO: 8.8 FL (ref 7.4–10.4)
POTASSIUM SERPL-SCNC: 3.6 MMOL/L (ref 3.6–5)
PROT SERPL-MCNC: 8 GM/DL (ref 6.4–8.2)
SODIUM SERPL-SCNC: 138 MMOL/L (ref 135–145)
T4 FREE SERPL-MCNC: 0.95 NG/DL (ref 0.7–1.48)
TSH SERPL DL<=0.05 MIU/L-ACNC: 17.61 UIU/ML (ref 0.35–4.94)
WBC # BLD AUTO: 10.8 10^3/UL (ref 4.3–11)

## 2019-04-01 PROCEDURE — 96361 HYDRATE IV INFUSION ADD-ON: CPT

## 2019-04-01 PROCEDURE — 96374 THER/PROPH/DIAG INJ IV PUSH: CPT

## 2019-04-01 PROCEDURE — 85025 COMPLETE CBC W/AUTO DIFF WBC: CPT

## 2019-04-01 PROCEDURE — 87040 BLOOD CULTURE FOR BACTERIA: CPT

## 2019-04-01 PROCEDURE — 80053 COMPREHEN METABOLIC PANEL: CPT

## 2019-04-01 PROCEDURE — 83735 ASSAY OF MAGNESIUM: CPT

## 2019-04-01 PROCEDURE — 84439 ASSAY OF FREE THYROXINE: CPT

## 2019-04-01 PROCEDURE — 36415 COLL VENOUS BLD VENIPUNCTURE: CPT

## 2019-04-01 PROCEDURE — 70491 CT SOFT TISSUE NECK W/DYE: CPT

## 2019-04-01 PROCEDURE — 84443 ASSAY THYROID STIM HORMONE: CPT

## 2019-04-01 PROCEDURE — 96375 TX/PRO/DX INJ NEW DRUG ADDON: CPT

## 2019-04-01 NOTE — ED EENT
History of Present Illness


General


Chief Complaint:  Oral/Throat Problems


Stated Complaint:  THYROID SWELLING AFTER MARISABEL 3/6


Nursing Triage Note:  


pt verbalized thyroid surgery 3/6 states swelling has gotten better. pt states 


worsen today. verbalized "feels different"


Source:  patient


Exam Limitations:  no limitations





History of Present Illness


Date Seen by Provider:  Apr 1, 2019


Time Seen by Provider:  20:02


Initial Comments


PT ARRIVES VIA POV FROM HOME


PT HAD THYROIDECTOMY ON 03/06/19 BY DR. MENDIOLA FOR THYROID NODULES--BENIGN


PT STAYED A COUPLE OF DAYS DUE TO POST OP NAUSEA AND VOMITING, AND WAS NOTED TO 

HAVE A SUB Q HEMATOMA AT THAT TIME


PT CAME BACK TO ER 03/10 AND WAS ADMITTED UNTIL 03/12/19 FOR HYPOCALCEMIA


PT HAS NOT HAD HER DOSE OF LEVOTHYROXINE CHANGED AT ANY TIME, AND HAS NOT HAD 

LAB RECHECKED SINCE DISMISSED FROM THE HOSPITAL. 


PT STATES THE AREA WAS INITIALLY VERY SWOLLEN AND BRUISED AFTER SURGERY, THEN 

GOT A LITTLE BETTER


SAW DR. MENDIOLA ON 03/28 FOR POST OP VISIT AND WAS STILL SWOLLEN, BUT A LITTLE 

BETTER. PT STATES SHE WAS TOLD SHE DID NOT HAVE TO RETURN FOR ANY MORE FOLLOW 

UP APPOINTMENTS


PT STATES THAT ON WAKING THIS AM, SHE NOTICED THAT THE SWELLING WAS WORSE, AND 

HAS BEEN HAVING SLIGHT DIFFICULTY SWALLOWING TODAY


PT HAS NOT CHECKED TEMP, BUT STATES SHE HAS HAD "HOT AND COLD RUNNING ALL 

THROUGH MY BODY" TODAY


HAS HAD SLIGHT NAUSEA TODAY


NO DIFFICULTY BREATHING


NO DRAINAGE FROM WOUND





HAS NOT SOUGHT CARE UNTIL TONIGHT


HAS NOT TAKEN ANYTHING FOR PAIN AT ANY TIME--STATES SHE HAS TRAMADOL AT HOME 

BUT HAS NOT BEEN TAKING IT BECAUSE IT CAUSES CONSTIPATION


PT STATES SHE HAS NOT BEEN ON ANTIBIOTICS SINCE SURGERY





PCP: [facility]


SURGEON: DR. MENDIOLA





Allergies and Home Medications


Allergies


Coded Allergies:  


     No Known Drug Allergies (Unverified , 10/30/14)





Home Medications


Acetaminophen 500 Mg Tablet, 1,000 MG PO Q6H PRN for PAIN-MILD, (Reported)


Fluoxetine HCl 20 Mg Tablet, 60 MG PO HS, (Reported)


   TAKES 3 (20MG) TABLETS 


Gabapentin 400 Mg Capsule, 400 MG PO HS, (Reported)


Levothyroxine Sodium 112 Mcg Tablet, 112 MCG PO DAILY


   Prescribed by: MICHELLE MENDIOLA on 3/6/19 1454


Melatonin 10 Mg Tablet, 10 MG PO HS, (Reported)


Oxybutynin Chloride 5 Mg Tablet, 5 MG PO HS, (Reported)


Propranolol HCl 20 Mg Tablet, 20 MG PO BID, (Reported)


Tramadol HCl 50 Mg Tablet, 50 MG PO Q12H PRN for PAIN-MODERATE


   Prescribed by: MICHELLE MENDIOLA on 3/8/19 1137





Patient Home Medication List


Home Medication List Reviewed:  Yes





Review of Systems


Review of Systems


Constitutional:  see HPI


Eyes:  No Symptoms Reported


Ears:  No Symptoms Reported


Nose:  no symptoms reported


Mouth:  no symptoms reported


Throat:  see HPI


Respiratory:  no symptoms reported


Cardiovascular:  no symptoms reported


Gastrointestinal:  see HPI


Musculoskeletal:  see HPI


Skin:  see HPI


Neurological:  No Symptoms Reported


Hematologic/Lymphatic:  No Symptoms Reported


Immunological/Allergic:  no symptoms reported





Past Medical-Social-Family Hx


Patient Social History


Alcohol Use:  Rarely Uses


Recreational Drug Use:  No


Smoking Status:  Current Everyday Smoker (1 PPD FOR > 40 YEARS)


Type Used:  Cigarettes


Recent Foreign Travel:  No


Contact w/Someone Who Travel:  No


Recent Infectious Disease Expo:  No





Immunizations Up To Date


Date of Influenza Vaccine:  Nov 6, 2018





Past Medical History


Surgeries:  Yes (JAW SURGERY TO CORRECT UNDERBITE; COLONOSCOPIES WITH MULTIPLE  

POLYPECTOMIES; HYST/OVARIES INTACT; CARDIAC CATH 11/2014--NORMAL. )


Bowel Surgery, Cardiac, Hysterectomy, Thyroidectomy, Tonsillectomy


Respiratory:  Yes (CHRONIC DYSPNEA ON EXERTION)


Sleep Apnea


Currently Using CPAP:  Yes


Currently Using BIPAP:  No


Cardiac:  Yes (MITRAL VALVE PROLAPSE)


Heart Murmur, Hypertension, Palpitations, Valvular Heart Disease


Neurological:  No


GYN History:  Hysterectomy, Menopausal


Genitourinary:  Yes


Bladder Infection


Gastrointestinal:  Yes


Polyps


Musculoskeletal:  Yes


Arthritis


Endocrine:  Yes (OBESITY; BENIGN THYROID NODULES-S/P THYROIDECTOMY 03/06/19)


HEENT:  Yes (CORRECTIVE JAW SURGERY FOR UNDERBITE)


Cancer:  No


Psychosocial:  Yes


Anxiety, Depression


Integumentary:  No


Blood Disorders:  No





Physical Exam


Vital Signs





Vital Signs - First Documented








 4/1/19





 20:04


 


Temp 98.0


 


Pulse 104


 


Resp 20


 


B/P (MAP) 158/91 (113)


 


Pulse Ox 93


 


O2 Delivery Room Air








Height, Weight, BMI


Height: 5'7.00"


Weight: 270lbs. 1.0oz. [id number]; 42.0 BMI


Method:Estimated


General Appearance:  no apparent distress, obese


Eyes:  bilateral eye PERRL, bilateral eye EOMI


Mouth/Throat:  normal mouth inspection, pharynx normal; No excessive drooling, 

No trismus, No voice changes; other (NO SUBLINGUAL SWELLING OR ERYTHEMA OR 

TENDERNESS. )


Neck:  other (HAS SIGNIFICANT SWELLING, ERYTHEMA, AND VERY HARD/FIRM INDURATION 

TO ANTERIOR NECK SURROUNDING SURGICAL WOUND FROM RECENT THYROIDECTOMY; INCISION 

IS CLEAN, DRY AND INTACT. NO AREAS OF FLUCTUANCE, NO DRAINAGE, NO STREAKS. )


Cardiovascular:  regular rate, rhythm, no murmur


Respiratory:  normal breath sounds, no respiratory distress


Neurologic/Psychiatric:  CNs II-XII nml as tested, no motor/sensory deficits, 

alert, normal mood/affect, oriented x 3


Skin:  normal color, warm/dry, other (AS ABOVE)





Progress/Results/Core Measures


Results/Orders


Lab Results





Laboratory Tests








Test


 4/1/19


20:36 Range/Units


 


 


White Blood Count


 10.8 


 4.3-11.0


10^3/uL


 


Red Blood Count


 4.05 L


 4.35-5.85


10^6/uL


 


Hemoglobin 12.3  11.5-16.0  G/DL


 


Hematocrit 35  35-52  %


 


Mean Corpuscular Volume 86  80-99  FL


 


Mean Corpuscular Hemoglobin 30  25-34  PG


 


Mean Corpuscular Hemoglobin


Concent 35 


 32-36  G/DL





 


Red Cell Distribution Width 18.0 H 10.0-14.5  %


 


Platelet Count


 358 


 130-400


10^3/uL


 


Mean Platelet Volume 8.8  7.4-10.4  FL


 


Neutrophils (%) (Auto) 78 H 42-75  %


 


Lymphocytes (%) (Auto) 14  12-44  %


 


Monocytes (%) (Auto) 6  0-12  %


 


Eosinophils (%) (Auto) 2  0-10  %


 


Basophils (%) (Auto) 1  0-10  %


 


Neutrophils # (Auto) 8.5 H 1.8-7.8  X 10^3


 


Lymphocytes # (Auto) 1.5  1.0-4.0  X 10^3


 


Monocytes # (Auto) 0.6  0.0-1.0  X 10^3


 


Eosinophils # (Auto)


 0.2 


 0.0-0.3


10^3/uL


 


Basophils # (Auto)


 0.1 


 0.0-0.1


10^3/uL


 


Sodium Level 138  135-145  MMOL/L


 


Potassium Level 3.6  3.6-5.0  MMOL/L


 


Chloride Level 99    MMOL/L


 


Carbon Dioxide Level 25  21-32  MMOL/L


 


Anion Gap 14  5-14  MMOL/L


 


Blood Urea Nitrogen 10  7-18  MG/DL


 


Creatinine


 0.68 


 0.60-1.30


MG/DL


 


Estimat Glomerular Filtration


Rate > 60 


  





 


BUN/Creatinine Ratio 15   


 


Glucose Level 96    MG/DL


 


Calcium Level 8.5  8.5-10.1  MG/DL


 


Corrected Calcium 8.2 L 8.5-10.1  MG/DL


 


Magnesium Level 2.6 H 1.8-2.4  MG/DL


 


Total Bilirubin 1.3 H 0.1-1.0  MG/DL


 


Aspartate Amino Transf


(AST/SGOT) 17 


 5-34  U/L





 


Alanine Aminotransferase


(ALT/SGPT) 21 


 0-55  U/L





 


Alkaline Phosphatase 87    U/L


 


Total Protein 8.0  6.4-8.2  GM/DL


 


Albumin 4.4  3.2-4.5  GM/DL


 


Free Thyroxine


 0.95 


 0.70-1.48


NG/DL


 


TSH Cascade Testing


 17.61 H


 0.35-4.94


UIU/ML








My Orders





Orders - LYDIA GOLDBERG DO


Ct Neck (Soft Tissue) W (4/1/19 20:08)


Saline Lock/Iv-Start (4/1/19 20:08)


Cbc With Automated Diff (4/1/19 20:08)


Comprehensive Metabolic Panel (4/1/19 20:08)


Magnesium (4/1/19 20:08)


Thyroid Analyzer (4/1/19 20:08)


Blood Culture (4/1/19 20:08)


Free T4 (Free Thyroxine) (4/1/19 20:36)


Piperacillin/Tazobactam (Bulk) (Zosyn In (4/1/19 22:15)


Ketorolac Injection (Toradol Injection) (4/1/19 22:15)


Piperacillin Sodium/Tazobactam (Zosyn Vi (4/1/19 22:14)


Ns (Ivpb) (Sodium Chloride 0.9% Ivpb Bag (4/1/19 22:14)


Iohexol Injection (Omnipaque 350 Mg/Ml 1 (4/1/19 23:00)


Received Contrast (Hold Metformin- Contr (4/1/19 23:00)


Saline Lock/Iv-Start (4/1/19 23:30)


Ns Iv 1000 Ml (Sodium Chloride 0.9%) (4/1/19 23:30)


Rx-Trimeth/Sulfameth Ds Tab (Rx-Bactrim/ (4/2/19 00:46)





Medications Given in ED





Current Medications








 Medications  Dose


 Ordered  Sig/Kim


 Route  Start Time


 Stop Time Status Last Admin


Dose Admin


 


 Iohexol  100 ml  ONCE  ONCE


 IV  4/1/19 23:00


 4/1/19 23:01 DC 4/1/19 22:59


100 ML


 


 Ketorolac


 Tromethamine  30 mg  ONCE  ONCE


 IVP  4/1/19 22:15


 4/1/19 22:16 DC 4/1/19 22:25


30 MG


 


 Piperacillin Sod/


 Tazobactam Sod  4.5 gm  STK-MED ONCE


 IV  4/1/19 22:14


 4/1/19 22:16 DC 4/1/19 22:26


4.5 GM


 


 Sodium Chloride  100 ml @ ud  STK-MED ONCE


 .ROUTE  4/1/19 22:14


 4/1/19 22:17 DC 4/1/19 22:26


240 MLS/HR








Vital Signs/I&O











 4/1/19 4/2/19





 20:04 01:14


 


Temp 98.0 98.0


 


Pulse 104 79


 


Resp 20 18


 


B/P (MAP) 158/91 (113) 147/64 (91)


 


Pulse Ox 93 94


 


O2 Delivery Room Air 














 4/1/19





 23:59


 


Intake Total 100 ml


 


Balance 100 ml














Blood Pressure Mean:  113











Progress


Progress Note :  


Progress Note


DECREASED PAIN WITH TORADOL





THIS HOSPITAL IS CURRENTLY ON DIVERSION. DISCUSSED OPTION OF BEING TRANSFERRED 

TO ANOTHER FACILITY TONIGHT, OR FOLLOW UP WITH DR. MENDIOLA TOMORROW FOR FURTHER 

CARE, AND PT OPTS TO GO HOME AND SEE DR. MENDIOLA. 


NO FEVER AND NORMAL VITALS DURING ER STAY.





Diagnostic Imaging





Comments


CT NECK SOFT TISSUE--POST OP CHANGES TO THYROID BED WITH SURGICAL CLIPS IN 

PLACE. DECREASE IN PREVIOUSLY IDENTIFIED FLUID COLLECTION IN THYROID BED, WHICH 

COMMUNICATES WITH A MUCH LARGER SUB Q COLLECTION OF FLUID  THAT HAS INCREASED 

IN SIZE FROM  PREVIOUS STUDY--6.4 X 3.6 X 5.9 CM . LARGER COLLECTION WITH THICK

, IRREGULAR  ENHANCING WALL, NO GAS IN FLUID COLLECTION. POSSIBLE SEROMA, BUT 

CANNOT RULE OUT ABSCESS. NO EFFACEMENT OF AIRWAY--PER [facility] VIA FAX @ 1747





Departure


Impression





 Primary Impression:  


 Post-operative wound abscess


 Additional Impressions:  


 Iatrogenic hypothyroidism


 Status post thyroidectomy


Disposition:  01 HOME, SELF-CARE


Condition:  Stable





Departure-Patient Inst.


Referrals:  


[facility] (PCP/Family)


Primary Care Physician








MICHELLE MENDIOLA MD


Patient Instructions:  Hypothyroidism (Underactive Thyroid) (DC), Skin Abscess, 

Surgical Wound (DC), Wound Infection





Add. Discharge Instructions:  


INCREASE YOUR LEVOTHYROXINE TO 1 1/2 PILLS DAILY





FOLLOW UP WITH [facility] IN 1 WEEK FOR FURTHER EVALUATION OF THYROID MEDICATION





FOLLOW UP WITH DR. MENDIOLA IN THE MORNING FOR FOLLOW UP FOR THIS POST OP PROBLEM


RETURN TO ER IF WORSE





YOU MAY TAKE YOUR HOME TRAMADOL AS NEEDED FOR PAIN 





YOU MAY ALSO TAKE TYLENOL AND IBUPROFEN AS NEEDED FOR PAIN





TAKE COLACE 1-2 PILLS TWICE A DAY FOR CONSTIPATION 





All discharge instructions reviewed with patient and/or family. Voiced 

understanding.











LYDIA GOLDBERG DO Apr 1, 2019 21:50

## 2019-04-01 NOTE — XMS REPORT
Continuity of Care Document

 Created on: 2019



ANTOINETTE HOLM

External Reference #: [id number]

: 1960

Sex: Female



Demographics







 Address  [address] [place] [address]  [id number]

 

 Home Phone  [phone] x

 

 Preferred Language  Unknown

 

 Marital Status  Unknown

 

 Christian Affiliation  Unknown

 

 Race  Unknown

 

 Ethnic Group  Unknown





Author







 Author  [facility]

 

 Address  Unknown

 

 Phone  Unavailable



              



Allergies

      





 Active            Description            Code            Type            
Severity            Reaction            Onset            Reported/Identified   
         Relationship to Patient            Clinical Status        

 

 Yes            No Known Drug Allergies            [id number]            Drug 
Allergy            Unknown            N/A                         10/30/2014   
                               



                  



Medications

      



There is no data.                  



Problems

      





 Date Dx Coded            Attending            Type            Code            
Diagnosis            Diagnosed By        

 

 2008            JAVON CHILEL                         401.1    
        HYPERTENSION, BENIGN ESSENTIAL                     

 

 2008            JAVON CHILEL S                         V58.69   
         MEDICATION HIGH RISK                     

 

 2008                                      401.1            HYPERTENSION, 
BENIGN ESSENTIAL                     

 

 2008                                      V58.69            MEDICATION 
HIGH RISK                     

 

 2008            JAVON CHILEL S                         401.1    
        HYPERTENSION, BENIGN ESSENTIAL                     

 

 2008            MARISELA CHILELA S                         V58.69   
         MEDICATION HIGH RISK                     

 

 2008                                      401.1            HYPERTENSION, 
BENIGN ESSENTIAL                     

 

 2008                                      V58.69            MEDICATION 
HIGH RISK                     

 

 2008                                      401.1            HYPERTENSION, 
BENIGN ESSENTIAL                     

 

 2008                                      V58.69            MEDICATION 
HIGH RISK                     

 

 2008            KETAN MORENO CECY K                         401.1          
  HYPERTENSION, BENIGN ESSENTIAL                     

 

 2008            ACEVES DO CECY K                         V58.69         
   MEDICATION HIGH RISK                     

 

 2008            KETAN MORENO CECY K                         401.1          
  HYPERTENSION, BENIGN ESSENTIAL                     

 

 2008            ACEVES DO CECY K                         V58.69         
   MEDICATION HIGH RISK                     

 

 2008            JAVON CHILEL S                         401.1    
        HYPERTENSION, BENIGN ESSENTIAL                     

 

 2008            MARISELA CHILELA S                         V58.69   
         MEDICATION HIGH RISK                     

 

 2008            NAN CULLEN MD                         401.1            
HYPERTENSION, BENIGN ESSENTIAL                     

 

 2008            NAN CULLEN MD                         V58.69            
MEDICATION HIGH RISK                     

 

 2008            ACEVES DO CECY K                         401.1          
  HYPERTENSION, BENIGN ESSENTIAL                     

 

 2008            ACEVES DO CECY K                         V58.69         
   MEDICATION HIGH RISK                     

 

 2008            JAVON CHILEL S                         401.1    
        HYPERTENSION, BENIGN ESSENTIAL                     

 

 2008            JAVON CHILEL S                         V58.69   
         MEDICATION HIGH RISK                     

 

 2009            JAVON CHILEL S                         528.9    
        DISEASES OF THE ORAL SOFT TISSUES (EXCEPT GINGIVA, TONGUE)             
        

 

 2009            MARISELA CHILELA S                         625.6    
        FEMALE STRESS INCONTINENCE                     

 

 2009            REMA CHILELNDA S                         787.91   
         diarrhea                     

 

 2009            MARISELA CHILELA S                         V72.3    
        GYNECOLOGICAL EXAMINATION                     

 

 2009                                      528.9            DISEASES OF 
THE ORAL SOFT TISSUES (EXCEPT GINGIVA, TONGUE)                     

 

 2009                                      625.6            FEMALE STRESS 
INCONTINENCE                     

 

 2009                                      787.91            diarrhea    
                 

 

 2009                                      V72.3            GYNECOLOGICAL 
EXAMINATION                     

 

 2009            JAVON CHILEL S                         528.9    
        DISEASES OF THE ORAL SOFT TISSUES (EXCEPT GINGIVA, TONGUE)             
        

 

 2009            JAVON CHILEL S                         625.6    
        FEMALE STRESS INCONTINENCE                     

 

 2009            MARISELA CHILELA S                         787.91   
         diarrhea                     

 

 2009            MARISELA CHILELA S                         V72.3    
        GYNECOLOGICAL EXAMINATION                     

 

 2009                                      528.9            DISEASES OF 
THE ORAL SOFT TISSUES (EXCEPT GINGIVA, TONGUE)                     

 

 2009                                      625.6            FEMALE STRESS 
INCONTINENCE                     

 

 2009                                      787.91            diarrhea    
                 

 

 2009                                      V72.3            GYNECOLOGICAL 
EXAMINATION                     

 

 2009                                      528.9            DISEASES OF 
THE ORAL SOFT TISSUES (EXCEPT GINGIVA, TONGUE)                     

 

 2009                                      625.6            FEMALE STRESS 
INCONTINENCE                     

 

 2009                                      787.91            DIARRHEA    
                 

 

 2009                                      V72.3            GYNECOLOGICAL 
EXAMINATION                     

 

 2009            ACEVES DO, CECY K                         528.9          
  DISEASES OF THE ORAL SOFT TISSUES (EXCEPT GINGIVA, TONGUE)                   
  

 

 2009            ACEVES DO, CECY K                         625.6          
  FEMALE STRESS INCONTINENCE                     

 

 2009            ACEVES DO, CECY K                         787.91         
   DIARRHEA                     

 

 2009            ACEVES DO, CECY K                         V72.3          
  GYNECOLOGICAL EXAMINATION                     

 

 2009            ACEVES DO, CECY K                         528.9          
  DISEASES OF THE ORAL SOFT TISSUES (EXCEPT GINGIVA, TONGUE)                   
  

 

 2009            ACEVES DO, CECY K                         625.6          
  FEMALE STRESS INCONTINENCE                     

 

 2009            ACEVES DO, CECY K                         787.91         
   DIARRHEA                     

 

 2009            ACEVES DO, CECY K                         V72.3          
  GYNECOLOGICAL EXAMINATION                     

 

 2009            REMA CHILELNDA S                         528.9    
        DISEASES OF THE ORAL SOFT TISSUES (EXCEPT GINGIVA, TONGUE)             
        

 

 2009            REMA CHILELNDA S                         625.6    
        FEMALE STRESS INCONTINENCE                     

 

 2009            REMA CHILELNDA S                         787.91   
         DIARRHEA                     

 

 2009            REMA CHILELNDA S                         V72.3    
        GYNECOLOGICAL EXAMINATION                     

 

 2009            ALYSE HODGES ALI                         528.9            
DISEASES OF THE ORAL SOFT TISSUES (EXCEPT GINGIVA, TONGUE)                     

 

 2009            NAN CULLEN MD                         625.6            
FEMALE STRESS INCONTINENCE                     

 

 2009            ALYSE HODGES ALI                         787.91            
DIARRHEA                     

 

 2009            ALYSE HODGES ALI                         V72.3            
GYNECOLOGICAL EXAMINATION                     

 

 2009            ACEVES DO CECY K                         528.9          
  DISEASES OF THE ORAL SOFT TISSUES (EXCEPT GINGIVA, TONGUE)                   
  

 

 2009            KETAN MORENO CECY K                         625.6          
  FEMALE STRESS INCONTINENCE                     

 

 2009            ACEVES DO CECY K                         787.91         
   DIARRHEA                     

 

 2009            ACEVES DO CECY K                         V72.3          
  GYNECOLOGICAL EXAMINATION                     

 

 2009            REMA CHILELNDA S                         528.9    
        DISEASES OF THE ORAL SOFT TISSUES (EXCEPT GINGIVA, TONGUE)             
        

 

 2009            REMA CHILELNDA S                         625.6    
        FEMALE STRESS INCONTINENCE                     

 

 2009            REMA CHILELNDA S                         787.91   
         DIARRHEA                     

 

 2009            REMA CHILELNDA S                         V72.3    
        GYNECOLOGICAL EXAMINATION                     

 

 04/15/2009            REMA CHILELNDA S                         388.30   
         TINNITUS UNSPECIFIED                     

 

 04/15/2009                                      388.30            TINNITUS 
UNSPECIFIED                     

 

 04/15/2009            REMA CHILELNDA S                         388.30   
         TINNITUS UNSPECIFIED                     

 

 04/15/2009                                      388.30            TINNITUS 
UNSPECIFIED                     

 

 04/15/2009                                      388.30            TINNITUS 
UNSPECIFIED                     

 

 04/15/2009            KETAN MORENO CECY K                         388.30         
   TINNITUS UNSPECIFIED                     

 

 04/15/2009            TROY ACEVES DOA K                         388.30         
   TINNITUS UNSPECIFIED                     

 

 04/15/2009            REMA CHILELNDA S                         388.30   
         TINNITUS UNSPECIFIED                     

 

 04/15/2009            NAN CULLEN MD                         388.30            
TINNITUS UNSPECIFIED                     

 

 04/15/2009            ACEVES DO, CECY K                         388.30         
   TINNITUS UNSPECIFIED                     

 

 04/15/2009            FILIBERTO MYERS JAVON S                         388.30   
         TINNITUS UNSPECIFIED                     

 

 2009            REMA CHILELNDA S                         706.2    
        SEBACEOUS CYST                     

 

 2009            REMA CHILELNDA S                         729.5    
        PAIN IN LIMB                     

 

 2009                                      706.2            SEBACEOUS 
CYST                     

 

 2009                                      729.5            PAIN IN LIMB 
                    

 

 2009            FILIBERTO MYERS JAVON S                         706.2    
        SEBACEOUS CYST                     

 

 2009            REMA CHILELNDA S                         729.5    
        PAIN IN LIMB                     

 

 2009                                      706.2            SEBACEOUS 
CYST                     

 

 2009                                      729.5            PAIN IN LIMB 
                    

 

 2009                                      706.2            SEBACEOUS 
CYST                     

 

 2009                                      729.5            PAIN IN LIMB 
                    

 

 2009            ACEVES DO, CECY K                         706.2          
  SEBACEOUS CYST                     

 

 2009            ACEVES DO, CECY K                         729.5          
  PAIN IN LIMB                     

 

 2009            ACEVES DO, CECY K                         706.2          
  SEBACEOUS CYST                     

 

 2009            ACEVES DO, CECY K                         729.5          
  PAIN IN LIMB                     

 

 2009            REMA CHILELNDA S                         706.2    
        SEBACEOUS CYST                     

 

 2009            FILIBERTO MYERS JAVON S                         729.5    
        PAIN IN LIMB                     

 

 2009            NAN CULLEN MD                         706.2            
SEBACEOUS CYST                     

 

 2009            NAN CULLEN MD                         729.5            
PAIN IN LIMB                     

 

 2009            ACEVES DO, CECY K                         706.2          
  SEBACEOUS CYST                     

 

 2009            ACEVES DO, CECY K                         729.5          
  PAIN IN LIMB                     

 

 2009            REMA CHILELNDA S                         706.2    
        SEBACEOUS CYST                     

 

 2009            FILIBERTO MYERS JAVON S                         729.5    
        PAIN IN LIMB                     

 

 2010            REMA CHILELNDA S                         333.94   
         RESTLESS LEGS SYNDROME (RLS)                     

 

 2010                                      333.94            RESTLESS 
LEGS SYNDROME (RLS)                     

 

 2010            JAVON CHILEL                         333.94   
         RESTLESS LEGS SYNDROME (RLS)                     

 

 2010                                      333.94            RESTLESS 
LEGS SYNDROME (RLS)                     

 

 2010                                      333.94            RESTLESS 
LEGS SYNDROME (RLS)                     

 

 2010            CECY ACEVES DO K                         333.94         
   RESTLESS LEGS SYNDROME (RLS)                     

 

 2010            CECY ACEVES DO K                         333.94         
   RESTLESS LEGS SYNDROME (RLS)                     

 

 2010            JAVON CHILEL S                         333.94   
         RESTLESS LEGS SYNDROME (RLS)                     

 

 2010            NAN CULLEN MD                         333.94            
RESTLESS LEGS SYNDROME (RLS)                     

 

 2010            CECY ACEVES DO K                         333.94         
   RESTLESS LEGS SYNDROME (RLS)                     

 

 2010            JAVON CHILEL S                         333.94   
         RESTLESS LEGS SYNDROME (RLS)                     

 

 2010            JAVON CHILEL S                         681.10   
         CELLULITIS AND ABSCESS OF TOE, UNSPECIFIED                     

 

 2010                                      681.10            CELLULITIS 
AND ABSCESS OF TOE, UNSPECIFIED                     

 

 2010            JAVON CHILEL S                         681.10   
         CELLULITIS AND ABSCESS OF TOE, UNSPECIFIED                     

 

 2010                                      681.10            CELLULITIS 
AND ABSCESS OF TOE, UNSPECIFIED                     

 

 2010                                      681.10            CELLULITIS 
AND ABSCESS OF TOE, UNSPECIFIED                     

 

 2010            CECY ACEVES DO K                         681.10         
   CELLULITIS AND ABSCESS OF TOE, UNSPECIFIED                     

 

 2010            CECY ACEVES DO K                         681.10         
   CELLULITIS AND ABSCESS OF TOE, UNSPECIFIED                     

 

 2010            JAVON CHILEL S                         681.10   
         CELLULITIS AND ABSCESS OF TOE, UNSPECIFIED                     

 

 2010            NAN CULLEN MD                         681.10            
CELLULITIS AND ABSCESS OF TOE, UNSPECIFIED                     

 

 2010            CECY ACEVES DO K                         681.10         
   CELLULITIS AND ABSCESS OF TOE, UNSPECIFIED                     

 

 2010            JAVON CHILEL S                         681.10   
         CELLULITIS AND ABSCESS OF TOE, UNSPECIFIED                     

 

 05/10/2011            JAVON CHILEL S                         461.9    
        SINUSITIS ACUTE                     

 

 05/10/2011            JAVON CHILEL S                         525.9    
        UNSPECIFIED DISORDER OF THE TEETH AND SUPPORTING STRUCTURES            
         

 

 05/10/2011            REMA CHILELNDA S                         787.02   
         NAUSEA ALONE                     

 

 05/10/2011                                      461.9            SINUSITIS 
ACUTE                     

 

 05/10/2011                                      525.9            UNSPECIFIED 
DISORDER OF THE TEETH AND SUPPORTING STRUCTURES                     

 

 05/10/2011                                      787.02            NAUSEA ALONE
                     

 

 05/10/2011            REMA CHILELNDA S                         461.9    
        SINUSITIS ACUTE                     

 

 05/10/2011            REMA CHILELNDA S                         525.9    
        UNSPECIFIED DISORDER OF THE TEETH AND SUPPORTING STRUCTURES            
         

 

 05/10/2011            JAVON CHILEL S                         787.02   
         NAUSEA ALONE                     

 

 05/10/2011                                      461.9            SINUSITIS 
ACUTE                     

 

 05/10/2011                                      525.9            UNSPECIFIED 
DISORDER OF THE TEETH AND SUPPORTING STRUCTURES                     

 

 05/10/2011                                      787.02            NAUSEA ALONE
                     

 

 05/10/2011                                      461.9            SINUSITIS 
ACUTE                     

 

 05/10/2011                                      525.9            UNSPECIFIED 
DISORDER OF THE TEETH AND SUPPORTING STRUCTURES                     

 

 05/10/2011                                      787.02            NAUSEA ALONE
                     

 

 05/10/2011            CECY ACEVES DO K                         461.9          
  SINUSITIS ACUTE                     

 

 05/10/2011            CECY ACEVES DO K                         525.9          
  UNSPECIFIED DISORDER OF THE TEETH AND SUPPORTING STRUCTURES                  
   

 

 05/10/2011            TROY ACEVES DOA K                         787.02         
   NAUSEA ALONE                     

 

 05/10/2011            TROY ACEVES DOA K                         461.9          
  SINUSITIS ACUTE                     

 

 05/10/2011            CECY ACEVES DO K                         525.9          
  UNSPECIFIED DISORDER OF THE TEETH AND SUPPORTING STRUCTURES                  
   

 

 05/10/2011            TROY ACEVES DOA K                         787.02         
   NAUSEA ALONE                     

 

 05/10/2011            REMA CHILELNDA S                         461.9    
        SINUSITIS ACUTE                     

 

 05/10/2011            JAVON CHILEL S                         525.9    
        UNSPECIFIED DISORDER OF THE TEETH AND SUPPORTING STRUCTURES            
         

 

 05/10/2011            MARISELA CHILELA S                         787.02   
         NAUSEA ALONE                     

 

 05/10/2011            NAN CULLEN MD                         461.9            
SINUSITIS ACUTE                     

 

 05/10/2011            NAN CULLEN MD                         525.9            
UNSPECIFIED DISORDER OF THE TEETH AND SUPPORTING STRUCTURES                     

 

 05/10/2011            NAN CULLEN MD                         787.02            
NAUSEA ALONE                     

 

 05/10/2011            KETAN MORENO CECY K                         461.9          
  SINUSITIS ACUTE                     

 

 05/10/2011            TROY ACEVES DOA K                         525.9          
  UNSPECIFIED DISORDER OF THE TEETH AND SUPPORTING STRUCTURES                  
   

 

 05/10/2011            TROY ACEVES DOA K                         787.02         
   NAUSEA ALONE                     

 

 05/10/2011            JAVON CHILEL S                         461.9    
        SINUSITIS ACUTE                     

 

 05/10/2011            JAVON CHILEL S                         525.9    
        UNSPECIFIED DISORDER OF THE TEETH AND SUPPORTING STRUCTURES            
         

 

 05/10/2011            JAVON CHILEL S                         787.02   
         NAUSEA ALONE                     

 

 2011            MARISELA CHILELA S                         305.1    
        current smoker                     

 

 2011            JAVON CHILEL S                         382.00   
         OTITIS MEDIA ACUTE SUPPURATIVE RIGHT EAR                     

 

 2011                                      305.1            current 
smoker                     

 

 2011                                      382.00            OTITIS MEDIA 
ACUTE SUPPURATIVE RIGHT EAR                     

 

 2011            JAVON CHILEL S                         305.1    
        current smoker                     

 

 2011            JAVON CHILEL S                         382.00   
         OTITIS MEDIA ACUTE SUPPURATIVE RIGHT EAR                     

 

 2011                                      305.1            current 
smoker                     

 

 2011                                      382.00            OTITIS MEDIA 
ACUTE SUPPURATIVE RIGHT EAR                     

 

 2011                                      305.1            current 
smoker                     

 

 2011                                      382.00            OTITIS MEDIA 
ACUTE SUPPURATIVE RIGHT EAR                     

 

 2011            TROY ACEVES DOA K                         305.1          
  current smoker                     

 

 2011            ACEVES DO CECY K                         382.00         
   OTITIS MEDIA ACUTE SUPPURATIVE RIGHT EAR                     

 

 2011            KETAN MORENO CECY K                         305.1          
  current smoker                     

 

 2011            KETAN MORENO CECY K                         382.00         
   OTITIS MEDIA ACUTE SUPPURATIVE RIGHT EAR                     

 

 2011            JAVON CHILEL S                         305.1    
        current smoker                     

 

 2011            JAVON CHILEL S                         382.00   
         OTITIS MEDIA ACUTE SUPPURATIVE RIGHT EAR                     

 

 2011            NAN CULLEN MD                         305.1            
current smoker                     

 

 2011            NAN CULLEN MD                         382.00            
OTITIS MEDIA ACUTE SUPPURATIVE RIGHT EAR                     

 

 2011            KETAN MORENO CECY K                         305.1          
  current smoker                     

 

 2011            KETAN MORENO CECY K                         382.00         
   OTITIS MEDIA ACUTE SUPPURATIVE RIGHT EAR                     

 

 2011            JAVON CHILEL S                         305.1    
        current smoker                     

 

 2011            JAVON CHILEL S                         382.00   
         OTITIS MEDIA ACUTE SUPPURATIVE RIGHT EAR                     

 

 2012            FILIBERTO APRN, JAVON S                         300.00   
         anxiety                     

 

 2012            FILIBERTO MYERS, JAVON S                         530.81   
         ESOPHAGEAL REFLUX                     

 

 2012                                      300.00            anxiety     
                

 

 2012                                      530.81            ESOPHAGEAL 
REFLUX                     

 

 2012            FILIBERTO MYERS JAVON S                         300.00   
         anxiety                     

 

 2012            FILIBERTO MYERS JAVON S                         530.81   
         ESOPHAGEAL REFLUX                     

 

 2012                                      300.00            anxiety     
                

 

 2012                                      530.81            ESOPHAGEAL 
REFLUX                     

 

 2012                                      300.00            anxiety     
                

 

 2012                                      530.81            ESOPHAGEAL 
REFLUX                     

 

 2012            ACEVES DO, CECY K                         300.00         
   anxiety                     

 

 2012            ACEVES DO, CECY K                         530.81         
   ESOPHAGEAL REFLUX                     

 

 2012            ACEVES DO, CECY K                         300.00         
   anxiety                     

 

 2012            ACEVES DO, CECY K                         530.81         
   ESOPHAGEAL REFLUX                     

 

 2012            FILIBERTO MYERS JAVON S                         300.00   
         anxiety                     

 

 2012            FILIBERTO MYERS JAVON S                         530.81   
         ESOPHAGEAL REFLUX                     

 

 2012            NAN CULLEN MD                         300.00            
anxiety                     

 

 2012            NAN CULLEN MD                         530.81            
ESOPHAGEAL REFLUX                     

 

 2012            ACEVES DO, CECY K                         300.00         
   anxiety                     

 

 2012            ACEVES DO, CECY K                         530.81         
   ESOPHAGEAL REFLUX                     

 

 2012            FILIBERTO MYERS JAVON S                         300.00   
         anxiety                     

 

 2012            REMA CHILELNDA S                         530.81   
         ESOPHAGEAL REFLUX                     

 

 2012            REMA CHILELNDA S                         702.19   
         OTHER SEBORRHEIC KERATOSIS                     

 

 2012                                      702.19            OTHER 
SEBORRHEIC KERATOSIS                     

 

 2012            MARISELA CHILELA S                         702.19   
         OTHER SEBORRHEIC KERATOSIS                     

 

 2012                                      702.19            OTHER 
SEBORRHEIC KERATOSIS                     

 

 2012                                      702.19            OTHER 
SEBORRHEIC KERATOSIS                     

 

 2012            ACEVES DO, CECY K                         702.19         
   OTHER SEBORRHEIC KERATOSIS                     

 

 2012            ACEVES DO, CECY K                         702.19         
   OTHER SEBORRHEIC KERATOSIS                     

 

 2012            MARISELA CHILELA S                         702.19   
         OTHER SEBORRHEIC KERATOSIS                     

 

 2012            ALYSE HODGES, ALI                         702.19            
OTHER SEBORRHEIC KERATOSIS                     

 

 2012            KETAN MORENO CECY K                         702.19         
   OTHER SEBORRHEIC KERATOSIS                     

 

 2012            FILIBERTO MYERS JAVON S                         702.19   
         OTHER SEBORRHEIC KERATOSIS                     

 

 2013            FILIBERTO APRHECTOR JAVON S                         682.9    
        CELLULITIS AND ABSCESS OF UNSPECIFIED SITES                     

 

 2013            FILIBERTO MYERS JAVON S                         728.71   
         PLANTAR FASCIAL FIBROMATOSIS                     

 

 2013                                      682.9            CELLULITIS 
AND ABSCESS OF UNSPECIFIED SITES                     

 

 2013                                      728.71            PLANTAR 
FASCIAL FIBROMATOSIS                     

 

 2013                                      682.9            CELLULITIS 
AND ABSCESS OF UNSPECIFIED SITES                     

 

 2013                                      728.71            PLANTAR 
FASCIAL FIBROMATOSIS                     

 

 2013            KETAN MORENO CECY K                         682.9          
  CELLULITIS AND ABSCESS OF UNSPECIFIED SITES                     

 

 2013            KETAN MORENO CECY K                         728.71         
   PLANTAR FASCIAL FIBROMATOSIS                     

 

 2013            TROY ACEVES DOA K                         682.9          
  CELLULITIS AND ABSCESS OF UNSPECIFIED SITES                     

 

 2013            KETAN MORENO CECY K                         728.71         
   PLANTAR FASCIAL FIBROMATOSIS                     

 

 2013            FILIBERTO APRHECTOR JAVON S                         682.9    
        CELLULITIS AND ABSCESS OF UNSPECIFIED SITES                     

 

 2013            FILIBERTO MYERS JAVON S                         728.71   
         PLANTAR FASCIAL FIBROMATOSIS                     

 

 2013            ALYSE HODGES, ALI                         682.9            
CELLULITIS AND ABSCESS OF UNSPECIFIED SITES                     

 

 2013            ALYSE HODGES, ALI                         728.71            
PLANTAR FASCIAL FIBROMATOSIS                     

 

 2013            KETAN MORENO CECY K                         682.9          
  CELLULITIS AND ABSCESS OF UNSPECIFIED SITES                     

 

 2013            KETAN MORENO CECY K                         728.71         
   PLANTAR FASCIAL FIBROMATOSIS                     

 

 2013            FILIBERTO APRHECTOR JAVON S                         682.9    
        CELLULITIS AND ABSCESS OF UNSPECIFIED SITES                     

 

 2013            FILIBERTO MYERS JAVON S                         728.71   
         PLANTAR FASCIAL FIBROMATOSIS                     

 

 2014            CECY ACEVES DO K                         388.70         
   OTALGIA                     

 

 2014            TROY ACEVES DOA K                         696.1          
  PSORIASIS                     

 

 2014            CECY ACEVES DO K                         V73.81         
   HPV SCREENING                     

 

 2014            ACEVES DO, CECY K                         V76.10         
   BREAST CANCER SCREENING                     

 

 2014            ACEVES , CECY K                         V76.2          
  CERVICAL CANCER SCREENING (PAP SMEAR)                     

 

 2014            KETAN MORENO, CECY K                         388.70         
   OTALGIA                     

 

 2014            ACEVES DO, CECY K                         696.1          
  PSORIASIS                     

 

 2014            ACEVES , CECY K                         V73.81         
   HPV SCREENING                     

 

 2014            KETAN MORENO, CECY K                         V76.10         
   BREAST CANCER SCREENING                     

 

 2014            KETAN MORENO, CECY K                         V76.2          
  CERVICAL CANCER SCREENING (PAP SMEAR)                     

 

 2014            REMA CHILELNDA S                         388.70   
         OTALGIA                     

 

 2014            FILIBERTO MYERS JAVON S                         696.1    
        PSORIASIS                     

 

 2014            REMA CHILELNDA S                         V73.81   
         HPV SCREENING                     

 

 2014            REMA CHILELNDA S                         V76.10   
         BREAST CANCER SCREENING                     

 

 2014            REMA CHILELNDA S                         V76.2    
        CERVICAL CANCER SCREENING (PAP SMEAR)                     

 

 2014            ALYSE HODGES, ALI                         388.70            
OTALGIA                     

 

 2014            ALYSE HODGES, NAN                         696.1            
PSORIASIS                     

 

 2014            NAN CULLEN MD                         V73.81            
HPV SCREENING                     

 

 2014            NAN CULLEN MD                         V76.10            
BREAST CANCER SCREENING                     

 

 2014            ALYSE HODGES, NAN                         V76.2            
CERVICAL CANCER SCREENING (PAP SMEAR)                     

 

 2014            KETAN MORENO, CECY K                         388.70         
   OTALGIA                     

 

 2014            KETAN MORENO, CECY K                         696.1          
  PSORIASIS                     

 

 2014            KETAN MORENO, CECY K                         V73.81         
   HPV SCREENING                     

 

 2014            KETAN MORENO, CECY K                         V76.10         
   BREAST CANCER SCREENING                     

 

 2014            KETAN MORENO, CECY K                         V76.2          
  CERVICAL CANCER SCREENING (PAP SMEAR)                     

 

 2014            FILIBERTO MYERS JAVON S                         388.70   
         OTALGIA                     

 

 2014            FILIBERTO MYERS JAVON S                         696.1    
        PSORIASIS                     

 

 2014            FILIBERTO APRHECTOR JAVON S                         V73.81   
         HPV SCREENING                     

 

 2014            REMA CHILELNDA S                         V76.10   
         BREAST CANCER SCREENING                     

 

 2014            REMA CHILELNDA S                         V76.2    
        CERVICAL CANCER SCREENING (PAP SMEAR)                     

 

 2014            KETAN DO CECY K                         307.81         
   TENSION HEADACHE                     

 

 2014            KETAN MORENO CECY K                         704.00         
   ALOPECIA UNSPECIFIED                     

 

 2014            KETAN MORENO CECY K                         786.50         
   CHEST PAIN                     

 

 2014            KETAN MORENO CECY K                         V18.0          
  FAMILY HISTORY OF DIABETES MELLITUS                     

 

 2014            REMA CHILELNDA S                         307.81   
         TENSION HEADACHE                     

 

 2014            REMA CHILELNDA S                         704.00   
         ALOPECIA UNSPECIFIED                     

 

 2014            REMA CHILELNDA S                         786.50   
         CHEST PAIN                     

 

 2014            FILIBERTO MYERS JAVON S                         V18.0    
        FAMILY HISTORY OF DIABETES MELLITUS                     

 

 2014            NAN CULLEN MD                         307.81            
TENSION HEADACHE                     

 

 2014            NAN CULLEN MD                         704.00            
ALOPECIA UNSPECIFIED                     

 

 2014            NAN CULLEN MD                         786.50            
CHEST PAIN                     

 

 2014            NAN CULLEN MD                         V18.0            
FAMILY HISTORY OF DIABETES MELLITUS                     

 

 2014            KETAN TROY MORENOA K                         307.81         
   TENSION HEADACHE                     

 

 2014            KETAN MORENO CECY K                         704.00         
   ALOPECIA UNSPECIFIED                     

 

 2014            KETAN MORENO CECY K                         786.50         
   CHEST PAIN                     

 

 2014            KETAN MORENO CECY K                         V18.0          
  FAMILY HISTORY OF DIABETES MELLITUS                     

 

 2014            REMA CHILELNDA S                         307.81   
         TENSION HEADACHE                     

 

 2014            REMA CHILELNDA S                         704.00   
         ALOPECIA UNSPECIFIED                     

 

 2014            REMA CHILELNDA S                         786.50   
         CHEST PAIN                     

 

 2014            REMA CHILELNDA S                         V18.0    
        FAMILY HISTORY OF DIABETES MELLITUS                     

 

 09/10/2014            REMA CHILELNDA S                         278.02   
         OVERWEIGHT                     

 

 09/10/2014            REMA CHILELNDA S                         307.42   
         INSOMNIA, PSYCHOPHYSIOLOGICAL                     

 

 09/10/2014            REMA CHILELNDA S                         729.5    
        PAIN- LEG                     

 

 09/10/2014            REMA CHILELNDA S                         V15.82   
         NICOTINE ABUSE                     

 

 09/10/2014            NAN CULLEN MD                         278.02            
OVERWEIGHT                     

 

 09/10/2014            NAN CULLEN MD                         307.42            
INSOMNIA, PSYCHOPHYSIOLOGICAL                     

 

 09/10/2014            NAN CULLEN MD                         729.5            
PAIN- LEG                     

 

 09/10/2014            NAN CULLEN MD                         V15.82            
NICOTINE ABUSE                     

 

 09/10/2014            KETAN MORENOTROYA K                         278.02         
   OVERWEIGHT                     

 

 09/10/2014            KETAN MORENO CECY K                         307.42         
   INSOMNIA, PSYCHOPHYSIOLOGICAL                     

 

 09/10/2014            KETAN MORENOTROYA K                         729.5          
  PAIN- LEG                     

 

 09/10/2014            KETAN MORENO CECY K                         V15.82         
   NICOTINE ABUSE                     

 

 09/10/2014            FILIBERTO MYERS JAVON S                         278.02   
         OVERWEIGHT                     

 

 09/10/2014            REMA CHILELNDA S                         307.42   
         INSOMNIA, PSYCHOPHYSIOLOGICAL                     

 

 09/10/2014            REMA CHILELNDA S                         729.5    
        PAIN- LEG                     

 

 09/10/2014            REMA CHILELNDA S                         V15.82   
         NICOTINE ABUSE                     

 

 10/15/2014            NAN CULLEN MD                         785.1            
PALPITATIONS                     

 

 10/15/2014            NAN CULLEN MD                         786.09            
DYSPNEA                     

 

 10/15/2014            KETAN MORENOCECY K                         785.1          
  PALPITATIONS                     

 

 10/15/2014            KETAN MORENOTROYA K                         786.09         
   DYSPNEA                     

 

 10/15/2014            FILIBERTO APRREMA YOUNGNDA S                         785.1    
        PALPITATIONS                     

 

 10/15/2014            REMA CHILELNDA S                         786.09   
         DYSPNEA                     

 

 2014            ALYSE HODGES FACC, NAN FACP CCDS            Ot            
278.00            OBESITY, NOS                     

 

 2014            ALYSE HODGES FACC, ALI FACP CCDS            Ot            
305.1            TOBACCO USE DISORDER                     

 

 2014            ALYSE HODGES FACC, NAN FACP CCDS            Ot            
785.1            PALPITATIONS                     

 

 2014            ALYSE HODGES FACC, NAN FACP CCDS            Ot            
786.09            RESPIRATORY ABNORM NEC                     

 

 2014            ALYSE HODGES FACC, ALI FACP CCDS            Ot            
786.59            CHEST PAIN NEC                     

 

 2014            ALYSE HODGES FACC, NAN FACP CCDS            Ot            
794.30            ABN CARDIOVASC STUDY NOS                     

 

 2014            ALYSE HODGES FACC, NAN FACP CCDS            Ot            
V17.3            FAM HX-ISCHEM HEART DIS                     

 

 2014            NAN CULLEN MD, FACC FACP CCDS            Ot            
V58.69            OTH MED,LT,CURRENT USE                     

 

 2014            ALYSE HODGES FACC, ALI FACP CCDS            Ot            
V85.41            BODY MASS INDEX 40.0-44.9, ADULT                     

 

 2014            JAVON CHILEL S                         627.2    
        HOT FLASHES                     

 

 2014            JAVON CHILEL S                         789.03   
         ABDOMINAL PAIN RIGHT LOWER QUADRANT                     

 

 2015            JAVON DE LOS SANTOS ARNP            Ot            789.03    
                              

 

 2015            JAVON DE LOS SANTOS ARNP            Ot            789.03    
                              

 

 2016            IRENA LIU            Ot            V76.12     
       OTH SCREEN MAMMO-MALIGN NEOPLASM OF BRUNO                     

 

 2016            ALYSE HODGES FACC, NAN FACP CCDS            Ot            
785.1            PALPITATIONS                     

 

 2016            ALYSE HODGES FACC, NAN FACP CCDS            Ot            
786.09            RESPIRATORY ABNORM NEC                     

 

 2016            ALYSE HODGES FACC, ALI FACP CCDS            Ot            
785.1            PALPITATIONS                     

 

 2016            ALYSE HODGES FACC, ALI FACP CCDS            Ot            
786.09            RESPIRATORY ABNORM NEC                     

 

 2016            JAVON DE LOS SANTOS ARNP            Ot            789.03    
        ABDOMINAL PAIN, RIGHT LOWER QUADRANT                     

 

 2016            JAVON DE LOS SANTOS ARNP            Ot            789.03    
        ABDOMINAL PAIN, RIGHT LOWER QUADRANT                     

 

 2016            JAVON DE LOS SANTOS ARNP            Ot            R10.31    
        RIGHT LOWER QUADRANT PAIN                     

 

 2016            HAMMONDSHUNTER CRAWFORD DO D            Ot            R19.7       
     DIARRHEA, UNSPECIFIED                     

 

 2016            HAMMONDSHUNTER CRAWFORD DO D            Ot            Z01.818     
       ENCOUNTER FOR OTHER PREPROCEDURAL EXAMIN                     

 

 2016            HAMMONDSREMA CRAWFORD DOTT D            Ot            R19.7       
     DIARRHEA, UNSPECIFIED                     

 

 2016            HAMMONDS REMA MORENOTT D            Ot            Z01.818     
       ENCOUNTER FOR OTHER PREPROCEDURAL EXAMIN                     

 

 2016            IRENA LIU            Ot            V76.12     
       OTH SCREEN MAMMO-MALIGN NEOPLASM OF BRUNO                     

 

 2016            ALYSE HODGES FACC, ALI FACP CCDS            Ot            
785.1            PALPITATIONS                     

 

 2016            ALYSE HODGES FACC, ALI FACP CCDS            Ot            
786.09            RESPIRATORY ABNORM NEC                     

 

 2016            ALYSE HODGES FACC, ALI FACP CCDS            Ot            
785.1            PALPITATIONS                     

 

 2016            [facility]            Ot            
786.09            RESPIRATORY ABNORM NEC                     

 

 2016            JAVON DE LOS SANTOS ARNP            Ot            789.03    
        ABDOMINAL PAIN, RIGHT LOWER QUADRANT                     

 

 2016            JAVON DE LOS SANTOS ARNP            Ot            789.03    
        ABDOMINAL PAIN, RIGHT LOWER QUADRANT                     

 

 2016            JAVON DE LOS SANTOS ARNP            Ot            R10.31    
        RIGHT LOWER QUADRANT PAIN                     

 

 2016            IRENA LIU            Ot            V76.12     
       OT SCREEN MAMMO-MALIGN NEOPLASM OF BRUNO                     

 

 2016            ALYSE HODGES [facility]            Ot            
785.1            PALPITATIONS                     

 

 2016            ALYSE HODGES [facility], ALI FACP CCDS            Ot            
786.09            RESPIRATORY ABNORM NEC                     

 

 2016            ALYSE HODGES [facility], ALI FACP CCDS            Ot            
785.1            PALPITATIONS                     

 

 2016            [facility]            Ot            
786.09            RESPIRATORY ABNORM NEC                     

 

 2016            JAVON DE LOS SANTOS ARNP            Ot            789.03    
        ABDOMINAL PAIN, RIGHT LOWER QUADRANT                     

 

 2016            JAVON DE LOS SANTOS ARNP            Ot            789.03    
        ABDOMINAL PAIN, RIGHT LOWER QUADRANT                     

 

 2016            JAVON DE LOS SANTOS            Ot            R10.31    
        RIGHT LOWER QUADRANT PAIN                     

 

 2016            JAVON DE LOS SANTOS ARNP            Ot            789.03    
        ABDOMINAL PAIN, RIGHT LOWER QUADRANT                     

 

 2016            HUNTER HAMMONDS DO            Ot            D12.5       
     BENIGN NEOPLASM OF SIGMOID COLON                     

 

 2016            HUNTER HAMMONDS DO            Ot            K52.9       
     NONINFECTIVE GASTROENTERITIS AND COLITIS                     

 

 2016            HUNTER HAMMONDS DO            Ot            K62.1       
     RECTAL POLYP                     

 

 2016            HUNTER HAMMONDS DO            Ot            K63.5       
     POLYP OF COLON                     

 

 2016            JAVON DE LOS SANTOS            Ot            789.03    
        ABDOMINAL PAIN, RIGHT LOWER QUADRANT                     

 

 2016            HUNTER HAMMONDS DO            Ot            R19.7       
     DIARRHEA, UNSPECIFIED                     

 

 2016            HUNTER HAMMONDS DO            Ot            Z01.818     
       ENCOUNTER FOR OTHER PREPROCEDURAL EXAMIN                     

 

 2016            HUNTER HAMMONDS DO            Ot            R19.7       
     DIARRHEA, UNSPECIFIED                     

 

 2016            HUNTER HAMMONDS DO            Ot            Z01.818     
       ENCOUNTER FOR OTHER PREPROCEDURAL EXAMIN                     

 

 2016            CASSIUS MORENOHUNTER            Ot            D12.5       
     BENIGN NEOPLASM OF SIGMOID COLON                     

 

 2016            HAMMONDS HUNTER            Ot            K52.9       
     NONINFECTIVE GASTROENTERITIS AND COLITIS                     

 

 2016            HAMMONDS HUNTER            Ot            K62.1       
     RECTAL POLYP                     

 

 2016            Coden HUNTER            Ot            K63.5       
     POLYP OF COLON                     

 

 2016            IRENA LIU            Ot            V76.12     
       OTH SCREEN MAMMO-MALIGN NEOPLASM OF BRUNO                     

 

 2016            ALYSE HODGES FACC, NAN FACP CCDS            Ot            
785.1            PALPITATIONS                     

 

 2016            ALYSE HODGES FACC, ALI FACP CCDS            Ot            
786.09            RESPIRATORY ABNORM NEC                     

 

 2016            ALYSE HODGES FACC, ALI FACP CCDS            Ot            
785.1            PALPITATIONS                     

 

 2016            ALYSE HODGES FACC, ALI FACP CCDS            Ot            
786.09            RESPIRATORY ABNORM NEC                     

 

 2016            JAVON DE LOS SANTOS ARNP            Ot            789.03    
        ABDOMINAL PAIN, RIGHT LOWER QUADRANT                     

 

 2016            JAVON DE LOS SANTOS ARNP            Ot            789.03    
        ABDOMINAL PAIN, RIGHT LOWER QUADRANT                     

 

 2016            JAVON DE LOS SANTOS ARNP            Ot            R10.31    
        RIGHT LOWER QUADRANT PAIN                     

 

 2016            FILIBERTOJAVON FAGAN ARNP            Ot            R10.31    
        RIGHT LOWER QUADRANT PAIN                     

 

 2016            JAVON DE LOS SANTOS ARNP            Ot            789.03    
        ABDOMINAL PAIN, RIGHT LOWER QUADRANT                     

 

 2017            IRENA LIU            Ot            V76.12     
       OTH SCREEN MAMMO-MALIGN NEOPLASM OF BRUNO                     

 

 2017            ALYSE HODGES FACC, NAN FACP CCDS            Ot            
785.1            PALPITATIONS                     

 

 2017            ALYSE HODGES FACC, ALI FACP CCDS            Ot            
786.09            RESPIRATORY ABNORM NEC                     

 

 2017            ALYSE HODGES FACC, ALI FACP CCDS            Ot            
785.1            PALPITATIONS                     

 

 2017            ALYSE HODGES FACC, ALI FACP CCDS            Ot            
786.09            RESPIRATORY ABNORM NEC                     

 

 2017            JAVON DE LOS SANTOS ARNP            Ot            789.03    
        ABDOMINAL PAIN, RIGHT LOWER QUADRANT                     

 

 2017            JAVON DE LOS SANTOS            Ot            789.03    
        ABDOMINAL PAIN, RIGHT LOWER QUADRANT                     

 

 2017            JAVON DE LOS SANTOS            Ot            R10.31    
        RIGHT LOWER QUADRANT PAIN                     

 

 2017            HUNTER HAMMONDS DO            Ot            Z01.818     
       ENCOUNTER FOR OTHER PREPROCEDURAL EXAMIN                     

 

 2017            HUNTER HAMMONDS DO            Ot            Z86.010     
       PERSONAL HISTORY OF COLONIC POLYPS                     

 

 2017            HUNTER HAMMONDS DO            Ot            E66.01      
      MORBID (SEVERE) OBESITY DUE TO EXCESS CA                     

 

 2017            HUNTER HAMMONDS DO            Ot            F17.210     
       NICOTINE DEPENDENCE, CIGARETTES, UNCOMPL                     

 

 2017            HUNTER HAMMONDS DO            Ot            I10         
   ESSENTIAL (PRIMARY) HYPERTENSION                     

 

 2017            HUNTER HAMMONDS DO            Ot            K62.1       
     RECTAL POLYP                     

 

 2017            HUNTER HAMMONDS DO            Ot            Z09         
   ENCNTR FOR F/U EXAM AFT TRTMT FOR COND O                     

 

 2017            HUNTER HAMMONDS DO            Ot            Z68.41      
      BODY MASS INDEX (BMI) 40.0-44.9, ADULT                     

 

 2017            HUNTER HAMMONDS DO            Ot            Z79.899     
       OTHER LONG TERM (CURRENT) DRUG THERAPY                     

 

 2017            HUNTER HAMMONDS DO            Ot            Z86.010     
       PERSONAL HISTORY OF COLONIC POLYPS                     

 

 2018            JAVON DE LOS SANTOS            Ot            Z12.31    
        ENCNTR SCREEN MAMMOGRAM FOR MALIGNANT NE                     

 

 2018            IRENA LIU            Ot            V76.12     
       OTH SCREEN MAMMO-MALIGN NEOPLASM OF BRUNO                     

 

 2018            ALYSE HODGES FACC, ALI FACP CCDS            Ot            
785.1            PALPITATIONS                     

 

 2018            ALYSE HODGES FACC, ALI FACP CCDS            Ot            
786.09            RESPIRATORY ABNORM NEC                     

 

 2018            ALYSE HODGES FACC, ALI FACP CCDS            Ot            
785.1            PALPITATIONS                     

 

 2018            ALYSE HODGES FACYENNI, ALI FACP CCDS            Ot            
786.09            RESPIRATORY ABNORM NEC                     

 

 2018            JAVON DE LOS SANTOS ARNP            Ot            789.03    
        ABDOMINAL PAIN, RIGHT LOWER QUADRANT                     

 

 2018            JAVON DE LOS SANTOS            Ot            789.03    
        ABDOMINAL PAIN, RIGHT LOWER QUADRANT                     

 

 2018            FILIBERTO, JAVON ARNP            Ot            R10.31    
        RIGHT LOWER QUADRANT PAIN                     

 

 2018            JAVON DE LOS SANTOS ARNP            Ot            Z12.31    
        ENCNTR SCREEN MAMMOGRAM FOR MALIGNANT NE                     

 

 2018            BAIMA, JAHAIRA L ARNP            Ot            I10      
      ESSENTIAL (PRIMARY) HYPERTENSION                     

 

 2018            BAIMA, JAHAIRA L ARNP            Ot            I25.119  
          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                     

 

 2018            BAIMA, JAHAIRA L ARNP            Ot            I34.0    
        NONRHEUMATIC MITRAL (VALVE) INSUFFICIENC                     

 

 2018            BAIMA, JAHAIRA L ARNP            Ot            I73.9    
        PERIPHERAL VASCULAR DISEASE, UNSPECIFIED                     

 

 2018            BAIMA, JAHAIRA L ARNP            Ot            R06.09   
         OTHER FORMS OF DYSPNEA                     

 

 2018            BAIMA, JAHAIRA L ARNP            Ot            R29.818  
          OTHER SYMPTOMS AND SIGNS INVOLVING THE N                     

 

 2018            BAIMA, JAHAIRA L ARNP            Ot            Z72.0    
        TOBACCO USE                     

 

 10/13/2018            BAIMA, JAHAIRA L ARNP            Ot            I10      
      ESSENTIAL (PRIMARY) HYPERTENSION                     

 

 10/13/2018            BAIMA, JAHAIRA L ARNP            Ot            I25.119  
          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                     

 

 10/13/2018            BAIMA, JAHAIRA L ARNP            Ot            I34.0    
        NONRHEUMATIC MITRAL (VALVE) INSUFFICIENC                     

 

 10/13/2018            BAIMA, JAHAIRA L ARNP            Ot            R06.09   
         OTHER FORMS OF DYSPNEA                     

 

 10/13/2018            BAIMA, JAHAIRA L ARNP            Ot            Z72.0    
        TOBACCO USE                     

 

 10/15/2018            BAIMA, JAHAIRA L ARNP            Ot            I10      
      ESSENTIAL (PRIMARY) HYPERTENSION                     

 

 10/15/2018            BAIMA, JAHAIRA L ARNP            Ot            I25.119  
          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                     

 

 10/15/2018            BAIMA, JAHAIRA L ARNP            Ot            I34.0    
        NONRHEUMATIC MITRAL (VALVE) INSUFFICIENC                     

 

 10/15/2018            BAIMA, JAHAIRA L ARNP            Ot            I73.9    
        PERIPHERAL VASCULAR DISEASE, UNSPECIFIED                     

 

 10/15/2018            BAIMA, JAHAIRA L ARNP            Ot            R06.09   
         OTHER FORMS OF DYSPNEA                     

 

 10/15/2018            BAIMA, JAHAIRA L ARNP            Ot            R29.818  
          OTHER SYMPTOMS AND SIGNS INVOLVING THE N                     

 

 10/15/2018            BAIMA, JAHAIRA L ARNP            Ot            Z72.0    
        TOBACCO USE                     

 

 10/15/2018            BAIMA, JAHAIRA L ARNP            Ot            I10      
      ESSENTIAL (PRIMARY) HYPERTENSION                     

 

 10/15/2018            YOUSIFMA, JAHAIRA L ARNP            Ot            I25.119  
          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                     

 

 10/15/2018            BAIAURE DE JESUSHER L ARNP            Ot            I34.0    
        NONRHEUMATIC MITRAL (VALVE) INSUFFICIENC                     

 

 10/15/2018            BAIMA, JAHAIRA L ARNP            Ot            I73.9    
        PERIPHERAL VASCULAR DISEASE, UNSPECIFIED                     

 

 10/15/2018            BAIMA JAHAIRA L ARNP            Ot            R06.09   
         OTHER FORMS OF DYSPNEA                     

 

 10/15/2018            YOUSIFMA JAHAIRA L ARNP            Ot            R29.818  
          OTHER SYMPTOMS AND SIGNS INVOLVING THE N                     

 

 10/15/2018            SIDJAHAIRA L ARNP            Ot            Z72.0    
        TOBACCO USE                     

 

 10/17/2018            IRENA LIU            Ot            V76.12     
       OTH SCREEN MAMMO-MALIGN NEOPLASM OF BRUNO                     

 

 10/17/2018            ALYSE HODGES FACC, ALI FACP CCDS            Ot            
785.1            PALPITATIONS                     

 

 10/17/2018            ALYSE HODGES FACC, ALI FACP CCDS            Ot            
786.09            RESPIRATORY ABNORM NEC                     

 

 10/17/2018            ALYSE HODGES FACC, ALI FACP CCDS            Ot            
785.1            PALPITATIONS                     

 

 10/17/2018            ALYSE MD FACC, ALI FACP CCDS            Ot            
786.09            RESPIRATORY ABNORM NEC                     

 

 10/17/2018            JAVON DE LOS SANTOS ARNP            Ot            789.03    
        ABDOMINAL PAIN, RIGHT LOWER QUADRANT                     

 

 10/17/2018            JAVON DE LOS SANTOS ARNP            Ot            789.03    
        ABDOMINAL PAIN, RIGHT LOWER QUADRANT                     

 

 10/17/2018            JAVON DE LOS SANTOS ARNP            Ot            R10.31    
        RIGHT LOWER QUADRANT PAIN                     

 

 10/17/2018            JAVON DE LOS SANTOS ARNP            Ot            Z12.31    
        ENCNTR SCREEN MAMMOGRAM FOR MALIGNANT NE                     

 

 10/17/2018            SID JAHAIRA L ARNP            Ot            I10      
      ESSENTIAL (PRIMARY) HYPERTENSION                     

 

 10/17/2018            SID JAHAIRA L ARNP            Ot            I25.119  
          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                     

 

 10/17/2018            YOUSIFAURE DE JESUSHER L ARNP            Ot            I34.0    
        NONRHEUMATIC MITRAL (VALVE) INSUFFICIENC                     

 

 10/17/2018            SID JAHAIRA L ARNP            Ot            I73.9    
        PERIPHERAL VASCULAR DISEASE, UNSPECIFIED                     

 

 10/17/2018            BAIMA, JAHAIRA L ARNP            Ot            R06.09   
         OTHER FORMS OF DYSPNEA                     

 

 10/17/2018            BAIMA, JAHAIRA L ARNP            Ot            R29.818  
          OTHER SYMPTOMS AND SIGNS INVOLVING THE N                     

 

 10/17/2018            BAIMA, JAHAIRA L ARNP            Ot            Z72.0    
        TOBACCO USE                     

 

 10/17/2018            BAIMA, JAHAIRA L ARNP            Ot            I10      
      ESSENTIAL (PRIMARY) HYPERTENSION                     

 

 10/17/2018            BAIMA, JAHAIRA L ARNP            Ot            I25.119  
          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                     

 

 10/17/2018            BAIMA, JAHAIRA L ARNP            Ot            I34.0    
        NONRHEUMATIC MITRAL (VALVE) INSUFFICIENC                     

 

 10/17/2018            BAIMA, JAHAIRA L ARNP            Ot            R06.09   
         OTHER FORMS OF DYSPNEA                     

 

 10/17/2018            BAIMA, JAHAIRA L ARNP            Ot            Z72.0    
        TOBACCO USE                     

 

 10/17/2018            BAIMA, JAHAIRA L ARNP            Ot            I10      
      ESSENTIAL (PRIMARY) HYPERTENSION                     

 

 10/17/2018            BAIMA, JAHAIRA L ARNP            Ot            I25.119  
          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                     

 

 10/17/2018            BAIMA, JAHAIRA L ARNP            Ot            I34.0    
        NONRHEUMATIC MITRAL (VALVE) INSUFFICIENC                     

 

 10/17/2018            BAIMA, JAHAIRA L ARNP            Ot            R06.09   
         OTHER FORMS OF DYSPNEA                     

 

 10/17/2018            BAIMA, JAHAIRA L ARNP            Ot            Z72.0    
        TOBACCO USE                     

 

 10/31/2018            SEGUNDO SIU E APRN            Ot            G47.00
            INSOMNIA, UNSPECIFIED                     

 

 10/31/2018            SHERRON SEGUNDO E APRN            Ot            G47.10
            HYPERSOMNIA, UNSPECIFIED                     

 

 10/31/2018            SHERRON SEGUNDO E APRN            Ot            G47.33
            OBSTRUCTIVE SLEEP APNEA (ADULT) (PEDIATR                     

 

 10/31/2018            SHERRON SEGUNDO E APRN            Ot            G47.50
            PARASOMNIA, UNSPECIFIED                     

 

 2018            SHERRON SEGUNDO E APRN            Ot            G47.00
            INSOMNIA, UNSPECIFIED                     

 

 2018            SHERRON SEGUNDO E APRN            Ot            G47.10
            HYPERSOMNIA, UNSPECIFIED                     

 

 2018            SHERRON SEGUNDO E APRN            Ot            G47.33
            OBSTRUCTIVE SLEEP APNEA (ADULT) (PEDIATR                     

 

 2018            RALPH SIUINE E APRN            Ot            G47.50
            PARASOMNIA, UNSPECIFIED                     

 

 12/10/2018            BAIMA, JAHAIRA L ARNP            Ot            I10      
      ESSENTIAL (PRIMARY) HYPERTENSION                     

 

 12/10/2018            SIDJAHAIRA ARNP            Ot            I25.119  
          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                     

 

 12/10/2018            JAHAIRA LAU EVETTE ARNP            Ot            I34.0    
        NONRHEUMATIC MITRAL (VALVE) INSUFFICIENC                     

 

 12/10/2018            YOUSIFMA JAHAIRA L ARNP            Ot            R06.09   
         OTHER FORMS OF DYSPNEA                     

 

 12/10/2018            JAHAIRA LAU ARNP            Ot            Z72.0    
        TOBACCO USE                     

 

 12/10/2018            SHERRONSEGUNDO MUNOZ APRN            Ot            R06.02
            SHORTNESS OF BREATH                     

 

 12/10/2018            SHERRONRALPH MUNOZINE DEDE APRN            Ot            R16.1 
           SPLENOMEGALY, NOT ELSEWHERE CLASSIFIED                     

 

 12/10/2018            SHERRONSEGUNDO MUNOZ APRN            Ot            Z72.0 
           TOBACCO USE                     

 

 2018            SHERRONSEGUNDO MUNOZ APRN            Ot            R06.02
            SHORTNESS OF BREATH                     

 

 2018            SHERRONSEGUNDO MUNOZ APRN            Ot            R16.1 
           SPLENOMEGALY, NOT ELSEWHERE CLASSIFIED                     

 

 2018            SHERRONRALPH MUNOZINE DEDE APRN            Ot            Z72.0 
           TOBACCO USE                     

 

 2018            GRAY CHEATHAM, IRENA IVERSON            Ot            V76.12     
       OTH SCREEN MAMMO-MALIGN NEOPLASM OF BRUNO                     

 

 2018            ALYSE HODGES FACC, ALI FACP CCDS            Ot            
785.1            PALPITATIONS                     

 

 2018            ALYSE HODGES FACC, ALI FACP CCDS            Ot            
786.09            RESPIRATORY ABNORM NEC                     

 

 2018            ALYSE HODGES FACC, ALI FACP CCDS            Ot            
785.1            PALPITATIONS                     

 

 2018            ALYSE HODGES FACC, ALI FACP CCDS            Ot            
786.09            RESPIRATORY ABNORM NEC                     

 

 2018            JAVON DE LOS SANTOS ARNP            Ot            789.03    
        ABDOMINAL PAIN, RIGHT LOWER QUADRANT                     

 

 2018            JAVON DE LOS SANTOS ARNP            Ot            789.03    
        ABDOMINAL PAIN, RIGHT LOWER QUADRANT                     

 

 2018            JAVON DE LOS SANTOS ARNP            Ot            R10.31    
        RIGHT LOWER QUADRANT PAIN                     

 

 2018            JAVON DE LOS SANTOS ARNP            Ot            Z12.31    
        ENCNTR SCREEN MAMMOGRAM FOR MALIGNANT NE                     

 

 2018            JAHAIRA LAU ARNP            Ot            I10      
      ESSENTIAL (PRIMARY) HYPERTENSION                     

 

 2018            SID JAHAIRA EVETTE ARNP            Ot            I25.119  
          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                     

 

 2018            BAIMAJAHAIRA L ARNP            Ot            I34.0    
        NONRHEUMATIC MITRAL (VALVE) INSUFFICIENC                     

 

 2018            BAIMA JAHAIRA L ARNP            Ot            I73.9    
        PERIPHERAL VASCULAR DISEASE, UNSPECIFIED                     

 

 2018            BAIMA, JAHAIRA L ARNP            Ot            R06.09   
         OTHER FORMS OF DYSPNEA                     

 

 2018            BAIMA, JAHAIRA L ARNP            Ot            R29.818  
          OTHER SYMPTOMS AND SIGNS INVOLVING THE N                     

 

 2018            BAIMA JAHAIRA L ARNP            Ot            Z72.0    
        TOBACCO USE                     

 

 2018            BAIMA, JAHAIRA L ARNP            Ot            I10      
      ESSENTIAL (PRIMARY) HYPERTENSION                     

 

 2018            BAIMA, JAHAIRA L ARNP            Ot            I25.119  
          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                     

 

 2018            BAIMA, JAHAIRA L ARNP            Ot            I34.0    
        NONRHEUMATIC MITRAL (VALVE) INSUFFICIENC                     

 

 2018            BAIMA, JAHAIRA L ARNP            Ot            R06.09   
         OTHER FORMS OF DYSPNEA                     

 

 2018            BAIMA, JAHAIRA L ARNP            Ot            Z72.0    
        TOBACCO USE                     

 

 2018            SEGUNDO SIU            Ot            R06.02
            SHORTNESS OF BREATH                     

 

 2018            SEGUNDO SIU            Ot            R16.1 
           SPLENOMEGALY, NOT ELSEWHERE CLASSIFIED                     

 

 2018            SEGUNDO SIU APRHECTOR            Ot            Z72.0 
           TOBACCO USE                     

 

 2018            JAVON DE LOS SANTOS ARNP            Ot            E04.2     
       NONTOXIC MULTINODULAR GOITER                     

 

 2018            JAVON DE LOS SANTOS ARNP            Ot            E04.2     
       NONTOXIC MULTINODULAR GOITER                     

 

 2019            MAURY HODGES, MICHELLE IVERSON            Ot            E04.1     
       NONTOXIC SINGLE THYROID NODULE                     

 

 2019            JAVON DE LOS SANTOS ARNP            Ot            E04.2     
       NONTOXIC MULTINODULAR GOITER                     

 

 2019            MAURY HODGES, MICHELLE IVERSON            Ot            E04.1     
       NONTOXIC SINGLE THYROID NODULE                     

 

 2019            JAVON DE LOS SANTOS ARNP            Ot            E04.2     
       NONTOXIC MULTINODULAR GOITER                     

 

 2019            GRAY CHEATHAM, IRENA IVERSON            Ot            V76.12     
       OTH SCREEN MAMMO-MALIGN NEOPLASM OF BRUNO                     

 

 2019            ALYSE HODGES FACC, ALI FACP CCDS            Ot            
785.1            PALPITATIONS                     

 

 2019            ALYSE HODGES FACC, NAN FACP CCDS            Ot            
786.09            RESPIRATORY ABNORM NEC                     

 

 2019            ALYSE HODGES FACC, ALI FACP CCDS            Ot            
785.1            PALPITATIONS                     

 

 2019            ALYSE HODGES FACYENNI, ALI FACP CCDS            Ot            
786.09            RESPIRATORY ABNORM NEC                     

 

 2019            JAVON DE LOS SANTOS ARNP            Ot            789.03    
        ABDOMINAL PAIN, RIGHT LOWER QUADRANT                     

 

 2019            JAVON DE LOS SANTOS ARNP            Ot            789.03    
        ABDOMINAL PAIN, RIGHT LOWER QUADRANT                     

 

 2019            JAVON DE LOS SANTOS ARNP            Ot            R10.31    
        RIGHT LOWER QUADRANT PAIN                     

 

 2019            JAVON DE LOS SANTOS ARNP            Ot            Z12.31    
        ENCNTR SCREEN MAMMOGRAM FOR MALIGNANT NE                     

 

 2019            JAHAIRA LAU L ARNP            Ot            I10      
      ESSENTIAL (PRIMARY) HYPERTENSION                     

 

 2019            SID JAHAIRA L ARNP            Ot            I25.119  
          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                     

 

 2019            SID JAHAIRA L ARNP            Ot            I34.0    
        NONRHEUMATIC MITRAL (VALVE) INSUFFICIENC                     

 

 2019            SID JAHAIRA L ARNP            Ot            I73.9    
        PERIPHERAL VASCULAR DISEASE, UNSPECIFIED                     

 

 2019            SID JAHAIRA L ARNP            Ot            R06.09   
         OTHER FORMS OF DYSPNEA                     

 

 2019            SID JAHAIRA L ARNP            Ot            R29.818  
          OTHER SYMPTOMS AND SIGNS INVOLVING THE N                     

 

 2019            JAHAIRA LAU L ARNP            Ot            Z72.0    
        TOBACCO USE                     

 

 2019            AURE LAUHER L ARNP            Ot            I10      
      ESSENTIAL (PRIMARY) HYPERTENSION                     

 

 2019            SID JAHAIRA L ARNP            Ot            I25.119  
          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                     

 

 2019            YOUSIFMA JAHAIRA L ARNP            Ot            I34.0    
        NONRHEUMATIC MITRAL (VALVE) INSUFFICIENC                     

 

 2019            YOUSIFMA JAHAIRA L ARNP            Ot            R06.09   
         OTHER FORMS OF DYSPNEA                     

 

 2019            AURE LAUHER L ARNP            Ot            Z72.0    
        TOBACCO USE                     

 

 2019            SEGUNDO SIU            Ot            R06.02
            SHORTNESS OF BREATH                     

 

 2019            SEGUNDO SIU            Ot            R16.1 
           SPLENOMEGALY, NOT ELSEWHERE CLASSIFIED                     

 

 2019            SEGUNDO SIU            Ot            Z72.0 
           TOBACCO USE                     

 

 2019            JAVON DE LOS SANTOS            Ot            E04.2     
       NONTOXIC MULTINODULAR GOITER                     

 

 2019            MICHELLE MENDIOLA MD            Ot            E04.1     
       NONTOXIC SINGLE THYROID NODULE                     

 

 2019            MICHELLE MENDIOLA MD            Ot            E04.1     
       NONTOXIC SINGLE THYROID NODULE                     

 

 2019            MICHELLE MENDIOLA MD            Ot            E66.01    
        MORBID (SEVERE) OBESITY DUE TO EXCESS CA                     

 

 2019            MICHELLE MENDIOLA MD            Ot            E83.51    
        HYPOCALCEMIA                     

 

 2019            MICHELLE MENDIOLA MD            Ot            E89.89    
        OTH POSTPROC ENDOCRINE AND METABOLIC COM                     

 

 2019            MICHELLE MENDIOLA MD            Ot            F17.210   
         NICOTINE DEPENDENCE, CIGARETTES, UNCOMPL                     

 

 2019            MICHELLE MENDIOLA MD            Ot            G47.33    
        OBSTRUCTIVE SLEEP APNEA (ADULT) (PEDIATR                     

 

 2019            IMCHELLE MENDIOLA MD            Ot            I10       
     ESSENTIAL (PRIMARY) HYPERTENSION                     

 

 2019            MICHELLE MENDIOLA MD            Ot            I34.1     
       NONRHEUMATIC MITRAL (VALVE) PROLAPSE                     

 

 2019            MICHELLE MENDIOLA MD            Ot            L76.32    
        POSTPROC HEMATOMA OF SKIN, SUBCU FOLLOWI                     

 

 2019            MICHELLE MENDIOLA MD            Ot            R11.2     
       NAUSEA WITH VOMITING, UNSPECIFIED                     

 

 2019            MICHELLE MENDIOLA MD            Ot            Z68.41    
        BODY MASS INDEX (BMI) 40.0-44.9, ADULT                     

 

 2019            MICHELLE MENDIOLA MD            Ot            E83.51    
        HYPOCALCEMIA                     

 

 2019            MICHELLE MENDIOLA MD            Ot            E89.0     
       POSTPROCEDURAL HYPOTHYROIDISM                     

 

 2019            MICHELLE MENDIOLA MD            Ot            F17.210   
         NICOTINE DEPENDENCE, CIGARETTES, UNCOMPL                     

 

 2019            MICHELLE MENDIOLA MD            Ot            F32.9     
       MAJOR DEPRESSIVE DISORDER, SINGLE EPISOD                     

 

 2019            MICHELLE MENDIOLA MD            Ot            F41.9     
       ANXIETY DISORDER, UNSPECIFIED                     

 

 2019            MICHELLE MENDIOLA MD            Ot            G47.30    
        SLEEP APNEA, UNSPECIFIED                     

 

 2019            MICHELLE MENDIOLA MD            Ot            G89.18    
        OTHER ACUTE POSTPROCEDURAL PAIN                     

 

 2019            MAURY HODGES, MICHELLE IVERSON            Ot            I10       
     ESSENTIAL (PRIMARY) HYPERTENSION                     

 

 2019            MAURY HODGES, MICHELLE IVERSON            Ot            I34.1     
       NONRHEUMATIC MITRAL (VALVE) PROLAPSE                     

 

 2019            MICHELLE MENDIOLA MD            Ot            L76.21    
        POSTPROC HEMOR OF SKIN, SUBCU FOL A DERM                     

 

 2019            MAURY HODGES, MICHELLE IVERSON Ot            E04.1     
       NONTOXIC SINGLE THYROID NODULE                     

 

 2019            MICHELLE MENDIOLA MD, Ot            E04.1     
       NONTOXIC SINGLE THYROID NODULE                     



                                                                               
                                                                               
                                                                               
                                                                               
                                                                               
                                                                               
                                                                               
                                                                               
                                                                               
                                                                               
                                                                               
                                                                               
                                                                               
                                                                         



Procedures

      





 Code            Description            Performed By            Performed On   
     

 

                                               PAP SMEAR OBTAIN SMEAR     
                              2014        

 

             47215                                  NUCLEAR STRESS TESTING     
                              2014        

 

             31921                                  ECHO 2D                    
               2014        

 

             36022                                  HEMOCCULT                  
                 2014        

 

             43715                                  PAP SMEAR                  
                 2014        

 

             27493                                  MAMMOGRAM, SCREENING       
                            2014        

 

             54803                                  EKG, TRACING (IN-HOUSE)    
                               2014        

 

             91508                                  UA LONG DIP                
                   2014        

 

             16613                                  A1C (IN-HOUSE)             
                      2014        

 

             28712                                  ROUTINE VENIPUNCTURE       
                            2014        

 

             51615                                  CBC                        
           2014        

 

             [id number]                                  GFR CALC (RESULT ONLY)   
                                2014        

 

             83536                                  CMP                        
           2014        

 

             31636                                  LIPID PANEL                
                   2014        

 

             06761                                  MAGNESIUM                  
                 2014        

 

             31912                                  TSH                        
           2014        

 

             25959                                  INSULIN LEVEL              
                     2014        

 

             CARDIOLOG                                  NAN CULLEN            
                       09/10/2014        

 

             09041                                  ROUTINE VENIPUNCTURE       
                            2014        

 

             67946                                  CT ABDOMEN AND PELVIS W/
CONTRAST                                   2014        

 

             79306                                  FSH                        
           2014        

 

             48875                                  US PELVIC COMPL (REFLEX CPT
- 33487)                                   2015        

 

             [id number]                                  REATTACHMENT OF L SUP 
PARATHYROID, OPEN                                    2019        

 

             [id number]                                  RESECTION OF LEFT THYROID 
[place], OP                                   2019        

 

             [id number]                                  RESECTION OF RIGHT 
THYROID [place], O                                   2019        



                                                                  



Results

      





 Test            Result            Range        









 No Test Indicated - 16 11:33         









 .            Comment                      

 

 Dear Doctor,            Comment                      









 Comp. Metabolic Panel (14) - 16 11:33         









 Glucose, Serum            91 mg/dL            65-99        

 

 BUN            9 mg/dL            6-24        

 

 Creatinine, Serum            0.61 mg/dL            0.57-1.00        

 

 eGFR If NonAfricn Am            102 mL/min/1.73                >59        

 

 eGFR If Africn Am            118 mL/min/1.73                >59        

 

 BUN/Creatinine Ratio            15             9-23        

 

 Sodium, Serum            140 mmol/L            134-144        

 

 Potassium, Serum            4.2 mmol/L            3.5-5.2        

 

 Chloride, Serum            98 mmol/L                    

 

 Carbon Dioxide, Total            25 mmol/L            18-29        

 

 Calcium, Serum            9.1 mg/dL            8.7-10.2        

 

 Protein, Total, Serum            7.0 g/dL            6.0-8.5        

 

 Albumin, Serum            4.4 g/dL            3.5-5.5        

 

 Globulin, Total            2.6 g/dL            1.5-4.5        

 

 A/G Ratio            1.7             1.1-2.5        

 

 Bilirubin, Total            0.9 mg/dL            0.0-1.2        

 

 Alkaline Phosphatase, S            83 IU/L                    

 

 AST (SGOT)            15 IU/L            0-40        

 

 ALT (SGPT)            17 IU/L            0-32        









 Lipid Panel - 16 11:33         









 Cholesterol, Total            183 mg/dL            100-199        

 

 Triglycerides            94 mg/dL            0-149        

 

 HDL Cholesterol            63 mg/dL            >39        

 

 VLDL Cholesterol Macario            19 mg/dL            5-40        

 

 LDL Cholesterol Calc            101 mg/dL            0-99        









 Pap Lb, HPV-hr - 17 15:13         









 HPV, high-risk            Negative             Negative        

 

 DIAGNOSIS:            Comment                      

 

 Specimen adequacy:            Comment                      

 

 Clinician provided ICD10:            Comment                      

 

 Performed by:            Comment                      

 

 QC reviewed by:            Comment                      

 

 .            .                      

 

 Pathologist provided ICD10:            Comment                      

 

 Note:            Comment                      









 CULTURE, URINE - 17 12:04         









 CULTURE, URINE, ROUTINE            SEE NOTE             NRG        









 PDM - 09 PANEL (PROFILE 1) - 18 13:15         









 Prescribed Drug 1            Xanax(TM)             NRG        

 

 Creatinine            53.3 mg/dL            > or=20.0        

 

 pH            6.95             4.5 - 9.0        

 

 Oxidant            NEGATIVE mcg/mL            <200        

 

 Amphetamines            NEGATIVE ng/mL            <500        

 

 medMATCH Amphetamines            CONSISTENT             NRG        

 

 Benzodiazepines            POSITIVE ng/mL            <100        

 

 Marijuana Metabolite            POSITIVE ng/mL            <20        

 

 Cocaine Metabolite            NEGATIVE ng/mL            <150        

 

 medMATCH Cocaine Metab            CONSISTENT             NRG        

 

 Opiates            NEGATIVE ng/mL            <100        

 

 medMATCH Opiates            CONSISTENT             NRG        

 

 Oxycodone            NEGATIVE ng/mL            <100        

 

 medMATCH Oxycodone            CONSISTENT             NRG        

 

 COMMENT                         NRG        

 

   Alphahydroxyalprazolam            74 ng/mL            <25        

 

 medMATCH aOH alprazolam            CONSISTENT             NRG        

 

   Alphahydroxymidazolam            NEGATIVE ng/mL            <50        

 

 medMATCH aOH midazolam            CONSISTENT             NRG        

 

   Alphahydroxytriazolam            NEGATIVE ng/mL            <50        

 

 medMATCH aOH triazolam            CONSISTENT             NRG        

 

   Aminoclonazepam            NEGATIVE ng/mL            <25        

 

 medMATCH Aminoclonazepam            CONSISTENT             NRG        

 

   Hydroxyethylflurazepam            NEGATIVE ng/mL            <50        

 

 medMATCH OH,Et flurazepam            CONSISTENT             NRG        

 

   Lorazepam            NEGATIVE ng/mL            <50        

 

 medMATCH Lorazepam            CONSISTENT             NRG        

 

   Nordiazepam            NEGATIVE ng/mL            <50        

 

 medMATCH Nordiazepam            CONSISTENT             NRG        

 

   Oxazepam            NEGATIVE ng/mL            <50        

 

 medMATCH Oxazepam            CONSISTENT             NRG        

 

   Temazepam            NEGATIVE ng/mL            <50        

 

 medMATCH Temazepam            CONSISTENT             NRG        

 

   Marijuana Metabolite            66 ng/mL            <5        

 

 medMATCH Marijuana Metab            INCONSISTENT             NRG        

 

 Barbiturates            NEGATIVE ng/mL            <300        

 

 medMATCH Barbiturates            CONSISTENT             NRG        

 

 Methadone Metabolite            NEGATIVE ng/mL            <100        

 

 medMATCH Methadone Metab            CONSISTENT             NRG        

 

 Phencyclidine            NEGATIVE ng/mL            <25        

 

 medMATCH Phencyclidine            CONSISTENT             NRG        









 TSH - 18 12:32         









 TSH            0.79 mIU/L            0.40-4.50        









 Complete blood count (CBC) with automated white blood cell (WBC) differential 
- 19 10:35         









 Blood leukocytes automated count (number/volume)            6.8 10*3/uL       
     4.3-11.0        

 

 Blood erythrocytes automated count (number/volume)            4.27 10*6/uL    
        4.35-5.85        

 

 Venous blood hemoglobin measurement (mass/volume)            13.1 g/dL        
    11.5-16.0        

 

 Blood hematocrit (volume fraction)            37 %            35-52        

 

 Automated erythrocyte mean corpuscular volume            87 [foz_us]          
  80-99        

 

 Automated erythrocyte mean corpuscular hemoglobin (mass per erythrocyte)      
      31 pg            25-34        

 

 Automated erythrocyte mean corpuscular hemoglobin concentration measurement (
mass/volume)            35 g/dL            32-36        

 

 Automated erythrocyte distribution width ratio            17.6 %            
10.0-14.5        

 

 Automated blood platelet count (count/volume)            251 10*3/uL          
  130-400        

 

 Automated blood platelet mean volume measurement            8.9 [foz_us]      
      7.4-10.4        

 

 Automated blood neutrophils/100 leukocytes            67 %            42-75   
     

 

 Automated blood lymphocytes/100 leukocytes            23 %            12-44   
     

 

 Blood monocytes/100 leukocytes            7 %            0-12        

 

 Automated blood eosinophils/100 leukocytes            3 %            0-10     
   

 

 Automated blood basophils/100 leukocytes            2 %            0-10        

 

 Blood neutrophils automated count (number/volume)            4.5 10*3         
   1.8-7.8        

 

 Blood lymphocytes automated count (number/volume)            1.5 10*3         
   1.0-4.0        

 

 Blood monocytes automated count (number/volume)            0.4 10*3            
0.0-1.0        

 

 Automated eosinophil count            0.2 10*3/uL            0.0-0.3        

 

 Automated blood basophil count (count/volume)            0.1 10*3/uL          
  0.0-0.1        









 Whole blood basic metabolic panel - 19 10:35         









 Serum or plasma sodium measurement (moles/volume)            135 mmol/L       
     135-145        

 

 Serum or plasma potassium measurement (moles/volume)            4.1 mmol/L    
        3.6-5.0        

 

 Serum or plasma chloride measurement (moles/volume)            102 mmol/L     
               

 

 Carbon dioxide            25 mmol/L            21-32        

 

 Serum or plasma anion gap determination (moles/volume)            8 mmol/L    
        5-14        

 

 Serum or plasma urea nitrogen measurement (mass/volume)            12 mg/dL   
         7-18        

 

 Serum or plasma creatinine measurement (mass/volume)            0.64 mg/dL    
        0.60-1.30        

 

 Serum or plasma urea nitrogen/creatinine mass ratio            19             
NRG        

 

 Serum or plasma creatinine measurement with calculation of estimated 
glomerular filtration rate            >             NRG        

 

 Serum or plasma glucose measurement (mass/volume)            95 mg/dL         
           

 

 Serum or plasma calcium measurement (mass/volume)            9.4 mg/dL        
    8.5-10.1        









 Methicillin resistant Staphylococcus aureus (MRSA) screening culture -  10:35         









 Methicillin resistant Staphylococcus aureus (MRSA) screening culture          
  NEG             NRG        









 Whole blood basic metabolic panel - 19 06:27         









 Serum or plasma sodium measurement (moles/volume)            135 mmol/L       
     135-145        

 

 Serum or plasma potassium measurement (moles/volume)            3.7 mmol/L    
        3.6-5.0        

 

 Serum or plasma chloride measurement (moles/volume)            97 mmol/L      
              

 

 Carbon dioxide            25 mmol/L            21-32        

 

 Serum or plasma anion gap determination (moles/volume)            13 mmol/L   
         5-14        

 

 Serum or plasma urea nitrogen measurement (mass/volume)            13 mg/dL   
         7-18        

 

 Serum or plasma creatinine measurement (mass/volume)            0.64 mg/dL    
        0.60-1.30        

 

 Serum or plasma urea nitrogen/creatinine mass ratio            20             
NRG        

 

 Serum or plasma creatinine measurement with calculation of estimated 
glomerular filtration rate            >             NRG        

 

 Serum or plasma glucose measurement (mass/volume)            114 mg/dL        
            

 

 Serum or plasma calcium measurement (mass/volume)            7.8 mg/dL        
    8.5-10.1        









 IONIZED CALCIUM (SEND OFF) - 19 06:27         









 Blood ionized calcium measurement (mass/volume)            0.94 %            
1.16-1.32        

 

 Venous blood ionized calcium measurement adjusted to pH 7.4 (moles/volume)    
        0.97 %            1.16-1.32        

 

 pH measurement            7.45             NRG        









 Whole blood basic metabolic panel - 19 05:57         









 Serum or plasma sodium measurement (moles/volume)            130 mmol/L       
     135-145        

 

 Serum or plasma potassium measurement (moles/volume)            3.8 mmol/L    
        3.6-5.0        

 

 Serum or plasma chloride measurement (moles/volume)            94 mmol/L      
              

 

 Carbon dioxide            25 mmol/L            21-32        

 

 Serum or plasma anion gap determination (moles/volume)            11 mmol/L   
         5-14        

 

 Serum or plasma urea nitrogen measurement (mass/volume)            12 mg/dL   
         7-18        

 

 Serum or plasma creatinine measurement (mass/volume)            0.64 mg/dL    
        0.60-1.30        

 

 Serum or plasma urea nitrogen/creatinine mass ratio            19             
NRG        

 

 Serum or plasma creatinine measurement with calculation of estimated 
glomerular filtration rate            >             NRG        

 

 Serum or plasma glucose measurement (mass/volume)            109 mg/dL        
            

 

 Serum or plasma calcium measurement (mass/volume)            7.1 mg/dL        
    8.5-10.1        









 Complete blood count (CBC) with automated white blood cell (WBC) differential 
- 03/10/19 23:40         









 Blood leukocytes automated count (number/volume)            10.1 10*3/uL      
      4.3-11.0        

 

 Blood erythrocytes automated count (number/volume)            3.80 10*6/uL    
        4.35-5.85        

 

 Venous blood hemoglobin measurement (mass/volume)            11.8 g/dL        
    11.5-16.0        

 

 Blood hematocrit (volume fraction)            33 %            35-52        

 

 Automated erythrocyte mean corpuscular volume            86 [foz_us]          
  80-99        

 

 Automated erythrocyte mean corpuscular hemoglobin (mass per erythrocyte)      
      31 pg            25-34        

 

 Automated erythrocyte mean corpuscular hemoglobin concentration measurement (
mass/volume)            36 g/dL            32-36        

 

 Automated erythrocyte distribution width ratio            17.2 %            
10.0-14.5        

 

 Automated blood platelet count (count/volume)            348 10*3/uL          
  130-400        

 

 Automated blood platelet mean volume measurement            8.8 [foz_us]      
      7.4-10.4        

 

 Automated blood neutrophils/100 leukocytes            71 %            42-75   
     

 

 Automated blood lymphocytes/100 leukocytes            18 %            12-44   
     

 

 Blood monocytes/100 leukocytes            8 %            0-12        

 

 Automated blood eosinophils/100 leukocytes            2 %            0-10     
   

 

 Automated blood basophils/100 leukocytes            1 %            0-10        

 

 Blood neutrophils automated count (number/volume)            7.1 10*3         
   1.8-7.8        

 

 Blood lymphocytes automated count (number/volume)            1.8 10*3         
   1.0-4.0        

 

 Blood monocytes automated count (number/volume)            0.8 10*3            
0.0-1.0        

 

 Automated eosinophil count            0.2 10*3/uL            0.0-0.3        

 

 Automated blood basophil count (count/volume)            0.1 10*3/uL          
  0.0-0.1        









 PT panel in platelet poor plasma by coagulation assay - 03/10/19 23:40         









 Prothrombin time (PT) in platelet poor plasma by coagulation assay            
14.1 s            12.2-14.7        

 

 INR in platelet poor plasma or blood by coagulation assay            1.1      
       0.8-1.4        









 Activated partial thromboplastin time (aPTT) in platelet poor plasma 
bycoagulation assay - 03/10/19 23:40         









 Activated partial thromboplastin time (aPTT) in platelet poor plasma 
bycoagulation assay            30 s            24-35        









 Comprehensive metabolic panel - 03/10/19 23:40         









 Serum or plasma sodium measurement (moles/volume)            139 mmol/L       
     135-145        

 

 Serum or plasma potassium measurement (moles/volume)            3.4 mmol/L    
        3.6-5.0        

 

 Serum or plasma chloride measurement (moles/volume)            98 mmol/L      
              

 

 Carbon dioxide            25 mmol/L            21-32        

 

 Serum or plasma anion gap determination (moles/volume)            16 mmol/L   
         5-14        

 

 Serum or plasma urea nitrogen measurement (mass/volume)            7 mg/dL    
        7-18        

 

 Serum or plasma creatinine measurement (mass/volume)            0.66 mg/dL    
        0.60-1.30        

 

 Serum or plasma urea nitrogen/creatinine mass ratio            11             
NRG        

 

 Serum or plasma creatinine measurement with calculation of estimated 
glomerular filtration rate            >             NRG        

 

 Serum or plasma glucose measurement (mass/volume)            102 mg/dL        
            

 

 Serum or plasma calcium measurement (mass/volume)            6.4 mg/dL        
    8.5-10.1        

 

 Serum or plasma total bilirubin measurement (mass/volume)            1.5 mg/dL
            0.1-1.0        

 

 Serum or plasma alkaline phosphatase measurement (enzymatic activity/volume)  
          68 U/L                    

 

 Serum or plasma aspartate aminotransferase measurement (enzymatic activity/
volume)            20 U/L            5-34        

 

 Serum or plasma alanine aminotransferase measurement (enzymatic activity/volume
)            28 U/L            0-55        

 

 Serum or plasma protein measurement (mass/volume)            7.1 g/dL         
   6.4-8.2        

 

 Serum or plasma albumin measurement (mass/volume)            4.1 g/dL         
   3.2-4.5        

 

 CALCIUM CORRECTED            6.3 mg/dL            8.5-10.1        









 Magnesium - 03/10/19 23:40         









 Magnesium            2.0 mg/dL            1.8-2.4        









 Serum or plasma troponin i.cardiac measurement (mass/volume) - 03/10/19 23:40 
        









 Serum or plasma troponin i.cardiac measurement (mass/volume)            < ng/
mL            <0.028        









 Myoglobin, serum - 03/10/19 23:40         









 Myoglobin, serum            32.3 ng/mL            10.0-92.0        









 Serum or plasma lithium measurement (moles/volume) - 03/10/19 23:40         









 BNP level            148.9 pg/mL            <100.0        









 Serum or plasma thyrotropin measurement by detection limit <=0.05 miu/l (units/
volume) - 03/10/19 23:40         









 Serum or plasma thyrotropin measurement by detection limit <=0.05 miu/l (units/
volume)            2.31 u[iU]/mL            0.35-4.94        









 Complete blood count (CBC) with automated white blood cell (WBC) differential 
- 19 07:14         









 Blood leukocytes automated count (number/volume)            9.2 10*3/uL       
     4.3-11.0        

 

 Blood erythrocytes automated count (number/volume)            3.62 10*6/uL    
        4.35-5.85        

 

 Venous blood hemoglobin measurement (mass/volume)            11.2 g/dL        
    11.5-16.0        

 

 Blood hematocrit (volume fraction)            31 %            35-52        

 

 Automated erythrocyte mean corpuscular volume            87 [foz_us]          
  80-99        

 

 Automated erythrocyte mean corpuscular hemoglobin (mass per erythrocyte)      
      31 pg            25-34        

 

 Automated erythrocyte mean corpuscular hemoglobin concentration measurement (
mass/volume)            36 g/dL            32-36        

 

 Automated erythrocyte distribution width ratio            17.3 %            
10.0-14.5        

 

 Automated blood platelet count (count/volume)            330 10*3/uL          
  130-400        

 

 Automated blood platelet mean volume measurement            8.8 [foz_us]      
      7.4-10.4        

 

 Automated blood neutrophils/100 leukocytes            70 %            42-75   
     

 

 Automated blood lymphocytes/100 leukocytes            18 %            12-44   
     

 

 Blood monocytes/100 leukocytes            8 %            0-12        

 

 Automated blood eosinophils/100 leukocytes            3 %            0-10     
   

 

 Automated blood basophils/100 leukocytes            1 %            0-10        

 

 Blood neutrophils automated count (number/volume)            6.5 10*3         
   1.8-7.8        

 

 Blood lymphocytes automated count (number/volume)            1.7 10*3         
   1.0-4.0        

 

 Blood monocytes automated count (number/volume)            0.7 10*3            
0.0-1.0        

 

 Automated eosinophil count            0.2 10*3/uL            0.0-0.3        

 

 Automated blood basophil count (count/volume)            0.1 10*3/uL          
  0.0-0.1        









 Comprehensive metabolic panel - 19 07:14         









 Serum or plasma sodium measurement (moles/volume)            142 mmol/L       
     135-145        

 

 Serum or plasma potassium measurement (moles/volume)            3.4 mmol/L    
        3.6-5.0        

 

 Serum or plasma chloride measurement (moles/volume)            101 mmol/L     
               

 

 Carbon dioxide            26 mmol/L            21-32        

 

 Serum or plasma anion gap determination (moles/volume)            15 mmol/L   
         5-14        

 

 Serum or plasma urea nitrogen measurement (mass/volume)            6 mg/dL    
        7-18        

 

 Serum or plasma creatinine measurement (mass/volume)            0.64 mg/dL    
        0.60-1.30        

 

 Serum or plasma urea nitrogen/creatinine mass ratio            9             
NRG        

 

 Serum or plasma creatinine measurement with calculation of estimated 
glomerular filtration rate            >             NRG        

 

 Serum or plasma glucose measurement (mass/volume)            106 mg/dL        
            

 

 Serum or plasma calcium measurement (mass/volume)            6.6 mg/dL        
    8.5-10.1        

 

 Serum or plasma total bilirubin measurement (mass/volume)            1.3 mg/dL
            0.1-1.0        

 

 Serum or plasma alkaline phosphatase measurement (enzymatic activity/volume)  
          67 U/L                    

 

 Serum or plasma aspartate aminotransferase measurement (enzymatic activity/
volume)            18 U/L            5-34        

 

 Serum or plasma alanine aminotransferase measurement (enzymatic activity/volume
)            25 U/L            0-55        

 

 Serum or plasma protein measurement (mass/volume)            6.6 g/dL         
   6.4-8.2        

 

 Serum or plasma albumin measurement (mass/volume)            3.9 g/dL         
   3.2-4.5        

 

 CALCIUM CORRECTED            6.7 mg/dL            8.5-10.1        









 Complete blood count (CBC) with automated white blood cell (WBC) differential 
- 19 03:30         









 Blood leukocytes automated count (number/volume)            9.4 10*3/uL       
     4.3-11.0        

 

 Blood erythrocytes automated count (number/volume)            3.66 10*6/uL    
        4.35-5.85        

 

 Venous blood hemoglobin measurement (mass/volume)            11.3 g/dL        
    11.5-16.0        

 

 Blood hematocrit (volume fraction)            32 %            35-52        

 

 Automated erythrocyte mean corpuscular volume            87 [foz_us]          
  80-99        

 

 Automated erythrocyte mean corpuscular hemoglobin (mass per erythrocyte)      
      31 pg            25-34        

 

 Automated erythrocyte mean corpuscular hemoglobin concentration measurement (
mass/volume)            35 g/dL            32-36        

 

 Automated erythrocyte distribution width ratio            17.4 %            
10.0-14.5        

 

 Automated blood platelet count (count/volume)            318 10*3/uL          
  130-400        

 

 Automated blood platelet mean volume measurement            8.8 [foz_us]      
      7.4-10.4        

 

 Automated blood neutrophils/100 leukocytes            65 %            42-75   
     

 

 Automated blood lymphocytes/100 leukocytes            23 %            12-44   
     

 

 Blood monocytes/100 leukocytes            9 %            0-12        

 

 Automated blood eosinophils/100 leukocytes            2 %            0-10     
   

 

 Automated blood basophils/100 leukocytes            1 %            0-10        

 

 Blood neutrophils automated count (number/volume)            6.1 10*3         
   1.8-7.8        

 

 Blood lymphocytes automated count (number/volume)            2.1 10*3         
   1.0-4.0        

 

 Blood monocytes automated count (number/volume)            0.9 10*3            
0.0-1.0        

 

 Automated eosinophil count            0.2 10*3/uL            0.0-0.3        

 

 Automated blood basophil count (count/volume)            0.1 10*3/uL          
  0.0-0.1        









 Comprehensive metabolic panel - 19 03:30         









 Serum or plasma sodium measurement (moles/volume)            141 mmol/L       
     135-145        

 

 Serum or plasma potassium measurement (moles/volume)            3.6 mmol/L    
        3.6-5.0        

 

 Serum or plasma chloride measurement (moles/volume)            99 mmol/L      
              

 

 Carbon dioxide            26 mmol/L            21-32        

 

 Serum or plasma anion gap determination (moles/volume)            16 mmol/L   
         5-14        

 

 Serum or plasma urea nitrogen measurement (mass/volume)            7 mg/dL    
        7-18        

 

 Serum or plasma creatinine measurement (mass/volume)            0.68 mg/dL    
        0.60-1.30        

 

 Serum or plasma urea nitrogen/creatinine mass ratio            10             
NRG        

 

 Serum or plasma creatinine measurement with calculation of estimated 
glomerular filtration rate            >             NRG        

 

 Serum or plasma glucose measurement (mass/volume)            96 mg/dL         
           

 

 Serum or plasma calcium measurement (mass/volume)            6.7 mg/dL        
    8.5-10.1        

 

 Serum or plasma total bilirubin measurement (mass/volume)            1.3 mg/dL
            0.1-1.0        

 

 Serum or plasma alkaline phosphatase measurement (enzymatic activity/volume)  
          65 U/L                    

 

 Serum or plasma aspartate aminotransferase measurement (enzymatic activity/
volume)            17 U/L            5-34        

 

 Serum or plasma alanine aminotransferase measurement (enzymatic activity/volume
)            21 U/L            0-55        

 

 Serum or plasma protein measurement (mass/volume)            6.5 g/dL         
   6.4-8.2        

 

 Serum or plasma albumin measurement (mass/volume)            3.9 g/dL         
   3.2-4.5        

 

 CALCIUM CORRECTED            6.8 mg/dL            8.5-10.1        



                                                                  



Encounters

      





 ACCT No.            Visit Date/Time            Discharge            Status    
        Pt. Type            Provider            Facility            Loc./Unit  
          Complaint        

 

 [id number]            2014 16:18:00            2014 23:59:59          
  CLS            Outpatient            JAVON CHILEL                  
                             

 

 [id number]            10/15/2014 08:55:00            10/15/2014 23:59:59          
  CLS            Outpatient            NAN CULLEN MD                          
                     

 

 [id number]            09/10/2014 10:15:00            09/10/2014 23:59:59          
  CLS            Outpatient            FILIBERTO LOUISJAVON S                  
                             

 

 [id number]            2014 08:54:00            2014 23:59:59          
  CLS            Outpatient            CECY ACEVES DO                        
                       

 

 [id number]            2014 13:00:00            2014 23:59:59          
  CLS            Outpatient            CECY ACEVES DO                        
                       

 

 [id number]            2014 13:00:00            2014 23:59:59          
  CLS            Outpatient            CECY ACEVES DO                        
                       

 

 [id number]            2013 12:48:00            2013 23:59:59          
  CLS            Outpatient                                                    
        

 

 [id number]            2013 10:09:00            2013 23:59:59          
  CLS            Outpatient            JAVON CHILEL                  
                             

 

 [id number]            2012 13:33:00            2012 23:59:59          
  CLS            Outpatient                                                    
        

 

 [id number]            2012 13:33:00            2012 23:59:59            
CLS            Outpatient            JAVON CHILEL                    
                           

 

 [id number]            2013 15:19:00                                      
Document Registration                                                          
  

 

 [id number]            2017 17:07:00                                   
   Document Registration                                                       
     

 

 [id number]            2016 10:06:00                                   
   Document Registration                                                       
     

 

 [id number]            2019 11:20:00            2019 23:59:59            
CLS            Outpatient            JAV[facility] CHILEL                    
     [facility]                     

 

 [id number]            2018 12:20:00                                      
Document Registration                                                          
  

 

 [id number]            2018 13:00:00                                      
Document Registration                                                          
  

 

 [id number]            2017 11:40:00                                      
Document Registration                                                          
  

 

 [id number]            2015 13:21:52                         [place]            
Document Registration                                                          
  

 

 [id number]            2016 10:06:00                                   
   Document Registration                                                       
     

 

 [id number]            2019 02:45:00            2019 17:00:00    
        DIS            Inpatient            MICHELLE MENDIOLA MD            [facility] 
[facility]            4TH            HYPOCALCEMIA,POST OP 
PAIN        

 

 [id number]            2019 11:32:00            2019 12:20:00    
        DIS            Outpatient            MICHELLE MENDIOLA MD            
[facility]            4TH            POST OP NAUSEA,
VOMITING,HYPOCALCEMIA        

 

 [id number]            2019 10:15:00            2019 10:48:00    
        DIS            Outpatient            MICHELLE MENDIOLA MD            
[facility]            PREOP            THYROID NODULES   
     

 

 [id number]            2019 12:59:00            2019 23:59:59    
        CLS            Outpatient            MICHELLE MENDIOLA MD            
[facility]            LAB            THYROID NODULE      
  

 

 [id number]            2019 09:54:00            2019 23:59:59    
        CLS            Outpatient            MICHELLE MENDIOLA MD            
[facility]            RAD            THYROID NODULE      
  

 

 [id number]            2018 13:06:00            2018 23:59:59    
        CLS            Outpatient            JAVON DE LOS SANTOS            
[facility]            RAD            THYROID NODULE      
  

 

 [id number]            10/31/2018 13:44:00            10/31/2018 13:52:00    
        DIS            Outpatient            SEGUNDO SIU          
  [facility]            SLEEP            SNORING,EDS     
   

 

 [id number]            10/17/2018 11:47:00            10/17/2018 23:59:59    
        CLS            Outpatient            SEGUNDO SIU          
  [facility]            RAD            TOBACCO USER,
DYSPNEA        

 

 [id number]            10/09/2018 13:27:00            10/09/2018 23:59:59    
        CLS            Outpatient            JAHAIRA LAU ARNP            
[facility]            CARD            DYSPNEA ON EXERTION
        

 

 [id number]            2018 10:49:00            2018 23:59:59    
        CLS            Outpatient            JAHAIRA LAU ARNP            
[facility]            CARD            DYSPNEA ON EXERTION
        

 

 [id number]            2017 12:54:00            2017 23:59:59    
        CLS            Outpatient            JAVON DE LOS SANTOS ARNP            
[facility]            RAD            Z12.31 SCREENING 
MAMMO        

 

 [id number]            2017 08:45:00            2017 12:33:00    
        DIS            Outpatient            HUNTER HAMMONDS DO            [facility] 
[facility]            ENDO            HISTORY POLYPS        

 

 [id number]            2017 05:33:00            2017 15:11:00    
        DIS            Outpatient            HUNTER HAMMONDS DO            [facility] 
[facility]            PREOP            COLONOSCOPY        

 

 [id number]            2016 09:46:00            2016 15:00:00    
        DIS            Outpatient            HUNTER HAMMONDS DO            [facility] 
[facility]            SDC            SCREENING        

 

 [id number]            2016 05:39:00            2016 10:46:00    
        DIS            Outpatient            HUNTER HAMMONDS DO            [facility] 
[facility]            PREOP            SCREENING        

 

 [id number]            08/15/2016 08:34:00            08/15/2016 23:59:59    
        CLS            Outpatient            JAVON DE LOS SANTOS            
[facility]            RAD            RLQ ABD PAIN        

 

 [id number]            2015 13:21:00            2015 23:59:59    
        CLS            Outpatient            JAVON DE LOS SANTOS            
[facility]            RAD            ABD PAIN, RLQ        

 

 [id number]            2015 11:11:00            2015 23:59:59    
        CLS            Outpatient            JAVON DE LOS SANTOS            
[facility]            RAD            RLQ PAIN        

 

 [id number]            2014 08:16:00            2014 17:11:00    
        DIS            Outpatient            NAN CULLEN MD, FACC FACP CCDS     
       [facility]            CATH            ABN STRESS 
TEST        

 

 [id number]            10/30/2014 07:42:00            10/30/2014 23:59:59    
        CLS            Outpatient            ALYSE HODGES FACC ALI FACP CCDS     
       [facility]            CARD            ANGINA,SOB,
FATIGUE        

 

 [id number]            10/23/2014 10:44:00            10/23/2014 23:59:59    
        CLS            Outpatient            NAN CULLEN MD, FACC FACP CCDS     
       [facility]            CARD            ANGINA,SOB,
FATIGUE        

 

 [id number]            2014 10:08:00            2014 23:59:59    
        CLS            Outpatient            IRENA LIU            [facility] 
[facility]            RAD            SCREENING        

 

 [id number]            2019 19:54:00                         ACT       
     Emergency            LYDIA GOLDBERG DO            [facility]            ER            THYROID SWELLING AFTER MARISABEL 3/6

## 2019-04-02 VITALS — SYSTOLIC BLOOD PRESSURE: 147 MMHG | DIASTOLIC BLOOD PRESSURE: 64 MMHG

## 2019-04-02 NOTE — DIAGNOSTIC IMAGING REPORT
PROCEDURE: CT neck soft tissue with contrast.



TECHNIQUE: Multiple contiguous axial images were obtained through

the neck after the administration of contrast. Auto Exposure

Controls were utilized during the CT exam to meet ALARA standards

for radiation dose reduction. 



INDICATION:  Throat swelling.



Comparison made with prior examination from  03/11/2019.



FINDINGS: The visualized intracranial structures are

unremarkable. The nasopharyngeal, oropharyngeal and

hypopharyngeal tissues are symmetric without mass effect. There

is no tonsillar enlargement or abscess. Epiglottis is normal.

Trachea is normal. The prevertebral soft tissues are within

normal limits. The parotid and submandibular glands are normal in

appearance. There are postsurgical changes in the thyroid bed

with multiple surgical clips. There is a fluid collection

anterior to the thyroid bed that appears to communicate with the

larger subcutaneous fluid collection measuring 6.4 cm transverse

by 3.6 cm AP by 5.9 cm craniocaudal. This has an enhancing

irregular wall. There is no gas within the collection. The fluid

collection around the thyroid is decreased in size while the

subcutaneous collection has increased in size when compared to

prior examination. While this may simply reflect seroma the

irregular enhancing walls raise the possibility of abscess. There

is minimal degenerative change in the cervical spine. Major

vascular structures of the neck enhance in a normal fashion.

There is no pathologically enlarged adenopathy in the neck. Lung

apices are clear.



IMPRESSION: Interval decrease in the previously identified fluid

collection in the surgical bed of the thyroid however this does

appear to communicate with the subcutaneous collection which is

increased in size since prior exam. This demonstrates a thick

enhancing wall. While there is no gas within the collection the

possibility of abscess cannot be entirely excluded. Recommend

clinical correlation and if warranted followup with aspiration.

There is no significant mass effect on the airway.



No other acute abnormality of the neck.



Dictated by: 



  Dictated on workstation # [id number]

## 2019-07-16 ENCOUNTER — HOSPITAL ENCOUNTER (OUTPATIENT)
Dept: HOSPITAL 75 - RAD | Age: 59
End: 2019-07-16
Attending: NURSE PRACTITIONER
Payer: COMMERCIAL

## 2019-07-16 DIAGNOSIS — Z13.820: Primary | ICD-10-CM

## 2019-07-16 DIAGNOSIS — N95.1: ICD-10-CM

## 2019-07-16 PROCEDURE — 77080 DXA BONE DENSITY AXIAL: CPT

## 2019-07-16 NOTE — DIAGNOSTIC IMAGING REPORT
INDICATION: Screening for osteoporosis.



COMPARISON: None.



FINDINGS:



The bone mineral density of the hips and spine was measured.

There are no prior studies available for comparison.



The T-score for the spine is -1.6. This does indicate osteopenia.



The total T-score for the left hip is -1.0 and for the right hip

-0.5. The T-score for the right femoral neck is -0.9. These

values are within normal limits. However, the T-score for the

left femoral neck is -1.6. This does indicate osteopenia.



AP Spine L1-L4:  

[BMD (g/cm2): 1.008] [T-Score: -1.6] [Z-Score: -1.7]

[BMD Previous: N/A] [BMD % Change: N/A]



LT Hip Neck:       

[BMD (g/cm2): 0.819] [T-Score: -1.6] [Z-Score: -1.2]



LT Hip Total:       

[BMD (g/cm2):0.879] [T-Score:-1.0] [Z-Score: -0.5]

[BMD Previous: N/A] [BMD % Change: N/A]



RT Hip Neck:      

[BMD (g/cm2):0.918] [T-Score:-0.9] [Z-Score:-0.4]



RT Hip Total:      

[BMD (g/cm2):0.941] [T-score:-0.5] [Z-Score:-0.5]

[BMD Previous:N/A] [BMD % Change:N/A]



*Indicates significant change from prior examination based on 95%

confidence level.



World Health Organization criteria for BMD interpretation

classify patients as Normal (T-score at or above -1.0),

Osteopenic (T-score between -1.0 and -2.5) or Osteoporotic

(T-score at or below -2.5).



LIMITATIONS AND MODIFICATION: None.



FRACTURE RISK (FRAX SCORE):

The ten year probability of (%): 

Major Osteoporotic Fracture: [6.9]

Hip Fracture: [0.5]



IMPRESSION:

1. The total T-scores for the hips and for the right femoral neck

are within normal limits.

2. The T-score for the spine and the left femoral neck, however,

falls within the range of osteopenia.

3. See below National Osteoporosis Foundation guidelines on when

to potentially initiate pharmacologic therapy. 



Based on the National Osteoporosis Foundation Guidelines,

pharmacologic treatment should be initiated in any of the

following, unless clinical conditions suggest otherwise:



*  Any patient with prior fragility fracture of the hip or

vertebrae. A spine fracture indicates 5X risk for subsequent

spine fracture and 2X risk for subsequent hip fracture.



*  Osteoporosis (T-score <-2.5).



*  Postmenopausal women and men age 50 and older with low bone

mass/osteopenia (T-score between -1.0 and -2.5) by DXA and

10-year major osteoporotic fracture greater than 20% or a 10-year

probability of hip fracture greater than 3%. These fracture risks

are supplied above in the FRAX score, if applicable.



*  Clinician judgment and/or patient preferences may indicate

treatment for people with 10-year fracture probabilities above or

below these levels.



Dictated by: 



  Dictated on workstation # [id number]

## 2020-03-12 ENCOUNTER — HOSPITAL ENCOUNTER (OUTPATIENT)
Dept: HOSPITAL 75 - RAD | Age: 60
End: 2020-03-12
Attending: NURSE PRACTITIONER
Payer: SELF-PAY

## 2020-03-12 DIAGNOSIS — R91.1: Primary | ICD-10-CM

## 2020-03-12 DIAGNOSIS — Z72.0: ICD-10-CM

## 2020-03-12 DIAGNOSIS — Z87.891: ICD-10-CM

## 2020-03-12 DIAGNOSIS — R06.00: ICD-10-CM

## 2020-03-12 NOTE — DIAGNOSTIC IMAGING REPORT
TIME OF EXAM: 3/12/2020 1:18 PM



REASON FOR EXAM: Lung cancer screening. 40 pack year history

smoking. Quit 1 year ago.



COMPARISON: 03/11/2019.



TECHNIQUE:  Low Dose CT helical images obtained through the

chest. Sagittal and coronal reformats were obtained and reviewed.

Dose reduction techniques were utilized.

CTDI vol: 2.22 mGy



FINDINGS: 



Nodules: 

-- A)  4 mm, indeterminate noncalcified groundglass right upper

lobe nodule (image 54 series 2)



Lungs: Lung volumes are normal. No significant emphysema or

fibrosis is present. There is no appreciable bronchiectasis. No

pulmonary mass or consolidation is present. No central

endoluminal airway lesion is seen.



Heart and Mediastinum: Heart size is within normal limits. Small

amount of coronary calcifications are present. Aortic

atherosclerosis is present without aneurysm. No pericardial

effusion is present.  No axillary, supraclavicular, mediastinal,

internal mammary, or hilar adenopathy is present by CT size

criteria.  Normal size and attenuation of the visualized thyroid

gland.



Pleura: Normal pleural spaces. No effusion or pneumothorax. No

pleural nodularity or mass.



Abdomen: Included views of the upper abdomen demonstrate no acute

abnormality.



Bones and soft tissues: Regional skeletal and soft tissue

structures are age-appropriate. 





IMPRESSION: 

1. Stable 4 mm groundglass nodule in the right upper lobe. No new

suspicious pulmonary nodules. Recommend continued surveillance

with annual low-dose chest CT.

2. No thoracic lymphadenopathy.



Result code: Category 2: Benign Appearance or Behavior



Follow up: Continue annual screening with LDCT in 12 months



Dictated by: 



  Dictated on workstation # [id number]

## 2020-05-27 NOTE — XMS REPORT
[facility]

 Created on: 10/01/2017



Shereen Shafer

External Reference #: [id number]

: 1960

Sex: Female



Demographics







 Address  [address] [place]  [address]

 

 Preferred Language  Unknown

 

 Marital Status  Unknown

 

 Scientologist Affiliation  Unknown

 

 Race  Unknown

 

 Ethnic Group  Unknown





Author







 Author  JAVON DE LOS SANTOS

 

 [facility]

 

 Address  [address] [place]  [address]



 

 Phone  [phone]







Care Team Providers







 Care Team Member Name  Role  Phone

 

 JAVON DE LOS SANTOS  Unavailable  [phone]







PROBLEMS







 Type  Condition  ICD9-CM Code  PKN91-JP Code  Onset Dates  Condition Status  
SNOMED Code

 

 Problem  Knee pain     M25.569     Active  88966743

 

 Problem  Dysuria     R30.0     Active  83723687

 

 Problem  Pharyngitis, unspecified etiology     J02.9     Active  318169905

 

 Problem  Dysthymia     F34.1     Active  80408713

 

 Problem  Eustachian tube dysfunction     H69.80     Active  87338665

 

 Problem  Anxiety disorder, unspecified     F41.9     Active  584856764

 

 Problem  Hypertension     I10     Active  62015583







ALLERGIES

No Information



SOCIAL HISTORY

Never Assessed



PLAN OF CARE





VITAL SIGNS





MEDICATIONS







 Medication  Instructions  Dosage  Frequency  Start Date  End Date  Duration  
Status

 

 Alprazolam 1 MG  Orally Twice a day, and 1 tablet at bedtime  0.5 Tablet as 
needed     03 Mar, 2015     28 days  Active







RESULTS

No Results



PROCEDURES

No Known procedures



IMMUNIZATIONS

No Known Immunizations



MEDICAL (GENERAL) HISTORY







 Type  Description  Date

 

 Medical History  depression   

 

 Medical History  hx of anxiety   

 

 Medical History  mitral valve prolapse   

 

 Surgical History  tonsillectomy and adenoidectomy   

 

 Surgical History  partial hysterectomy   

 

 Surgical History  jaw surgery  1974

 

 Surgical History  colonoscopy  16

 

 Hospitalization History  for surgery
[facility]

 Created on: 2018



Shereen Shafer

External Reference #: [id number]

: 1960

Sex: Female



Demographics







 Address  [address] [place]  [address]

 

 Preferred Language  Unknown

 

 Marital Status  Unknown

 

 Baptist Affiliation  Unknown

 

 Race  Unknown

 

 Ethnic Group  Unknown





Author







 Author  JAVON DE LOS SANTOS

 

 Organization  [facility]

 

 Address  [address] [place]  [address]



 

 Phone  [phone]







Care Team Providers







 Care Team Member Name  Role  Phone

 

 JAVON DE LOS SANTOS  Unavailable  [phone]







PROBLEMS







 Type  Condition  ICD9-CM Code  BUT21-QI Code  Onset Dates  Condition Status  
SNOMED Code

 

 Problem  Eustachian tube dysfunction     H69.80     Active  18105828

 

 Problem  Knee pain     M25.569     Active  02801863

 

 Problem  Psoriasis     L40.9     Active  9535544

 

 Problem  Dysuria     R30.0     Active  63522486

 

 Problem  Hypertension     I10     Active  86027189

 

 Problem  Dysthymia     F34.1     Active  89041973

 

 Problem  Pharyngitis, unspecified etiology     J02.9     Active  787719072

 

 Problem  Anxiety disorder, unspecified     F41.9     Active  916742023







ALLERGIES

No Information



ENCOUNTERS







 Encounter  Location  Date  Diagnosis

 

 [place] [id number] [place] [address]29-
5762  06 Mar, 2018  Anxiety disorder, unspecified F41.9

 

 [place] [id number] [place] [address] 44736-
1545    Anxiety disorder, unspecified F41.9

 

 [place] [id number] [place] [address] 39285-
2599     

 

 [place] [id number] [place] [address] 55493-
7109    Anxiety disorder, unspecified F41.9

 

 [place] [address] [address]-
1844  12 Dec, 2017  Anxiety disorder, unspecified F41.9

 

 [place] [id number] [place] [address] 79223-
9826    Anxiety disorder, unspecified F41.9 ; Hypertension I10 ; 
Encounter for screening mammogram for breast cancer Z12.31 ; Psoriasis L40.9 
and BMI 40.0-44.9, adult Z68.41

 

 [facility]  [id number] [place] [address] 25677-
5912    Anxiety disorder, unspecified F41.9

 

 [facility]  [id number] [place] [address] 36379-
9381  23 Oct, 2017   

 

 [facility]  [id number] [place] [address] 02041-
2432  17 Oct, 2017  Anxiety disorder, unspecified F41.9

 

 [facility]  [id number] [place] [address] 49303-
2907  09 Oct, 2017   

 

 [place] [id number] [id number]  
21 Sep, 2017  Dysuria R30.0

 

 [facility]  [id number] [place] [address] 35224-
4454  19 Sep, 2017  Anxiety disorder, unspecified F41.9

 

 [facility]  [address] [place] [address] 93939-
4565  22 Aug, 2017  Anxiety disorder, unspecified F41.9

 

 [facility]  [address] [place] [address] 70528-
9265    Anxiety disorder, unspecified F41.9

 

 [place] [address] [place] [address] 72436-
7874  10 Jul, 2017   

 

 [facility]  [id number] [place] [address] 52574-
4812    Anxiety disorder, unspecified F41.9

 

 [facility]  [id number] [place] [address] 43497-
3997  30 May, 2017  Anxiety disorder, unspecified F41.9

 

 [facility]  [address] [place] [address] 01483-
0778  02 May, 2017  Anxiety disorder, unspecified F41.9

 

 [place] [address] [address]-
9686    Anxiety disorder, unspecified F41.9

 

 [place] [id number] [place] [address] 38496-
1540  07 Mar, 2017   

 

 [place] [id number] [place] [address] 30056-
6914  07 Mar, 2017  Anxiety disorder, unspecified F41.9

 

 [place] [id number] [place] [address] 35707-
1415    Well woman exam Z01.419 ; Cervical cancer screening Z12.4 
and Breast cancer screening Z12.39

 

 [place] [id number] [place] [address] 55132-
4358    Anxiety disorder, unspecified F41.9

 

 [place] [id number] [place] [address] 80740-
3926    Eustachian tube dysfunction H69.80 and Pharyngitis, 
unspecified etiology J02.9

 

 [place] [id number] [place] [address] 33994-
5066    Anxiety disorder, unspecified F41.9

 

 [place] [address] [place] [address] 52948-
7344  05 Dec, 2016  Anxiety disorder, unspecified F41.9

 

 [place] [id number] [place] [address] 44584-
5104    Anxiety disorder, unspecified F41.9

 

 [place] [id number] [place] [address] 74628-
6271  30 Sep, 2016  Anxiety disorder, unspecified F41.9

 

 [place] [address] [place] [address] 38823-
5743  20 Sep, 2016  Irritable bowel syndrome with diarrhea K58.0 ; Hypertension 
I10 ; Family history of diabetes mellitus Z83.3 and Visual changes H53.9

 

 [place] [id number] [address]-
1831  26 Aug, 2016  Anxiety disorder, unspecified F41.9

 

 [facility]  [id number] [place] [address] 07685-
7896  09 Aug, 2016  RLQ abdominal pain R10.31

 

 [facility]  [id number] [place] [address] 52673-
1206  04 Aug, 2016  RLQ abdominal pain R10.31 and Varicose veins of both lower 
extremities I83.93

 

 [facility]  [id number] [place] [address] 79845-
6342    Anxiety disorder, unspecified F41.9

 

 [facility]  [id number] [place] [address] 78525-
2406     

 

 [facility]  [id number] [place] [address] 59099-
5664     

 

 [facility]  [id number] [place] [address] 75660-
3048    Knee pain M25.569

 

 [facility]  [id number] [place] [address] 47330-
8540     

 

 [facility]  [id number] [place] [address] 54612-
6434  27 May, 2016  Anxiety disorder, unspecified F41.9

 

 [facility]  [id number] [place] [address] 60771-
9056     

 

 [facility]  [id number] [place] [address] 19613-
2545     

 

 [facility]  [id number] [place] [address] 10772-
1906  30 Mar, 2016   

 

 [facility]  [id number] [place] [address] 97066
2546  29 Mar, 2016   

 

 [facility]  [id number] [place] [address] 31440-
8666    Hypertension I10 ; Dysthymia F34.1 ; Knee pain M25.569 and 
Eustachian tube dysfunction H69.80

 

 [facility]  [id number] [place] [address] 37950-
6693     

 

 [facility]  [id number] [place] [address] 410288-
4837     

 

 [facility]  [address] 053759-
0968  02 Dec, 2015   

 

 [facility]  [id number] [place] [address] 14220-
0171  01 Dec, 2015   

 

 [facility]  [id number] [place] [address] 936143-
8682     

 

 [facility]  [id number] [place] [address] 22941-
6906     

 

 [facility]  [id number] [place] [address] 63138-
2938  30 Oct, 2015   

 

 [address] [id number] [id number]  
23 Sep, 2015  Dental examination V72.2

 

 [id number] [place] [address] [id number]  
15 Sep, 2015  Allergic rhinitis 477.9 and Chronic serous otitis media of both 
ears 381.10

 

 [facility]  [id number] [place] [address] 06809-
8466  21 Aug, 2015   

 

 [address] [id number] [place] [address] [id number]  
08 Aug, 2015  Sinusitis 473.9 and Bronchitis 490

 

 [facility]  [id number] [place] [address] 59239-
6226     

 

 [facility]  [id number] [place] [address] 88462-
2906     

 

 [facility]  [id number] [place] [address] 71808-
4453     

 

 [facility] [place] [facility]  [address] [id number] [place] [address]-
3111     

 

 [facility] [place] [facility]  [address] [id number] [place] [address]-
4316     

 

 [facility] [place] [facility]  [address] [id number] [place] [address]-
2546  03 Mar, 2015   

 

 [facility] [place] [facility]  [address] [id number] [place] [address]-
2546  03 Mar, 2015   

 

 [facility] [place] [facility]  [address] [id number] [place] [address]-
8741     

 

 [facility] [place] [facility]  [address] [id number] [place] [address]-
3266     

 

 [facility] [place] [facility]  [address] [id number] [place] [address]-
2358     

 

 [facility] [place] [facility]  [address] [id number] [address]-
3291     

 

 [facility] [place] [facility]  [address] [id number][place] [address]-
9504     

 

 [facility] [place] [facility]  [address] [id number] [address]-
2747     

 

 [facility] [place] [facility]  [address] [id number] [address]-
3326     

 

 [facility] [place] [facility]  [address] [id number] [address]-
3947     

 

 [facility] [place] [facility]  [address] [id number][place] [address]-
9671     

 

 [facility] [place] [facility]  [address] [id number][place] [address]-
0196     

 

 [facility] [place] [facility]  [address] [id number] [place] [address]-
2576  30 Dec, 2014   

 

 [facility] [place] [facility]  [address] [id number] [place] [address]-
1227  29 Dec, 2014   

 

 [facility] [place] [facility]  [address] [id number] [place] [address]-
3617  29 Dec, 2014   

 

 [facility] [place] [facility]  [address] [id number] [place] [address]-
4777  03 Dec, 2014   

 

 [facility] [place] [facility]  [address] [id number] [place] [address]-
2628  03 Dec, 2014   

 

 [facility] [place] [facility]  [address] [id number] [place] [address]-
3530     

 

 [facility] [place] [facility]  [address] [id number] [place] [address]-
5813     

 

 [facility] [place] [facility]  [address] [id number] [place] [address]-
1079  23 Oct, 2014   

 

 [facility] [place] [facility]  [address] [id number] [place] [address]-
5862  23 Oct, 2014   

 

 [facility] [place] [facility]  [address] [id number] [place] [address]-
0014  15 Oct, 2014   

 

 [facility] [place] [facility]  [address] [id number] [place] [address]-
0225  15 Oct, 2014   

 

 [facility] [place] [facility]  [address] [id number] [place] [address]-
5372  22 Sep, 2014   

 

 [facility] [place] [facility]  [address] [id number] [place] [address]-
2548  22 Sep, 2014   

 

 [facility] [place] [facility]  [address] [id number] [place] [address]-
2540  10 Sep, 2014   

 

 [facility] [place] [facility]  [address] [id number] [place] [address]
2549  10 Sep, 2014   

 

 [facility] [place] [facility]  [address] [id number] [place] [address]-
2545  03 Sep, 2014   

 

 [facility] [place] [facility]  [address] [id number] [place] [address]
2542  03 Sep, 2014   

 

 [facility] [place] [facility]  [address] [id number] [place] [address]-
5948  29 Aug, 2014   

 

 [facility] [place] [facility]  [address] [id number] [place] [address]-
9908  29 Aug, 2014   

 

 [facility] [place] [facility]  [address] [id number] [place] [address]-
8294  27 Aug, 2014   

 

 [facility] [place] [facility]  [address] [id number] [place] [address]-
6135  27 Aug, 2014   

 

 [facility] [place] [facility]  [address] [id number] [place] [address]-
7417  22 Aug, 2014   

 

 [facility] [place] [facility]  [address] [id number] [place] [address]-
0420  22 Aug, 2014   

 

 [facility] [place] [facility]  [address] [id number] [place] [address]-
4102     

 

 [facility] [place] [facility]  [address] [id number] [place] [address]-
5521     

 

 [facility] [place] [facility]  [address] [id number] [place] [address]-
2164  27 May, 2014   

 

 [facility] [place] [facility]  [address] [id number] [place] [address]-
0902  27 May, 2014   

 

 [facility] [place] [facility]  [address] [id number] [place] [address]-
5451     

 

 [facility] [place] [facility]  [address] [id number] [place] [address]-
5733     

 

 [facility] [place] [facility]  [address] [id number] [place] [address]-
5811  06 Mar, 2014   

 

 [facility] [place] [facility]  [address] [id number] [place] [address]-
6729  06 Mar, 2014   

 

 [facility] [place] [facility]  [address] [id number] [place]
4354     

 

 [facility] [place] [facility]  [address] [id number] [place] [address]-
3397     

 

 [facility] [place] [facility]  [address] [id number] [place] [address]-
2896     

 

 [facility] [place] [facility]  [address] [id number] [place] [address]-
9246     

 

 [facility] [place] [facility]  [address] [id number] [place] [address]-
9956     

 

 [facility] [place] [facility]  [address] [id number] [place] [address]-
3586     

 

 [facility] [place] [facility]  [address] [id number] [place] [address]-
5216     

 

 [facility] [place] [facility]  [address] [id number] [place] [address]-
9769     

 

 [facility] [place] [facility]  [address] [facility] [id number] [place] [address]-
8672  15 Oct, 2013   

 

 [facility] [place] [facility]  [address] [id number] [place] [address]-
1880  15 Oct, 2013   

 

 [facility] [place] [facility]  [address] [id number] [place] [address]-
8869  28 Aug, 2013   

 

 [facility] [place] [facility]  [address] [facility] [id number] [place] [address]-
5991     

 

 [facility] [place] [facility]  [address] [id number] [place] [address]
2546  27 Mar, 2013   

 

 [facility] [place] [facility]  [address] [id number] [place] [address]
2541     

 

 [facility] [place] [facility]  [address] [id number] [place] [address]-
4405     

 

 [facility] [place] [facility]  [address] [id number] [place] [address]
2546  10 Michel, 2013   

 

 [facility] [place] [facility]  [address] [id number] [place] [address]-
0496  03 Dec, 2012   

 

 [facility] [place] [facility]  [address] [id number] [place] [address]-
6396  03 Dec, 2012   

 

 [facility] [place] [facility]  [address] [id number] [place] [address]-
0586     

 

 [facility] [place] [facility]  [address] [id number] [place] [address]-
6506     

 

 [facility] [place] [facility]  [address] [id number] [place] [address]-
5476  08 Oct, 2012   

 

 [facility] [place] [facility]  [address] [id number] [place] [address]-
9686  25 Sep, 2012   

 

 [facility] [place] [facility]  [address] [id number] [place] [address]-
1576  06 Sep, 2012   

 

 [facility] [place] [facility]  [address] [id number] [place] [address]-
9146  10 Aug, 2012   

 

 [facility] [place] [facility]  [address] [id number][place] [address]-
1426     

 

 [facility] [place] [facility]  [address] [id number] [place] [address]-
1976     

 

 [facility] [place] [facility]  [address] [id number][place] [address]-
8926     

 

 [facility] [place] [facility]  [address] [id number] [place] [address]-
4726     

 

 [facility] [place] [facility]  [address] [id number][place] [address]-
7726     

 

 [facility] [place] [facility]  [address] [id number] [place] [address]-
0966     

 

 [facility] [place] [facility]  [address] [id number][place] [address]-
9066     

 

 [facility] [place] [facility]  [address] [id number] [place] [address]-
4066     

 

 [facility] [place] [facility]  [address] [id number] [place] [address]-
6436  31 2012   

 

 [facility]  [address] [id number] [place] [address]-
5747  06 2012   

 

 [facility] [place] [facility]  [address] [id number] [place] [address]-
8495  06 Dec, 2011   

 

 [facility] [place] [facility]  [address] [id number] [place] [address]-
3601  10 2011   

 

 [facility] [place] [facility]  [address] [id number] [place] [address]-
9450  14 2011   

 

 [facility] [place] [facility]  [address] [id number] [place] [address]-
5296  10 May, 2011   

 

 [facility] [place] [facility]  [address] [id number] [place] [address]-
1886  29 Dec, 2010   

 

 [facility] [place] [facility]  [address] [id number] [place] [address]-
3019  16 2010   

 

 [facility] [place] [facility]  [address] [id number] [place] [address]-
4510  18 Oct, 2010   

 

 [facility] [place] [facility]  [address] [id number] [place] [address]-
4694  14 2010   

 

 [facility] [place] [facility]  [address] [id number] [place] [address]-
3456  14 Dec, 2009   

 

 [facility] [place] [facility]  [address] [id number] [place] [address]-
3985  14 Dec, 2009   

 

 [facility] [place] [facility]  [address] [id number] [place] [address]-
5892  10 Dec, 2009   

 

 [facility] [place] [facility]  [address] [id number] [place] [address]-
2794  07 Dec, 2009   

 

 [facility] [place] [facility]  [address] [id number] [place] [address]-
9952  25 2009   

 

 [facility] [place] [facility]  [address] [id number] [place] [address]-
2270  10 2009   

 

 [facility]  [address] [facility] [id number] [place] [address]-
4642  15 Apr, 2009   







IMMUNIZATIONS

No Known Immunizations



SOCIAL HISTORY

Never Assessed



REASON FOR VISIT

Controlled Med Refill



PLAN OF CARE





VITAL SIGNS





MEDICATIONS







 Medication  Instructions  Dosage  Frequency  Start Date  End Date  Duration  
Status

 

 Alprazolam 1 MG  Orally Twice a day, and 1 tablet at bedtime  0.5 Tablet as 
needed     03 Mar, 2015     28 days  Active







RESULTS

No Results



PROCEDURES

No Known procedures



INSTRUCTIONS





MEDICATIONS ADMINISTERED

No Known Medications



MEDICAL (GENERAL) HISTORY







 Type  Description  Date

 

 Medical History  depression   

 

 Medical History  hx of anxiety   

 

 Medical History  mitral valve prolapse   

 

 Surgical History  tonsillectomy and adenoidectomy   

 

 Surgical History  partial hysterectomy   

 

 Surgical History  jaw surgery  1974

 

 Surgical History  colonoscopy  16

 

 Surgical History  colonoscopy  10/2017

 

 Hospitalization History  for surgery
[facility]

 Created on: 2018



Shereen Shafer

External Reference #: [id number]

: 1960

Sex: Female



Demographics







 Address  [address] [place]  [address]

 

 Preferred Language  Unknown

 

 Marital Status  Unknown

 

 Christianity Affiliation  Unknown

 

 Race  Unknown

 

 Ethnic Group  Unknown





Author







 Author  JAHAIRA LAU

 

 [facility]

 

 Address  [address] [place]  [address]



 

 Phone  [phone]







Care Team Providers







 Care Team Member Name  Role  Phone

 

 JAHAIRA LAU  Unavailable  [phone]







PROBLEMS







 Type  Condition  ICD9-CM Code  GOR03-CA Code  Onset Dates  Condition Status  
SNOMED Code

 

 Problem  Bilateral claudication of lower limb     I73.9     Active  25600140

 

 Problem  Coronary artery disease involving native coronary artery of native 
heart with angina pectoris     I25.119     Active  [id number]

 

 Problem  Mitral valve insufficiency, unspecified etiology     I34.0     Active
  46552001

 

 Problem  Thyroid nodule     E04.1     Active  152059726

 

 Problem  Violation of controlled substance agreement     Z91.14    
Active  675018206

 

 Problem  Overactive bladder     N32.81     Active  361746048

 

 Problem  Obstructive sleep apnea     G47.33     Active  49161277

 

 Problem  Essential hypertension     I10     Active  71366483

 

 Problem  Primary insomnia     F51.01     Active  3908670

 

 Problem  Sleep apnea in adult     G47.30     Active  05609109

 

 Problem  Hypertension     I10     Active  38331609

 

 Problem  Knee pain     M25.569     Active  50835108

 

 Problem  Eustachian tube dysfunction     H69.80     Active  00975151

 

 Problem  Dysthymia     F34.1     Active  48844729

 

 Problem  Dysuria     R30.0     Active  68035133

 

 Problem  Psoriasis     L40.9     Active  0682277

 

 Problem  Anxiety disorder, unspecified     F41.9     Active  759575395

 

 Problem  Acute right-sided low back pain with right-sided sciatica     M54.41 
    Active  877396307

 

 Problem  Pharyngitis, unspecified etiology     J02.9     Active  826058012

 

 Problem  Other chronic pain     G89.29     Active  95520591







ALLERGIES

No Known Allergies



ENCOUNTERS







 Encounter  Location  Date  Diagnosis

 

 [facility]  [address] [facility] [id number] [address]-
8922     

 

 [facility]  [address] [facility] [id number] [place] [address]-
6791  12 Dec, 2018  Primary insomnia F51.01 ; Sleep apnea in adult G47.30 ; 
Thyroid nodule E04.1 ; Dysfunction of right eustachian tube H69.81 ; Overactive 
bladder N32.81 and BMI 40.0-44.9, adult Z68.41

 

 [place] [id number] [place] [address] 7[address]13-
6953  12 Dec, 2018  Shortness of breath R06.02 ; Hypertension I10 ; Other 
hyperlipidemia E78.49 ; Obstructive sleep apnea G47.33 and BMI 40.0-44.9, adult 
Z68.41

 

 [place] [address] [address]-
5084  04 Dec, 2018  Thyroid nodule E04.1

 

 [place] [id number] [place] [address] 86910-
8920  04 Dec, 2018   

 

 [address] [address]-
6291  03 Dec, 2018   

 

 [address] [address]-
3602  14 2018  Left hip pain M25.552 ; Left lower quadrant pain R10.32 ; 
Psoriasis L40.9 ; Encounter for screening mammogram for breast cancer Z12.31 
and BMI 40.0-44.9, adult Z68.41

 

 [place] [id number] [place] [address] 90644-
9563  12 Sep, 2018  Dyspnea on exertion R06.09 ; Essential hypertension I10 ; 
Bilateral claudication of lower limb I73.9 ; Mitral valve insufficiency, 
unspecified etiology I34.0 ; Suspected sleep apnea R29.818 ; Tobacco use Z72.0 
and Coronary artery disease involving native coronary artery of native heart 
with angina pectoris I25.119

 

 [place] [address]  [address] [id number] [id number]  
  Shortness of breath R06.02 and Anxiety disorder, unspecified F41.9

 

 [place] [id number] [place] [address] 96291-
1185    Anxiety disorder, unspecified F41.9

 

 [place] [id number] [place] [address] 92722-
7343    Anxiety disorder, unspecified F41.9 ; Shortness of breath 
R06.02 ; Therapeutic drug monitoring Z51.81 ; Family history of diabetes 
mellitus Z83.3 ; BMI 40.0-44.9, adult Z68.41 and Tobacco abuse Z72.0

 

 [place] [id number] [place] [address] 69452-
5747  25 May, 2018  Anxiety disorder, unspecified F41.9 and Pain in right knee 
M25.561

 

 [place] [id number] [place] [address] 82378-
5549  02 May, 2018  Pain in right knee M25.561 ; Pain in left knee M25.562 ; 
Other chronic pain G89.29 ; Anxiety disorder, unspecified F41.9 ; Acute right-
sided low back pain with right-sided sciatica M54.41 and BMI 40.0-44.9, adult 
Z68.41

 

 [place] [id number] [place] [address] 51312-
2133    Anxiety disorder, unspecified F41.9

 

 [place] [id number] [place] [address] 72375-
4411  26 Mar, 2018  Anxiety disorder, unspecified F41.9

 

 [place] [address] [place] [address] 14360-
7806  06 Mar, 2018  Anxiety disorder, unspecified F41.9

 

 [place] [id number] [place] [address] 25822-
3256    Anxiety disorder, unspecified F41.9

 

 [place] [id number] [place] [address] 55505-
3845     

 

 [place] [id number] [place] [address] 55477-
4140    Anxiety disorder, unspecified F41.9

 

 [place] [id number] [address]-
3286  12 Dec, 2017  Anxiety disorder, unspecified F41.9

 

 [place] [id number] [place] [address] 04891-
5368    Anxiety disorder, unspecified F41.9 ; Hypertension I10 ; 
Encounter for screening mammogram for breast cancer Z12.31 ; Psoriasis L40.9 
and BMI 40.0-44.9, adult Z68.41

 

 [place] [id number] [place] [address] 15525-
4835  14 2017  Anxiety disorder, unspecified F41.9

 

 [place] [id number] [place] [address] 23381-
9910  23 Oct, 2017   

 

 [place] [id number] [place] [address] 39566-
9062  17 Oct, 2017  Anxiety disorder, unspecified F41.9

 

 [place] [id number] [place] [address] 53514-
4568  09 Oct, 2017   

 

 [place] [id number] [id number]  
21 Sep, 2017  Dysuria R30.0

 

 [place] [address] [place] [address] 86830-
4415  19 Sep, 2017  Anxiety disorder, unspecified F41.9

 

 [place] [address] [place] [address] 32504-
6966  22 Aug, 2017  Anxiety disorder, unspecified F41.9

 

 [place] [address] [place] [address] 58259-
5391    Anxiety disorder, unspecified F41.9

 

 [place] [id number] [place] [address] 67363-
7124  10 Jul, 2017   

 

 [facility]  [address] [place] [address] 29055-
0511    Anxiety disorder, unspecified F41.9

 

 [place] [id number] [address]-
7535  30 May, 2017  Anxiety disorder, unspecified F41.9

 

 [facility]  [id number] [place] [address] 939244-
2435  02 May, 2017  Anxiety disorder, unspecified F41.9

 

 [facility]  [address] [place] [address] 577992-
1762    Anxiety disorder, unspecified F41.9

 

 [place] [id number] [place] [address] 958899-
4672  07 Mar, 2017   

 

 [place] [id number] [place] [address] 367669-
0218  07 Mar, 2017  Anxiety disorder, unspecified F41.9

 

 [place] [address] [place] [address] 97542-
9246    Well woman exam Z01.419 ; Cervical cancer screening Z12.4 
and Breast cancer screening Z12.39

 

 [place] [address] [place] [address] 39110-
7560    Anxiety disorder, unspecified F41.9

 

 [place] [id number] [place] [address] 68839-
5881    Eustachian tube dysfunction H69.80 and Pharyngitis, 
unspecified etiology J02.9

 

 [place] [address] [place] [address] 89518-
3902    Anxiety disorder, unspecified F41.9

 

 [place] [address] [place] [address] 87683-
8551  05 Dec, 2016  Anxiety disorder, unspecified F41.9

 

 [place] [address] [place] [address] 53884-
8704    Anxiety disorder, unspecified F41.9

 

 [place] [address]65100[place] [address]-
6307  30 Sep, 2016  Anxiety disorder, unspecified F41.9

 

 [facility]  [address]100[place] [address]-
5726  20 Sep, 2016  Irritable bowel syndrome with diarrhea K58.0 ; Hypertension 
I10 ; Family history of diabetes mellitus Z83.3 and Visual changes H53.9

 

 [facility]  [address] [place] [address] 11943-
3250  26 Aug, 2016  Anxiety disorder, unspecified F41.9

 

 [facility]  [id number] [place] [address] 09699-
7282  09 Aug, 2016  RLQ abdominal pain R10.31

 

 [facility]  [id number] [place] [address] 84317-
0862  04 Aug, 2016  RLQ abdominal pain R10.31 and Varicose veins of both lower 
extremities I83.93

 

 [facility]  [id number] [place] [address] 50163-
2235    Anxiety disorder, unspecified F41.9

 

 [facility]  [id number] [place] [address] 05312-
8303     

 

 [facility]  [id number] [place] [address] 05587-
2549     

 

 [facility]  [id number] [place] [address] 42022-
2876    Knee pain M25.569

 

 [facility]  [id number] [place] [address] 99206-
6578     

 

 [facility]  [id number] [place] [address] 63134-
9168  27 May, 2016  Anxiety disorder, unspecified F41.9

 

 [facility]  [id number] [place] [address] 43685-
7378     

 

 [facility]  [id number] [place] [address] 32250-
2710     

 

 [facility]  [id number] [place] [address] 20632-
8457  30 Mar, 2016   

 

 [facility]  [address]100[place] [address]-
2520  29 Mar, 2016   

 

 [facility]  [id number] [place] [address] 492264-
4620    Hypertension I10 ; Dysthymia F34.1 ; Knee pain M25.569 and 
Eustachian tube dysfunction H69.80

 

 [facility]  [id number] [place] [address] 21592-
7204     

 

 [facility]  [id number] [place] [address] 241759-
8794     

 

 [facility]  [id number] [place] [address] 568709-
8565  02 Dec, 2015   

 

 [facility]  [id number] [place] [address] 182625-
4359  01 Dec, 2015   

 

 [facility]  [id number] [place] [address] 05407-
0286     

 

 [facility]  [id number] [place] [address] 28475-
6998     

 

 [facility]  [address] [address]-
1272  30 Oct, 2015   

 

 [facility]  [address] [facility] [id number] [id number]  
23 Sep, 2015  Dental examination V72.2

 

 [facility]  [address] [id number][place] [id number]  
15 Sep, 2015  Allergic rhinitis 477.9 and Chronic serous otitis media of both 
ears 381.10

 

 [facility]  [address] [address]
2546  21 Aug, 2015   

 

 [facility]  [address] [place] [address] AVE [id number] [id number]  
08 Aug, 2015  Sinusitis 473.9 and Bronchitis 490

 

 [facility]  [id number] [place] [address]-
5887     

 

 [facility] [place] [facility]  [address] [id number] [place] [address]-
0003     

 

 [facility] [place] [facility]  [address] [id number] [place] [address]-
8561     

 

 [facility] [place] [facility]  [address] [id number] [place] [address]-
2894     

 

 [facility] [place] [facility]  [address] [id number] [place] [address]-
9848     

 

 [facility] [place] [facility]  [address] [id number] [place] [address]-
5799  03 Mar, 2015   

 

 [facility] [place] [facility]  [address] [id number] [place] [address]-
6872  03 Mar, 2015   

 

 [facility] [place] [facility]  [address] [id number] [place] [address]-
6148     

 

 [facility] [place] [facility]  [address] [id number] [place] [address]-
4946     

 

 [facility] [place] [facility]  [address] [id number] [place] [address]-
7524     

 

 [facility] [place] [facility]  [address] [id number] [place] [address]-
8247     

 

 [facility] [place] [facility]  [address] [id number] [place] [address]-
9707     

 

 [facility] [place] [facility]  [address] [id number] [place] [address]-
3848     

 

 [facility] [place] [facility]  [address] [id number] [place] [address]-
9902     

 

 [facility] [place] [facility]  [address] [id number] [place] [address]-
4327     

 

 [facility] [place] [facility]  [address] [id number] [place] [address]-
4461     

 

 [facility] [place] [facility]  [address] [id number] [place] [address]-
2245     

 

 [facility] [place] [facility]  [address] [id number] [place] 88[place]-
8069  30 Dec, 2014   

 

 [facility] [place] [facility]  [address] [id number] [place]-
9209  29 Dec, 2014   

 

 [facility] [place] [facility]  [address] [id number] [place] [address]-
9820  29 Dec, 2014   

 

 [facility] [place] [facility]  [address] [id number] [place] 56[place]-
8855  03 Dec, 2014   

 

 [facility] [place] [facility]  [address] [id number] [place] [address]-
5871  03 Dec, 2014   

 

 [facility] [place] [facility]  [address] [id number] [place] [address]-
8122     

 

 [facility] [place] [facility]  [address] [id number] [place] [address]-
1370     

 

 [facility] [place] [facility]  [address] [id number] [place] [address]-
5264  23 Oct, 2014   

 

 [facility] [place] [facility]  [address] [id number] [place] [address]-
0342  23 Oct, 2014   

 

 [facility] [place] [facility]  [address] [id number] [place] [address]-
8442  15 Oct, 2014   

 

 [facility] [place] [facility]  [address] [id number] [place] [address]-
5424  15 Oct, 2014   

 

 [facility] [place] [facility]  [address] [id number] [place] [address]-
9019  22 Sep, 2014   

 

 [facility] [place] [facility]  [address] [id number] [place] [address]-
9231  22 Sep, 2014   

 

 [facility] [place] [facility]  [address] [id number] [place]-
8630  10 Sep, 2014   

 

 [facility] [place] [facility]  [address] [id number] [place] [address]-
6262  10 Sep, 2014   

 

 [facility] [place] [facility]  [address] [id number] [place] [address]-
1539  03 Sep, 2014   

 

 [facility] [place] [facility]  [address] [id number] [place] [address]-
5873  03 Sep, 2014   

 

 [facility] [place] [facility]  [address] [id number] [place] [address]-
4014  29 Aug, 2014   

 

 [facility] [place] [facility]  [address] [id number] [place] [address]-
3800  29 Aug, 2014   

 

 [facility] [place] [facility]  [address] [id number] [place] [address]-
8021  27 Aug, 2014   

 

 [facility] [place] [facility]  [address] [id number] [place] [address]-
3829  27 Aug, 2014   

 

 [facility] [place] [facility]  [address] [id number] [place] [address]-
0259  22 Aug, 2014   

 

 [facility] [place] [facility]  [address] [id number] [place] [address]-
5732  22 Aug, 2014   

 

 [facility] [place] [facility]  [address] [id number] [place] [address]-
6474     

 

 [facility] [place] [facility]  [address] [id number] [place] [address]-
2060     

 

 [facility] [place] [facility]  [address] [id number] [place] [address]-
3868  27 May, 2014   

 

 [facility] [place] [facility]  [address] [id number] [place] [address]-
8833  27 May, 2014   

 

 [facility] [place] [facility]  [address] [id number] [place] [address]-
1310     

 

 [facility] [place] [facility]  [address] [id number] [place] [address]-
4400     

 

 [facility] [place] [facility]  [address] [id number] [place] [address]-
3223  06 Mar, 2014   

 

 [facility] [place] [facility]  [address] [id number] [place] [address]-
8916  06 Mar, 2014   

 

 [facility] [place] [facility]  [address] [id number] [place] [address]-
4851     

 

 [facility] [place] [facility]  [address] [id number] [place] 28[place]-
1466     

 

 [facility] [place] [facility]  [address] [id number] [place] [address]-
3644     

 

 [facility] [place] [facility]  [address] [id number] [place] [address]-
9448     

 

 [facility] [place] [facility]  [address] [id number] [place] [address]-
0064     

 

 [facility] [place] [facility]  [address] [facility] [id number] [place] [address]-
9940     

 

 [facility] [place] [facility]  [address] [facility] [id number] [place] [address]-
7411     

 

 [facility] [place] [facility]  [address] [facility] [id number] [place] [address]-
7527     

 

 [facility] [place] [facility]  [address] [facility] [id number] [place] [address]-
7737  15 Oct, 2013   

 

 [facility] [place] [facility]  [address] [facility] [id number] [place] [address]-
8979  15 Oct, 2013   

 

 [facility] [place] [facility]  [address] [id number] [place] [address]-
8622  28 Aug, 2013   

 

 [facility] [place] [facility]  [address] [id number] [place] [address]-
1181     

 

 [facility] [place] [facility]  [address] [id number] [place] [address]-
5764  27 Mar, 2013   

 

 [facility] [place] [facility]  [address] [facility] [id number] [place]-
0807     

 

 [facility] [place] [facility]  [address] [facility] [id number] [place] [address]-
6460     

 

 [facility]  [address] [id number] [place] [address]-
9670  10 Michel, 2013   

 

 [facility] [place] [facility]  [address] [id number] [place] [address]-
2526  03 Dec, 2012   

 

 [facility] [place] [facility]  [address] [facility] [id number] [place] [address]-
8846  03 Dec, 2012   

 

 [facility] [place] [facility]  [address] [id number] [place] [address]-
4571     

 

 [facility] [place] [facility]  [address] [id number] [place] [address]-
3286     

 

 [facility] [place] [facility]  [address] [facility] [id number] [place] [address]-
4848  08 Oct, 2012   

 

 [facility]  [address] [address]-
5240  25 Sep, 2012   

 

 [facility] [place] [facility]  [address] [facility] [id number] [place] [address]-
6374  06 Sep, 2012   

 

 [facility]  [id number] [address]-
3126  10 Aug, 2012   

 

 [facility] [place] [facility]  [address] [facility] [id number] [place] [address]-
1695     

 

 [facility] [place] [facility]  [address] [facility] [id number] [place] [address]-
9342     

 

 [facility] [place] [facility]  [address] [facility] [id number] [place] [address]-
1109     

 

 [facility] [place] [facility]  [address] [facility] [id number] [place] [address]-
9990     

 

 [facility] [place] [facility]  [address] [facility] [id number] [place] [address]-
6411     

 

 [facility] [place] [facility]  [id number] [address]-
2736     

 

 [facility] [place] [facility]  [address] [id number] [place] [address]-
7127     

 

 [facility] [place] [facility]  [address] [id number] [place] [address]-
2339     

 

 [facility] [place] [facility]  [address] [id number] [place] [address]-
8429     

 

 [facility] [place] [facility]  [address] [id number] [place] [address]-
2915     

 

 [facility] [place] [facility]  [address] [id number] [place] [address]-
6867  06 Dec, 2011   

 

 [facility] [place] [facility]  [address] [id number] [place] [address]-
6121  10 Nov, 2011   

 

 [facility]  [address] [id number] [place] [address]-
8206  14 2011   

 

 [facility] [place] [facility]  [address] [id number] [place] [address]-
6455  10 May, 2011   

 

 [facility]  [address] [id number] [place] [address]-
1756  29 Dec, 2010   

 

 [facility] [place] [facility]  [address] [id number] [place] [address]-
1776  16 2010   

 

 [facility] [place] [facility]  [address] [id number] [place] [address]-
3552  18 Oct, 2010   

 

 [facility] [place] [facility]  [address] [id number] [place] [address]-
7169  14 2010   

 

 [facility] [place] [facility]  [address] [id number] [place] [address]-
9945  14 Dec, 2009   

 

 [facility] [place] [facility]  [address] [id number] [place] [address]-
9317  14 Dec, 2009   

 

 [facility] [place] [facility]  [address] [id number] [place] [address]-
8619  10 Dec, 2009   

 

 [facility] [place] [facility]  [address] [id number] [place] [address]-
9106  07 Dec, 2009   

 

 [facility]  [address] [facility] [id number] [place] [address]-
8076     

 

 [facility]  [address] [facility] [id number] [place] [address]-
2546  10 Nov, 2009   

 

 [facility]  [address] [facility] [id number] [place] [address]-
3626  15 Apr, 2009   







IMMUNIZATIONS

No Known Immunizations



SOCIAL HISTORY

Never Assessed



REASON FOR VISIT

shortness of breath with exertion



PLAN OF CARE







 Activity  Details









  









 Follow Up  3 Months Reason:







VITAL SIGNS







 Height  67 in  2018

 

 Weight  276 lbs  2018

 

 Heart Rate  68 bpm  2018

 

 Respiratory Rate  20   2018

 

 Oximetry  98 %  2018

 

 BMI  43.22 kg/m2  2018

 

 Blood pressure systolic  122 mmHg  2018

 

 Blood pressure diastolic  84 mmHg  2018







MEDICATIONS







 Medication  Instructions  Dosage  Frequency  Start Date  End Date  Duration  
Status

 

 Propranolol HCl 20 MG     1 tablet Twice a day Orally           90  Active

 

 Betamethasone Dipropionate 0.05 %  Externally twice weekly  1 application to 
affected area             Active

 

 Fluoxetine HCl 20 MG     TAKE 3 CAPSULES BY MOUTH ONCE DAILY           30  
Active







RESULTS







 Name  Result  Date  Reference Range

 

 Lexiscan Stress Nuclear Test     2018   

 

 Carotid Ultrasound     2018-10-05   

 

 Echo 2D     2018-10-09   

 

 NEIL w/ [employer]     2018-10-09   







PROCEDURES

No Known procedures



INSTRUCTIONS





MEDICATIONS ADMINISTERED

No Known Medications



MEDICAL (GENERAL) HISTORY







 Type  Description  Date

 

 Medical History  depression   

 

 Medical History  hx of anxiety   

 

 Medical History  mitral valve prolapse   

 

 Surgical History  tonsillectomy and adenoidectomy   

 

 Surgical History  partial hysterectomy   

 

 Surgical History  jaw surgery  1974

 

 Surgical History  colonoscopy  16

 

 Surgical History  colonoscopy  10/2017

 

 Hospitalization History  for surgery
[facility]

 Created on: 2018



Shereen Shafer

External Reference #: [id number]

: 1960

Sex: Female



Demographics







 Address  [address] [place]  [address]

 

 Preferred Language  Unknown

 

 Marital Status  Unknown

 

 Confucianist Affiliation  Unknown

 

 Race  Unknown

 

 Ethnic Group  Unknown





Author







 Author  JAVON DE LOS SANTOS

 

 Organization  [facility]

 

 Address  [address] [place]  [address]



 

 Phone  [phone]







Care Team Providers







 Care Team Member Name  Role  Phone

 

 JAVON DE LOS SANTOS  Unavailable  [phone]







PROBLEMS







 Type  Condition  ICD9-CM Code  XWR71-JD Code  Onset Dates  Condition Status  
SNOMED Code

 

 Problem  Hypertension     I10     Active  94652741

 

 Problem  Eustachian tube dysfunction     H69.80     Active  39661822

 

 Problem  Knee pain     M25.569     Active  63607779

 

 Problem  Dysthymia     F34.1     Active  97674957

 

 Problem  Other chronic pain     G89.29     Active  65042613

 

 Problem  Acute right-sided low back pain with right-sided sciatica     M54.41 
    Active  [id number]

 

 Problem  Pharyngitis, unspecified etiology     J02.9     Active  487443096

 

 Problem  Anxiety disorder, unspecified     F41.9     Active  085464221

 

 Problem  Psoriasis     L40.9     Active  3426122

 

 Problem  Dysuria     R30.0     Active  07492760







ALLERGIES

No Information



ENCOUNTERS







 Encounter  Location  Date  Diagnosis

 

 [place] [address] [place] [address] 98794-
3139  12 Sep, 2018   

 

 [place] [id number] [place] [address] 18233-
8071    Anxiety disorder, unspecified F41.9

 

 [place] [id number] [place] [address] 56535-
8955    Anxiety disorder, unspecified F41.9 ; Shortness of breath 
R06.02 ; Therapeutic drug monitoring Z51.81 ; Family history of diabetes 
mellitus Z83.3 ; BMI 40.0-44.9, adult Z68.41 and Tobacco abuse Z72.0

 

 [place] [id number] [place] [address] 47674-
4785  25 May, 2018  Anxiety disorder, unspecified F41.9 and Pain in right knee 
M25.561

 

 [place] [id number] [place] [address] 47509-
5679  02 May, 2018  Pain in right knee M25.561 ; Pain in left knee M25.562 ; 
Other chronic pain G89.29 ; Anxiety disorder, unspecified F41.9 ; Acute right-
sided low back pain with right-sided sciatica M54.41 and BMI 40.0-44.9, adult 
Z68.41

 

 [place] [id number] [place] [address] 66729-
0455    Anxiety disorder, unspecified F41.9

 

 [place] [id number] [place] [address] 14267-
6550  26 Mar, 2018  Anxiety disorder, unspecified F41.9

 

 [place] [id number] [place] [address] 76741-
4917  06 Mar, 2018  Anxiety disorder, unspecified F41.9

 

 [place] [id number] [place] [address] 63123-
5947    Anxiety disorder, unspecified F41.9

 

 [place] [id number] [place] [address] 98860-
4277     

 

 [place] [id number] [place] [address] 85126-
2104    Anxiety disorder, unspecified F41.9

 

 [place] [id number] [place] [address] 19157-
5093  12 Dec, 2017  Anxiety disorder, unspecified F41.9

 

 [place] [id number] [place] [address] 42640-
1706    Anxiety disorder, unspecified F41.9 ; Hypertension I10 ; 
Encounter for screening mammogram for breast cancer Z12.31 ; Psoriasis L40.9 
and BMI 40.0-44.9, adult Z68.41

 

 [place] [id number] [place] [address] 10269-
3533  14 2017  Anxiety disorder, unspecified F41.9

 

 [facility]  [address] [address]-
5020  23 Oct, 2017   

 

 [facility]  [address] [place] [address] 26450-
3656  17 Oct, 2017  Anxiety disorder, unspecified F41.9

 

 [facility]  [address] [place] [address] 39613-
8776  09 Oct, 2017   

 

 [facility] [id number] [address]  
21 Sep, 2017  Dysuria R30.0

 

 [facility]  [id number] [place] [address] 22513-
7836  19 Sep, 2017  Anxiety disorder, unspecified F41.9

 

 [facility]  [address] [place] [address] 80548-
2822  22 Aug, 2017  Anxiety disorder, unspecified F41.9

 

 [facility]  [address] [place] [address] 24217-
0251    Anxiety disorder, unspecified F41.9

 

 [facility]  [id number] [place] [address] 98937-
8718  10 Jul, 2017   

 

 [facility]  [address] [place] [address] 19462-
4390    Anxiety disorder, unspecified F41.9

 

 [facility]  [address] [place] [address] 54792-
2248  30 May, 2017  Anxiety disorder, unspecified F41.9

 

 [facility]  [address] [place] [address] 91785-
5049  02 May, 2017  Anxiety disorder, unspecified F41.9

 

 [facility]  [address] [place] [address] 41118045-
7285    Anxiety disorder, unspecified F41.9

 

 [facility]  [address] [place] [address] 77260-
9228  07 Mar, 2017   

 

 [place] [id number] [place] [address] 13424-
9832  07 Mar, 2017  Anxiety disorder, unspecified F41.9

 

 [place] [address] [place] [id number] [place] [address] 25919-
7883    Well woman exam Z01.419 ; Cervical cancer screening Z12.4 
and Breast cancer screening Z12.39

 

 [place] [address] [address]-
5502    Anxiety disorder, unspecified F41.9

 

 [place] [address] [place] [id number] [place] [address] 36342-
9943    Eustachian tube dysfunction H69.80 and Pharyngitis, 
unspecified etiology J02.9

 

 [place] [address] [address]208-
8712    Anxiety disorder, unspecified F41.9

 

 [place] [address] 29669-
9603  05 Dec, 2016  Anxiety disorder, unspecified F41.9

 

 [place] [address] 78637-
7806    Anxiety disorder, unspecified F41.9

 

 [place] [address] [id number] [place] [address] 42656-
2182  30 Sep, 2016  Anxiety disorder, unspecified F41.9

 

 [place] [address] [place] [id number] [place] [address] 54847-
8007  20 Sep, 2016  Irritable bowel syndrome with diarrhea K58.0 ; Hypertension 
I10 ; Family history of diabetes mellitus Z83.3 and Visual changes H53.9

 

 [place] [address] [place] [id number] [place] [address] 76396-
0795  26 Aug, 2016  Anxiety disorder, unspecified F41.9

 

 [place] [address] [place] [id number] [place] [address] 91042-
4453  09 Aug, 2016  RLQ abdominal pain R10.31

 

 [facility]  [id number] [place] [address] 44277-
3044  04 Aug, 2016  RLQ abdominal pain R10.31 and Varicose veins of both lower 
extremities I83.93

 

 [facility]  [id number] [place] [address] 47586-
9707    Anxiety disorder, unspecified F41.9

 

 [facility]  [id number] [place] [address] 12027-
2070     

 

 [facility]  [id number] [place] [address] 02029-
5196     

 

 [facility]  [id number] [place] [address] 23743-
3770    Knee pain M25.569

 

 [facility]  [id number] [place] [address] 28020-
6638     

 

 [facility]  [id number] [place] [address] 21359-
3476  27 May, 2016  Anxiety disorder, unspecified F41.9

 

 [facility]  [id number] [place] [address] 94524-
2776     

 

 [facility]  [id number] [place] [address] 78989-
6013     

 

 [facility]  [id number] [place] [address] 49308-
9922  30 Mar, 2016   

 

 [facility]  [id number] [place] [address] 91457-
7007  29 Mar, 2016   

 

 [facility]  [id number] [place] [address] 59031-
5857    Hypertension I10 ; Dysthymia F34.1 ; Knee pain M25.569 and 
Eustachian tube dysfunction H69.80

 

 [facility]  [id number] [place] [address] 67377-
3031     

 

 [facility]  [address]100[place] [address]-
6296     

 

 [facility]  [address] [address]-
2733  02 Dec, 2015   

 

 [facility]  [address]100[place] [address]-
7415  01 Dec, 2015   

 

 [facility]  [address]100[place] [address]-
6026     

 

 [facility]  [address]005[address]100[place] [address]-
3608     

 

 [facility]  [address] [address]-
0730  30 Oct, 2015   

 

 [facility]  [address] [id number] [id number]  
23 Sep, 2015  Dental examination V72.2

 

 [facility]  [address] [id number] [id number]  
15 Sep, 2015  Allergic rhinitis 477.9 and Chronic serous otitis media of both 
ears 381.10

 

 [facility]  [address] [address]-
3197  21 Aug, 2015   

 

 [facility]  [address] [id number] [address]  
08 Aug, 2015  Sinusitis 473.9 and Bronchitis 490

 

 [facility]  [address] [address]-
3191     

 

 [facility]  [id number] [address]-
1652     

 

 [facility]  [address]100[place] [address]-
2291     

 

 [facility]  [address] [address]-
3042     

 

 [facility]  [address]100[place] 438852-
5392     

 

 [facility] [place] [facility]  [address] [id number] [place] [address]-
2509  03 Mar, 2015   

 

 [facility] [place] [facility]  [address] [id number] [place] [address]-
2016  03 Mar, 2015   

 

 [facility] [place] [facility]  [address] [id number] [place] [address]-
7731     

 

 [facility] [place] [facility]  [address] [id number] [place] [address]-
0416     

 

 [facility] [place] [facility]  [address] [id number] [place] [address]-
9104     

 

 [facility] [place] [facility]  [address] [id number] [place] [address]-
6496     

 

 [facility] [place] [facility]  [address] [id number] [place] [address]-
7174     

 

 [facility] [place] [facility]  [address] [id number] [place] [address]-
5340     

 

 [facility] [place] [facility]  [address] [id number] [place] [address]-
0158     

 

 [facility] [place] [facility]  [address] [id number] [place] [address]-
3017     

 

 [facility] [place] [facility]  [address] [id number] [place] [address]-
5394     

 

 [facility] [place] [facility]  [address] [id number] [place] [address]-
3960     

 

 [facility] [place] [facility]  [address] [id number] [place] [address]-
6297  30 Dec, 2014   

 

 [facility] [place] [facility]  [address] [id number] [place] [address]-
4816  29 Dec, 2014   

 

 [facility] [place] [facility]  [address] [id number] [place] [address]-
8390  29 Dec, 2014   

 

 [facility] [place] [facility]  [address] [id number] [place] [address]-
8417  03 Dec, 2014   

 

 [facility] [place] [facility]  [address] [id number] [place] [address]-
7003  03 Dec, 2014   

 

 [facility] [place] [facility]  [address] [id number] [place] [address]-
7178     

 

 [facility] [place] [facility]  [address] [id number] [place] [address]-
6052     

 

 [facility] [place] [facility]  [address] [id number] [place] [address]-
0235  23 Oct, 2014   

 

 [facility] [place] [facility]  [address] [id number] [place] [address]-
7765  23 Oct, 2014   

 

 [facility] [place] [facility]  [address] [id number] [place] [address]-
2863  15 Oct, 2014   

 

 [facility] [place] [facility]  [address] [id number] [place] [address]-
8119  15 Oct, 2014   

 

 [facility] [place] [facility]  [address] [id number] [place] [address]-
5287  22 Sep, 2014   

 

 [facility] [place] [facility]  [address] [id number] [place] [address]-
4863  22 Sep, 2014   

 

 [facility] [place] [facility]  [address] [id number] [place] [address]-
9717  10 Sep, 2014   

 

 [facility] [place] [facility]  [address] [id number] [place] [address]-
9863  10 Sep, 2014   

 

 [facility] [place] [facility]  [address] [id number] [place] [address]-
2724  03 Sep, 2014   

 

 [facility] [place] [facility]  [address] [id number] [place] [address]-
3669  03 Sep, 2014   

 

 [facility] [place] [facility]  [address] [id number] [place] [address]-
2525  29 Aug, 2014   

 

 [facility] [place] [facility]  [address] [id number] [place] [address]-
1854  29 Aug, 2014   

 

 [facility] [place] [facility]  [address] [id number] [place] [address]-
4878  27 Aug, 2014   

 

 [facility] [place] [facility]  [address] [id number] [place] [address]-
6226  27 Aug, 2014   

 

 [facility] [place] [facility]  [address] [id number] [place] [address]-
5128  22 Aug, 2014   

 

 [facility] [place] [facility]  [address] [id number] [place] [address]-
4968  22 Aug, 2014   

 

 [facility] [place] [facility]  [address] [id number] [place] [address]-
1295     

 

 [facility] [place] [facility]  [address] [id number] [place] [address]-
3821     

 

 [facility] [place] [facility]  [address] [id number] [place] [address]-
2826  27 May, 2014   

 

 [facility] [place] [facility]  [address] [id number] [place] [address]-
0146  27 May, 2014   

 

 [facility] [place] [facility]  [address] [id number] [place] [address]-
0226     

 

 [facility] [place] [facility]  [address] [id number] [place] [address]-
7830     

 

 [facility] [place] [facility]  [address] [id number] [place] [address]-
7950  06 Mar, 2014   

 

 [facility] [place] [facility]  [address] [id number] [place] [address]-
5001  06 Mar, 2014   

 

 [facility] [place] [facility]  [address] [id number] [place] [address]-
9253     

 

 [facility] [place] [facility]  [address] [id number] [place] [address]-
9988     

 

 [facility] [place] [facility]  [address] [id number] [place] [address]-
1803     

 

 [facility] [place] [facility]  [address] [id number] [place] [address]-
4042     

 

 [facility]  [address] [id number] [place] [address]-
3516     

 

 [facility] [place] [facility]  [address] [id number] [place]-
7046     

 

 [facility] [place] [facility]  [address] [id number] [place] [address]-
0166     

 

 [facility] [place] [facility]  [address] [id number] [place] [address]-
9672     

 

 [facility]  [address] [id number] [place] [address]-
1397  15 Oct, 2013   

 

 [facility] [place] [facility]  [address] [id number] [place] [address]-
5029  15 Oct, 2013   

 

 [facility]  [address] [id number] [place] [address]-
3980  28 Aug, 2013   

 

 [facility] [place] [facility]  [address] [id number] [place] [address]-
1311     

 

 [facility] [place] [facility]  [address] [id number] [place] [address]-
0172  27 Mar, 2013   

 

 [facility] [place] [facility]  [address] [id number] [place] [address]-
5943     

 

 [facility] [place] [facility]  [address] [id number] [place] [address]-
6914     

 

 [facility] [place] [facility]  [address] [id number] [place] [address]-
3224  10 Michel, 2013   

 

 [facility] [place] [facility]  [address] [id number] [place] [address]-
5437  03 Dec, 2012   

 

 [facility] [place] [facility]  [address] [id number] [place]
6551  03 Dec, 2012   

 

 [facility] [place] [facility]  [address] [id number] [address]-
5233     

 

 [facility] [place] [facility]  [address] [id number] [place] [address]-
8120     

 

 [facility] [place] [facility]  [address] [id number] [place] [address]-
0276  08 Oct, 2012   

 

 [facility] [place] [facility]  [address] [id number] [place] [address]-
0606  25 Sep, 2012   

 

 [facility] [place] [facility]  [address] [id number] [place] [address]-
3780  06 Sep, 2012   

 

 [facility] [place] [facility]  [address] [id number] [place] [address]-
5906  10 Aug, 2012   

 

 [facility] [place] [facility]  [address] [id number] [place] [address]-
7481     

 

 [facility] [place] [facility]  [address] [facility] [id number] [place] [address]-
6730     

 

 [facility] [place] [facility]  [address] [id number] [place] [address]-
6525     

 

 [facility] [place] [facility]  [address] [facility] [id number] [place] [address]-
6225     

 

 [facility] [place] [facility]  [address] [facility] [id number] [place] [address]-
4367     

 

 [facility] [place] [facility]  [address] [facility] [id number] [place] [address]-
0567     

 

 [facility] [place] [facility]  [address] [id number][place] [address]-
0057     

 

 [facility] [place] [facility]  [address] [id number] [place] [address]-
8438     

 

 [facility] [place] [facility]  [address] [id number] [place] [address]-
6311     

 

 [facility] [place] [facility]  [address] [facility] [id number][place] [address]-
5055     

 

 [facility] [place] [facility]  [address] [id number] [address]-
5089  06 Dec, 2011   

 

 [facility]  [address], [place] [address]-
4568  10 2011   

 

 [facility]  [address]100[place], [place] [address]-
4991  14 2011   

 

 [facility]  [address]100[place] [address]-
8624  10 May, 2011   

 

 [facility]  [address]100[place] [address]-
0176  29 Dec, 2010   

 

 [facility]  [address] [place] [address] 80560-
0107  16 2010   

 

 [facility]  [address] [address]-
6938  18 Oct, 2010   

 

 [facility]  [address] [place] [address] 67914-
9909  14 2010   

 

 [facility]  [address], [place] [address]-
8376  14 Dec, 2009   

 

 [facility]  [address], [place] [address]-
6470  14 Dec, 2009   

 

 [facility]  [address], [place] [address]-
8409  10 Dec, 2009   

 

 [facility]  [address]100[place], [place] [address]-
1618  07 Dec, 2009   

 

 [facility]  [id number] [address]-
1786     

 

 [facility]  [address]100[place] [address]25-
7652  10 2009   

 

 [facility]  [address] [address]-
4054  15 2009   







IMMUNIZATIONS

No Known Immunizations



SOCIAL HISTORY

Never Assessed



REASON FOR VISIT

Controlled Med Refill 18



PLAN OF CARE





VITAL SIGNS





MEDICATIONS







 Medication  Instructions  Dosage  Frequency  Start Date  End Date  Duration  
Status

 

 Alprazolam 1 MG  Orally Twice a day, and 1 tablet at bedtime  0.5 Tablet as 
needed     03 Mar, 2015     28 days  Active







RESULTS

No Results



PROCEDURES

No Known procedures



INSTRUCTIONS





MEDICATIONS ADMINISTERED

No Known Medications



MEDICAL (GENERAL) HISTORY







 Type  Description  Date

 

 Medical History  depression   

 

 Medical History  hx of anxiety   

 

 Medical History  mitral valve prolapse   

 

 Surgical History  tonsillectomy and adenoidectomy   

 

 Surgical History  partial hysterectomy   

 

 Surgical History  jaw surgery  1974

 

 Surgical History  colonoscopy  16

 

 Surgical History  colonoscopy  10/2017

 

 Hospitalization History  for surgery
[facility]

 Created on: 2018



Shereen Shafer

External Reference #: [id number]

: 1960

Sex: Female



Demographics







 Address  [address] [place]  [address]

 

 Preferred Language  Unknown

 

 Marital Status  Unknown

 

 Taoism Affiliation  Unknown

 

 Race  Unknown

 

 Ethnic Group  Unknown





Author







 Author  JAVON DE LOS SANTOS

 

 Organization  [facility]

 

 Address  [address] [place]  [address]



 

 Phone  [phone]







Care Team Providers







 Care Team Member Name  Role  Phone

 

 JAVON DE LOS SANTOS  Unavailable  [phone]







PROBLEMS







 Type  Condition  ICD9-CM Code  UAU42-CM Code  Onset Dates  Condition Status  
SNOMED Code

 

 Problem  Dysuria     R30.0     Active  11803309

 

 Problem  Acute right-sided low back pain with right-sided sciatica     M54.41 
    Active  805409415

 

 Problem  Psoriasis     L40.9     Active  9286048

 

 Problem  Thyroid nodule     E04.1     Active  535226289

 

 Problem  Coronary artery disease involving native coronary artery of native 
heart with angina pectoris     I25.119     Active  [id number]

 

 Problem  Essential hypertension     I10     Active  72493031

 

 Problem  Other chronic pain     G89.29     Active  22339953

 

 Problem  Mitral valve insufficiency, unspecified etiology     I34.0     Active
  80058601

 

 Problem  Bilateral claudication of lower limb     I73.9     Active  97794714

 

 Problem  Violation of controlled substance agreement     Z91.14    
Active  943231531

 

 Problem  Dysthymia     F34.1     Active  82909121

 

 Problem  Hypertension     I10     Active  57014327

 

 Problem  Knee pain     M25.569     Active  63036267

 

 Problem  Anxiety disorder, unspecified     F41.9     Active  261473298

 

 Problem  Eustachian tube dysfunction     H69.80     Active  88986647

 

 Problem  Pharyngitis, unspecified etiology     J02.9     Active  222393182







ALLERGIES

No Information



ENCOUNTERS







 Encounter  Location  Date  Diagnosis

 

 [facility]  [address], [place] [address]-
2303  12 Dec, 2018   

 

 [facility]  [address] [place] [address]-
7898  12 Dec, 2018   

 

 [facility]  [address], [place] [address]-
3355  04 Dec, 2018  Thyroid nodule E04.1

 

 [facility]  [id number], [place] [address]-
8951  04 Dec, 2018   

 

 [place] [address] [place] [address]-
7763  03 Dec, 2018   

 

 [place] [address] [place] [address]-
9531  14 2018  Left hip pain M25.552 ; Left lower quadrant pain R10.32 ; 
Psoriasis L40.9 ; Encounter for screening mammogram for breast cancer Z12.31 
and BMI 40.0-44.9, adult Z68.41

 

 [place] [address] [place] [phone]
[phone]  12 Sep, 2018  Dyspnea on exertion R06.09 ; Essential hypertension I10 ; 
Bilateral claudication of lower limb I73.9 ; Mitral valve insufficiency, 
unspecified etiology I34.0 ; Suspected sleep apnea R29.818 ; Tobacco use Z72.0 
and Coronary artery disease involving native coronary artery of native heart 
with angina pectoris I25.119

 

 [address] [id number] [id number]  
  Shortness of breath R06.02 and Anxiety disorder, unspecified F41.9

 

 [address] [place] [phone]
[phone]    Anxiety disorder, unspecified F41.9

 

 [place] [id number] [place] [address] [phone]
[phone]    Anxiety disorder, unspecified F41.9 ; Shortness of breath 
R06.02 ; Therapeutic drug monitoring Z51.81 ; Family history of diabetes 
mellitus Z83.3 ; BMI 40.0-44.9, adult Z68.41 and Tobacco abuse Z72.0

 

 [address] [place] [phone]
[phone]  25 May, 2018  Anxiety disorder, unspecified F41.9 and Pain in right knee 
M25.561

 

 [place] [id number] [place] [address] [phone]
[phone]  02 May, 2018  Pain in right knee M25.561 ; Pain in left knee M25.562 ; 
Other chronic pain G89.29 ; Anxiety disorder, unspecified F41.9 ; Acute right-
sided low back pain with right-sided sciatica M54.41 and BMI 40.0-44.9, adult 
Z68.41

 

 [place] [address] [phone]
[phone]    Anxiety disorder, unspecified F41.9

 

 [place] [address] [phone]
[phone]  26 Mar, 2018  Anxiety disorder, unspecified F41.9

 

 [place] [address] [phone]
[phone]  06 Mar, 2018  Anxiety disorder, unspecified F41.9

 

 [place] [address] [phone]
[phone]    Anxiety disorder, unspecified F41.9

 

 [place] [address] [phone]
[phone]     

 

 [place] [address] [phone]
[phone]    Anxiety disorder, unspecified F41.9

 

 [place] [id number] [place] [address] 87414-
0041  12 Dec, 2017  Anxiety disorder, unspecified F41.9

 

 [place] [id number] [place] [address] 78583-
5652    Anxiety disorder, unspecified F41.9 ; Hypertension I10 ; 
Encounter for screening mammogram for breast cancer Z12.31 ; Psoriasis L40.9 
and BMI 40.0-44.9, adult Z68.41

 

 [place] [address] [address]-
8408  14 2017  Anxiety disorder, unspecified F41.9

 

 [place] [address] [address]-
4464  23 Oct, 2017   

 

 [facility]  [address]65100[place], [place] [address]-
2655  17 Oct, 2017  Anxiety disorder, unspecified F41.9

 

 [facility]  [address]00565100[place], [place] [address]-
2815  09 Oct, 2017   

 

 [place] [address] [facility] [id number] [id number]  
21 Sep, 2017  Dysuria R30.0

 

 [facility]  [address]65100[place], [place] [address]-
9283  19 Sep, 2017  Anxiety disorder, unspecified F41.9

 

 [facility]  [address] [place] [address] 60149-
8544  22 Aug, 2017  Anxiety disorder, unspecified F41.9

 

 [facility]  [address] [place] [address] 21280-
7125    Anxiety disorder, unspecified F41.9

 

 [facility]  [address], [place] [address]-
3249  10 Jul, 2017   

 

 [facility]  [address] [place] [address] 19786-
9230    Anxiety disorder, unspecified F41.9

 

 [facility]  [address] [place] [address] 91731-
9986  30 May, 2017  Anxiety disorder, unspecified F41.9

 

 [facility]  [address] [place] [address] 45687-
9988  02 May, 2017  Anxiety disorder, unspecified F41.9

 

 [facility]  [address], [place] [address]-
2206    Anxiety disorder, unspecified F41.9

 

 [facility]  [address]00565100[place] 343121-
3817  07 Mar, 2017   

 

 [facility]  [address], [place] [address]-
2813  07 Mar, 2017  Anxiety disorder, unspecified F41.9

 

 [place] [address] [place] [address] 43963-
4056   2017  Well woman exam Z01.419 ; Cervical cancer screening Z12.4 
and Breast cancer screening Z12.39

 

 [place] [id number] [place] [address] 30292-
0565    Anxiety disorder, unspecified F41.9

 

 [place] [id number] [place] [address] 25343-
2998    Eustachian tube dysfunction H69.80 and Pharyngitis, 
unspecified etiology J02.9

 

 [place] [id number] [place] [address] 57750-
9464    Anxiety disorder, unspecified F41.9

 

 [place] [id number] [place] [address] 00489-
1330  05 Dec, 2016  Anxiety disorder, unspecified F41.9

 

 [place] [id number] [place] [address] 39931-
6879    Anxiety disorder, unspecified F41.9

 

 [place] [id number] [place] [address] 76458-
5939  30 Sep, 2016  Anxiety disorder, unspecified F41.9

 

 [place] [id number] [place] [address] 93963-
8886  20 Sep, 2016  Irritable bowel syndrome with diarrhea K58.0 ; Hypertension 
I10 ; Family history of diabetes mellitus Z83.3 and Visual changes H53.9

 

 [place] [id number] [place] [address] 97814-
2103  26 Aug, 2016  Anxiety disorder, unspecified F41.9

 

 [place] [id number] [place] [address] 76628-
7064  09 Aug, 2016  RLQ abdominal pain R10.31

 

 [place] [id number] [place] [address] 07231-
0854  04 Aug, 2016  RLQ abdominal pain R10.31 and Varicose veins of both lower 
extremities I83.93

 

 [facility]  [id number] [place] [address] 61930-
6451    Anxiety disorder, unspecified F41.9

 

 [facility]  [id number] [place] [address] 19329-
7506     

 

 [facility]  [id number] [place] [address] 41846-
0074     

 

 [facility]  [id number] [place] [address] 26572-
8805    Knee pain M25.569

 

 [facility]  [id number] [place] [address] 54715-
0290     

 

 [facility]  [id number] [place] [address] 54521-
5912  27 May, 2016  Anxiety disorder, unspecified F41.9

 

 [facility]  [id number] [place] [address] 12140-
5153     

 

 [facility]  [id number] [place] [address] 62359-
3753     

 

 [facility]  [id number] [place] [address] 31131-
1353  30 Mar, 2016   

 

 [facility]  [id number] [place] [address] 76499-
2128  29 Mar, 2016   

 

 [facility]  [id number] [place] [address] 92242-
0411    Hypertension I10 ; Dysthymia F34.1 ; Knee pain M25.569 and 
Eustachian tube dysfunction H69.80

 

 [facility]  [id number] [place] [address] 40184-
8115     

 

 [facility]  [id number] [place] [address] 47819-
5311     

 

 [facility]  [address]65100[place], [place] [address]-
4016  02 Dec, 2015   

 

 [facility]  [address] [place] [address] 40030-
4150  01 Dec, 2015   

 

 [facility]  [address]65100[place], [place] [address]-
9828     

 

 [facility]  [id number] [place] [address] 88741-
9410     

 

 [facility]  [address]00565100[place], [place] [address]-
5511  30 Oct, 2015   

 

 [facility]  [id number] [place] [id number]  
23 Sep, 2015  Dental examination V72.2

 

 [facility]  [address] [id number] [address]  
15 Sep, 2015  Allergic rhinitis 477.9 and Chronic serous otitis media of both 
ears 381.10

 

 [facility]  [address]65100[place], [place] [address]-
7952  21 Aug, 2015   

 

 [facility]  [address] [id number] [address]  
08 Aug, 2015  Sinusitis 473.9 and Bronchitis 490

 

 [facility]  [address]00565100[place], [place] [address]-
9070     

 

 [facility]  [address]00565100[place], [place] [address]-
1770     

 

 [facility]  [address]00565100[place], [place] [address]-
6983     

 

 [facility]  [id number] [place] [address] 52299-
5911     

 

 [facility]  [address]00565100[place] [address]12826-
0862     

 

 [facility]  [address]00565[address], [place] [address]-
5604  03 Mar, 2015   

 

 [facility] [place] [facility]  [address] [id number] [place] [address]-
6613  03 Mar, 2015   

 

 [facility] [place] [facility]  [address] [id number] [place] [address]-
5237     

 

 [facility] [place] [facility]  [address] [id number] [place] [address]-
6466     

 

 [facility] [place] [facility]  [address] [id number] [place] [address]-
3517     

 

 [facility] [place] [facility]  [address] [id number] [place] [address]-
5576     

 

 [facility] [place] [facility]  [address] [id number] [place] [address]-
3826     

 

 [facility] [place] [facility]  [address] [id number] [place] [address]-
5675     

 

 [facility] [place] [facility]  [address] [id number] [place] [address]-
0903     

 

 [facility] [place] [facility]  [address] [id number] [place] [address]-
0791     

 

 [facility] [place] [facility]  [address] [id number] [place] [address]-
5707     

 

 [facility] [place] [facility]  [address] [id number] [place] [address]-
3276     

 

 [facility] [place] [facility]  [address] [id number] [place] [address]-
3928  30 Dec, 2014   

 

 [facility] [place] [facility]  [address] [id number] [place] [address]-
1067  29 Dec, 2014   

 

 [facility] [place] [facility]  [address] [id number] [place] [address]-
6036  29 Dec, 2014   

 

 [facility] [place] [facility]  [address] [id number] [place] [address]-
2546  03 Dec, 2014   

 

 [facility] [place] [facility]  [address] [id number] [place] [address]-
5900  03 Dec, 2014   

 

 [facility] [place] [facility]  [address] [id number] [place] [address]-
8609     

 

 [facility] [place] [facility]  [address] [id number] [place] [address]-
2021     

 

 [facility] [place] [facility]  [address] [id number] [place] [address]-
4223  23 Oct, 2014   

 

 [facility] [place] [facility]  [address] [id number] [place] [address]-
9653  23 Oct, 2014   

 

 [facility] [place] [facility]  [address] [id number] [place] [address]-
7582  15 Oct, 2014   

 

 [facility] [place] [facility]  [address] [id number] [place] [address]-
4246  15 Oct, 2014   

 

 [facility] [place] [facility]  [address] [id number] [place] [address]-
7946  22 Sep, 2014   

 

 [facility] [place] [facility]  [address] [id number] [place] [address]-
1387  22 Sep, 2014   

 

 [facility] [place] [facility]  [address] [id number] [place] [address]-
8636  10 Sep, 2014   

 

 [facility] [place] [facility]  [address] [id number] [place] [address]-
3758  10 Sep, 2014   

 

 [facility] [place] [facility]  [address] [id number] [place] [address]-
5807  03 Sep, 2014   

 

 [facility] [place] [facility]  [address] [id number] [place] [address]-
1570  03 Sep, 2014   

 

 [facility] [place] [facility]  [address] [id number] [place] [address]-
6364  29 Aug, 2014   

 

 [facility] [place] [facility]  [address] [id number] [place] [address]-
8473  29 Aug, 2014   

 

 [facility] [place] [facility]  [address] [id number] [place] [address]-
0871  27 Aug, 2014   

 

 [facility] [place] [facility]  [address] [id number] [place] [address]-
3363  27 Aug, 2014   

 

 [facility] [place] [facility]  [address] [id number] [place] [address]-
0802  22 Aug, 2014   

 

 [facility] [place] [facility]  [address] [id number] [place] [address]-
5318  22 Aug, 2014   

 

 [facility] [place] [facility]  [address] [id number] [place] [address]-
7486     

 

 [facility] [place] [facility]  [address] [id number] [place] [address]-
8774     

 

 [facility] [place] [facility]  [address] [id number] [place] [address]-
9626  27 May, 2014   

 

 [facility] [place] [facility]  [address] [id number] [place] [address]-
9280  27 May, 2014   

 

 [facility] [place] [facility]  [address] [id number] [place] [address]-
3587     

 

 [facility] [place] [facility]  [address] [id number] [place] [address]-
1190     

 

 [facility] [place] [facility]  [address] [id number] [place] [address]-
0400  06 Mar, 2014   

 

 [facility] [place] [facility]  [address] [id number] [place] [address]-
6856  06 Mar, 2014   

 

 [facility] [place] [facility]  [address] [id number] [place] [address]-
6684     

 

 [facility] [place] [facility]  [address] [id number] [place] [address]-
6427     

 

 [facility] [place] [facility]  [address] [id number] [place] [address]-
1659     

 

 [facility] [place] [facility]  [address] [id number] [place] [address]-
8508     

 

 [facility] [place] [facility]  [address] [id number] [place] [address]-
2576     

 

 [facility]  [address] [id number] [place] [address]-
8147     

 

 [facility] [place] [facility]  [address] [id number] [place] [address]-
7366     

 

 [facility] [place] [facility]  [address] [id number] [place] [address]-
1456     

 

 [facility] [place] [facility]  [address] [id number] [place] [address]-
7009  15 Oct, 2013   

 

 [facility] [place] [facility]  [address] [id number] [place] [address]-
2031  15 Oct, 2013   

 

 [facility] [place] [facility]  [address] [id number] [place] [address]-
0924  28 Aug, 2013   

 

 [facility] [place] [facility]  [address] [facility] [id number] [place] [address]-
5639     

 

 [facility] [place] [facility]  [address] [id number] [place] [address]-
2505  27 Mar, 2013   

 

 [facility] [place] [facility]  [address] [id number] [place] [address]-
6484     

 

 [facility] [place] [facility]  [address] [facility] [id number] [place] [address]-
9624     

 

 [facility] [place] [facility]  [address] [facility] [id number] [place] [address]-
3022  10 Michel, 2013   

 

 [facility] [place] [facility]  [address] [facility] [id number] [place] [address]-
8298  03 Dec, 2012   

 

 [facility] [place] [facility]  [address] [id number] [place]-
1549  03 Dec, 2012   

 

 [facility] [place] [facility]  [address] [facility] [id number] [place] [address]-
6336     

 

 [facility] [place] [facility]  [address] [facility] [id number] [place] [address]-
1176     

 

 [facility] [place] [facility]  [address] [id number] [place] [address]-
6144  08 Oct, 2012   

 

 [facility] [place] [facility]  [address] [id number] [place] [address]-
9424  25 Sep, 2012   

 

 [facility] [place] [facility]  [address] [id number] [place] [address]-
2266  06 Sep, 2012   

 

 [facility] [place] [facility]  [address] [id number] [place] [address]-
8111  10 Aug, 2012   

 

 [facility] [place] [facility]  [address] [id number] [place] [address]-
5508     

 

 [facility] [place] [facility]  [address] [id number] [place] [address]-
9026     

 

 [facility] [place] [facility]  [address] [id number] [place] [address]-
1669     

 

 [facility] [place] [facility]  [address] [id number] [place] [address]-
9935     

 

 [facility] [place] [facility]  [address] [id number] [place] [address]-
4651     

 

 [facility] [place] [facility]  [address] [facility] [id number] [place] [address]-
7659     

 

 [facility] [place] [facility]  [address] [id number] [place] [address]-
2137     

 

 [facility] [place] [facility]  [address] [id number] [place] [address]-
7386     

 

 [facility] [place] [facility]  [address] [id number] [place] [address]-
5247     

 

 [facility] [place] [facility]  [address] [id number] [place] [address]-
2073     

 

 [facility] [place] [facility]  [address] [id number] [place] [address]-
6928  06 Dec, 2011   

 

 [facility] [place] [facility]  [address] [id number][place] [address]-
7310  10 2011   

 

 [facility]  [address] [facility] [id number]-
4648  14 2011   

 

 [facility]  [address] [facility] [id number][place] [address]-
0669  10 May, 2011   

 

 [facility]  [address], [place] [address]-
3536  29 Dec, 2010   

 

 [facility]  [address] [facility] [id number][place] [address]-
1095  16 2010   

 

 [facility]  [address] [facility] [id number][place] [address]-
6756  18 Oct, 2010   

 

 [facility]  [address], [place] [address]-
7646  14 2010   

 

 [facility]  [address]100[place], [place] [address]-
8155  14 Dec, 2009   

 

 [facility]  [address], [place] [address]-
1764  14 Dec, 2009   

 

 [facility]  [address], [place] [address]-
2085  10 Dec, 2009   

 

 [facility]  [address], [place] [address]-
0965  07 Dec, 2009   

 

 [facility]  [id number], [place] [address]-
1821  25 2009   

 

 [facility]  [address], [place] [address]-
0771  10 2009   

 

 [facility]  [id number], [place] [address]-
5689  15 2009   







IMMUNIZATIONS

No Known Immunizations



SOCIAL HISTORY

Never Assessed



REASON FOR VISIT

Lab results



PLAN OF CARE





VITAL SIGNS





MEDICATIONS

Unknown Medications



RESULTS

No Results



PROCEDURES

No Known procedures



INSTRUCTIONS





MEDICATIONS ADMINISTERED

No Known Medications



MEDICAL (GENERAL) HISTORY







 Type  Description  Date

 

 Medical History  depression   

 

 Medical History  hx of anxiety   

 

 Medical History  mitral valve prolapse   

 

 Surgical History  tonsillectomy and adenoidectomy   

 

 Surgical History  partial hysterectomy   

 

 Surgical History  jaw surgery  1974

 

 Surgical History  colonoscopy  16

 

 Surgical History  colonoscopy  10/2017

 

 Hospitalization History  for surgery
[facility]

 Created on: 10/01/2017



Shereen Shafer

External Reference #: [id number]

: 1960

Sex: Female



Demographics







 Address  [address]  [address]

 

 Preferred Language  Unknown

 

 Marital Status  Unknown

 

 Denominational Affiliation  Unknown

 

 Race  Unknown

 

 Ethnic Group  Unknown





Author







 Author  JAVON DE LOS SANTOS

 

 [facility]

 

 Address  [address] [place]  [address]



 

 Phone  [phone]







Care Team Providers







 Care Team Member Name  Role  Phone

 

 JAVON DE LOS SANTOS  Unavailable  [phone]







PROBLEMS







 Type  Condition  ICD9-CM Code  AXS48-DD Code  Onset Dates  Condition Status  
SNOMED Code

 

 Problem  Knee pain     M25.569     Active  15296487

 

 Problem  Dysuria     R30.0     Active  82551766

 

 Problem  Pharyngitis, unspecified etiology     J02.9     Active  056578649

 

 Problem  Dysthymia     F34.1     Active  21327980

 

 Problem  Eustachian tube dysfunction     H69.80     Active  42765632

 

 Problem  Anxiety disorder, unspecified     F41.9     Active  716756864

 

 Problem  Hypertension     I10     Active  46846861







ALLERGIES

No Information



SOCIAL HISTORY

Never Assessed



PLAN OF CARE





VITAL SIGNS





MEDICATIONS

No Known Medications



RESULTS

No Results



PROCEDURES

No Known procedures



IMMUNIZATIONS

No Known Immunizations



MEDICAL (GENERAL) HISTORY







 Type  Description  Date

 

 Medical History  depression   

 

 Medical History  hx of anxiety   

 

 Medical History  mitral valve prolapse   

 

 Surgical History  tonsillectomy and adenoidectomy   

 

 Surgical History  partial hysterectomy   

 

 Surgical History  jaw surgery  1974

 

 Surgical History  colonoscopy  16

 

 Hospitalization History  for surgery
Continuity of Care Document

 Created on: 2019



ANTOINETTE HOLM

External Reference #: [id number]

: 1960

Sex: Female



Demographics







 Address  [address] [place] [address]  [address]

 

 Home Phone  [phone] x

 

 Preferred Language  Unknown

 

 Marital Status  Unknown

 

 Jew Affiliation  Unknown

 

 Race  Unknown

 

 Ethnic Group  Unknown





Author







 Author  [facility]

 

 Address  Unknown

 

 Phone  Unavailable



              



Allergies

      





 Active            Description            Code            Type            
Severity            Reaction            Onset            Reported/Identified   
         Relationship to Patient            Clinical Status        

 

 Yes            No Known Drug Allergies            [id number]            Drug 
Allergy            Unknown            N/A                         10/30/2014   
                               



                  



Medications

      



There is no data.                  



Problems

      





 Date Dx Coded            Attending            Type            Code            
Diagnosis            Diagnosed By        

 

 2008            JAVON CHILEL                         401.1    
        HYPERTENSION, BENIGN ESSENTIAL                     

 

 2008            JAVON CHILEL S                         V58.69   
         MEDICATION HIGH RISK                     

 

 2008                                      401.1            HYPERTENSION, 
BENIGN ESSENTIAL                     

 

 2008                                      V58.69            MEDICATION 
HIGH RISK                     

 

 2008            JAVON CHILEL S                         401.1    
        HYPERTENSION, BENIGN ESSENTIAL                     

 

 2008            MARISELA CHILELA S                         V58.69   
         MEDICATION HIGH RISK                     

 

 2008                                      401.1            HYPERTENSION, 
BENIGN ESSENTIAL                     

 

 2008                                      V58.69            MEDICATION 
HIGH RISK                     

 

 2008                                      401.1            HYPERTENSION, 
BENIGN ESSENTIAL                     

 

 2008                                      V58.69            MEDICATION 
HIGH RISK                     

 

 2008            KETAN MORENO CECY K                         401.1          
  HYPERTENSION, BENIGN ESSENTIAL                     

 

 2008            ACEVES DO CECY K                         V58.69         
   MEDICATION HIGH RISK                     

 

 2008            KTEAN MORENO CECY K                         401.1          
  HYPERTENSION, BENIGN ESSENTIAL                     

 

 2008            ACEVES DO CECY K                         V58.69         
   MEDICATION HIGH RISK                     

 

 2008            JAVON CHILEL S                         401.1    
        HYPERTENSION, BENIGN ESSENTIAL                     

 

 2008            MARISELA CHILELA S                         V58.69   
         MEDICATION HIGH RISK                     

 

 2008            NAN CULLEN MD                         401.1            
HYPERTENSION, BENIGN ESSENTIAL                     

 

 2008            NAN CULLEN MD                         V58.69            
MEDICATION HIGH RISK                     

 

 2008            ACEVES DO CECY K                         401.1          
  HYPERTENSION, BENIGN ESSENTIAL                     

 

 2008            ACEVES DO CECY K                         V58.69         
   MEDICATION HIGH RISK                     

 

 2008            JAVON CHILEL S                         401.1    
        HYPERTENSION, BENIGN ESSENTIAL                     

 

 2008            JAVON CHILEL S                         V58.69   
         MEDICATION HIGH RISK                     

 

 2009            JAVON CHILEL S                         528.9    
        DISEASES OF THE ORAL SOFT TISSUES (EXCEPT GINGIVA, TONGUE)             
        

 

 2009            MARISELA CHILELA S                         625.6    
        FEMALE STRESS INCONTINENCE                     

 

 2009            REMA CHILELNDA S                         787.91   
         diarrhea                     

 

 2009            MARISELA CHILELA S                         V72.3    
        GYNECOLOGICAL EXAMINATION                     

 

 2009                                      528.9            DISEASES OF 
THE ORAL SOFT TISSUES (EXCEPT GINGIVA, TONGUE)                     

 

 2009                                      625.6            FEMALE STRESS 
INCONTINENCE                     

 

 2009                                      787.91            diarrhea    
                 

 

 2009                                      V72.3            GYNECOLOGICAL 
EXAMINATION                     

 

 2009            JAVON CHILEL S                         528.9    
        DISEASES OF THE ORAL SOFT TISSUES (EXCEPT GINGIVA, TONGUE)             
        

 

 2009            JAVON CHILEL S                         625.6    
        FEMALE STRESS INCONTINENCE                     

 

 2009            MARISELA CHILELA S                         787.91   
         diarrhea                     

 

 2009            MARISELA CHILELA S                         V72.3    
        GYNECOLOGICAL EXAMINATION                     

 

 2009                                      528.9            DISEASES OF 
THE ORAL SOFT TISSUES (EXCEPT GINGIVA, TONGUE)                     

 

 2009                                      625.6            FEMALE STRESS 
INCONTINENCE                     

 

 2009                                      787.91            diarrhea    
                 

 

 2009                                      V72.3            GYNECOLOGICAL 
EXAMINATION                     

 

 2009                                      528.9            DISEASES OF 
THE ORAL SOFT TISSUES (EXCEPT GINGIVA, TONGUE)                     

 

 2009                                      625.6            FEMALE STRESS 
INCONTINENCE                     

 

 2009                                      787.91            DIARRHEA    
                 

 

 2009                                      V72.3            GYNECOLOGICAL 
EXAMINATION                     

 

 2009            ACEVES DO, CECY K                         528.9          
  DISEASES OF THE ORAL SOFT TISSUES (EXCEPT GINGIVA, TONGUE)                   
  

 

 2009            ACEVES DO, CECY K                         625.6          
  FEMALE STRESS INCONTINENCE                     

 

 2009            ACEVES DO, ECCY K                         787.91         
   DIARRHEA                     

 

 2009            ACEVES DO, CECY K                         V72.3          
  GYNECOLOGICAL EXAMINATION                     

 

 2009            ACEVES DO, CECY K                         528.9          
  DISEASES OF THE ORAL SOFT TISSUES (EXCEPT GINGIVA, TONGUE)                   
  

 

 2009            ACEVES DO, CECY K                         625.6          
  FEMALE STRESS INCONTINENCE                     

 

 2009            ACEVES DO, CECY K                         787.91         
   DIARRHEA                     

 

 2009            ACEVES DO, CECY K                         V72.3          
  GYNECOLOGICAL EXAMINATION                     

 

 2009            REMA CHILELNDA S                         528.9    
        DISEASES OF THE ORAL SOFT TISSUES (EXCEPT GINGIVA, TONGUE)             
        

 

 2009            REMA CHILELNDA S                         625.6    
        FEMALE STRESS INCONTINENCE                     

 

 2009            REMA CHILELNDA S                         787.91   
         DIARRHEA                     

 

 2009            REMA CHILELNDA S                         V72.3    
        GYNECOLOGICAL EXAMINATION                     

 

 2009            ALYSE HODGES ALI                         528.9            
DISEASES OF THE ORAL SOFT TISSUES (EXCEPT GINGIVA, TONGUE)                     

 

 2009            NAN CULLEN MD                         625.6            
FEMALE STRESS INCONTINENCE                     

 

 2009            ALYSE HODGES ALI                         787.91            
DIARRHEA                     

 

 2009            ALYSE HODGES ALI                         V72.3            
GYNECOLOGICAL EXAMINATION                     

 

 2009            ACEVES DO CECY K                         528.9          
  DISEASES OF THE ORAL SOFT TISSUES (EXCEPT GINGIVA, TONGUE)                   
  

 

 2009            KETAN MORENO CECY K                         625.6          
  FEMALE STRESS INCONTINENCE                     

 

 2009            ACEVES DO CECY K                         787.91         
   DIARRHEA                     

 

 2009            ACEVES DO CECY K                         V72.3          
  GYNECOLOGICAL EXAMINATION                     

 

 2009            REMA CHILELNDA S                         528.9    
        DISEASES OF THE ORAL SOFT TISSUES (EXCEPT GINGIVA, TONGUE)             
        

 

 2009            REMA CHILELNDA S                         625.6    
        FEMALE STRESS INCONTINENCE                     

 

 2009            REMA CHILELNDA S                         787.91   
         DIARRHEA                     

 

 2009            REMA CHILELNDA S                         V72.3    
        GYNECOLOGICAL EXAMINATION                     

 

 04/15/2009            REMA CHILELNDA S                         388.30   
         TINNITUS UNSPECIFIED                     

 

 04/15/2009                                      388.30            TINNITUS 
UNSPECIFIED                     

 

 04/15/2009            REMA CHILELNDA S                         388.30   
         TINNITUS UNSPECIFIED                     

 

 04/15/2009                                      388.30            TINNITUS 
UNSPECIFIED                     

 

 04/15/2009                                      388.30            TINNITUS 
UNSPECIFIED                     

 

 04/15/2009            KETAN MORENO CECY K                         388.30         
   TINNITUS UNSPECIFIED                     

 

 04/15/2009            TROY ACEVES DOA K                         388.30         
   TINNITUS UNSPECIFIED                     

 

 04/15/2009            REMA CHILELNDA S                         388.30   
         TINNITUS UNSPECIFIED                     

 

 04/15/2009            NAN CULLEN MD                         388.30            
TINNITUS UNSPECIFIED                     

 

 04/15/2009            ACEVES DO, CECY K                         388.30         
   TINNITUS UNSPECIFIED                     

 

 04/15/2009            FILIBERTO MYERS JAVON S                         388.30   
         TINNITUS UNSPECIFIED                     

 

 2009            REMA CHILELNDA S                         706.2    
        SEBACEOUS CYST                     

 

 2009            REMA CHILELNDA S                         729.5    
        PAIN IN LIMB                     

 

 2009                                      706.2            SEBACEOUS 
CYST                     

 

 2009                                      729.5            PAIN IN LIMB 
                    

 

 2009            FILIBERTO MYERS JAVON S                         706.2    
        SEBACEOUS CYST                     

 

 2009            REMA CHILELNDA S                         729.5    
        PAIN IN LIMB                     

 

 2009                                      706.2            SEBACEOUS 
CYST                     

 

 2009                                      729.5            PAIN IN LIMB 
                    

 

 2009                                      706.2            SEBACEOUS 
CYST                     

 

 2009                                      729.5            PAIN IN LIMB 
                    

 

 2009            ACEVES DO, CCEY K                         706.2          
  SEBACEOUS CYST                     

 

 2009            ACEVES DO, CECY K                         729.5          
  PAIN IN LIMB                     

 

 2009            ACEVES DO, CECY K                         706.2          
  SEBACEOUS CYST                     

 

 2009            ACEVES DO, CECY K                         729.5          
  PAIN IN LIMB                     

 

 2009            REMA CHILELNDA S                         706.2    
        SEBACEOUS CYST                     

 

 2009            FILIBERTO MYERS JAVON S                         729.5    
        PAIN IN LIMB                     

 

 2009            NAN CULLEN MD                         706.2            
SEBACEOUS CYST                     

 

 2009            NAN CULLEN MD                         729.5            
PAIN IN LIMB                     

 

 2009            ACEVES DO, CECY K                         706.2          
  SEBACEOUS CYST                     

 

 2009            ACEVES DO, CECY K                         729.5          
  PAIN IN LIMB                     

 

 2009            REMA CHILELNDA S                         706.2    
        SEBACEOUS CYST                     

 

 2009            FILIBERTO MYERS JAVON S                         729.5    
        PAIN IN LIMB                     

 

 2010            REMA CHILELNDA S                         333.94   
         RESTLESS LEGS SYNDROME (RLS)                     

 

 2010                                      333.94            RESTLESS 
LEGS SYNDROME (RLS)                     

 

 2010            JAVON CHILEL                         333.94   
         RESTLESS LEGS SYNDROME (RLS)                     

 

 2010                                      333.94            RESTLESS 
LEGS SYNDROME (RLS)                     

 

 2010                                      333.94            RESTLESS 
LEGS SYNDROME (RLS)                     

 

 2010            CECY ACEVES DO K                         333.94         
   RESTLESS LEGS SYNDROME (RLS)                     

 

 2010            CECY ACEVES DO K                         333.94         
   RESTLESS LEGS SYNDROME (RLS)                     

 

 2010            JAVNO CHILEL S                         333.94   
         RESTLESS LEGS SYNDROME (RLS)                     

 

 2010            NAN CULLEN MD                         333.94            
RESTLESS LEGS SYNDROME (RLS)                     

 

 2010            CECY ACEVES DO K                         333.94         
   RESTLESS LEGS SYNDROME (RLS)                     

 

 2010            JAVON CHILEL S                         333.94   
         RESTLESS LEGS SYNDROME (RLS)                     

 

 2010            JAVON CHILEL S                         681.10   
         CELLULITIS AND ABSCESS OF TOE, UNSPECIFIED                     

 

 2010                                      681.10            CELLULITIS 
AND ABSCESS OF TOE, UNSPECIFIED                     

 

 2010            JAVON CHILEL S                         681.10   
         CELLULITIS AND ABSCESS OF TOE, UNSPECIFIED                     

 

 2010                                      681.10            CELLULITIS 
AND ABSCESS OF TOE, UNSPECIFIED                     

 

 2010                                      681.10            CELLULITIS 
AND ABSCESS OF TOE, UNSPECIFIED                     

 

 2010            CECY ACEVES DO K                         681.10         
   CELLULITIS AND ABSCESS OF TOE, UNSPECIFIED                     

 

 2010            CECY ACEVES DO K                         681.10         
   CELLULITIS AND ABSCESS OF TOE, UNSPECIFIED                     

 

 2010            JAVON CHILEL S                         681.10   
         CELLULITIS AND ABSCESS OF TOE, UNSPECIFIED                     

 

 2010            NAN CULLEN MD                         681.10            
CELLULITIS AND ABSCESS OF TOE, UNSPECIFIED                     

 

 2010            CECY ACEVES DO K                         681.10         
   CELLULITIS AND ABSCESS OF TOE, UNSPECIFIED                     

 

 2010            JAVON CHILEL S                         681.10   
         CELLULITIS AND ABSCESS OF TOE, UNSPECIFIED                     

 

 05/10/2011            JAVON CHILEL S                         461.9    
        SINUSITIS ACUTE                     

 

 05/10/2011            JAVON CHILEL S                         525.9    
        UNSPECIFIED DISORDER OF THE TEETH AND SUPPORTING STRUCTURES            
         

 

 05/10/2011            REMA CHILELNDA S                         787.02   
         NAUSEA ALONE                     

 

 05/10/2011                                      461.9            SINUSITIS 
ACUTE                     

 

 05/10/2011                                      525.9            UNSPECIFIED 
DISORDER OF THE TEETH AND SUPPORTING STRUCTURES                     

 

 05/10/2011                                      787.02            NAUSEA ALONE
                     

 

 05/10/2011            REMA CHILELNDA S                         461.9    
        SINUSITIS ACUTE                     

 

 05/10/2011            REMA CHILELNDA S                         525.9    
        UNSPECIFIED DISORDER OF THE TEETH AND SUPPORTING STRUCTURES            
         

 

 05/10/2011            JAVON CHILEL S                         787.02   
         NAUSEA ALONE                     

 

 05/10/2011                                      461.9            SINUSITIS 
ACUTE                     

 

 05/10/2011                                      525.9            UNSPECIFIED 
DISORDER OF THE TEETH AND SUPPORTING STRUCTURES                     

 

 05/10/2011                                      787.02            NAUSEA ALONE
                     

 

 05/10/2011                                      461.9            SINUSITIS 
ACUTE                     

 

 05/10/2011                                      525.9            UNSPECIFIED 
DISORDER OF THE TEETH AND SUPPORTING STRUCTURES                     

 

 05/10/2011                                      787.02            NAUSEA ALONE
                     

 

 05/10/2011            CECY ACEVES DO K                         461.9          
  SINUSITIS ACUTE                     

 

 05/10/2011            CECY ACEVES DO K                         525.9          
  UNSPECIFIED DISORDER OF THE TEETH AND SUPPORTING STRUCTURES                  
   

 

 05/10/2011            TROY ACEVES DOA K                         787.02         
   NAUSEA ALONE                     

 

 05/10/2011            TROY ACEVES DOA K                         461.9          
  SINUSITIS ACUTE                     

 

 05/10/2011            CECY ACEVES DO K                         525.9          
  UNSPECIFIED DISORDER OF THE TEETH AND SUPPORTING STRUCTURES                  
   

 

 05/10/2011            TROY ACEVES DOA K                         787.02         
   NAUSEA ALONE                     

 

 05/10/2011            REMA CHILELNDA S                         461.9    
        SINUSITIS ACUTE                     

 

 05/10/2011            JAVON CHILEL S                         525.9    
        UNSPECIFIED DISORDER OF THE TEETH AND SUPPORTING STRUCTURES            
         

 

 05/10/2011            MARISELA CHILELA S                         787.02   
         NAUSEA ALONE                     

 

 05/10/2011            NAN CULLEN MD                         461.9            
SINUSITIS ACUTE                     

 

 05/10/2011            NAN CULLEN MD                         525.9            
UNSPECIFIED DISORDER OF THE TEETH AND SUPPORTING STRUCTURES                     

 

 05/10/2011            NAN CULLEN MD                         787.02            
NAUSEA ALONE                     

 

 05/10/2011            KETAN MORENO CECY K                         461.9          
  SINUSITIS ACUTE                     

 

 05/10/2011            TROY ACEVES DOA K                         525.9          
  UNSPECIFIED DISORDER OF THE TEETH AND SUPPORTING STRUCTURES                  
   

 

 05/10/2011            TROY ACEVES DOA K                         787.02         
   NAUSEA ALONE                     

 

 05/10/2011            JAVON CHILEL S                         461.9    
        SINUSITIS ACUTE                     

 

 05/10/2011            JAVON CHILEL S                         525.9    
        UNSPECIFIED DISORDER OF THE TEETH AND SUPPORTING STRUCTURES            
         

 

 05/10/2011            JAVON CHILEL S                         787.02   
         NAUSEA ALONE                     

 

 2011            MARISELA CHILELA S                         305.1    
        current smoker                     

 

 2011            JAVON CHILEL S                         382.00   
         OTITIS MEDIA ACUTE SUPPURATIVE RIGHT EAR                     

 

 2011                                      305.1            current 
smoker                     

 

 2011                                      382.00            OTITIS MEDIA 
ACUTE SUPPURATIVE RIGHT EAR                     

 

 2011            JAVON CHILEL S                         305.1    
        current smoker                     

 

 2011            JAVON CHILEL S                         382.00   
         OTITIS MEDIA ACUTE SUPPURATIVE RIGHT EAR                     

 

 2011                                      305.1            current 
smoker                     

 

 2011                                      382.00            OTITIS MEDIA 
ACUTE SUPPURATIVE RIGHT EAR                     

 

 2011                                      305.1            current 
smoker                     

 

 2011                                      382.00            OTITIS MEDIA 
ACUTE SUPPURATIVE RIGHT EAR                     

 

 2011            TROY ACEVES DOA K                         305.1          
  current smoker                     

 

 2011            ACEVES DO CECY K                         382.00         
   OTITIS MEDIA ACUTE SUPPURATIVE RIGHT EAR                     

 

 2011            KETAN MORENO CECY K                         305.1          
  current smoker                     

 

 2011            KETAN MORENO CECY K                         382.00         
   OTITIS MEDIA ACUTE SUPPURATIVE RIGHT EAR                     

 

 2011            JAVON CHILEL S                         305.1    
        current smoker                     

 

 2011            JAVON CHILEL S                         382.00   
         OTITIS MEDIA ACUTE SUPPURATIVE RIGHT EAR                     

 

 2011            NAN CULLEN MD                         305.1            
current smoker                     

 

 2011            NAN CULLEN MD                         382.00            
OTITIS MEDIA ACUTE SUPPURATIVE RIGHT EAR                     

 

 2011            KETAN MORENO CECY K                         305.1          
  current smoker                     

 

 2011            KETAN MORENO CECY K                         382.00         
   OTITIS MEDIA ACUTE SUPPURATIVE RIGHT EAR                     

 

 2011            JAVON CHILEL S                         305.1    
        current smoker                     

 

 2011            JAVON CHILEL S                         382.00   
         OTITIS MEDIA ACUTE SUPPURATIVE RIGHT EAR                     

 

 2012            FILIBERTO APRN, JAVON S                         300.00   
         anxiety                     

 

 2012            FILIBERTO MYERS, JAVON S                         530.81   
         ESOPHAGEAL REFLUX                     

 

 2012                                      300.00            anxiety     
                

 

 2012                                      530.81            ESOPHAGEAL 
REFLUX                     

 

 2012            FILIBERTO MYERS JAVON S                         300.00   
         anxiety                     

 

 2012            FILIBERTO MYERS JAVON S                         530.81   
         ESOPHAGEAL REFLUX                     

 

 2012                                      300.00            anxiety     
                

 

 2012                                      530.81            ESOPHAGEAL 
REFLUX                     

 

 2012                                      300.00            anxiety     
                

 

 2012                                      530.81            ESOPHAGEAL 
REFLUX                     

 

 2012            ACEVES DO, CECY K                         300.00         
   anxiety                     

 

 2012            ACEVES DO, CECY K                         530.81         
   ESOPHAGEAL REFLUX                     

 

 2012            ACEVES DO, CECY K                         300.00         
   anxiety                     

 

 2012            ACEVES DO, CECY K                         530.81         
   ESOPHAGEAL REFLUX                     

 

 2012            FILIBERTO MYERS JAVON S                         300.00   
         anxiety                     

 

 2012            FILIBERTO MYERS JAVON S                         530.81   
         ESOPHAGEAL REFLUX                     

 

 2012            NAN CULLEN MD                         300.00            
anxiety                     

 

 2012            NAN CULLEN MD                         530.81            
ESOPHAGEAL REFLUX                     

 

 2012            ACEVES DO, CECY K                         300.00         
   anxiety                     

 

 2012            ACEVES DO, CECY K                         530.81         
   ESOPHAGEAL REFLUX                     

 

 2012            FILIBERTO MYERS JAVON S                         300.00   
         anxiety                     

 

 2012            REMA CHILELNDA S                         530.81   
         ESOPHAGEAL REFLUX                     

 

 2012            REMA CHILELNDA S                         702.19   
         OTHER SEBORRHEIC KERATOSIS                     

 

 2012                                      702.19            OTHER 
SEBORRHEIC KERATOSIS                     

 

 2012            MARISELA CHILELA S                         702.19   
         OTHER SEBORRHEIC KERATOSIS                     

 

 2012                                      702.19            OTHER 
SEBORRHEIC KERATOSIS                     

 

 2012                                      702.19            OTHER 
SEBORRHEIC KERATOSIS                     

 

 2012            ACEVES DO, CECY K                         702.19         
   OTHER SEBORRHEIC KERATOSIS                     

 

 2012            ACEVES DO, CECY K                         702.19         
   OTHER SEBORRHEIC KERATOSIS                     

 

 2012            MARISELA CHILELA S                         702.19   
         OTHER SEBORRHEIC KERATOSIS                     

 

 2012            ALYSE HODGES, ALI                         702.19            
OTHER SEBORRHEIC KERATOSIS                     

 

 2012            KETAN MORENO CECY K                         702.19         
   OTHER SEBORRHEIC KERATOSIS                     

 

 2012            FILIBERTO MYERS JAVON S                         702.19   
         OTHER SEBORRHEIC KERATOSIS                     

 

 2013            FILIBERTO APRHECTOR JAVON S                         682.9    
        CELLULITIS AND ABSCESS OF UNSPECIFIED SITES                     

 

 2013            FILIBERTO MYERS JAVON S                         728.71   
         PLANTAR FASCIAL FIBROMATOSIS                     

 

 2013                                      682.9            CELLULITIS 
AND ABSCESS OF UNSPECIFIED SITES                     

 

 2013                                      728.71            PLANTAR 
FASCIAL FIBROMATOSIS                     

 

 2013                                      682.9            CELLULITIS 
AND ABSCESS OF UNSPECIFIED SITES                     

 

 2013                                      728.71            PLANTAR 
FASCIAL FIBROMATOSIS                     

 

 2013            KETAN MORENO CECY K                         682.9          
  CELLULITIS AND ABSCESS OF UNSPECIFIED SITES                     

 

 2013            KETAN MORENO CECY K                         728.71         
   PLANTAR FASCIAL FIBROMATOSIS                     

 

 2013            TROY ACEVES DOA K                         682.9          
  CELLULITIS AND ABSCESS OF UNSPECIFIED SITES                     

 

 2013            KETAN MORENO CECY K                         728.71         
   PLANTAR FASCIAL FIBROMATOSIS                     

 

 2013            FILIBERTO APRHECTOR JAVON S                         682.9    
        CELLULITIS AND ABSCESS OF UNSPECIFIED SITES                     

 

 2013            FILIBERTO MYERS JAVON S                         728.71   
         PLANTAR FASCIAL FIBROMATOSIS                     

 

 2013            ALYSE HODGES, ALI                         682.9            
CELLULITIS AND ABSCESS OF UNSPECIFIED SITES                     

 

 2013            ALYSE HODGES, ALI                         728.71            
PLANTAR FASCIAL FIBROMATOSIS                     

 

 2013            KETAN MORENO CECY K                         682.9          
  CELLULITIS AND ABSCESS OF UNSPECIFIED SITES                     

 

 2013            KETAN MORENO CECY K                         728.71         
   PLANTAR FASCIAL FIBROMATOSIS                     

 

 2013            FILIBERTO APRHECTOR JAVON S                         682.9    
        CELLULITIS AND ABSCESS OF UNSPECIFIED SITES                     

 

 2013            FILIBERTO MYERS JAVON S                         728.71   
         PLANTAR FASCIAL FIBROMATOSIS                     

 

 2014            CECY ACEVES DO K                         388.70         
   OTALGIA                     

 

 2014            TROY ACEVES DOA K                         696.1          
  PSORIASIS                     

 

 2014            CECY ACEVES DO K                         V73.81         
   HPV SCREENING                     

 

 2014            ACEVES DO, CECY K                         V76.10         
   BREAST CANCER SCREENING                     

 

 2014            ACEVES , CECY K                         V76.2          
  CERVICAL CANCER SCREENING (PAP SMEAR)                     

 

 2014            KETAN MORENO, CECY K                         388.70         
   OTALGIA                     

 

 2014            ACEVES DO, CECY K                         696.1          
  PSORIASIS                     

 

 2014            ACEVES , CECY K                         V73.81         
   HPV SCREENING                     

 

 2014            KETAN MORENO, CECY K                         V76.10         
   BREAST CANCER SCREENING                     

 

 2014            KETAN MORENO, CECY K                         V76.2          
  CERVICAL CANCER SCREENING (PAP SMEAR)                     

 

 2014            REMA CHILELNDA S                         388.70   
         OTALGIA                     

 

 2014            FILIBERTO MYERS JAVON S                         696.1    
        PSORIASIS                     

 

 2014            REMA CHILELNDA S                         V73.81   
         HPV SCREENING                     

 

 2014            REMA CHILELNDA S                         V76.10   
         BREAST CANCER SCREENING                     

 

 2014            REMA CHILELNDA S                         V76.2    
        CERVICAL CANCER SCREENING (PAP SMEAR)                     

 

 2014            ALYSE HODGES, ALI                         388.70            
OTALGIA                     

 

 2014            ALYSE HODGES, NAN                         696.1            
PSORIASIS                     

 

 2014            NAN CULLEN MD                         V73.81            
HPV SCREENING                     

 

 2014            NAN CULLEN MD                         V76.10            
BREAST CANCER SCREENING                     

 

 2014            ALYSE HODGES, NAN                         V76.2            
CERVICAL CANCER SCREENING (PAP SMEAR)                     

 

 2014            KETAN MORENO, CECY K                         388.70         
   OTALGIA                     

 

 2014            KETAN MORENO, CCEY K                         696.1          
  PSORIASIS                     

 

 2014            KETAN MORENO, CECY K                         V73.81         
   HPV SCREENING                     

 

 2014            KETAN MORENO, CECY K                         V76.10         
   BREAST CANCER SCREENING                     

 

 2014            KETAN MORENO, CECY K                         V76.2          
  CERVICAL CANCER SCREENING (PAP SMEAR)                     

 

 2014            FILIBERTO MYERS JAVON S                         388.70   
         OTALGIA                     

 

 2014            FILIBERTO MYERS JAVON S                         696.1    
        PSORIASIS                     

 

 2014            FILIBERTO APRHECTOR JAVON S                         V73.81   
         HPV SCREENING                     

 

 2014            REMA CHILELNDA S                         V76.10   
         BREAST CANCER SCREENING                     

 

 2014            REMA CHILELNDA S                         V76.2    
        CERVICAL CANCER SCREENING (PAP SMEAR)                     

 

 2014            KETAN DO CECY K                         307.81         
   TENSION HEADACHE                     

 

 2014            KETAN MORENO CECY K                         704.00         
   ALOPECIA UNSPECIFIED                     

 

 2014            KETAN MORENO CECY K                         786.50         
   CHEST PAIN                     

 

 2014            KETAN MORENO CECY K                         V18.0          
  FAMILY HISTORY OF DIABETES MELLITUS                     

 

 2014            REMA CHILELNDA S                         307.81   
         TENSION HEADACHE                     

 

 2014            REMA CHILELNDA S                         704.00   
         ALOPECIA UNSPECIFIED                     

 

 2014            REMA CHILELNDA S                         786.50   
         CHEST PAIN                     

 

 2014            FILIBERTO MYERS JAVON S                         V18.0    
        FAMILY HISTORY OF DIABETES MELLITUS                     

 

 2014            NAN CULLEN MD                         307.81            
TENSION HEADACHE                     

 

 2014            NAN CULLEN MD                         704.00            
ALOPECIA UNSPECIFIED                     

 

 2014            NAN CULLEN MD                         786.50            
CHEST PAIN                     

 

 2014            NAN CULLEN MD                         V18.0            
FAMILY HISTORY OF DIABETES MELLITUS                     

 

 2014            KETAN TROY MORENOA K                         307.81         
   TENSION HEADACHE                     

 

 2014            KETAN MORENO CECY K                         704.00         
   ALOPECIA UNSPECIFIED                     

 

 2014            KETAN MORENO CECY K                         786.50         
   CHEST PAIN                     

 

 2014            KETAN MORENO CECY K                         V18.0          
  FAMILY HISTORY OF DIABETES MELLITUS                     

 

 2014            REMA CHILELNDA S                         307.81   
         TENSION HEADACHE                     

 

 2014            REMA CHILELNDA S                         704.00   
         ALOPECIA UNSPECIFIED                     

 

 2014            REMA CHILELNDA S                         786.50   
         CHEST PAIN                     

 

 2014            REMA CHILELNDA S                         V18.0    
        FAMILY HISTORY OF DIABETES MELLITUS                     

 

 09/10/2014            REMA CHILELNDA S                         278.02   
         OVERWEIGHT                     

 

 09/10/2014            REMA CHILELNDA S                         307.42   
         INSOMNIA, PSYCHOPHYSIOLOGICAL                     

 

 09/10/2014            REMA CHILELNDA S                         729.5    
        PAIN- LEG                     

 

 09/10/2014            REMA CHILELNDA S                         V15.82   
         NICOTINE ABUSE                     

 

 09/10/2014            NAN CULLEN MD                         278.02            
OVERWEIGHT                     

 

 09/10/2014            NAN CULLEN MD                         307.42            
INSOMNIA, PSYCHOPHYSIOLOGICAL                     

 

 09/10/2014            NAN CULLEN MD                         729.5            
PAIN- LEG                     

 

 09/10/2014            NAN CULLEN MD                         V15.82            
NICOTINE ABUSE                     

 

 09/10/2014            KETAN MORENOTROYA K                         278.02         
   OVERWEIGHT                     

 

 09/10/2014            KETAN MORENO CECY K                         307.42         
   INSOMNIA, PSYCHOPHYSIOLOGICAL                     

 

 09/10/2014            KETAN MORENOTROYA K                         729.5          
  PAIN- LEG                     

 

 09/10/2014            KETAN MORENO CECY K                         V15.82         
   NICOTINE ABUSE                     

 

 09/10/2014            FILIBERTO MYERS JAVON S                         278.02   
         OVERWEIGHT                     

 

 09/10/2014            REMA CHILELNDA S                         307.42   
         INSOMNIA, PSYCHOPHYSIOLOGICAL                     

 

 09/10/2014            REMA CHILELNDA S                         729.5    
        PAIN- LEG                     

 

 09/10/2014            REMA CHILELNDA S                         V15.82   
         NICOTINE ABUSE                     

 

 10/15/2014            NAN CULLEN MD                         785.1            
PALPITATIONS                     

 

 10/15/2014            NAN CULLEN MD                         786.09            
DYSPNEA                     

 

 10/15/2014            KETAN MORENOCECY K                         785.1          
  PALPITATIONS                     

 

 10/15/2014            KETAN MORENOTROYA K                         786.09         
   DYSPNEA                     

 

 10/15/2014            FILIBERTO APRREMA YOUNGNDA S                         785.1    
        PALPITATIONS                     

 

 10/15/2014            REMA CHILELNDA S                         786.09   
         DYSPNEA                     

 

 2014            AYLSE HODGES FACC, NAN FACP CCDS            Ot            
278.00            OBESITY, NOS                     

 

 2014            ALYSE HODGES FACC, ALI FACP CCDS            Ot            
305.1            TOBACCO USE DISORDER                     

 

 2014            ALYSE HODGES FACC, NAN FACP CCDS            Ot            
785.1            PALPITATIONS                     

 

 2014            ALYSE HODGES FACC, NAN FACP CCDS            Ot            
786.09            RESPIRATORY ABNORM NEC                     

 

 2014            ALYSE HODGES FACC, ALI FACP CCDS            Ot            
786.59            CHEST PAIN NEC                     

 

 2014            ALYSE HODGES FACC, NAN FACP CCDS            Ot            
794.30            ABN CARDIOVASC STUDY NOS                     

 

 2014            ALYSE HODGES FACC, NAN FACP CCDS            Ot            
V17.3            FAM HX-ISCHEM HEART DIS                     

 

 2014            NAN CULLEN MD, FACC FACP CCDS            Ot            
V58.69            OTH MED,LT,CURRENT USE                     

 

 2014            ALYSE HODGES FACC, ALI FACP CCDS            Ot            
V85.41            BODY MASS INDEX 40.0-44.9, ADULT                     

 

 2014            JAVON CHILEL S                         627.2    
        HOT FLASHES                     

 

 2014            JAVON CHILEL S                         789.03   
         ABDOMINAL PAIN RIGHT LOWER QUADRANT                     

 

 2015            JAVON DE LOS SANTOS ARNP            Ot            789.03    
                              

 

 2015            JAVON DE LOS SANTOS ARNP            Ot            789.03    
                              

 

 2016            IRENA LIU            Ot            V76.12     
       OTH SCREEN MAMMO-MALIGN NEOPLASM OF BRUNO                     

 

 2016            ALYSE HODGES FACC, NAN FACP CCDS            Ot            
785.1            PALPITATIONS                     

 

 2016            ALYSE HODGES FACC, NAN FACP CCDS            Ot            
786.09            RESPIRATORY ABNORM NEC                     

 

 2016            ALYSE HODGES FACC, ALI FACP CCDS            Ot            
785.1            PALPITATIONS                     

 

 2016            ALYSE HODGES FACC, ALI FACP CCDS            Ot            
786.09            RESPIRATORY ABNORM NEC                     

 

 2016            JAVON DE LOS SANTOS ARNP            Ot            789.03    
        ABDOMINAL PAIN, RIGHT LOWER QUADRANT                     

 

 2016            JAVON DE LOS SANTOS ARNP            Ot            789.03    
        ABDOMINAL PAIN, RIGHT LOWER QUADRANT                     

 

 2016            JAVON DE LOS SANTOS ARNP            Ot            R10.31    
        RIGHT LOWER QUADRANT PAIN                     

 

 2016            HAMMONDSHUNTER CRAWFORD DO D            Ot            R19.7       
     DIARRHEA, UNSPECIFIED                     

 

 2016            HAMMONDSHUNTER CRAWFORD DO D            Ot            Z01.818     
       ENCOUNTER FOR OTHER PREPROCEDURAL EXAMIN                     

 

 2016            HAMMONDSREMA CRAWFORD DOTT D            Ot            R19.7       
     DIARRHEA, UNSPECIFIED                     

 

 2016            HAMMONDS REMA MORENOTT D            Ot            Z01.818     
       ENCOUNTER FOR OTHER PREPROCEDURAL EXAMIN                     

 

 2016            IRENA LIU            Ot            V76.12     
       OTH SCREEN MAMMO-MALIGN NEOPLASM OF BRUNO                     

 

 2016            ALYSE HODGES FACC, ALI FACP CCDS            Ot            
785.1            PALPITATIONS                     

 

 2016            ALYSE HODGES FACC, ALI FACP CCDS            Ot            
786.09            RESPIRATORY ABNORM NEC                     

 

 2016            ALYSE HODGES FACC, ALI FACP CCDS            Ot            
785.1            PALPITATIONS                     

 

 2016            [facility]            Ot            
786.09            RESPIRATORY ABNORM NEC                     

 

 2016            JAVON DE LOS SANTOS ARNP            Ot            789.03    
        ABDOMINAL PAIN, RIGHT LOWER QUADRANT                     

 

 2016            JAVON DE LOS SANTOS ARNP            Ot            789.03    
        ABDOMINAL PAIN, RIGHT LOWER QUADRANT                     

 

 2016            JAVON DE LOS SANTOS ARNP            Ot            R10.31    
        RIGHT LOWER QUADRANT PAIN                     

 

 2016            IRENA LIU            Ot            V76.12     
       OT SCREEN MAMMO-MALIGN NEOPLASM OF BRUNO                     

 

 2016            [facility]            Ot            
785.1            PALPITATIONS                     

 

 2016            ALYSE HODGES [facility], ALI FACP CCDS            Ot            
786.09            RESPIRATORY ABNORM NEC                     

 

 2016            ALYSE HODGES [facility], ALI FACP CCDS            Ot            
785.1            PALPITATIONS                     

 

 2016            [facility]            Ot            
786.09            RESPIRATORY ABNORM NEC                     

 

 2016            JAVON DE LOS SANTOS ARNP            Ot            789.03    
        ABDOMINAL PAIN, RIGHT LOWER QUADRANT                     

 

 2016            JAVON DE LOS SANTOS ARNP            Ot            789.03    
        ABDOMINAL PAIN, RIGHT LOWER QUADRANT                     

 

 2016            JAVON DE LOS SANTOS            Ot            R10.31    
        RIGHT LOWER QUADRANT PAIN                     

 

 2016            JAVON DE LOS SANTOS ARNP            Ot            789.03    
        ABDOMINAL PAIN, RIGHT LOWER QUADRANT                     

 

 2016            HUNTER HAMMONDS DO            Ot            D12.5       
     BENIGN NEOPLASM OF SIGMOID COLON                     

 

 2016            HUNTER HAMMONDS DO            Ot            K52.9       
     NONINFECTIVE GASTROENTERITIS AND COLITIS                     

 

 2016            HUNTER HAMMONDS DO            Ot            K62.1       
     RECTAL POLYP                     

 

 2016            HUNTER HAMMONDS DO            Ot            K63.5       
     POLYP OF COLON                     

 

 2016            JAVON DE LOS SANTOS            Ot            789.03    
        ABDOMINAL PAIN, RIGHT LOWER QUADRANT                     

 

 2016            HUNTER HAMMONDS DO            Ot            R19.7       
     DIARRHEA, UNSPECIFIED                     

 

 2016            HUNTER HAMMONDS DO            Ot            Z01.818     
       ENCOUNTER FOR OTHER PREPROCEDURAL EXAMIN                     

 

 2016            HUNTER HAMMONDS DO            Ot            R19.7       
     DIARRHEA, UNSPECIFIED                     

 

 2016            HUNTER HAMMONDS DO            Ot            Z01.818     
       ENCOUNTER FOR OTHER PREPROCEDURAL EXAMIN                     

 

 2016            CASSIUS MORENOHUNTER            Ot            D12.5       
     BENIGN NEOPLASM OF SIGMOID COLON                     

 

 2016            HAMMONDS HUNTER            Ot            K52.9       
     NONINFECTIVE GASTROENTERITIS AND COLITIS                     

 

 2016            HAMMONDS HUNTER            Ot            K62.1       
     RECTAL POLYP                     

 

 2016            [place] HUNTER            Ot            K63.5       
     POLYP OF COLON                     

 

 2016            IRENA LIU            Ot            V76.12     
       OTH SCREEN MAMMO-MALIGN NEOPLASM OF BRUNO                     

 

 2016            ALYSE HODGES FACC, NAN FACP CCDS            Ot            
785.1            PALPITATIONS                     

 

 2016            ALYSE HODGES FACC, ALI FACP CCDS            Ot            
786.09            RESPIRATORY ABNORM NEC                     

 

 2016            ALYSE HODGES FACC, ALI FACP CCDS            Ot            
785.1            PALPITATIONS                     

 

 2016            ALYSE HODGES FACC, ALI FACP CCDS            Ot            
786.09            RESPIRATORY ABNORM NEC                     

 

 2016            JAVON DE LOS SANTOS ARNP            Ot            789.03    
        ABDOMINAL PAIN, RIGHT LOWER QUADRANT                     

 

 2016            JAVON DE LOS SANTOS ARNP            Ot            789.03    
        ABDOMINAL PAIN, RIGHT LOWER QUADRANT                     

 

 2016            JAVON DE LOS SANTOS ARNP            Ot            R10.31    
        RIGHT LOWER QUADRANT PAIN                     

 

 2016            FILIBERTOJAVON FAGAN ARNP            Ot            R10.31    
        RIGHT LOWER QUADRANT PAIN                     

 

 2016            JAVON DE LOS SANTOS ARNP            Ot            789.03    
        ABDOMINAL PAIN, RIGHT LOWER QUADRANT                     

 

 2017            IRENA LIU            Ot            V76.12     
       OTH SCREEN MAMMO-MALIGN NEOPLASM OF BRUNO                     

 

 2017            ALYSE HODGES FACC, NAN FACP CCDS            Ot            
785.1            PALPITATIONS                     

 

 2017            ALYSE HODGES FACC, ALI FACP CCDS            Ot            
786.09            RESPIRATORY ABNORM NEC                     

 

 2017            ALYSE HODGES FACC, ALI FACP CCDS            Ot            
785.1            PALPITATIONS                     

 

 2017            ALYSE HODGES FACC, ALI FACP CCDS            Ot            
786.09            RESPIRATORY ABNORM NEC                     

 

 2017            JAVON DE LOS SANTOS ARNP            Ot            789.03    
        ABDOMINAL PAIN, RIGHT LOWER QUADRANT                     

 

 2017            JAVON DE LOS SANTOS            Ot            789.03    
        ABDOMINAL PAIN, RIGHT LOWER QUADRANT                     

 

 2017            JAVON DE LOS SANTOS            Ot            R10.31    
        RIGHT LOWER QUADRANT PAIN                     

 

 2017            HUNTER HAMMONDS DO            Ot            Z01.818     
       ENCOUNTER FOR OTHER PREPROCEDURAL EXAMIN                     

 

 2017            HUNTER HAMMONDS DO            Ot            Z86.010     
       PERSONAL HISTORY OF COLONIC POLYPS                     

 

 2017            HUNTER HAMMONDS DO            Ot            E66.01      
      MORBID (SEVERE) OBESITY DUE TO EXCESS CA                     

 

 2017            HUNTER HAMMONDS DO            Ot            F17.210     
       NICOTINE DEPENDENCE, CIGARETTES, UNCOMPL                     

 

 2017            HUNTER HAMMONDS DO            Ot            I10         
   ESSENTIAL (PRIMARY) HYPERTENSION                     

 

 2017            HUNTER HAMMONDS DO            Ot            K62.1       
     RECTAL POLYP                     

 

 2017            HUNTER HAMMONDS DO            Ot            Z09         
   ENCNTR FOR F/U EXAM AFT TRTMT FOR COND O                     

 

 2017            HUNTER HAMMONDS DO            Ot            Z68.41      
      BODY MASS INDEX (BMI) 40.0-44.9, ADULT                     

 

 2017            HUNTER HAMMONDS DO            Ot            Z79.899     
       OTHER LONG TERM (CURRENT) DRUG THERAPY                     

 

 2017            HUNTER HAMMONDS DO            Ot            Z86.010     
       PERSONAL HISTORY OF COLONIC POLYPS                     

 

 2018            JAVON DE LOS SANTOS            Ot            Z12.31    
        ENCNTR SCREEN MAMMOGRAM FOR MALIGNANT NE                     

 

 2018            IRENA LIU            Ot            V76.12     
       OTH SCREEN MAMMO-MALIGN NEOPLASM OF BRUNO                     

 

 2018            ALYSE HODGES FACC, ALI FACP CCDS            Ot            
785.1            PALPITATIONS                     

 

 2018            ALYSE HODGES FACC, ALI FACP CCDS            Ot            
786.09            RESPIRATORY ABNORM NEC                     

 

 2018            ALYSE HODGES FACC, ALI FACP CCDS            Ot            
785.1            PALPITATIONS                     

 

 2018            ALYSE HODGES FACYENNI, ALI FACP CCDS            Ot            
786.09            RESPIRATORY ABNORM NEC                     

 

 2018            JAVON DE LOS SANTOS ARNP            Ot            789.03    
        ABDOMINAL PAIN, RIGHT LOWER QUADRANT                     

 

 2018            JAVON DE LOS SANTOS            Ot            789.03    
        ABDOMINAL PAIN, RIGHT LOWER QUADRANT                     

 

 2018            FILIBERTO, JAVON ARNP            Ot            R10.31    
        RIGHT LOWER QUADRANT PAIN                     

 

 2018            JAVON DE LOS SANTOS ARNP            Ot            Z12.31    
        ENCNTR SCREEN MAMMOGRAM FOR MALIGNANT NE                     

 

 2018            BAIMA, JAHAIRA L ARNP            Ot            I10      
      ESSENTIAL (PRIMARY) HYPERTENSION                     

 

 2018            BAIMA, JAHAIRA L ARNP            Ot            I25.119  
          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                     

 

 2018            BAIMA, JAHAIRA L ARNP            Ot            I34.0    
        NONRHEUMATIC MITRAL (VALVE) INSUFFICIENC                     

 

 2018            BAIMA, JAHAIRA L ARNP            Ot            I73.9    
        PERIPHERAL VASCULAR DISEASE, UNSPECIFIED                     

 

 2018            BAIMA, JAHAIRA L ARNP            Ot            R06.09   
         OTHER FORMS OF DYSPNEA                     

 

 2018            BAIMA, JAHAIRA L ARNP            Ot            R29.818  
          OTHER SYMPTOMS AND SIGNS INVOLVING THE N                     

 

 2018            BAIMA, JAHAIRA L ARNP            Ot            Z72.0    
        TOBACCO USE                     

 

 10/13/2018            BAIMA, JAHAIRA L ARNP            Ot            I10      
      ESSENTIAL (PRIMARY) HYPERTENSION                     

 

 10/13/2018            BAIMA, JAHAIRA L ARNP            Ot            I25.119  
          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                     

 

 10/13/2018            BAIMA, JAHAIRA L ARNP            Ot            I34.0    
        NONRHEUMATIC MITRAL (VALVE) INSUFFICIENC                     

 

 10/13/2018            BAIMA, JAHAIRA L ARNP            Ot            R06.09   
         OTHER FORMS OF DYSPNEA                     

 

 10/13/2018            BAIMA, JAHAIRA L ARNP            Ot            Z72.0    
        TOBACCO USE                     

 

 10/15/2018            BAIMA, JAHAIRA L ARNP            Ot            I10      
      ESSENTIAL (PRIMARY) HYPERTENSION                     

 

 10/15/2018            BAIMA, JAHAIRA L ARNP            Ot            I25.119  
          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                     

 

 10/15/2018            BAIMA, JAHAIRA L ARNP            Ot            I34.0    
        NONRHEUMATIC MITRAL (VALVE) INSUFFICIENC                     

 

 10/15/2018            BAIMA, JAHAIRA L ARNP            Ot            I73.9    
        PERIPHERAL VASCULAR DISEASE, UNSPECIFIED                     

 

 10/15/2018            BAIMA, JAHAIRA L ARNP            Ot            R06.09   
         OTHER FORMS OF DYSPNEA                     

 

 10/15/2018            BAIMA, JAHAIRA L ARNP            Ot            R29.818  
          OTHER SYMPTOMS AND SIGNS INVOLVING THE N                     

 

 10/15/2018            BAIMA, JAHAIRA L ARNP            Ot            Z72.0    
        TOBACCO USE                     

 

 10/15/2018            BAIMA, JAHAIRA L ARNP            Ot            I10      
      ESSENTIAL (PRIMARY) HYPERTENSION                     

 

 10/15/2018            YOUSIFMA, JAHAIRA L ARNP            Ot            I25.119  
          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                     

 

 10/15/2018            BAIAURE DE JESUSHER L ARNP            Ot            I34.0    
        NONRHEUMATIC MITRAL (VALVE) INSUFFICIENC                     

 

 10/15/2018            BAIMA, JAHAIRA L ARNP            Ot            I73.9    
        PERIPHERAL VASCULAR DISEASE, UNSPECIFIED                     

 

 10/15/2018            BAIMA JAHAIRA L ARNP            Ot            R06.09   
         OTHER FORMS OF DYSPNEA                     

 

 10/15/2018            YOUSIFMA JAHAIRA L ARNP            Ot            R29.818  
          OTHER SYMPTOMS AND SIGNS INVOLVING THE N                     

 

 10/15/2018            SIDJAHAIRA L ARNP            Ot            Z72.0    
        TOBACCO USE                     

 

 10/17/2018            IRENA LIU            Ot            V76.12     
       OTH SCREEN MAMMO-MALIGN NEOPLASM OF BRUNO                     

 

 10/17/2018            ALYSE HODGES FACC, ALI FACP CCDS            Ot            
785.1            PALPITATIONS                     

 

 10/17/2018            ALYSE HODGES FACC, ALI FACP CCDS            Ot            
786.09            RESPIRATORY ABNORM NEC                     

 

 10/17/2018            ALYSE HODGES FACC, ALI FACP CCDS            Ot            
785.1            PALPITATIONS                     

 

 10/17/2018            ALYSE MD FACC, ALI FACP CCDS            Ot            
786.09            RESPIRATORY ABNORM NEC                     

 

 10/17/2018            JAVON DE LOS SANTOS ARNP            Ot            789.03    
        ABDOMINAL PAIN, RIGHT LOWER QUADRANT                     

 

 10/17/2018            JAVON DE LOS SANTOS ARNP            Ot            789.03    
        ABDOMINAL PAIN, RIGHT LOWER QUADRANT                     

 

 10/17/2018            JAVON DE LOS SANTOS ARNP            Ot            R10.31    
        RIGHT LOWER QUADRANT PAIN                     

 

 10/17/2018            JAVON DE LOS SANTOS ARNP            Ot            Z12.31    
        ENCNTR SCREEN MAMMOGRAM FOR MALIGNANT NE                     

 

 10/17/2018            SID JAHAIRA L ARNP            Ot            I10      
      ESSENTIAL (PRIMARY) HYPERTENSION                     

 

 10/17/2018            SID JAHAIRA L ARNP            Ot            I25.119  
          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                     

 

 10/17/2018            YOUSIFAURE DE JESUSHER L ARNP            Ot            I34.0    
        NONRHEUMATIC MITRAL (VALVE) INSUFFICIENC                     

 

 10/17/2018            SID JAHAIRA L ARNP            Ot            I73.9    
        PERIPHERAL VASCULAR DISEASE, UNSPECIFIED                     

 

 10/17/2018            BAIMA, JAHAIRA L ARNP            Ot            R06.09   
         OTHER FORMS OF DYSPNEA                     

 

 10/17/2018            BAIMA, JAHAIRA L ARNP            Ot            R29.818  
          OTHER SYMPTOMS AND SIGNS INVOLVING THE N                     

 

 10/17/2018            BAIMA, JAHAIRA L ARNP            Ot            Z72.0    
        TOBACCO USE                     

 

 10/17/2018            BAIMA, JAHAIRA L ARNP            Ot            I10      
      ESSENTIAL (PRIMARY) HYPERTENSION                     

 

 10/17/2018            BAIMA, JAHAIRA L ARNP            Ot            I25.119  
          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                     

 

 10/17/2018            BAIMA, JAHAIRA L ARNP            Ot            I34.0    
        NONRHEUMATIC MITRAL (VALVE) INSUFFICIENC                     

 

 10/17/2018            BAIMA, JAHAIRA L ARNP            Ot            R06.09   
         OTHER FORMS OF DYSPNEA                     

 

 10/17/2018            BAIMA, JAHAIRA L ARNP            Ot            Z72.0    
        TOBACCO USE                     

 

 10/17/2018            BAIMA, JAHAIRA L ARNP            Ot            I10      
      ESSENTIAL (PRIMARY) HYPERTENSION                     

 

 10/17/2018            BAIMA, JAHAIRA L ARNP            Ot            I25.119  
          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                     

 

 10/17/2018            BAIMA, JAHAIRA L ARNP            Ot            I34.0    
        NONRHEUMATIC MITRAL (VALVE) INSUFFICIENC                     

 

 10/17/2018            BAIMA, JAHAIRA L ARNP            Ot            R06.09   
         OTHER FORMS OF DYSPNEA                     

 

 10/17/2018            BAIMA, JAHAIRA L ARNP            Ot            Z72.0    
        TOBACCO USE                     

 

 10/31/2018            SEGUNDO SIU E APRN            Ot            G47.00
            INSOMNIA, UNSPECIFIED                     

 

 10/31/2018            SHERRON SEGUNDO E APRN            Ot            G47.10
            HYPERSOMNIA, UNSPECIFIED                     

 

 10/31/2018            SHERRON SEGUNDO E APRN            Ot            G47.33
            OBSTRUCTIVE SLEEP APNEA (ADULT) (PEDIATR                     

 

 10/31/2018            SHERRON SEGUNDO E APRN            Ot            G47.50
            PARASOMNIA, UNSPECIFIED                     

 

 2018            SHERRON SEGUNDO E APRN            Ot            G47.00
            INSOMNIA, UNSPECIFIED                     

 

 2018            SHERRON SEGUNDO E APRN            Ot            G47.10
            HYPERSOMNIA, UNSPECIFIED                     

 

 2018            SHERRON SEGUNDO E APRN            Ot            G47.33
            OBSTRUCTIVE SLEEP APNEA (ADULT) (PEDIATR                     

 

 2018            RALPH SIUINE E APRN            Ot            G47.50
            PARASOMNIA, UNSPECIFIED                     

 

 12/10/2018            BAIMA, JAHAIRA L ARNP            Ot            I10      
      ESSENTIAL (PRIMARY) HYPERTENSION                     

 

 12/10/2018            SIDJAHAIRA ARNP            Ot            I25.119  
          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                     

 

 12/10/2018            JAHAIRA LAU EVETTE ARNP            Ot            I34.0    
        NONRHEUMATIC MITRAL (VALVE) INSUFFICIENC                     

 

 12/10/2018            YOUSIFMA JAHAIRA L ARNP            Ot            R06.09   
         OTHER FORMS OF DYSPNEA                     

 

 12/10/2018            JAHAIRA LAU ARNP            Ot            Z72.0    
        TOBACCO USE                     

 

 12/10/2018            SHERRONSEGUNDO MUNOZ APRN            Ot            R06.02
            SHORTNESS OF BREATH                     

 

 12/10/2018            SHERRONRALPH MUNOZINE DEDE APRN            Ot            R16.1 
           SPLENOMEGALY, NOT ELSEWHERE CLASSIFIED                     

 

 12/10/2018            SHERRONSEGUNDO MUNOZ APRN            Ot            Z72.0 
           TOBACCO USE                     

 

 2018            SHERRONSEGUNDO MUNOZ APRN            Ot            R06.02
            SHORTNESS OF BREATH                     

 

 2018            SHERRONSEGUNDO MUNOZ APRN            Ot            R16.1 
           SPLENOMEGALY, NOT ELSEWHERE CLASSIFIED                     

 

 2018            SHERRONRALPH MUNOZINE DEDE APRN            Ot            Z72.0 
           TOBACCO USE                     

 

 2018            GRAY CHEATHAM, IRENA IVERSON            Ot            V76.12     
       OTH SCREEN MAMMO-MALIGN NEOPLASM OF BRUNO                     

 

 2018            ALYSE HODGES FACC, ALI FACP CCDS            Ot            
785.1            PALPITATIONS                     

 

 2018            ALYSE HODGES FACC, ALI FACP CCDS            Ot            
786.09            RESPIRATORY ABNORM NEC                     

 

 2018            ALYSE HODGES FACC, ALI FACP CCDS            Ot            
785.1            PALPITATIONS                     

 

 2018            ALYSE HODGES FACC, ALI FACP CCDS            Ot            
786.09            RESPIRATORY ABNORM NEC                     

 

 2018            JAVON DE LOS SANTOS ARNP            Ot            789.03    
        ABDOMINAL PAIN, RIGHT LOWER QUADRANT                     

 

 2018            JAVON DE LOS SANTOS ARNP            Ot            789.03    
        ABDOMINAL PAIN, RIGHT LOWER QUADRANT                     

 

 2018            JAVON DE LOS SANTOS ARNP            Ot            R10.31    
        RIGHT LOWER QUADRANT PAIN                     

 

 2018            JAVON DE LOS SANTOS ARNP            Ot            Z12.31    
        ENCNTR SCREEN MAMMOGRAM FOR MALIGNANT NE                     

 

 2018            JAHAIRA LAU ARNP            Ot            I10      
      ESSENTIAL (PRIMARY) HYPERTENSION                     

 

 2018            SID JAHAIRA EVETTE ARNP            Ot            I25.119  
          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                     

 

 2018            BAIMAJAHAIRA L ARNP            Ot            I34.0    
        NONRHEUMATIC MITRAL (VALVE) INSUFFICIENC                     

 

 2018            BAIMA JAHAIRA L ARNP            Ot            I73.9    
        PERIPHERAL VASCULAR DISEASE, UNSPECIFIED                     

 

 2018            BAIMA, JAHAIRA L ARNP            Ot            R06.09   
         OTHER FORMS OF DYSPNEA                     

 

 2018            BAIMA, JAHAIRA L ARNP            Ot            R29.818  
          OTHER SYMPTOMS AND SIGNS INVOLVING THE N                     

 

 2018            BAIMA JAHAIRA L ARNP            Ot            Z72.0    
        TOBACCO USE                     

 

 2018            BAIMA, JAHAIRA L ARNP            Ot            I10      
      ESSENTIAL (PRIMARY) HYPERTENSION                     

 

 2018            BAIMA, JAHAIRA L ARNP            Ot            I25.119  
          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                     

 

 2018            BAIMA, JAHAIRA L ARNP            Ot            I34.0    
        NONRHEUMATIC MITRAL (VALVE) INSUFFICIENC                     

 

 2018            BAIMA, JAHAIRA L ARNP            Ot            R06.09   
         OTHER FORMS OF DYSPNEA                     

 

 2018            BAIMA, JAHAIRA L ARNP            Ot            Z72.0    
        TOBACCO USE                     

 

 2018            SEGUNDO SIU            Ot            R06.02
            SHORTNESS OF BREATH                     

 

 2018            SEGUNDO SIU            Ot            R16.1 
           SPLENOMEGALY, NOT ELSEWHERE CLASSIFIED                     

 

 2018            SEGUNDO SIU APRHECTOR            Ot            Z72.0 
           TOBACCO USE                     

 

 2018            JAVON DE LOS SANTOS ARNP            Ot            E04.2     
       NONTOXIC MULTINODULAR GOITER                     

 

 2018            JAVON DE LOS SANTOS ARNP            Ot            E04.2     
       NONTOXIC MULTINODULAR GOITER                     

 

 2019            MAURY HODGES, MICHELLE IVERSON            Ot            E04.1     
       NONTOXIC SINGLE THYROID NODULE                     

 

 2019            JAVON DE LOS SANTOS ARNP            Ot            E04.2     
       NONTOXIC MULTINODULAR GOITER                     

 

 2019            MAURY HODGES, MICHELLE IVERSON            Ot            E04.1     
       NONTOXIC SINGLE THYROID NODULE                     

 

 2019            JAVON DE LOS SANTOS ARNP            Ot            E04.2     
       NONTOXIC MULTINODULAR GOITER                     

 

 2019            GRAY CHEATHAM, IRENA IVERSON            Ot            V76.12     
       OTH SCREEN MAMMO-MALIGN NEOPLASM OF BRUNO                     

 

 2019            ALYSE HODGES FACC, ALI FACP CCDS            Ot            
785.1            PALPITATIONS                     

 

 2019            ALYSE HODGES FACC, NAN FACP CCDS            Ot            
786.09            RESPIRATORY ABNORM NEC                     

 

 2019            ALYSE HODGES FACC, ALI FACP CCDS            Ot            
785.1            PALPITATIONS                     

 

 2019            ALYSE HODGES FACYENNI, ALI FACP CCDS            Ot            
786.09            RESPIRATORY ABNORM NEC                     

 

 2019            JAVON DE LOS SANTOS ARNP            Ot            789.03    
        ABDOMINAL PAIN, RIGHT LOWER QUADRANT                     

 

 2019            JAVON DE LOS SANTOS ARNP            Ot            789.03    
        ABDOMINAL PAIN, RIGHT LOWER QUADRANT                     

 

 2019            JAVON DE LOS SANTOS ARNP            Ot            R10.31    
        RIGHT LOWER QUADRANT PAIN                     

 

 2019            JAVON DE LOS SANTOS ARNP            Ot            Z12.31    
        ENCNTR SCREEN MAMMOGRAM FOR MALIGNANT NE                     

 

 2019            JAHAIRA LAU L ARNP            Ot            I10      
      ESSENTIAL (PRIMARY) HYPERTENSION                     

 

 2019            SID JAHAIRA L ARNP            Ot            I25.119  
          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                     

 

 2019            SID JAHAIRA L ARNP            Ot            I34.0    
        NONRHEUMATIC MITRAL (VALVE) INSUFFICIENC                     

 

 2019            SID JAHAIRA L ARNP            Ot            I73.9    
        PERIPHERAL VASCULAR DISEASE, UNSPECIFIED                     

 

 2019            SID JAHAIRA L ARNP            Ot            R06.09   
         OTHER FORMS OF DYSPNEA                     

 

 2019            SID JAHAIRA L ARNP            Ot            R29.818  
          OTHER SYMPTOMS AND SIGNS INVOLVING THE N                     

 

 2019            JAHAIRA LAU L ARNP            Ot            Z72.0    
        TOBACCO USE                     

 

 2019            AURE LAUHER L ARNP            Ot            I10      
      ESSENTIAL (PRIMARY) HYPERTENSION                     

 

 2019            SID JAHAIRA L ARNP            Ot            I25.119  
          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                     

 

 2019            YOUSIFMA JAHAIRA L ARNP            Ot            I34.0    
        NONRHEUMATIC MITRAL (VALVE) INSUFFICIENC                     

 

 2019            YOUSIFMA JAHAIRA L ARNP            Ot            R06.09   
         OTHER FORMS OF DYSPNEA                     

 

 2019            AURE LAUHER L ARNP            Ot            Z72.0    
        TOBACCO USE                     

 

 2019            SEGUNDO SIU            Ot            R06.02
            SHORTNESS OF BREATH                     

 

 2019            SEGUNDO SIU            Ot            R16.1 
           SPLENOMEGALY, NOT ELSEWHERE CLASSIFIED                     

 

 2019            SEGUNDO SIU            Ot            Z72.0 
           TOBACCO USE                     

 

 2019            JAVON DE LOS SANTOS            Ot            E04.2     
       NONTOXIC MULTINODULAR GOITER                     

 

 2019            MAURY HODGES, MICHELLE IVERSON            Ot            E04.1     
       NONTOXIC SINGLE THYROID NODULE                     



                                                                               
                                                                               
                                                                               
                                                                               
                                                                               
                                                                               
                                                                               
                                                                               
                                                                               
                                                                               
                                                                               
                                                                               
                                                                               
                           



Procedures

      





 Code            Description            Performed By            Performed On   
     

 

                                               PAP SMEAR OBTAIN SMEAR     
                              2014        

 

             45275                                  NUCLEAR STRESS TESTING     
                              2014        

 

             56752                                  ECHO 2D                    
               2014        

 

             93264                                  HEMOCCULT                  
                 2014        

 

             41624                                  PAP SMEAR                  
                 2014        

 

             80678                                  MAMMOGRAM, SCREENING       
                            2014        

 

             08503                                  EKG, TRACING (IN-HOUSE)    
                               2014        

 

             24197                                  UA LONG DIP                
                   2014        

 

             89489                                  A1C (IN-HOUSE)             
                      2014        

 

             66115                                  ROUTINE VENIPUNCTURE       
                            2014        

 

             49191                                  CBC                        
           2014        

 

             [id number]                                  GFR CALC (RESULT ONLY)   
                                2014        

 

             29569                                  CMP                        
           2014        

 

             06671                                  LIPID PANEL                
                   2014        

 

             75300                                  MAGNESIUM                  
                 2014        

 

             69133                                  TSH                        
           2014        

 

             96978                                  INSULIN LEVEL              
                     2014        

 

             CARDIOLOG                                  NAN CULLEN            
                       09/10/2014        

 

             72424                                  ROUTINE VENIPUNCTURE       
                            2014        

 

             95286                                  CT ABDOMEN AND PELVIS W/
CONTRAST                                   2014        

 

             14336                                  FSH                        
           2014        

 

             34669                                  US PELVIC COMPL (REFLEX CPT
- 51028)                                   2015        



                                                            



Results

      





 Test            Result            Range        









 No Test Indicated - 16 11:33         









 .            Comment                      

 

 Dear Doctor,            Comment                      









 Comp. Metabolic Panel (14) - 16 11:33         









 Glucose, Serum            91 mg/dL            65-99        

 

 BUN            9 mg/dL            6-24        

 

 Creatinine, Serum            0.61 mg/dL            0.57-1.00        

 

 eGFR If NonAfricn Am            102 mL/min/1.73                >59        

 

 eGFR If Africn Am            118 mL/min/1.73                >59        

 

 BUN/Creatinine Ratio            15             9-23        

 

 Sodium, Serum            140 mmol/L            134-144        

 

 Potassium, Serum            4.2 mmol/L            3.5-5.2        

 

 Chloride, Serum            98 mmol/L                    

 

 Carbon Dioxide, Total            25 mmol/L            18-29        

 

 Calcium, Serum            9.1 mg/dL            8.7-10.2        

 

 Protein, Total, Serum            7.0 g/dL            6.0-8.5        

 

 Albumin, Serum            4.4 g/dL            3.5-5.5        

 

 Globulin, Total            2.6 g/dL            1.5-4.5        

 

 A/G Ratio            1.7             1.1-2.5        

 

 Bilirubin, Total            0.9 mg/dL            0.0-1.2        

 

 Alkaline Phosphatase, S            83 IU/L                    

 

 AST (SGOT)            15 IU/L            0-40        

 

 ALT (SGPT)            17 IU/L            0-32        









 Lipid Panel - 16 11:33         









 Cholesterol, Total            183 mg/dL            100-199        

 

 Triglycerides            94 mg/dL            0-149        

 

 HDL Cholesterol            63 mg/dL            >39        

 

 VLDL Cholesterol Macario            19 mg/dL            5-40        

 

 LDL Cholesterol Calc            101 mg/dL            0-99        









 Pap Lb, HPV-hr - 17 15:13         









 HPV, high-risk            Negative             Negative        

 

 DIAGNOSIS:            Comment                      

 

 Specimen adequacy:            Comment                      

 

 Clinician provided ICD10:            Comment                      

 

 Performed by:            Comment                      

 

 QC reviewed by:            Comment                      

 

 .            .                      

 

 Pathologist provided ICD10:            Comment                      

 

 Note:            Comment                      









 CULTURE, URINE - 17 12:04         









 CULTURE, URINE, ROUTINE            SEE NOTE             NRG        









 PDM - 09 PANEL (PROFILE 1) - 18 13:15         









 Prescribed Drug 1            Xanax(TM)             NRG        

 

 Creatinine            53.3 mg/dL            > or=20.0        

 

 pH            6.95             4.5 - 9.0        

 

 Oxidant            NEGATIVE mcg/mL            <200        

 

 Amphetamines            NEGATIVE ng/mL            <500        

 

 medMATCH Amphetamines            CONSISTENT             NRG        

 

 Benzodiazepines            POSITIVE ng/mL            <100        

 

 Marijuana Metabolite            POSITIVE ng/mL            <20        

 

 Cocaine Metabolite            NEGATIVE ng/mL            <150        

 

 medMATCH Cocaine Metab            CONSISTENT             NRG        

 

 Opiates            NEGATIVE ng/mL            <100        

 

 medMATCH Opiates            CONSISTENT             NRG        

 

 Oxycodone            NEGATIVE ng/mL            <100        

 

 medMATCH Oxycodone            CONSISTENT             NRG        

 

 COMMENT                         NRG        

 

   Alphahydroxyalprazolam            74 ng/mL            <25        

 

 medMATCH aOH alprazolam            CONSISTENT             NRG        

 

   Alphahydroxymidazolam            NEGATIVE ng/mL            <50        

 

 medMATCH aOH midazolam            CONSISTENT             NRG        

 

   Alphahydroxytriazolam            NEGATIVE ng/mL            <50        

 

 medMATCH aOH triazolam            CONSISTENT             NRG        

 

   Aminoclonazepam            NEGATIVE ng/mL            <25        

 

 medMATCH Aminoclonazepam            CONSISTENT             NRG        

 

   Hydroxyethylflurazepam            NEGATIVE ng/mL            <50        

 

 medMATCH OH,Et flurazepam            CONSISTENT             NRG        

 

   Lorazepam            NEGATIVE ng/mL            <50        

 

 medMATCH Lorazepam            CONSISTENT             NRG        

 

   Nordiazepam            NEGATIVE ng/mL            <50        

 

 medMATCH Nordiazepam            CONSISTENT             NRG        

 

   Oxazepam            NEGATIVE ng/mL            <50        

 

 medMATCH Oxazepam            CONSISTENT             NRG        

 

   Temazepam            NEGATIVE ng/mL            <50        

 

 medMATCH Temazepam            CONSISTENT             NRG        

 

   Marijuana Metabolite            66 ng/mL            <5        

 

 medMATCH Marijuana Metab            INCONSISTENT             NRG        

 

 Barbiturates            NEGATIVE ng/mL            <300        

 

 medMATCH Barbiturates            CONSISTENT             NRG        

 

 Methadone Metabolite            NEGATIVE ng/mL            <100        

 

 medMATCH Methadone Metab            CONSISTENT             NRG        

 

 Phencyclidine            NEGATIVE ng/mL            <25        

 

 medMATCH Phencyclidine            CONSISTENT             NRG        









 TSH - 18 12:32         









 TSH            0.79 mIU/L            0.40-4.50        



                            



Encounters

      





 ACCT No.            Visit Date/Time            Discharge            Status    
        Pt. Type            Provider            Facility            Loc./Unit  
          Complaint        

 

 [id number]            2014 16:18:00            2014 23:59:59          
  CLS            Outpatient            JAVON CHILEL                  
                             

 

 [id number]            10/15/2014 08:55:00            10/15/2014 23:59:59          
  CLS            Outpatient            NAN CULLEN MD                          
                     

 

 [id number]            09/10/2014 10:15:00            09/10/2014 23:59:59          
  CLS            Outpatient            JAVON CHILEL                  
                             

 

 [id number]            2014 08:54:00            2014 23:59:59          
  CLS            Outpatient            CECY ACEVES DO                        
                       

 

 [id number]            2014 13:00:00            2014 23:59:59          
  CLS            Outpatient            CECY ACEVES DO                        
                       

 

 [id number]            2014 13:00:00            2014 23:59:59          
  CLS            Outpatient            CECY ACEVES DO                        
                       

 

 [id number]            2013 12:48:00            2013 23:59:59          
  CLS            Outpatient                                                    
        

 

 [id number]            2013 10:09:00            2013 23:59:59          
  CLS            Outpatient            JAVON CHILEL                  
                             

 

 [id number]            2012 13:33:00            2012 23:59:59          
  CLS            Outpatient                                                    
        

 

 [id number]            2012 13:33:00            2012 23:59:59            
CLS            Outpatient            FILIBERTO MYERS JAVON ENGLISH                    
                           

 

 [id number]            2013 15:19:00                                      
Document Registration                                                          
  

 

 [id number]            2017 17:07:00                                   
   Document Registration                                                       
     

 

 [id number]            2016 10:06:00                                   
   Document Registration                                                       
     

 

 [id number]            2019 11:20:00            2019 23:59:59            
CLS            Outpatient            JAVON CHILEL                    
     [facility]                     

 

 [id number]            2018 12:20:00                                      
Document Registration                                                          
  

 

 [id number]            2018 13:00:00                                      
Document Registration                                                          
  

 

 [id number]            2017 11:40:00                                      
Document Registration                                                          
  

 

 [id number]            2015 13:21:52                         ACT            
Document Registration                                                          
  

 

 [id number]            2016 10:06:00                                   
   Document Registration                                                       
     

 

 [id number]            2019 10:15:00            2019 10:48:00    
        DIS            Outpatient            MICHELLE MENDIOLA MD            
[facility]            PREOP            THYROID NODULES   
     

 

 [id number]            2019 12:59:00            2019 23:59:59    
        CLS            Outpatient            MICHELLE MENDIOLA MD            
[facility]            LAB            THYROID NODULE      
  

 

 [id number]            2019 09:54:00            2019 23:59:59    
        CLS            Outpatient            MICHELLE MENDIOLA MD            
[facility]            RAD            THYROID NODULE      
  

 

 [id number]            2018 13:06:00            2018 23:59:59    
        CLS            Outpatient            JAVON DE LOS SANTOS            
[facility]            RAD            THYROID NODULE      
  

 

 [id number]            10/31/2018 13:44:00            10/31/2018 13:52:00    
        DIS            Outpatient            SEGUNDO SIU APRN          
  [facility]            SLEEP            SNORING,EDS     
   

 

 [id number]            10/17/2018 11:47:00            10/17/2018 23:59:59    
        CLS            Outpatient            SEGUNDO SIU APRN          
  [facility]            RAD            TOBACCO USER,
DYSPNEA        

 

 [id number]            10/09/2018 13:27:00            10/09/2018 23:59:59    
        CLS            Outpatient            JAHAIRA LAU ARNP            
[facility]            CARD            DYSPNEA ON EXERTION
        

 

 [id number]            2018 10:49:00            2018 23:59:59    
        CLS            Outpatient            JAHAIRA LAU EVETTE ARNP            
[facility]            CARD            DYSPNEA ON EXERTION
        

 

 [id number]            2017 12:54:00            2017 23:59:59    
        CLS            Outpatient            JAVON DE LOS SANTOS            
[facility]            RAD            Z12.31 SCREENING 
MAMMO        

 

 [id number]            2017 08:45:00            2017 12:33:00    
        DIS            Outpatient            HUNTER HAMMONDS DO            [facility]            ENDO            HISTORY POLYPS        

 

 [id number]            2017 05:33:00            2017 15:11:00    
        DIS            Outpatient            HUNTER HAMMONDS DO            [facility]            PREOP            COLONOSCOPY        

 

 [id number]            2016 09:46:00            2016 15:00:00    
        DIS            Outpatient            HUNTER HAMMONDS DO            [facility]            SDC            SCREENING        

 

 [id number]            2016 05:39:00            2016 10:46:00    
        DIS            Outpatient            HUNTER HAMMONDS DO            [facility]            PREOP            SCREENING        

 

 [id number]            08/15/2016 08:34:00            08/15/2016 23:59:59    
        CLS            Outpatient            JAVON DE LOS SANTOS            
[facility]            RAD            RLQ ABD PAIN        

 

 [id number]            2015 13:21:00            2015 23:59:59    
        CLS            Outpatient            JAVON DE LOS SANTOS ARNP            
[facility]            RAD            ABD PAIN, RLQ        

 

 [id number]            2015 11:11:00            2015 23:59:59    
        CLS            Outpatient            JAVON DE LOS SANTOS ARNP            
[facility]            RAD            RLQ PAIN        

 

 [id number]            2014 08:16:00            2014 17:11:00    
        DIS            Outpatient            ALYSE HODGES FACC, NAN SCHUMACHER CCDS     
       [facility]            CATH            ABN STRESS 
TEST        

 

 [id number]            10/30/2014 07:42:00            10/30/2014 23:59:59    
        CLS            Outpatient            ALYSE HODGES FACC, NAN SCHUMACHER CCDS     
       [facility]            CARD            ANGINA,SOB,
FATIGUE        

 

 [id number]            10/23/2014 10:44:00            10/23/2014 23:59:59    
        CLS            Outpatient            ALYSE HODGES FACC, NAN SCHUMACHER CCDS     
       [facility]            CARD            ANGINA,SOB,
FATIGUE        

 

 [id number]            2014 10:08:00            2014 23:59:59    
        CLS            Outpatient            IRENA LIU            [facility]            RAD            SCREENING        

 

 [id number]            2019 10:40:00                         ACT       
     Outpatient            MICHELLE MENDIOLA MD            [facility]            THYROID NODULES
[facility]

 Created on: 2018



Shereen Shafer

External Reference #: [id number]

: 1960

Sex: Female



Demographics







 Address  [address] [place]  [address]

 

 Preferred Language  Unknown

 

 Marital Status  Unknown

 

 Hoahaoism Affiliation  Unknown

 

 Race  Unknown

 

 Ethnic Group  Unknown





Author







 Author  JAVON DE LOS SANTOS

 

 Organization  [facility]

 

 Address  [address] [place]  [address]



 

 Phone  [phone]







Care Team Providers







 Care Team Member Name  Role  Phone

 

 JAVON DE LOS SANTOS  Unavailable  [phone]







PROBLEMS







 Type  Condition  ICD9-CM Code  EZX47-SB Code  Onset Dates  Condition Status  
SNOMED Code

 

 Problem  Eustachian tube dysfunction     H69.80     Active  56540747

 

 Problem  Knee pain     M25.569     Active  46195054

 

 Problem  Psoriasis     L40.9     Active  8103204

 

 Problem  Dysuria     R30.0     Active  98769757

 

 Problem  Hypertension     I10     Active  44941666

 

 Problem  Dysthymia     F34.1     Active  55415774

 

 Problem  Pharyngitis, unspecified etiology     J02.9     Active  589557124

 

 Problem  Anxiety disorder, unspecified     F41.9     Active  394746258







ALLERGIES

No Information



ENCOUNTERS







 Encounter  Location  Date  Diagnosis

 

 [place] [id number] [place] [address]-
0783  26 Mar, 2018  Anxiety disorder, unspecified F41.9

 

 [place] [id number] [place] [address] 81761-
5565  06 Mar, 2018  Anxiety disorder, unspecified F41.9

 

 [facility]  [id number] [place] [address] 13976-
1546    Anxiety disorder, unspecified F41.9

 

 [facility]  [id number] [place] [address] 41178-
5534     

 

 [facility]  [address] [address]-
0802    Anxiety disorder, unspecified F41.9

 

 [facility]  [id number] [place] [address] 05349-
2130  12 Dec, 2017  Anxiety disorder, unspecified F41.9

 

 [place] [address], [place] [address]-
4774    Anxiety disorder, unspecified F41.9 ; Hypertension I10 ; 
Encounter for screening mammogram for breast cancer Z12.31 ; Psoriasis L40.9 
and BMI 40.0-44.9, adult Z68.41

 

 [facility]  [address], [place] [address]-
3228    Anxiety disorder, unspecified F41.9

 

 [facility]  [id number] [place] [address] 24294-
2537  23 Oct, 2017   

 

 [place] [id number] [place] [address] 66778-
4780  17 Oct, 2017  Anxiety disorder, unspecified F41.9

 

 [place] [address] [place] [address] 62[address]-
4935  09 Oct, 2017   

 

 [address] [id number] [id number]  
21 Sep, 2017  Dysuria R30.0

 

 [facility]  [address] [place] [address] 92563-
1139  19 Sep, 2017  Anxiety disorder, unspecified F41.9

 

 [place] [address] [address]-
2121  22 Aug, 2017  Anxiety disorder, unspecified F41.9

 

 [place] [address] [place] [address] 75814-
4105    Anxiety disorder, unspecified F41.9

 

 [facility]  [address] [address]-
5551  10 Jul, 2017   

 

 [facility]  [id number] [place] [address] 19207-
5077    Anxiety disorder, unspecified F41.9

 

 [facility]  [address], [place] [address]-
9628  30 May, 2017  Anxiety disorder, unspecified F41.9

 

 [place] [address] [address]-
7064  02 May, 2017  Anxiety disorder, unspecified F41.9

 

 [place] [id number] [place] [address] 73080-
2711    Anxiety disorder, unspecified F41.9

 

 [facility]  [id number] [place] [address] 67669-
5538  07 Mar, 2017   

 

 [place] [id number] [place] [address] 384492-
1657  07 Mar, 2017  Anxiety disorder, unspecified F41.9

 

 [place] [id number] [place] [address] 317048-
9171    Well woman exam Z01.419 ; Cervical cancer screening Z12.4 
and Breast cancer screening Z12.39

 

 [place] [id number] [place] [address] 73261-
0740    Anxiety disorder, unspecified F41.9

 

 [place] [id number] [place] [address] 77548-
6532    Eustachian tube dysfunction H69.80 and Pharyngitis, 
unspecified etiology J02.9

 

 [place] [id number] [place] [address] 14130-
5328    Anxiety disorder, unspecified F41.9

 

 [place] [id number] [place] [address] 80611-
0510  05 Dec, 2016  Anxiety disorder, unspecified F41.9

 

 [place] [id number] [place] [address] 91580-
9217    Anxiety disorder, unspecified F41.9

 

 [place] [address] [place] [address] 94480-
1236  30 Sep, 2016  Anxiety disorder, unspecified F41.9

 

 [place] [id number] [place] [address] 06504-
7272  20 Sep, 2016  Irritable bowel syndrome with diarrhea K58.0 ; Hypertension 
I10 ; Family history of diabetes mellitus Z83.3 and Visual changes H53.9

 

 [facility]  [address] [address]-
9022  26 Aug, 2016  Anxiety disorder, unspecified F41.9

 

 [facility]  [id number] [place] [address] 75142-
4696  09 Aug, 2016  RLQ abdominal pain R10.31

 

 [facility]  [address] [address]-
5383  04 Aug, 2016  RLQ abdominal pain R10.31 and Varicose veins of both lower 
extremities I83.93

 

 [facility]  [address] [address]-
2585    Anxiety disorder, unspecified F41.9

 

 [facility]  [address] [address]-
4020     

 

 [facility]  [address] [address]-
9861     

 

 [facility]  [address] [address]-
1585    Knee pain M25.569

 

 [facility]  [id number] [place] [address] 95645-
5951     

 

 [facility]  [id number] [place] [address] 48563-
7881  27 May, 2016  Anxiety disorder, unspecified F41.9

 

 [facility]  [id number] [place] [address] 42461-
0992     

 

 [facility]  [address] 9[address]-
9559     

 

 [facility]  [id number] [place] [address] 04135-
6081  30 Mar, 2016   

 

 [facility]  [address] [address]-
0268  29 Mar, 2016   

 

 [facility]  [address], [place] [address]-
5514    Hypertension I10 ; Dysthymia F34.1 ; Knee pain M25.569 and 
Eustachian tube dysfunction H69.80

 

 [facility]  [address], [place] [address]-
3856     

 

 [facility]  [id number] [place] [address] 368649-
1814     

 

 [facility]  [id number] [place] [address] 39806-
2213  02 Dec, 2015   

 

 [facility]  [id number] [place] [address] 101696-
9841  01 Dec, 2015   

 

 [facility]  [id number] [place] [address] 92526-
1799     

 

 [facility]  [id number] [place] [address] 17958-
6366     

 

 [facility]  [id number] [place] [address] 95717-
3194  30 Oct, 2015   

 

 [facility]  [address] [facility] [id number] [id number]  
23 Sep, 2015  Dental examination V72.2

 

 [facility]  [address] [place] [address] [id number] [id number]  
15 Sep, 2015  Allergic rhinitis 477.9 and Chronic serous otitis media of both 
ears 381.10

 

 [facility]  [address], [place] [address]-
7399  21 Aug, 2015   

 

 [facility]  [address] [facility] [id number] [place] [id number]  
08 Aug, 2015  Sinusitis 473.9 and Bronchitis 490

 

 [facility]  [id number] [place] [address] 88948-
9007     

 

 [facility]  [address] [place] [address]-
0037     

 

 [facility] [place] [facility]  [address] [id number] [place] [address]-
7188     

 

 [facility] [place] [facility]  [address] [id number] [place] [address]-
1495     

 

 [facility] [place] [facility]  [address] [id number] [place] [address]-
8502     

 

 [facility] [place] [facility]  [address] [id number] [place] [address]-
9348  03 Mar, 2015   

 

 [facility] [place] [facility]  [address] [id number] [place] [address]-
3543  03 Mar, 2015   

 

 [facility] [place] [facility]  [address] [id number] [place] [address]-
3651     

 

 [facility] [place] [facility]  [address] [id number] [place] [address]-
6800     

 

 [facility] [place] [facility]  [address] [id number] [place] [address]-
6825     

 

 [facility] [place] [facility]  [address] [id number] [place] [address]-
3507     

 

 [facility] [place] [facility]  [address] [id number] [place] [address]-
0960     

 

 [facility] [place] [facility]  [address] [id number] [place] [address]-
1766     

 

 [facility] [place] [facility]  [address] [id number] [place] [address]-
7444     

 

 [facility] [place] [facility]  [address] [id number] [place] [address]-
8907     

 

 [facility] [place] [facility]  [address] [id number] [place] [address]-
1059     

 

 [facility] [place] [facility]  [address] [id number] [place] [address]-
7721     

 

 [facility] [place] [facility]  [address] [id number] [place] [address]-
0350  30 Dec, 2014   

 

 [facility] [place] [facility]  [address] [id number] [place] [address]-
7223  29 Dec, 2014   

 

 [facility] [place] [facility]  [address] [id number] [place] [address]-
5217  29 Dec, 2014   

 

 [facility] [place] [facility]  [address] [id number] [place] [address]-
3463  03 Dec, 2014   

 

 [facility] [place] [facility]  [address] [id number] [place] [address]-
4030  03 Dec, 2014   

 

 [facility] [place] [facility]  [address] [id number] [place] [address]-
1044     

 

 [facility] [place] [facility]  [address] [id number] [place] [address]-
2306     

 

 [facility] [place] [facility]  [address] [id number] [place] [address]-
2379  23 Oct, 2014   

 

 [facility] [place] [facility]  [address] [id number] [place] [address]-
0594  23 Oct, 2014   

 

 [facility] [place] [facility]  [address] [id number] [place] [address]-
2854  15 Oct, 2014   

 

 [facility] [place] [facility]  [address] [id number] [place] [address]-
3598  15 Oct, 2014   

 

 [facility] [place] [facility]  [address] [id number] [place] [address]-
7682  22 Sep, 2014   

 

 [facility] [place] [facility]  [address] [id number] [place] [address]-
9601  22 Sep, 2014   

 

 [facility] [place] [facility]  [address] [id number] [place] [address]-
7417  10 Sep, 2014   

 

 [facility] [place] [facility]  [address] [id number] [place] [address]-
9081  10 Sep, 2014   

 

 [facility] [place] [facility]  [address] [id number] [place] [address]-
2345  03 Sep, 2014   

 

 [facility] [place] [facility]  [address] [id number] [place] [address]-
5013  03 Sep, 2014   

 

 [facility] [place] [facility]  [address] [id number] [place] [address]-
6365  29 Aug, 2014   

 

 [facility] [place] [facility]  [address] [id number] [place] [address]-
8385  29 Aug, 2014   

 

 [facility] [place] [facility]  [address] [id number] [place] [address]-
8824  27 Aug, 2014   

 

 [facility] [place] [facility]  [address] [id number] [place] [address]-
4581  27 Aug, 2014   

 

 [facility] [place] [facility]  [address] [id number] [place] [address]-
8817  22 Aug, 2014   

 

 [facility] [place] [facility]  [address] [id number] [place] [address]-
4216  22 Aug, 2014   

 

 [facility] [place] [facility]  [address] [id number] [place] [address]-
2297     

 

 [facility] [place] [facility]  [address] [id number] [place] [address]-
3615     

 

 [facility] [place] [facility]  [address] [id number] [place] [address]-
4021  27 May, 2014   

 

 [facility] [place] [facility]  [address] [id number] [place] [address]-
5943  27 May, 2014   

 

 [facility] [place] [facility]  [address] [id number] [place] [address]-
6373     

 

 [facility] [place] [facility]  [address] [id number] [place] [address]-
2562     

 

 [facility] [place] [facility]  [address] [id number] [place] [address]-
8015  06 Mar, 2014   

 

 [facility] [place] [facility]  [address] [id number] [place] [address]
2482  06 Mar, 2014   

 

 [facility] [place] [facility]  [address] [id number] [place] [address]-
2743     

 

 [facility] [place] [facility]  [address] [id number] [place] [address]-
2229     

 

 [facility] [place] [facility]  [address] [id number] [place] [address]-
3416     

 

 [facility] [place] [facility]  [address] [id number] [place] [address]-
7576     

 

 [facility] [place] [facility]  [address] [id number] [place] [address]-
8702     

 

 [facility] [place] [facility]  [address] [id number] [place] [address]-
1901     

 

 [facility] [place] [facility]  [address] [facility] [id number] [place] [address]-
6920     

 

 [facility] [place] [facility]  [address] [facility] [id number] [place] [address]-
0274     

 

 [facility] [place] [facility]  [address] [id number] [place] [address]-
3889  15 Oct, 2013   

 

 [facility] [place] [facility]  [address] [facility] [id number] [place] [address]-
8649  15 Oct, 2013   

 

 [facility] [place] [facility]  [address] [facility] [id number] [place] [address]-
5811  28 Aug, 2013   

 

 [facility] [place] [facility]  [address] [id number] [place] [address]-
7882     

 

 [facility] [place] [facility]  [address] [id number] [place] [address]-
7299  27 Mar, 2013   

 

 [facility] [place] [facility]  [address] [id number] [place] [address]-
5278     

 

 [facility] [place] [facility]  [address] [id number] [place] [address]-
0722     

 

 [facility] [place] [facility]  [address] [facility] [id number] [address]-
2546  10 Michel, 2013   

 

 [facility] [place] [facility]  [address] [id number] [place] [address]-
8476  03 Dec, 2012   

 

 [facility] [place] [facility]  [address] [id number] [place] [address]-
7060  03 Dec, 2012   

 

 [facility] [place] [facility]  [address] [facility] [id number] [place] [address]-
5795     

 

 [facility] [place] [facility]  [address] [id number][place] [address]-
8856     

 

 [facility] [place] [facility]  [address] [id number] [place] [address]-
3183  08 Oct, 2012   

 

 [facility] [place] [facility]  [address] [facility] [id number] [place] [address]-
1888  25 Sep, 2012   

 

 [facility] [place] [facility]  [address] [facility] [id number][place] [address]-
6635  06 Sep, 2012   

 

 [facility] [place] [facility]  [address] [facility] [id number][place] [address]-
1456  10 Aug, 2012   

 

 [facility] [place] [facility]  [address] [facility] [id number][place] [address]-
5778     

 

 [facility] [place] [facility]  [address] [facility] [id number][place] [address]-
5979     

 

 [facility] [place] [facility]  [address] [facility] [id number][place] [address]-
8691     

 

 [facility] [place] [facility]  [address] [facility] [id number][place] [address]-
9185     

 

 [facility] [place] [facility]  [address] [id number][place] [address]-
7306     

 

 [facility] [place] [facility]  [address] [facility] [id number], [place] [address]-
6132     

 

 [facility] [place] [facility]  [address] [facility] [id number][place] [address]-
7676     

 

 [facility] [place] [facility]  [address] [id number] [place] [address]-
6377  31 2012   

 

 [facility] [place] [facility]  [address] [id number] [place] [address]-
3532  31 2012   

 

 [facility] [place] [facility]  [address] [id number] [place] [address]
2546  06 2012   

 

 [facility] [place] [facility]  [address] [id number] [place] [address]-
8746  06 Dec, 2011   

 

 [facility] [place] [facility]  [address] [id number] [place] [address]-
0871  10 2011   

 

 [facility] [place] [facility]  [address] [id number] [place] [address]-
1239  14 2011   

 

 [facility] [place] [facility]  [address] [id number] [place] [address]-
2628  10 May, 2011   

 

 [facility] [place] [facility]  [address] [id number] [place] [address]-
8232  29 Dec, 2010   

 

 [facility] [place] [facility]  [address] [id number] [place] [address]-
9813  16 2010   

 

 [facility] [place] [facility]  [address] [id number] [place] [address]-
3895  18 Oct, 2010   

 

 [facility] [place] [facility]  [address] [id number] [place] [address]-
2405  14 2010   

 

 [facility] [place] [facility]  [address] [id number] [place] [address]-
9160  14 Dec, 2009   

 

 [facility] [place] [facility]  [address] [id number] [place] [address]-
1552  14 Dec, 2009   

 

 [facility] [place] [facility]  [address] [id number] [place] [address]-
0673  10 Dec, 2009   

 

 [facility] [place] [facility]  [address] [id number] [place] [address]-
0916  07 Dec, 2009   

 

 [facility] [place] [facility]  [address] [id number] [place] [address]-
4031     

 

 [facility]  [address] [facility] [id number] [place] [address]-
8369  10 Nov, 2009   

 

 [facility]  [address] [facility] [id number] [place] [address]-
6960  15 Apr, 2009   







IMMUNIZATIONS

No Known Immunizations



SOCIAL HISTORY

Never Assessed



REASON FOR VISIT

Refill request



PLAN OF CARE





VITAL SIGNS





MEDICATIONS

Unknown Medications



RESULTS

No Results



PROCEDURES

No Known procedures



INSTRUCTIONS





MEDICATIONS ADMINISTERED

No Known Medications



MEDICAL (GENERAL) HISTORY







 Type  Description  Date

 

 Medical History  depression   

 

 Medical History  hx of anxiety   

 

 Medical History  mitral valve prolapse   

 

 Surgical History  tonsillectomy and adenoidectomy   

 

 Surgical History  partial hysterectomy   

 

 Surgical History  jaw surgery  1974

 

 Surgical History  colonoscopy  16

 

 Surgical History  colonoscopy  10/2017

 

 Hospitalization History  for surgery
[facility]

 Created on: 2018



Shereen Shafer

External Reference #: [id number]

: 1960

Sex: Female



Demographics







 Address  [address] [place]  [address]

 

 Preferred Language  Unknown

 

 Marital Status  Unknown

 

 Oriental orthodox Affiliation  Unknown

 

 Race  Unknown

 

 Ethnic Group  Unknown





Author







 Author  JAVON DE LOS SANTOS

 

 Organization  [facility]

 

 Address  [address] [place]  [address]



 

 Phone  [phone]







Care Team Providers







 Care Team Member Name  Role  Phone

 

 JAVON DE LOS SANTOS  Unavailable  [phone]







PROBLEMS







 Type  Condition  ICD9-CM Code  MBW33-ZV Code  Onset Dates  Condition Status  
SNOMED Code

 

 Problem  Hypertension     I10     Active  51132648

 

 Problem  Eustachian tube dysfunction     H69.80     Active  90354741

 

 Problem  Knee pain     M25.569     Active  51587280

 

 Problem  Violation of controlled substance agreement     Z91.14    
Active  887105485

 

 Problem  Dysthymia     F34.1     Active  46298511

 

 Problem  Other chronic pain     G89.29     Active  24720966

 

 Problem  Acute right-sided low back pain with right-sided sciatica     M54.41 
    Active  365934139

 

 Problem  Pharyngitis, unspecified etiology     J02.9     Active  611131307

 

 Problem  Anxiety disorder, unspecified     F41.9     Active  587508473

 

 Problem  Psoriasis     L40.9     Active  6731605

 

 Problem  Dysuria     R30.0     Active  11985414







ALLERGIES

No Known Allergies



ENCOUNTERS







 Encounter  Location  Date  Diagnosis

 

 [place] [address] [facility] [id number] [address]-
4659  12 Sep, 2018   

 

 [facility] [address] [id number]  [address] [id number] [id number]  
  Shortness of breath R06.02 and Anxiety disorder, unspecified F41.9

 

 [facility]  [address] [facility] [id number] [address]-
9552    Anxiety disorder, unspecified F41.9

 

 [place] [address] [facility] [id number][place] [address]-
7915    Anxiety disorder, unspecified F41.9 ; Shortness of breath 
R06.02 ; Therapeutic drug monitoring Z51.81 ; Family history of diabetes 
mellitus Z83.3 ; BMI 40.0-44.9, adult Z68.41 and Tobacco abuse Z72.0

 

 [place] [address] [place] [id number] [place] [address]-
0626  25 May, 2018  Anxiety disorder, unspecified F41.9 and Pain in right knee 
M25.561

 

 [place] [address] [id number] [place] [address]-
4373  02 May, 2018  Pain in right knee M25.561 ; Pain in left knee M25.562 ; 
Other chronic pain G89.29 ; Anxiety disorder, unspecified F41.9 ; Acute right-
sided low back pain with right-sided sciatica M54.41 and BMI 40.0-44.9, adult 
Z68.41

 

 [place] [address] [place] [id number] [place] [address]-
2885    Anxiety disorder, unspecified F41.9

 

 [place] [address] [id number] [place] [address]-
9653  26 Mar, 2018  Anxiety disorder, unspecified F41.9

 

 [place] [id number] [place] [address] 08[address]-
4653  06 Mar, 2018  Anxiety disorder, unspecified F41.9

 

 [place] [id number] [place] [address] 030[address]-
8530    Anxiety disorder, unspecified F41.9

 

 [place] [address] [place] [id number] [place] [address] 30[address]-
7294     

 

 [place] [address] [place] [id number] [place] [address] 035[address]-
4058    Anxiety disorder, unspecified F41.9

 

 [place] [id number] [place] [address]-
8843  12 Dec, 2017  Anxiety disorder, unspecified F41.9

 

 [place] [id number] [place] [address]-
2103    Anxiety disorder, unspecified F41.9 ; Hypertension I10 ; 
Encounter for screening mammogram for breast cancer Z12.31 ; Psoriasis L40.9 
and BMI 40.0-44.9, adult Z68.41

 

 [facility]  [id number] [place] [address]-
5464    Anxiety disorder, unspecified F41.9

 

 [facility]  [id number] [place] [address]65[address]-
1832  23 Oct, 2017   

 

 [facility]  [id number] [place] [address]-
6841  17 Oct, 2017  Anxiety disorder, unspecified F41.9

 

 [facility]  [id number] [place] [address] 78551-
8460  09 Oct, 2017   

 

 [place] [id number] [id number]  
21 Sep, 2017  Dysuria R30.0

 

 [facility]  [id number] [place] [address]-
6586  19 Sep, 2017  Anxiety disorder, unspecified F41.9

 

 [facility]  [address] [place] [address]-
7981  22 Aug, 2017  Anxiety disorder, unspecified F41.9

 

 [facility]  [address] [place] [address]-
3856    Anxiety disorder, unspecified F41.9

 

 [place] [address] [place] [address]-
8642  10 Jul, 2017   

 

 [facility]  [id number] [place] [address] 24623-
4902    Anxiety disorder, unspecified F41.9

 

 [facility]  [id number] [place] [address] 87080-
0689  30 May, 2017  Anxiety disorder, unspecified F41.9

 

 [facility]  [address] [place] [address] 84808-
0370  02 May, 2017  Anxiety disorder, unspecified F41.9

 

 [place] [address]00565100[place] [address]-
2607    Anxiety disorder, unspecified F41.9

 

 [place] [address] [place] [id number] [place] [address] 83737-
1620  07 Mar, 2017   

 

 [place] [address] [place] [id number] [place] [address] 25896-
8502  07 Mar, 2017  Anxiety disorder, unspecified F41.9

 

 [place] [address] [place] [address] [place] [address]-
7807    Well woman exam Z01.419 ; Cervical cancer screening Z12.4 
and Breast cancer screening Z12.39

 

 [place] [address] [id number] [place] [address] 605[address]-
9061    Anxiety disorder, unspecified F41.9

 

 [place] [address] [place] [address] [place] [address] 12639-
5644    Eustachian tube dysfunction H69.80 and Pharyngitis, 
unspecified etiology J02.9

 

 [place] [address] [place] [id number] [place] [address] 11243-
6692    Anxiety disorder, unspecified F41.9

 

 [place] [address] [place] [address] 94984-
9197  05 Dec, 2016  Anxiety disorder, unspecified F41.9

 

 [place] [address] [place] [id number] [place] [address] 80434-
9902    Anxiety disorder, unspecified F41.9

 

 [place] [address] [place] [id number] [place] [address] 89563-
2507  30 Sep, 2016  Anxiety disorder, unspecified F41.9

 

 [place] [address] [place] [address] 05[address]-
0130  20 Sep, 2016  Irritable bowel syndrome with diarrhea K58.0 ; Hypertension 
I10 ; Family history of diabetes mellitus Z83.3 and Visual changes H53.9

 

 [place] [address] [place] [address] [id number] [address]-
5598  26 Aug, 2016  Anxiety disorder, unspecified F41.9

 

 [facility]  [id number] [place] [address] 680[address]-
2106  09 Aug, 2016  RLQ abdominal pain R10.31

 

 [facility]  [id number] [place] [address] 26288-
2496  04 Aug, 2016  RLQ abdominal pain R10.31 and Varicose veins of both lower 
extremities I83.93

 

 [facility]  [id number] [place] [address] 46430-
9317    Anxiety disorder, unspecified F41.9

 

 [facility]  [id number] [place] [address] 52444-
8016     

 

 [facility]  [id number] [place] [address] 14820-
0059     

 

 [facility]  [id number] [place] [address] 34008-
7855    Knee pain M25.569

 

 [facility]  [id number] [place] [address] 28448-
1655     

 

 [facility]  [id number] [place] [address] 56933-
3031  27 May, 2016  Anxiety disorder, unspecified F41.9

 

 [facility]  [id number] [place] [address] 17610-
1386     

 

 [facility]  [id number] [place] [address] 62415-
2543     

 

 [facility]  [id number] [place] [address] 97796-
1806  30 Mar, 2016   

 

 [facility]  [id number] [place] [address] 89274
2546  29 Mar, 2016   

 

 [facility]  [id number] [place] [address] 37637-
6455    Hypertension I10 ; Dysthymia F34.1 ; Knee pain M25.569 and 
Eustachian tube dysfunction H69.80

 

 [facility]  [id number] [place] [address] 87142-
1423     

 

 [facility]  [id number] [place] [address] 193621-
4230     

 

 [facility]  [address] 914417-
9184  02 Dec, 2015   

 

 [facility]  [address] [place] [address] 19320-
7774  01 Dec, 2015   

 

 [facility]  [address] [place] [address] 786164-
1910     

 

 [facility]  [address] [place] [address] 95531-
5646     

 

 [facility]  [id number] [place] [address] 57524-
8790  30 Oct, 2015   

 

 [address] [id number] [address]  
23 Sep, 2015  Dental examination V72.2

 

 [id number] [place] [address] [id number]  
15 Sep, 2015  Allergic rhinitis 477.9 and Chronic serous otitis media of both 
ears 381.10

 

 [facility]  [id number] [place] [address] 80861-
7216  21 Aug, 2015   

 

 [address] [id number] [place] [address] [id number]  
08 Aug, 2015  Sinusitis 473.9 and Bronchitis 490

 

 [facility]  [id number] [place] [address] 89768-
0576     

 

 [facility]  [id number] [place] [address] 15722-
3306     

 

 [facility]  [id number] [place] [address] 72816-
9977     

 

 [facility] [place] [facility]  [address] [id number] [place] [address]-
9102     

 

 [facility] [place] [facility]  [address] [id number] [place] [address]-
8716     

 

 [facility] [place] [facility]  [address] [id number] [place] [address]-
2546  03 Mar, 2015   

 

 [facility] [place] [facility]  [address] [id number] [place] [address]-
2546  03 Mar, 2015   

 

 [facility] [place] [facility]  [address] [id number] [place] [address]-
7608     

 

 [facility] [place] [facility]  [address] [id number] [place] [address]-
2386     

 

 [facility] [place] [facility]  [address] [id number] [place] [address]-
8921     

 

 [facility] [place] [facility]  [address] [id number] [address]-
5586     

 

 [facility] [place] [facility]  [address] [id number][place] [address]-
2921     

 

 [facility] [place] [facility]  [address] [id number][place] [address]-
1353     

 

 [facility] [place] [facility]  [address] [id number] [address]-
1537     

 

 [facility] [place] [facility]  [address] [id number][place] [address]-
8788     

 

 [facility] [place] [facility]  [address] [id number][place] [address]-
3836     

 

 [facility] [place] [facility]  [address] [id number][place] [address]-
8566     

 

 [facility] [place] [facility]  [address] [id number] [place] [address]-
0686  30 Dec, 2014   

 

 [facility] [place] [facility]  [address] [id number] [place] [address]-
7204  29 Dec, 2014   

 

 [facility] [place] [facility]  [address] [id number] [place] [address]-
4496  29 Dec, 2014   

 

 [facility] [place] [facility]  [address] [id number] [place] [address]-
4689  03 Dec, 2014   

 

 [facility] [place] [facility]  [address] [id number] [place] [address]-
0186  03 Dec, 2014   

 

 [facility] [place] [facility]  [address] [id number] [place] [address]-
5282     

 

 [facility] [place] [facility]  [address] [id number] [place] [address]-
7942     

 

 [facility] [place] [facility]  [address] [id number] [place] [address]-
8431  23 Oct, 2014   

 

 [facility] [place] [facility]  [address] [id number] [place] [address]-
4290  23 Oct, 2014   

 

 [facility] [place] [facility]  [address] [id number] [place] [address]-
0204  15 Oct, 2014   

 

 [facility] [place] [facility]  [address] [id number] [place] [address]-
8501  15 Oct, 2014   

 

 [facility] [place] [facility]  [address] [id number] [place] [address]-
2211  22 Sep, 2014   

 

 [facility] [place] [facility]  [address] [id number] [place] [address]-
2547  22 Sep, 2014   

 

 [facility] [place] [facility]  [address] [id number] [place] [address]-
2545  10 Sep, 2014   

 

 [facility] [place] [facility]  [address] [id number] [place] [address]
2541  10 Sep, 2014   

 

 [facility] [place] [facility]  [address] [id number] [place] [address]-
2548  03 Sep, 2014   

 

 [facility] [place] [facility]  [address] [id number] [place] [address]
2545  03 Sep, 2014   

 

 [facility] [place] [facility]  [address] [id number] [place] [address]-
8030  29 Aug, 2014   

 

 [facility] [place] [facility]  [address] [id number] [place] [address]-
5453  29 Aug, 2014   

 

 [facility] [place] [facility]  [address] [id number] [place] [address]-
9665  27 Aug, 2014   

 

 [facility] [place] [facility]  [address] [id number] [place] [address]-
2841  27 Aug, 2014   

 

 [facility] [place] [facility]  [address] [id number] [place] [address]-
8247  22 Aug, 2014   

 

 [facility] [place] [facility]  [address] [id number] [place] [address]-
7518  22 Aug, 2014   

 

 [facility] [place] [facility]  [address] [id number] [place] [address]-
2805     

 

 [facility] [place] [facility]  [address] [id number] [place] [address]-
9847     

 

 [facility] [place] [facility]  [address] [id number] [place] [address]-
1616  27 May, 2014   

 

 [facility] [place] [facility]  [address] [id number] [place] [address]-
2595  27 May, 2014   

 

 [facility] [place] [facility]  [address] [id number] [place] [address]-
4991     

 

 [facility] [place] [facility]  [address] [id number] [place] [address]-
1684     

 

 [facility] [place] [facility]  [address] [id number] [place] [address]-
6228  06 Mar, 2014   

 

 [facility] [place] [facility]  [address] [id number] [place] [address]-
9876  06 Mar, 2014   

 

 [facility] [place] [facility]  [address] [id number] [place] [address]-
1530     

 

 [facility] [place] [facility]  [address] [id number] [place] [address]-
4078     

 

 [facility] [place] [facility]  [address] [id number] [place] [address]-
7776     

 

 [facility] [place] [facility]  [address] [id number] [place] [address]-
6916     

 

 [facility] [place] [facility]  [address] [id number] [place] [address]-
5786     

 

 [facility] [place] [facility]  [address] [id number] [place] [address]-
4606     

 

 [facility] [place] [facility]  [address] [id number] [place] [address]-
0736     

 

 [facility] [place] [facility]  [address] [id number] [place] [address]-
3227     

 

 [facility] [place] [facility]  [address] [facility] [id number] [place] [address]-
3968  15 Oct, 2013   

 

 [facility] [place] [facility]  [address] [id number] [place] [address]-
4620  15 Oct, 2013   

 

 [facility] [place] [facility]  [address] [id number] [place] [address]-
9893  28 Aug, 2013   

 

 [facility] [place] [facility]  [address] [facility] [id number] [place] [address]-
5225     

 

 [facility] [place] [facility]  [address] [id number] [place] [address]
2546  27 Mar, 2013   

 

 [facility] [place] [facility]  [address] [id number] [place] [address]
2545     

 

 [facility] [place] [facility]  [address] [id number] [place] [address]-
0917     

 

 [facility] [place] [facility]  [address] [id number] [place] [address]
2546  10 Michel, 2013   

 

 [facility] [place] [facility]  [address] [address] [id number] [place] [address]-
8326  03 Dec, 2012   

 

 [facility] [place] [facility]  [address] [id number] [place] [address]-
1446  03 Dec, 2012   

 

 [facility] [place] [facility]  [address] [id number] [place] [address]-
7896     

 

 [facility] [place] [facility]  [address] [id number] [place] [address]-
3356     

 

 [facility] [place] [facility]  [address] [id number] [place] [address]-
2696  08 Oct, 2012   

 

 [facility] [place] [facility]  [address] [id number] [place] [address]-
5356  25 Sep, 2012   

 

 [facility] [place] [facility]  [address] [id number] [place] [address]-
3316  06 Sep, 2012   

 

 [facility] [place] [facility]  [address] [id number] [place] [address]-
4906  10 Aug, 2012   

 

 [facility] [place] [facility]  [address] [id number] [address]-
2506     

 

 [facility] [place] [facility]  [address] [id number] [place] [address]-
8586     

 

 [facility] [place] [facility]  [address] [id number] [address]-
3006     

 

 [facility] [place] [facility]  [address] [id number] [place] [address]-
8346     

 

 [facility] [place] [facility]  [address] [id number] [address]-
0916     

 

 [facility] [place] [facility]  [address] [id number] [place] [address]-
4116     

 

 [facility] [place] [facility]  [address] [id number][place] [address]-
5236     

 

 [facility] [place] [facility]  [address] [id number] [place] [address]-
3216     

 

 [facility] [place] [facility]  [address] [id number] [place] [address]-
4983  31 2012   

 

 [facility]  [address] [id number] [place] [address]-
4213  06 2012   

 

 [facility] [place] [facility]  [address] [id number] [place] [address]-
6683  06 Dec, 2011   

 

 [facility] [place] [facility]  [address] [id number] [place] [address]-
9037  10 2011   

 

 [facility] [place] [facility]  [address] [id number] [place] [address]-
4324  14 2011   

 

 [facility] [place] [facility]  [address] [id number] [place] [address]-
7442  10 May, 2011   

 

 [facility] [place] [facility]  [address] [id number] [place] [address]-
8111  29 Dec, 2010   

 

 [facility] [place] [facility]  [address] [id number] [place] [address]-
0320  16 2010   

 

 [facility] [place] [facility]  [address] [id number] [place] [address]-
7381  18 Oct, 2010   

 

 [facility] [place] [facility]  [address] [id number] [place] [address]-
3222  14 2010   

 

 [facility] [place] [facility]  [address] [id number] [place] [address]-
2734  14 Dec, 2009   

 

 [facility] [place] [facility]  [address] [id number] [place] [address]-
9350  14 Dec, 2009   

 

 [facility] [place] [facility]  [address] [id number] [place] [address]-
4419  10 Dec, 2009   

 

 [facility] [place] [facility]  [address] [id number] [place] [address]-
8139  07 Dec, 2009   

 

 [facility] [place] [facility]  [address] [id number] [place] [address]-
8605  25 2009   

 

 [facility] [place] [facility]  [address] [id number] [place] [address]-
0477  10 2009   

 

 [facility] [place] [facility]  [address] [facility] [id number] [place] [address]-
2644  15 Apr, 2009   







IMMUNIZATIONS

No Known Immunizations



SOCIAL HISTORY

Never Assessed



REASON FOR VISIT

Anxiety WB-MA, Hip pain f/u, PT says she has shortness of breath and chest pain



PLAN OF CARE







 Activity  Details









  









 Follow Up  3 Months Reason:shortness of breath

 

 Future/Pending Procedure  EKG, TRACING (IN-HOUSE) 



 



VITAL SIGNS







 Height  67 in  2018

 

 Weight  276 lbs  2018

 

 Temperature  97.4 degrees Fahrenheit  2018

 

 Heart Rate  72 bpm  2018

 

 Respiratory Rate  20   2018

 

 Oximetry  ambulating w/o oxygen:96 %  2018

 

 BMI  43.22 kg/m2  2018

 

 Blood pressure systolic  118 mmHg  2018

 

 Blood pressure diastolic  78 mmHg  2018







MEDICATIONS







 Medication  Instructions  Dosage  Frequency  Start Date  End Date  Duration  
Status

 

 Fluoxetine HCl 20 MG     TAKE 3 CAPSULES BY MOUTH ONCE DAILY           30  
Active

 

 Alprazolam 1 MG  Orally Twice a day, and 1 tablet at bedtime  0.5 Tablet as 
needed     03 Mar, 2015     28 days  Active

 

 Propranolol HCl 20 MG     1 tablet Twice a day Orally           90  Active

 

 Betamethasone Dipropionate 0.05 %  Externally twice weekly  1 application to 
affected area             Active







RESULTS

No Results



PROCEDURES







 Procedure  Date Ordered  Result  Body Site

 

 09 PANEL (PROFILE 1)  2018      

 

 GLYCATED HEMOGLOBIN TEST  2018      

 

 ELECTROCARDIOGRAM, TRACING  2018      

 

 X-RAY EXAM CHEST 2 VIEWS  2018      







INSTRUCTIONS





MEDICATIONS ADMINISTERED

No Known Medications



MEDICAL (GENERAL) HISTORY







 Type  Description  Date

 

 Medical History  depression   

 

 Medical History  hx of anxiety   

 

 Medical History  mitral valve prolapse   

 

 Surgical History  tonsillectomy and adenoidectomy   

 

 Surgical History  partial hysterectomy   

 

 Surgical History  jaw surgery  1974

 

 Surgical History  colonoscopy  16

 

 Surgical History  colonoscopy  10/2017

 

 Hospitalization History  for surgery
[facility]

 Created on: 2018



Shereen Shafer

External Reference #: [id number]

: 1960

Sex: Female



Demographics







 Address  [address] [place]  [address]

 

 Preferred Language  Unknown

 

 Marital Status  Unknown

 

 Faith Affiliation  Unknown

 

 Race  Unknown

 

 Ethnic Group  Unknown





Author







 Author  JAVON DE LOS SANTOS

 

 [facility]

 

 Address  [address] [place]  [address]



 

 Phone  [phone]







Care Team Providers







 Care Team Member Name  Role  Phone

 

 JAVON DE LOS SANTOS  Unavailable  [phone]







PROBLEMS







 Type  Condition  ICD9-CM Code  EEB03-YC Code  Onset Dates  Condition Status  
SNOMED Code

 

 Problem  Eustachian tube dysfunction     H69.80     Active  20450568

 

 Problem  Knee pain     M25.569     Active  63847888

 

 Problem  Psoriasis     L40.9     Active  8199316

 

 Problem  Dysuria     R30.0     Active  38674827

 

 Problem  Hypertension     I10     Active  49030095

 

 Problem  Dysthymia     F34.1     Active  42700606

 

 Problem  Pharyngitis, unspecified etiology     J02.9     Active  025789308

 

 Problem  Anxiety disorder, unspecified     F41.9     Active  555955935







ALLERGIES

No Information



ENCOUNTERS







 Encounter  Location  Date  Diagnosis

 

 [place] [id number] [place] [address]-
6901    Anxiety disorder, unspecified F41.9

 

 [place] [id number] [place] [address]3983-
1976  26 Mar, 2018  Anxiety disorder, unspecified F41.9

 

 [place] [id number] [place] [address]-
0842  06 Mar, 2018  Anxiety disorder, unspecified F41.9

 

 [facility]  [id number] [place] [address] 33124-
9104    Anxiety disorder, unspecified F41.9

 

 [place] [address] [address]-
8412     

 

 [facility]  [id number] [place] [address] 88459-
2032    Anxiety disorder, unspecified F41.9

 

 [place] [address], [place] [address]-
3506  12 Dec, 2017  Anxiety disorder, unspecified F41.9

 

 [place] [id number] [place] [address]35-
0021    Anxiety disorder, unspecified F41.9 ; Hypertension I10 ; 
Encounter for screening mammogram for breast cancer Z12.31 ; Psoriasis L40.9 
and BMI 40.0-44.9, adult Z68.41

 

 [place] [address] [place] [address] 28158-
6692    Anxiety disorder, unspecified F41.9

 

 [place] [id number] [place] [address] 10404-
7297  23 Oct, 2017   

 

 [place] [id number] [place] [address] 48424-
1699  17 Oct, 2017  Anxiety disorder, unspecified F41.9

 

 [place] [id number] [place] [address] 46356-
2636  09 Oct, 2017   

 

 [address] [id number] [id number]  
21 Sep, 2017  Dysuria R30.0

 

 [place] [address] [place] [address] 04218-
0005  19 Sep, 2017  Anxiety disorder, unspecified F41.9

 

 [place] [address] [place] [address] 12816-
5123  22 Aug, 2017  Anxiety disorder, unspecified F41.9

 

 [place] [address] [place] [address] 81744-
5310    Anxiety disorder, unspecified F41.9

 

 [place] [address] [place] [address]21-
0060  10 Jul, 2017   

 

 [place] [address] [place] [address] 04654-
4386    Anxiety disorder, unspecified F41.9

 

 [place] [address]65100[place] [address]-
2586  30 May, 2017  Anxiety disorder, unspecified F41.9

 

 [facility]  [id number] [place] [address] 00453-
1199  02 May, 2017  Anxiety disorder, unspecified F41.9

 

 [facility]  [address]65100[place], [place] [address]-
7736    Anxiety disorder, unspecified F41.9

 

 [facility]  [id number] [place] [address] 64839-
7218  07 Mar, 2017   

 

 [place] [id number] [place] [address] 825751-
0040  07 Mar, 2017  Anxiety disorder, unspecified F41.9

 

 [place] [address] [place] [address] 74460-
3782    Well woman exam Z01.419 ; Cervical cancer screening Z12.4 
and Breast cancer screening Z12.39

 

 [place] [address]65100[place], [place] [address]-
4000    Anxiety disorder, unspecified F41.9

 

 [place] [address] [place] [address] 58959-
5591    Eustachian tube dysfunction H69.80 and Pharyngitis, 
unspecified etiology J02.9

 

 [place] [address]65100[place], [place] [address]-
9626    Anxiety disorder, unspecified F41.9

 

 [place] [address], [place] [address]-
5977  05 Dec, 2016  Anxiety disorder, unspecified F41.9

 

 [place] [address]100[place], [place] [address]-
0798    Anxiety disorder, unspecified F41.9

 

 [place] [address]65[address], [place] [address]-
5599  30 Sep, 2016  Anxiety disorder, unspecified F41.9

 

 [facility]  [id number] [place] [address] 97[address]-
4049  20 Sep, 2016  Irritable bowel syndrome with diarrhea K58.0 ; Hypertension 
I10 ; Family history of diabetes mellitus Z83.3 and Visual changes H53.9

 

 [facility]  [id number] [place] [address] 65800-
5522  26 Aug, 2016  Anxiety disorder, unspecified F41.9

 

 [facility]  [id number] [place] [address] 55887-
9379  09 Aug, 2016  RLQ abdominal pain R10.31

 

 [facility]  [address] [address]-
9912  04 Aug, 2016  RLQ abdominal pain R10.31 and Varicose veins of both lower 
extremities I83.93

 

 [facility]  [id number] [place] [address] 80362-
3854    Anxiety disorder, unspecified F41.9

 

 [facility]  [id number] [place] [address] 87154-
0766     

 

 [facility]  [id number] [place] [address] 34237-
5150     

 

 [facility]  [id number] [place] [address] 65729-
2897    Knee pain M25.569

 

 [facility]  [id number] [place] [address] 46308-
0727     

 

 [facility]  [id number] [place] [address] 16246-
8723  27 May, 2016  Anxiety disorder, unspecified F41.9

 

 [facility]  [id number] [place] [address] 45163-
9821     

 

 [facility]  [id number] [place] [address] 29891-
8644     

 

 [facility]  [address] [place] [address]-
6765  30 Mar, 2016   

 

 [facility]  [id number] [place] [address] 41587-
4666  29 Mar, 2016   

 

 [facility]  [id number] [place] [address] 28340-
6736    Hypertension I10 ; Dysthymia F34.1 ; Knee pain M25.569 and 
Eustachian tube dysfunction H69.80

 

 [facility]  [id number] [place] [address] 50995-
4097     

 

 [facility]  [id number] [place] [address] 51263-
8304     

 

 [facility]  [id number] [place] [address] 31413-
7161  02 Dec, 2015   

 

 [facility]  [id number] [place] [address] 30938-
7778  01 Dec, 2015   

 

 [facility]  [id number] [place] [address] 53456-
5456     

 

 [facility]  [id number] [place] [address] 17223-
9544     

 

 [facility]  [id number] [place] [address] 19277-
2008  30 Oct, 2015   

 

 [facility]  [address] [id number] [id number]  
23 Sep, 2015  Dental examination V72.2

 

 [facility]  [address] [id number] [id number]  
15 Sep, 2015  Allergic rhinitis 477.9 and Chronic serous otitis media of both 
ears 381.10

 

 [facility]  [address] [place] [address] 81527-
5376  21 Aug, 2015   

 

 [facility]  [address] [id number] [address]  
08 Aug, 2015  Sinusitis 473.9 and Bronchitis 490

 

 [facility] [place] [facility]  [address] [id number] [place] [address]-
2536     

 

 [facility] [place] [facility]  [address] [id number] [place] [address]-
9939     

 

 [facility] [place] [facility]  [address] [id number] [place] [address]-
2546     

 

 [facility] [place] [facility]  [address] [id number] [place] [address]-
0506     

 

 [facility] [place] [facility]  [address] [id number] [place] [address]-
7429     

 

 [facility] [place] [facility]  [address] [id number] [place] [address]-
3676  03 Mar, 2015   

 

 [facility] [place] [facility]  [address] [id number] [place] [address]-
6876  03 Mar, 2015   

 

 [facility] [place] [facility]  [address] [id number] [place] [address]-
5570     

 

 [facility] [place] [facility]  [address] [id number] [place] [address]-
7893     

 

 [facility] [place] [facility]  [address] [id number] [place] [address]-
2540     

 

 [facility] [place] [facility]  [address] [id number] [place] [address]-
0586     

 

 [facility] [place] [facility]  [address] [id number] [place] [address]-
3266     

 

 [facility] [place] [facility]  [address] [id number] [place] [address]-
3362     

 

 [facility] [place] [facility]  [address] [id number] [place] [address]-
8886     

 

 [facility] [place] [facility]  [address] [id number] [place] [address]-
2546     

 

 [facility] [place] [facility]  [address] [id number] [place] [address]-
1392     

 

 [facility] [place] [facility]  [address] [id number] [place] [address]-
3873     

 

 [facility] [place] [facility]  [address] [id number] [place] [address]-
1498  30 Dec, 2014   

 

 [facility] [place] [facility]  [address] [id number] [place] [address]-
7704  29 Dec, 2014   

 

 [facility] [place] [facility]  [address] [id number] [place] [address]-
3141  29 Dec, 2014   

 

 [facility] [place] [facility]  [address] [id number] [place] [address]-
0504  03 Dec, 2014   

 

 [facility] [place] [facility]  [address] [id number] [place] [address]-
3561  03 Dec, 2014   

 

 [facility] [place] [facility]  [address] [id number] [place] [address]-
8216     

 

 [facility] [place] [facility]  [address] [id number] [place] [address]-
3629     

 

 [facility] [place] [facility]  [address] [id number] [place] [address]-
3468  23 Oct, 2014   

 

 [facility] [place] [facility]  [address] [id number] [place] [address]-
0612  23 Oct, 2014   

 

 [facility] [place] [facility]  [address] [id number][place] [address]-
2583  15 Oct, 2014   

 

 [facility] [place] [facility]  [address] [id number][place] [address]-
7238  15 Oct, 2014   

 

 [facility] [place] [facility]  [address] [id number] [place] [address]-
1289  22 Sep, 2014   

 

 [facility] [place] [facility]  [address] [id number] [place] [address]-
9412  22 Sep, 2014   

 

 [facility] [place] [facility]  [address] [id number] [place] [address]-
5851  10 Sep, 2014   

 

 [facility] [place] [facility]  [address] [id number] [place] [address]-
1502  10 Sep, 2014   

 

 [facility] [place] [facility]  [address] [id number] [place] [address]-
8048  03 Sep, 2014   

 

 [facility] [place] [facility]  [address] [id number] [place] [address]-
8788  03 Sep, 2014   

 

 [facility] [place] [facility]  [address] [id number] [place] [address]-
5286  29 Aug, 2014   

 

 [facility] [place] [facility]  [address] [id number] [place] [address]-
6107  29 Aug, 2014   

 

 [facility] [place] [facility]  [address] [id number] [place] [address]-
8783  27 Aug, 2014   

 

 [facility] [place] [facility]  [address] [id number] [place] [address]-
6380  27 Aug, 2014   

 

 [facility] [place] [facility]  [address] [id number] [place] [address]-
7899  22 Aug, 2014   

 

 [facility] [place] [facility]  [address] [id number] [place] [address]-
1943  22 Aug, 2014   

 

 [facility] [place] [facility]  [address] [id number] [place] [address]-
9971     

 

 [facility] [place] [facility]  [address] [id number] [place] [address]-
1116     

 

 [facility] [place] [facility]  [address] [id number] [place] [address]-
1241  27 May, 2014   

 

 [facility] [place] [facility]  [address] [id number] [place] [address]-
1798  27 May, 2014   

 

 [facility] [place] [facility]  [address] [id number] [place] [address]-
9619     

 

 [facility] [place] [facility]  [address] [id number] [place] [address]-
6086     

 

 [facility] [place] [facility]  [address] [id number] [place] [address]-
7490  06 Mar, 2014   

 

 [facility] [place] [facility]  [address] [id number] [place] [address]-
9106  06 Mar, 2014   

 

 [facility] [place] [facility]  [address] [id number] [place] [address]-
7276     

 

 [facility] [place] [facility]  [address] [id number] [place] [address]-
8875     

 

 [facility] [place] [facility]  [address] [id number] [place] [address]-
6067     

 

 [facility] [place] [facility]  [address] [id number] [place] [address]-
2510     

 

 [facility] [place] [facility]  [address] [id number] [place] [address]-
0616     

 

 [facility] [place] [facility]  [address] [id number] [place] [address]-
1914     

 

 [facility] [place] [facility]  [address] [id number] [place] [address]-
7786     

 

 [facility] [place] [facility]  [address] [id number] [place] [address]-
8830     

 

 [facility] [place] [facility]  [address] [facility] [id number] [place] [address]-
3796  15 Oct, 2013   

 

 [facility] [place] [facility]  [address] [id number] [place] [address]-
0763  15 Oct, 2013   

 

 [facility] [place] [facility]  [address] [id number] [place] [address]-
2541  28 Aug, 2013   

 

 [facility] [place] [facility]  [address] [id number] [place] [address]-
4392     

 

 [facility] [place] [facility]  [address] [id number] [place] [address]-
254  27 Mar, 2013   

 

 [facility] [place] [facility]  [address] [id number] [place] [address]-
9456     

 

 [facility] [place] [facility]  [address] [id number] [place] [address]-
2825     

 

 [facility]  [address] [id number] [place] [address]-
7217  10 Michel, 2013   

 

 [facility] [place] [facility]  [address] [id number] [place] [address]-
3866  03 Dec, 2012   

 

 [facility]  [address] [id number] [place] [address]-
9568  03 Dec, 2012   

 

 [facility] [place] [facility]  [address] [id number] [place] [address]-
6525     

 

 [facility]  [address] [id number] [place] [address]-
8417     

 

 [facility] [place] [facility]  [address] [facility] [id number] [place] [address]-
5206  08 Oct, 2012   

 

 [facility]  [address] [id number] [place] [address]-
1596  25 Sep, 2012   

 

 [facility]  [address] [id number] [place] [address]-
5402  06 Sep, 2012   

 

 [facility] [place] [facility]  [address] [facility] [id number] [place] [address]-
9085  10 Aug, 2012   

 

 [facility]  [address] [facility] [id number] [place] [address]-
2895     

 

 [facility] [place] [facility]  [address] [facility] [id number] [place] [address]-
1447     

 

 [facility] [place] [facility]  [address] [facility] [id number] [address]-
2546     

 

 [facility] [place] [facility]  [address] [id number] [place] [address]-
4816     

 

 [facility] [place] [facility]  [address] [facility] [id number] [place] [address]-
2546     

 

 [facility] [place] [facility]  [address] [facility] [id number][place] [address]-
7356     

 

 [facility] [place] [facility]  [address] [id number] [place] [address]-
4166     

 

 [facility] [place] [facility]  [address] [id number] [place] [address]-
2836     

 

 [facility] [place] [facility]  [address] [id number] [place] [address]-
0346     

 

 [facility] [place] [facility]  [address] [id number] [place] [address]-
7016     

 

 [facility] [place] [facility]  [address] [id number] [place] [address]-
0755  06 Dec, 2011   

 

 [facility] [place] [facility]  [address] [id number] [place] [address]-
9273  10 2011   

 

 [facility] [place] [facility]  [address] [id number] [place] [address]-
0464  14 2011   

 

 [facility] [place] [facility]  [address] [id number] [place] [address]-
6914  10 May, 2011   

 

 [facility] [place] [facility]  [address] [id number] [place] [address]-
3696  29 Dec, 2010   

 

 [facility] [place] [facility]  [address] [id number] [place] [address]-
8190  16 2010   

 

 [facility] [place] [facility]  [address] [id number] [place] [address]-
0476  18 Oct, 2010   

 

 [facility] [place] [facility]  [address] [id number][place] [address]-
9141  14 2010   

 

 [facility] [place] [facility]  [address] [id number] [place] [address]-
2376  14 Dec, 2009   

 

 [facility] [place] [facility]  [address] [id number] [place] [address]-
4926  14 Dec, 2009   

 

 [facility] [place] [facility]  [address] [id number] [place] [address]-
1499  10 Dec, 2009   

 

 [facility] [place] [facility]  [address] [id number][place] [address]-
5986  07 Dec, 2009   

 

 [facility]  [address] [facility] [id number] [place] [address]-
8366     

 

 [facility]  [address] [facility] [id number][place] [address]-
9834  10 Nov, 2009   

 

 [facility]  [address] [facility] [id number] [address]-
4258  15 Apr, 2009   







IMMUNIZATIONS

No Known Immunizations



SOCIAL HISTORY

Never Assessed



REASON FOR VISIT

Controlled Med Refill 2017



PLAN OF CARE





VITAL SIGNS





MEDICATIONS







 Medication  Instructions  Dosage  Frequency  Start Date  End Date  Duration  
Status

 

 Alprazolam 1 MG  Orally Twice a day, and 1 tablet at bedtime  0.5 Tablet as 
needed     03 Mar, 2015     28 days  Active







RESULTS

No Results



PROCEDURES

No Known procedures



INSTRUCTIONS





MEDICATIONS ADMINISTERED

No Known Medications



MEDICAL (GENERAL) HISTORY







 Type  Description  Date

 

 Medical History  depression   

 

 Medical History  hx of anxiety   

 

 Medical History  mitral valve prolapse   

 

 Surgical History  tonsillectomy and adenoidectomy   

 

 Surgical History  partial hysterectomy   

 

 Surgical History  jaw surgery  1974

 

 Surgical History  colonoscopy  16

 

 Surgical History  colonoscopy  10/2017

 

 Hospitalization History  for surgery
[facility]

 Created on: 2018



Shereen Shafer

External Reference #: [id number]

: 1960

Sex: Female



Demographics







 Address  [address] [place]  [address]

 

 Preferred Language  Unknown

 

 Marital Status  Unknown

 

 Yarsanism Affiliation  Unknown

 

 Race  Unknown

 

 Ethnic Group  Unknown





Author







 Author  JAVON DE LOS SANTOS

 

 Organization  [facility]

 

 Address  [address] [place]  [address]



 

 Phone  [phone]







Care Team Providers







 Care Team Member Name  Role  Phone

 

 JAVON DE LOS SANTOS  Unavailable  [phone]







PROBLEMS







 Type  Condition  ICD9-CM Code  JFU73-MW Code  Onset Dates  Condition Status  
SNOMED Code

 

 Problem  Hypertension     I10     Active  07175061

 

 Problem  Eustachian tube dysfunction     H69.80     Active  51157333

 

 Problem  Knee pain     M25.569     Active  49936737

 

 Problem  Dysthymia     F34.1     Active  63020596

 

 Problem  Other chronic pain     G89.29     Active  93071990

 

 Problem  Acute right-sided low back pain with right-sided sciatica     M54.41 
    Active  582188002

 

 Problem  Pharyngitis, unspecified etiology     J02.9     Active  084262021

 

 Problem  Anxiety disorder, unspecified     F41.9     Active  674494015

 

 Problem  Psoriasis     L40.9     Active  1869671

 

 Problem  Dysuria     R30.0     Active  57016059







ALLERGIES

No Information



ENCOUNTERS







 Encounter  Location  Date  Diagnosis

 

 [place] [address] [place] [address]-
7532     

 

 [place] [address] [place] [id number] [place] [address]70-
9693  02 May, 2018  Pain in right knee M25.561 ; Pain in left knee M25.562 ; 
Other chronic pain G89.29 ; Anxiety disorder, unspecified F41.9 ; Acute right-
sided low back pain with right-sided sciatica M54.41 and BMI 40.0-44.9, adult 
Z68.41

 

 [place] [address] [place] [id number] [place] [address] 17981-
3800    Anxiety disorder, unspecified F41.9

 

 [place] [address] [place] [id number] [place] [address] 22531-
6270  26 Mar, 2018  Anxiety disorder, unspecified F41.9

 

 [facility]  [address], [place] [address]
2142  06 Mar, 2018  Anxiety disorder, unspecified F41.9

 

 [facility]  [address] [place] [address] 45923-
0423    Anxiety disorder, unspecified F41.9

 

 [facility]  [address] [place] [address] 66923-
9099     

 

 [facility]  [address] [place] [address] 24291-
9317    Anxiety disorder, unspecified F41.9

 

 [place] [address] [place] [id number] [place] [address] 83245-
3894  12 Dec, 2017  Anxiety disorder, unspecified F41.9

 

 [place] [address] [place] [id number] [place] [address] 29222-
1020    Anxiety disorder, unspecified F41.9 ; Hypertension I10 ; 
Encounter for screening mammogram for breast cancer Z12.31 ; Psoriasis L40.9 
and BMI 40.0-44.9, adult Z68.41

 

 [place] [address] [place] [address] 84373-
2383    Anxiety disorder, unspecified F41.9

 

 [place] [address], [place] [address]-
5049  23 Oct, 2017   

 

 [facility]  [address] [place] [address] 78567-
7686  17 Oct, 2017  Anxiety disorder, unspecified F41.9

 

 [facility]  [address] [place] [address] 88940-
4193  09 Oct, 2017   

 

 [address] [id number] [id number]  
21 Sep, 2017  Dysuria R30.0

 

 [facility]  [address], [place] [address]-
3760  19 Sep, 2017  Anxiety disorder, unspecified F41.9

 

 [facility]  [address], [place] [address]-
3088  22 Aug, 2017  Anxiety disorder, unspecified F41.9

 

 [facility]  [address], [place] [address]-
0006    Anxiety disorder, unspecified F41.9

 

 [facility]  [id number] [place] [address] 796158-
7537  10 Jul, 2017   

 

 [facility]  [address] [place] [address] 961251-
4976    Anxiety disorder, unspecified F41.9

 

 [facility]  [id number] [place] [address] 15421-
0316  30 May, 2017  Anxiety disorder, unspecified F41.9

 

 [facility]  [address] [place] [address] 12035-
2016  02 May, 2017  Anxiety disorder, unspecified F41.9

 

 [facility]  [address], [place] [address]-
7472    Anxiety disorder, unspecified F41.9

 

 [facility]  [id number] [address]-
8786  07 Mar, 2017   

 

 [facility]  [address], [place] [address]-
8345  07 Mar, 2017  Anxiety disorder, unspecified F41.9

 

 [facility]  [address] [address]-
6588    Well woman exam Z01.419 ; Cervical cancer screening Z12.4 
and Breast cancer screening Z12.39

 

 [facility]  [address] [address]50[place]-
5626    Anxiety disorder, unspecified F41.9

 

 [facility]  [address], [place] [address]-
0126    Eustachian tube dysfunction H69.80 and Pharyngitis, 
unspecified etiology J02.9

 

 [place] [address] [place] [id number] [place] [address] 13782-
7058    Anxiety disorder, unspecified F41.9

 

 [place] [address] [place] [id number] [place] [address] 30071-
4714  05 Dec, 2016  Anxiety disorder, unspecified F41.9

 

 [place] [address] [address]-
4582    Anxiety disorder, unspecified F41.9

 

 [place] [address] [place] [id number] [place] [address] 78588-
8209  30 Sep, 2016  Anxiety disorder, unspecified F41.9

 

 [place] [address] [place] [id number] [place] [address] 28195-
1415  20 Sep, 2016  Irritable bowel syndrome with diarrhea K58.0 ; Hypertension 
I10 ; Family history of diabetes mellitus Z83.3 and Visual changes H53.9

 

 [place] [address] [place] [id number] [place] [address] 04021-
8656  26 Aug, 2016  Anxiety disorder, unspecified F41.9

 

 [place] [address] [place] [id number] [place] [address] 37486-
4223  09 Aug, 2016  RLQ abdominal pain R10.31

 

 [place] [address] [place] [id number] [place] [address] 84908-
4086  04 Aug, 2016  RLQ abdominal pain R10.31 and Varicose veins of both lower 
extremities I83.93

 

 [place] [address] [place] [id number] [place] [address] 07857-
9955    Anxiety disorder, unspecified F41.9

 

 [place] [address] [place] [id number] [place] [address] 11711-
9281     

 

 [place] [address] [place] [id number] [place] [address] 78377-
6524     

 

 [place] [address] [place] [id number] [place] [address] 07069-
4624    Knee pain M25.569

 

 [facility]  [id number] [place] [address] 61569-
7321     

 

 [facility]  [id number] [place] [address] 679022-
9766  27 May, 2016  Anxiety disorder, unspecified F41.9

 

 [facility]  [id number] [place] [address] 475772-
9720     

 

 [facility]  [id number] [place] [address] 038616-
8908     

 

 [facility]  [id number] [place] [address] 245162-
9578  30 Mar, 2016   

 

 [facility]  [id number] [place] [address] 40248-
7454  29 Mar, 2016   

 

 [facility]  [id number] [place] [address] 59213-
0784    Hypertension I10 ; Dysthymia F34.1 ; Knee pain M25.569 and 
Eustachian tube dysfunction H69.80

 

 [facility]  [id number] [place] [address] 69350-
8688     

 

 [facility]  [id number] [place] [address] 25697-
6969     

 

 [facility]  [id number] [place] [address] 72189-
3990  02 Dec, 2015   

 

 [facility]  [id number] [place] [address] 50496-
3931  01 Dec, 2015   

 

 [facility]  [id number] [place] [address] 54861-
0474     

 

 [facility]  [id number] [place] [address] 26300-
8416     

 

 [facility]  [id number] [place] [address] 593369-
6508  30 Oct, 2015   

 

 [facility]  [address] [id number] [place] [id number]  
23 Sep, 2015  Dental examination V72.2

 

 [facility] [address] [id number] [place] [id number]  
15 Sep, 2015  Allergic rhinitis 477.9 and Chronic serous otitis media of both 
ears 381.10

 

 [facility]  [address] [place] [address] 61302-
1356  21 Aug, 2015   

 

 [facility]  [address] [id number] [place] [address]  
08 Aug, 2015  Sinusitis 473.9 and Bronchitis 490

 

 [facility]  [address] [place] [id number] [place] [address] 33099-
5784     

 

 [facility]  [id number] [place] [address] 49438-
6115     

 

 [facility]  [id number] [place] [address] 40685-
1956     

 

 [facility]  [id number] [place] [address] 51052-
3425     

 

 [facility]  [id number] [place] [address] 13488-
3133     

 

 [facility]  [address], [place] [address]-
5126  03 Mar, 2015   

 

 [facility]  [id number] [place] [address] 62560-
7640  03 Mar, 2015   

 

 [facility]  [address] [place] [address] 68138-
7021     

 

 [facility]  [address] [id number] [place] [address] 98968-
3826     

 

 [facility]  [address]65[address], [place] [address]-
0526     

 

 [facility]  [id number] [place] [address] 08890-
4141     

 

 [facility] [place] [facility]  [address] [id number] [place] [address]-
8022     

 

 [facility] [place] [facility]  [address] [facility] [id number] [place] [address]-
7784     

 

 [facility] [place] [facility]  [address] [facility] [id number] [place] [address]-
9151     

 

 [facility] [place] [facility]  [address] [facility] [id number] [place] [address]-
1783     

 

 [facility] [place] [facility]  [address] [facility] [id number] [place] [address]-
3238     

 

 [facility] [place] [facility]  [address] [facility] [id number] [place] [address]-
6118     

 

 [facility] [place] [facility]  [id number] [address]-
0305  30 Dec, 2014   

 

 [facility] [place] [facility]  [address] [facility] [id number] [place] [address]-
8990  29 Dec, 2014   

 

 [facility] [place] [facility]  [address] [facility] [id number] [place] [address]-
6099  29 Dec, 2014   

 

 [facility] [place] [facility]  [address] [facility] [id number] [place] [address]-
7522  03 Dec, 2014   

 

 [facility] [place] [facility]  [address] [facility] [id number] [place] [address]-
8824  03 Dec, 2014   

 

 [facility] [place] [facility]  [address] [facility] [id number][place] [address]-
7275     

 

 [facility] [place] [facility]  [address] [id number] [place] [address]-
3192     

 

 [facility] [place] [facility]  [address] [facility] [id number] [place] [address]-
7221  23 Oct, 2014   

 

 [facility] [place] [facility]  [address] [facility] [id number] [place] [address]-
1313  23 Oct, 2014   

 

 [facility] [place] [facility]  [address] [id number] [place] [address]-
7035  15 Oct, 2014   

 

 [facility] [place] [facility]  [address] [id number] [place] [address]-
2047  15 Oct, 2014   

 

 [facility] [place] [facility]  [address] [id number] [place] [address]-
1891  22 Sep, 2014   

 

 [facility] [place] [facility]  [address] [id number] [place] [address]-
6256  22 Sep, 2014   

 

 [facility] [place] [facility]  [address] [id number] [place] [address]
2544  10 Sep, 2014   

 

 [facility] [place] [facility]  [address] [id number] [place] [address]-
5522  10 Sep, 2014   

 

 [facility] [place] [facility]  [address] [id number] [place] [address]-
9442  03 Sep, 2014   

 

 [facility] [place] [facility]  [address] [id number] [place] [address]-
5620  03 Sep, 2014   

 

 [facility] [place] [facility]  [address] [id number] [place] [address]-
2553  29 Aug, 2014   

 

 [facility] [place] [facility]  [address] [id number] [place] [address]-
1049  29 Aug, 2014   

 

 [facility] [place] [facility]  [address] [id number] [place] [address]-
1692  27 Aug, 2014   

 

 [facility] [place] [facility]  [address] [id number] [place] [address]-
8070  27 Aug, 2014   

 

 [facility] [place] [facility]  [address] [id number] [place] [address]-
6100  22 Aug, 2014   

 

 [facility] [place] [facility]  [address] [id number] [place] [address]-
6426  22 Aug, 2014   

 

 [facility] [place] [facility]  [address] [id number] [place] [address]-
1202     

 

 [facility] [place] [facility]  [address] [id number] [place] [address]-
0934     

 

 [facility] [place] [facility]  [address] [id number] [place] [address]-
9529  27 May, 2014   

 

 [facility] [place] [facility]  [address] [facility] [id number] [place] [address]-
1373  27 May, 2014   

 

 [facility] [place] [facility]  [address] [facility] [id number] [place] [address]-
9400     

 

 [facility] [place] [facility]  [address] [facility] [id number] [place] [address]-
4279     

 

 [facility] [place] [facility]  [address] [facility] [id number] [place] [address]-
0523  06 Mar, 2014   

 

 [facility] [place] [facility]  [address] [facility] [id number] [place] [address]-
6834  06 Mar, 2014   

 

 [facility] [place] [facility]  [address] [facility] [id number] [place] [address]-
3730     

 

 [facility] [place] [facility]  [address] [facility] [id number] [place] [address]-
3026     

 

 [facility] [place] [facility]  [address] [facility] [id number] [place] [address]-
1806     

 

 [facility] [place] [facility]  [address] [facility] [id number] [place] [address]-
0257     

 

 [facility] [place] [facility]  [address] [facility] [id number] [place] [address]-
3491     

 

 [facility] [place] [facility]  [address] [facility] [id number][place] [address]-
2744     

 

 [facility] [place] [facility]  [address] [facility] [id number] [place] [address]-
7908     

 

 [facility] [place] [facility]  [address] [facility] [id number] [place] [address]-
2632     

 

 [facility] [place] [facility]  [id number] [place] [address]-
5237  15 Oct, 2013   

 

 [facility] [place] [facility]  [address] [id number] [place] [address]-
8495  15 Oct, 2013   

 

 [facility] [place] [facility]  [address] [id number] [place] [address]-
7238  28 Aug, 2013   

 

 [facility] [place] [facility]  [address] [id number] [place] [address]-
4355     

 

 [facility] [place] [facility]  [address] [id number] [place] [address]-
4281  27 Mar, 2013   

 

 [facility] [place] [facility]  [address] [id number] [place] [address]-
7211     

 

 [facility] [place] [facility]  [address] [id number] [place] [address]-
7783     

 

 [facility] [place] [facility]  [address] [id number] [place] [address]-
0288  10 Michel, 2013   

 

 [facility] [place] [facility]  [address] [id number] [place] [address]-
8446  03 Dec, 2012   

 

 [facility] [place] [facility]  [address] [id number] [place] [address]-
4493  03 Dec, 2012   

 

 [facility] [place] [facility]  [address] [id number] [place] [address]-
5981     

 

 [facility] [place] [facility]  [address] [id number] [place] [address]-
6227     

 

 [facility] [place] [facility]  [address] [id number] [place] [address]-
1251  08 Oct, 2012   

 

 [facility] [place] [facility]  [address] [id number] [place]-
7656  25 Sep, 2012   

 

 [facility] [place] [facility]  [address] [id number] [place] [address]-
0493  06 Sep, 2012   

 

 [facility] [place] [facility]  [address] [id number] [place] [address]-
0018  10 Aug, 2012   

 

 [facility] [place] [facility]  [address] [id number] [place] [address]-
6649     

 

 [facility]  [address] [id number] [place] [address]-
9933     

 

 [facility] [place] [facility]  [address] [id number] [place] [address]-
0586     

 

 [facility] [place] [facility]  [address] [id number] [place] [address]-
5246     

 

 [facility] [place] [facility]  [address] [id number] [place] [address]-
9000     

 

 [facility] [place] [facility]  [address] [id number] [place] [address]-
2613     

 

 [facility] [place] [facility]  [address] [id number] [place] [address]-
1526     

 

 [facility]  [address] [id number] [place] [address]-
6694     

 

 [facility] [place] [facility]  [address] [id number] [place] [address]-
5478     

 

 [facility]  [address] [id number] [place] [address]-
7341     

 

 [facility] [place] [facility]  [address] [facility] [id number] [place] [address]-
1926  06 Dec, 2011   

 

 [facility] [place] [facility]  [address] [id number][place] [address]-
5196  10 Nov, 2011   

 

 [facility] [place] [facility]  [address] [id number] [place] [address]-
5327  14 2011   

 

 [facility] [place] [facility]  [address] [id number] [place] [address]-
4360  10 May, 2011   

 

 [facility] [place] [facility]  [address] [id number] [place] [address]-
6167  29 Dec, 2010   

 

 [facility] [place] [facility]  [address] [id number] [place] [address]-
2435  16 2010   

 

 [facility] [place] [facility]  [id number], [place] [address]-
0776  18 Oct, 2010   

 

 [facility]  [address], [place] [address]-
7166  14 2010   

 

 [facility]  [address], [place] [address]-
2748  14 Dec, 2009   

 

 [facility]  [id number], [place] [address]-
4431  14 Dec, 2009   

 

 [facility]  [address], [place] [address]-
4987  10 Dec, 2009   

 

 [facility]  [address], [place] [address]-
5145  07 Dec, 2009   

 

 [facility]  [address], [place] [address]-
5941     

 

 [facility]  [address], [place] [address]-
6473  10 Nov, 2009   

 

 [facility]  [id number], [place] [address]-
3875  15 Apr, 2009   







IMMUNIZATIONS

No Known Immunizations



SOCIAL HISTORY

Never Assessed



REASON FOR VISIT

Controlled Med Refill 10/18/2017



PLAN OF CARE





VITAL SIGNS





MEDICATIONS







 Medication  Instructions  Dosage  Frequency  Start Date  End Date  Duration  
Status

 

 Alprazolam 1 MG  Orally Twice a day, and 1 tablet at bedtime  0.5 Tablet as 
needed     03 Mar, 2015     28 days  Active







RESULTS

No Results



PROCEDURES

No Known procedures



INSTRUCTIONS





MEDICATIONS ADMINISTERED

No Known Medications



MEDICAL (GENERAL) HISTORY







 Type  Description  Date

 

 Medical History  depression   

 

 Medical History  hx of anxiety   

 

 Medical History  mitral valve prolapse   

 

 Surgical History  tonsillectomy and adenoidectomy   

 

 Surgical History  partial hysterectomy   

 

 Surgical History  jaw surgery  1974

 

 Surgical History  colonoscopy  16

 

 Surgical History  colonoscopy  10/2017

 

 Hospitalization History  for surgery
[facility]

 Created on: 04/10/2018



Shereen Shafer

External Reference #: [id number]

: 1960

Sex: Female



Demographics







 Address  [address] [place]  [address]

 

 Preferred Language  Unknown

 

 Marital Status  Unknown

 

 [employer] Affiliation  Unknown

 

 Race  Unknown

 

 Ethnic Group  Unknown





Author







 Author  JAVON DE LOS SANTOS

 

 Organization  [facility]

 

 Address  [address] [place]  [address]



 

 Phone  [phone]







Care Team Providers







 Care Team Member Name  Role  Phone

 

 JAVON DE LOS SANTOS  Unavailable  [phone]







PROBLEMS







 Type  Condition  ICD9-CM Code  [id number] Code  Onset Dates  Condition Status  
SNOMED Code

 

 Problem  Eustachian tube dysfunction     H69.80     Active  13173614

 

 Problem  Knee pain     M25.569     Active  33346579

 

 Problem  Psoriasis     L40.9     Active  9424112

 

 Problem  Dysuria     R30.0     Active  78157673

 

 Problem  Hypertension     I10     Active  18356018

 

 Problem  Dysthymia     F34.1     Active  26381638

 

 Problem  Pharyngitis, unspecified etiology     J02.9     Active  887123083

 

 Problem  Anxiety disorder, unspecified     F41.9     Active  [id number]







ALLERGIES

No Information



ENCOUNTERS







 Encounter  Location  Date  Diagnosis

 

 [place] [id number] [place] [phone]
[phone]  26 Mar, 2018  Anxiety disorder, unspecified F41.9

 

 [place] [id number] [place] [phone]
[phone]  06 Mar, 2018  Anxiety disorder, unspecified F41.9

 

 [facility]  [id number] [place] [phone]
[phone]    Anxiety disorder, unspecified F41.9

 

 [facility]  [id number] [place] [phone]
[phone]     

 

 [facility]  [address] [phone]    Anxiety disorder, unspecified F41.9

 

 [facility]  [address] [id number] [place] [address]-
6630  12 Dec, 2017  Anxiety disorder, unspecified F41.9

 

 [place] [address]65100[place], [place] [address]-
1110    Anxiety disorder, unspecified F41.9 ; Hypertension I10 ; 
Encounter for screening mammogram for breast cancer Z12.31 ; Psoriasis L40.9 
and BMI 40.0-44.9, adult Z68.41

 

 [facility]  [address]00565100[place], [place] [address]-
7162    Anxiety disorder, unspecified F41.9

 

 [facility]  [id number] [place] [address] 37984-
4641  23 Oct, 2017   

 

 [place] [address] [place] [id number] [place] [address] 10378-
5832  17 Oct, 2017  Anxiety disorder, unspecified F41.9

 

 [place] [address] [place] [address] 79933-
8398  09 Oct, 2017   

 

 [address] [id number] [id number]  
21 Sep, 2017  Dysuria R30.0

 

 [facility]  [address] [place] [address] 13694-
4809  19 Sep, 2017  Anxiety disorder, unspecified F41.9

 

 [place] [address] [address]-
9874  22 Aug, 2017  Anxiety disorder, unspecified F41.9

 

 [place] [address] [place] [address] 90784-
8506    Anxiety disorder, unspecified F41.9

 

 [facility]  [address]100[place], [place] [address]-
1460  10 Jul, 2017   

 

 [facility]  [id number] [place] [address] 17762-
7473    Anxiety disorder, unspecified F41.9

 

 [facility]  [address]65100[place], [place] [address]-
4689  30 May, 2017  Anxiety disorder, unspecified F41.9

 

 [place] [address]100[place], [place] [address]-
4818  02 May, 2017  Anxiety disorder, unspecified F41.9

 

 [place] [address] [place] [id number] [place] [address] 64574-
9220    Anxiety disorder, unspecified F41.9

 

 [facility]  [id number] [place] [address] 85[address]-
1522  07 Mar, 2017   

 

 [place] [address] [place] [id number] [place] [address] 392735-
3345  07 Mar, 2017  Anxiety disorder, unspecified F41.9

 

 [place] [address] [place] [id number] [place] [address] 783529-
8248    Well woman exam Z01.419 ; Cervical cancer screening Z12.4 
and Breast cancer screening Z12.39

 

 [place] [address] [place] [id number] [place] [address] 53928-
7570    Anxiety disorder, unspecified F41.9

 

 [place] [address] [place] [id number] [place] [address] 81263-
7701    Eustachian tube dysfunction H69.80 and Pharyngitis, 
unspecified etiology J02.9

 

 [place] [address] [place] [id number] [place] [address] 70111-
1704    Anxiety disorder, unspecified F41.9

 

 [place] [address] [place] [id number] [place] [address] 60728-
8652  05 Dec, 2016  Anxiety disorder, unspecified F41.9

 

 [place] [address] [place] [id number] [place] [address] 81418-
5890    Anxiety disorder, unspecified F41.9

 

 [place] [address] [place] [address] 552[address]-
0408  30 Sep, 2016  Anxiety disorder, unspecified F41.9

 

 [place] [address] [place] [id number] [place] [address] 80821-
9178  20 Sep, 2016  Irritable bowel syndrome with diarrhea K58.0 ; Hypertension 
I10 ; Family history of diabetes mellitus Z83.3 and Visual changes H53.9

 

 [facility]  [address] [address]-
0828  26 Aug, 2016  Anxiety disorder, unspecified F41.9

 

 [facility]  [id number] [place] [address] 43605-
7610  09 Aug, 2016  RLQ abdominal pain R10.31

 

 [facility]  [address] [address]-
5834  04 Aug, 2016  RLQ abdominal pain R10.31 and Varicose veins of both lower 
extremities I83.93

 

 [facility]  [address] [address]-
3454    Anxiety disorder, unspecified F41.9

 

 [facility]  [address] [address]-
2414     

 

 [facility]  [address] 18[address]-
4905     

 

 [facility]  [address] [address]-
3042    Knee pain M25.569

 

 [facility]  [id number] [place] [address] 09143-
1481     

 

 [facility]  [id number] [place] [address] 73691-
5367  27 May, 2016  Anxiety disorder, unspecified F41.9

 

 [facility]  [id number] [place] [address] 43[address]-
4207     

 

 [facility]  [address] [address]-
5957     

 

 [facility]  [id number] [place] [address] 24020-
8171  30 Mar, 2016   

 

 [facility]  [address] [address]-
3933  29 Mar, 2016   

 

 [facility]  [address]00565100[place], [place] [address]-
0956    Hypertension I10 ; Dysthymia F34.1 ; Knee pain M25.569 and 
Eustachian tube dysfunction H69.80

 

 [facility]  [address]65100[place], [place] [address]-
6459     

 

 [facility]  [id number] [place] [address] 044976-
9912     

 

 [facility]  [id number] [place] [address] 12133-
5885  02 Dec, 2015   

 

 [facility]  [id number] [place] [address] 163258-
8455  01 Dec, 2015   

 

 [facility]  [id number] [place] [address] 67474-
9555     

 

 [facility]  [id number] [place] [address] 67178-
8867     

 

 [facility]  [id number] [place] [address] 18777-
0590  30 Oct, 2015   

 

 [facility]  [address] [facility] [id number] [address]  
23 Sep, 2015  Dental examination V72.2

 

 [facility]  [address] [place] [address] [id number] [id number]  
15 Sep, 2015  Allergic rhinitis 477.9 and Chronic serous otitis media of both 
ears 381.10

 

 [facility]  [address]00565100[place], [place] [address]-
3781  21 Aug, 2015   

 

 [facility]  [id number] [place] [id number]  
08 Aug, 2015  Sinusitis 473.9 and Bronchitis 490

 

 [facility]  [id number] [place] [address] 28687-
8056     

 

 [facility]  [address] [place] [address]-
0529     

 

 [facility] [place] [facility]  [address] [id number] [place] [address]-
2174     

 

 [facility] [place] [facility]  [address] [id number] [place] [address]-
2855     

 

 [facility] [place] [facility]  [address] [id number] [place] [address]-
5414     

 

 [facility] [place] [facility]  [address] [id number] [place] [address]-
1867  03 Mar, 2015   

 

 [facility] [place] [facility]  [address] [id number] [place] [address]-
6989  03 Mar, 2015   

 

 [facility] [place] [facility]  [address] [id number] [place] [address]-
1803     

 

 [facility] [place] [facility]  [address] [id number] [place] [address]-
8445     

 

 [facility] [place] [facility]  [address] [id number] [place] [address]-
2710     

 

 [facility] [place] [facility]  [address] [id number] [place] [address]-
7011     

 

 [facility] [place] [facility]  [address] [id number] [place] [address]-
0735     

 

 [facility] [place] [facility]  [address] [id number] [place] [address]-
3919     

 

 [facility] [place] [facility]  [address] [id number] [place] [address]-
6115     

 

 [facility] [place] [facility]  [address] [id number] [place] [address]-
5224     

 

 [facility] [place] [facility]  [address] [id number] [place] [address]-
7537     

 

 [facility] [place] [facility]  [address] [id number] [place] [address]-
9028     

 

 [facility] [place] [facility]  [address] [id number] [place] [address]-
4561  30 Dec, 2014   

 

 [facility] [place] [facility]  [address] [id number] [place] [address]-
7071  29 Dec, 2014   

 

 [facility] [place] [facility]  [address] [id number] [place] [address]-
1028  29 Dec, 2014   

 

 [facility] [place] [facility]  [address] [id number] [place] [address]-
3375  03 Dec, 2014   

 

 [facility] [place] [facility]  [address] [id number] [place] [address]-
1246  03 Dec, 2014   

 

 [facility] [place] [facility]  [address] [id number] [place] [address]-
6506     

 

 [facility] [place] [facility]  [address] [id number] [place] [address]-
8370     

 

 [facility] [place] [facility]  [address] [id number] [place] [address]-
5856  23 Oct, 2014   

 

 [facility] [place] [facility]  [address] [id number] [place] [address]-
5591  23 Oct, 2014   

 

 [facility] [place] [facility]  [address] [id number] [place] [address]-
9548  15 Oct, 2014   

 

 [facility] [place] [facility]  [address] [id number] [place] [address]-
0097  15 Oct, 2014   

 

 [facility] [place] [facility]  [address] [id number] [place] [address]-
5498  22 Sep, 2014   

 

 [facility] [place] [facility]  [address] [id number] [place] [address]-
6070  22 Sep, 2014   

 

 [facility] [place] [facility]  [address] [id number] [place] [address]-
1401  10 Sep, 2014   

 

 [facility] [place] [facility]  [address] [id number] [place] [address]-
5184  10 Sep, 2014   

 

 [facility] [place] [facility]  [address] [id number] [place] [address]-
2507  03 Sep, 2014   

 

 [facility] [place] [facility]  [address] [id number] [place] [address]-
3533  03 Sep, 2014   

 

 [facility] [place] [facility]  [address] [id number] [place] [address]-
5865  29 Aug, 2014   

 

 [facility] [place] [facility]  [address] [id number] [place] [address]-
1543  29 Aug, 2014   

 

 [facility] [place] [facility]  [address] [id number] [place] [address]-
6318  27 Aug, 2014   

 

 [facility] [place] [facility]  [address] [id number] [place] [address]-
4793  27 Aug, 2014   

 

 [facility] [place] [facility]  [address] [id number] [place] [address]-
5277  22 Aug, 2014   

 

 [facility] [place] [facility]  [address] [id number] [place] [address]-
7216  22 Aug, 2014   

 

 [facility] [place] [facility]  [address] [id number] [place] [address]-
2008     

 

 [facility] [place] [facility]  [address] [id number] [place] [address]-
0606     

 

 [facility] [place] [facility]  [address] [id number] [place] [address]-
7607  27 May, 2014   

 

 [facility] [place] [facility]  [address] [id number] [place] [address]-
0598  27 May, 2014   

 

 [facility] [place] [facility]  [address] [id number] [place] [address]-
9502     

 

 [facility] [place] [facility]  [address] [id number] [place] [address]-
6979     

 

 [facility] [place] [facility]  [address] [id number] [place] [address]-
8246  06 Mar, 2014   

 

 [facility] [place] [facility]  [address] [id number] [place]-
7212  06 Mar, 2014   

 

 [facility] [place] [facility]  [address] [id number] [place] [address]-
4624     

 

 [facility] [place] [facility]  [address] [id number] [place] [address]-
4343     

 

 [facility] [place] [facility]  [address] [id number] [place] [address]-
5436     

 

 [facility] [place] [facility]  [address] [id number] [place] [address]-
4966     

 

 [facility] [place] [facility]  [address] [id number] [place] [address]-
8566     

 

 [facility] [place] [facility]  [address] [id number] [place] [address]-
1546     

 

 [facility] [place] [facility]  [address] [facility] [id number] [place] [address]-
1975     

 

 [facility] [place] [facility]  [address] [facility] [id number] [place] [address]-
3949     

 

 [facility] [place] [facility]  [address] [id number] [place] [address]-
4046  15 Oct, 2013   

 

 [facility] [place] [facility]  [address] [facility] [id number] [place] [address]-
1318  15 Oct, 2013   

 

 [facility] [place] [facility]  [address] [facility] [id number] [place] [address]-
5035  28 Aug, 2013   

 

 [facility] [place] [facility]  [address] [id number] [place] [address]-
1325     

 

 [facility] [place] [facility]  [address] [id number] [place] [address]-
8303  27 Mar, 2013   

 

 [facility] [place] [facility]  [address] [id number] [place] [address]-
4088     

 

 [facility] [place] [facility]  [address] [id number] [place] [address]-
0207     

 

 [facility] [place] [facility]  [address] [facility] [id number][place] [address]-
2546  10 Michel, 2013   

 

 [facility] [place] [facility]  [address] [id number] [place] [address]-
1826  03 Dec, 2012   

 

 [facility] [place] [facility]  [address] [id number] [place] [address]-
8713  03 Dec, 2012   

 

 [facility] [place] [facility]  [address] [facility] [id number] [place] [address]-
0447     

 

 [facility] [place] [facility]  [address] [id number][place] [address]-
8381     

 

 [facility] [place] [facility]  [address] [id number] [place] [address]-
0724  08 Oct, 2012   

 

 [facility] [place] [facility]  [address] [facility] [id number] [place] [address]-
7548  25 Sep, 2012   

 

 [facility] [place] [facility]  [address] [facility] [id number], [place] [address]-
3084  06 Sep, 2012   

 

 [facility] [place] [facility]  [address] [facility] [id number] [address]-
6821  10 Aug, 2012   

 

 [facility] [place] [facility]  [address] [facility] [id number], [place] [address]-
3509     

 

 [facility] [place] [facility]  [address] [facility] [id number][place] [address]-
2038     

 

 [facility] [place] [facility]  [address] [facility] [id number][place] [address]-
6845     

 

 [facility] [place] [facility]  [address] [facility] [id number][place] [address]-
8407     

 

 [facility] [place] [facility]  [address] [id number], [place] [address]-
5693     

 

 [facility] [place] [facility]  [address] [facility] [id number] [address]-
1164     

 

 [facility] [place] [facility]  [address] [facility] [id number][place] [address]-
0696     

 

 [facility] [place] [facility]  [address] [id number] [place] [address]-
6093  31 2012   

 

 [facility] [place] [facility]  [address] [id number] [place] [address]-
8963  31 2012   

 

 [facility] [place] [facility]  [address] [id number] [place] [address]
2546  06 2012   

 

 [facility] [place] [facility]  [address] [id number] [place] [address]-
5296  06 Dec, 2011   

 

 [facility] [place] [facility]  [address] [id number] [place] [address]-
6172  10 2011   

 

 [facility] [place] [facility]  [address] [id number] [place] [address]-
2661  14 2011   

 

 [facility] [place] [facility]  [address] [id number] [place] [address]-
0566  10 May, 2011   

 

 [facility] [place] [facility]  [address] [id number] [place] [address]-
8448  29 Dec, 2010   

 

 [facility] [place] [facility]  [address] [id number] [place] [address]-
4420  16 2010   

 

 [facility] [place] [facility]  [address] [id number] [place] [address]-
2363  18 Oct, 2010   

 

 [facility] [place] [facility]  [address] [id number] [place] [address]-
4879  14 2010   

 

 [facility] [place] [facility]  [address] [id number] [place] [address]-
7548  14 Dec, 2009   

 

 [facility] [place] [facility]  [address] [id number] [place] [address]-
8968  14 Dec, 2009   

 

 [facility] [place] [facility]  [address] [id number] [place] [address]-
9205  10 Dec, 2009   

 

 [facility] [place] [facility]  [address] [id number] [place] [address]-
4516  07 Dec, 2009   

 

 [facility] [place] [facility]  [address] [id number] [place] [address]-
5518     

 

 [facility]  [address] [facility] [id number] [place] [address]-
2546  10 Nov, 2009   

 

 [facility]  [address] [facility] [id number][place] [address]-
0736  15 Apr, 2009   







IMMUNIZATIONS

No Known Immunizations



SOCIAL HISTORY

Never Assessed



REASON FOR VISIT

Controlled Med Refill 2017



PLAN OF CARE





VITAL SIGNS





MEDICATIONS







 Medication  Instructions  Dosage  Frequency  Start Date  End Date  Duration  
Status

 

 Alprazolam 1 MG  Orally Twice a day, and 1 tablet at bedtime  0.5 Tablet as 
needed     03 Mar, 2015     28 days  Active







RESULTS

No Results



PROCEDURES

No Known procedures



INSTRUCTIONS





MEDICATIONS ADMINISTERED

No Known Medications



MEDICAL (GENERAL) HISTORY







 Type  Description  Date

 

 Medical History  depression   

 

 Medical History  hx of anxiety   

 

 Medical History  mitral valve prolapse   

 

 Surgical History  tonsillectomy and adenoidectomy   

 

 Surgical History  partial hysterectomy   

 

 Surgical History  jaw surgery  1974

 

 Surgical History  colonoscopy  16

 

 Surgical History  colonoscopy  10/2017

 

 Hospitalization History  for surgery
[facility]

 Created on: 2018



Shereen Shafer

External Reference #: [id number]

: 1960

Sex: Female



Demographics







 Address  [address] [place]  [address]

 

 Preferred Language  Unknown

 

 Marital Status  Unknown

 

 Druze Affiliation  Unknown

 

 Race  Unknown

 

 Ethnic Group  Unknown





Author







 Author  JAVON DE LOS SANTOS

 

 Organization  [facility]

 

 Address  [address] [place]  [address]



 

 Phone  [phone]







Care Team Providers







 Care Team Member Name  Role  Phone

 

 JAVON DE LOS SANTOS  Unavailable  [phone]







PROBLEMS







 Type  Condition  ICD9-CM Code  IDA73-MV Code  Onset Dates  Condition Status  
SNOMED Code

 

 Problem  Hypertension     I10     Active  73608536

 

 Problem  Eustachian tube dysfunction     H69.80     Active  74231360

 

 Problem  Knee pain     M25.569     Active  54962821

 

 Problem  Dysthymia     F34.1     Active  66106596

 

 Problem  Other chronic pain     G89.29     Active  64117462

 

 Problem  Acute right-sided low back pain with right-sided sciatica     M54.41 
    Active  294033707

 

 Problem  Pharyngitis, unspecified etiology     J02.9     Active  947325745

 

 Problem  Anxiety disorder, unspecified     F41.9     Active  508147305

 

 Problem  Psoriasis     L40.9     Active  4292924

 

 Problem  Dysuria     R30.0     Active  44218086







ALLERGIES

No Information



ENCOUNTERS







 Encounter  Location  Date  Diagnosis

 

 [place] [address] [place] [address]172-
0754    Anxiety disorder, unspecified F41.9 ; Shortness of breath 
R06.02 ; Therapeutic drug monitoring Z51.81 ; Family history of diabetes 
mellitus Z83.3 ; BMI 40.0-44.9, adult Z68.41 and Tobacco abuse Z72.0

 

 [place] [address] [id number] [address]-
1747  25 May, 2018  Anxiety disorder, unspecified F41.9 and Pain in right knee 
M25.561

 

 [place] [address] [place] [id number] [place] [address] 42766-
1970  02 May, 2018  Pain in right knee M25.561 ; Pain in left knee M25.562 ; 
Other chronic pain G89.29 ; Anxiety disorder, unspecified F41.9 ; Acute right-
sided low back pain with right-sided sciatica M54.41 and BMI 40.0-44.9, adult 
Z68.41

 

 [place] [address] [place] [address] [place] [address] 80404-
9748  27 2018  Anxiety disorder, unspecified F41.9

 

 [place] [address] [place] [address] [place] [address] 67200-
9318  26 Mar, 2018  Anxiety disorder, unspecified F41.9

 

 [place] [address] [place] [address] [place] [address] 30318-
7149  06 Mar, 2018  Anxiety disorder, unspecified F41.9

 

 [place] [address] [place] [id number] [place] [address] 41781-
7179    Anxiety disorder, unspecified F41.9

 

 [place] [address] [place] [address] [place] [address] 75294-
6034     

 

 [place] [address] [place] [address] [place] [address] 52936-
2118    Anxiety disorder, unspecified F41.9

 

 [place] [address] [place] [address] [place] [address] 18567-
2889  12 Dec, 2017  Anxiety disorder, unspecified F41.9

 

 [place] [address] [place] [address] [place] [address] 25380-
1621    Anxiety disorder, unspecified F41.9 ; Hypertension I10 ; 
Encounter for screening mammogram for breast cancer Z12.31 ; Psoriasis L40.9 
and BMI 40.0-44.9, adult Z68.41

 

 [place] [address] [place] [address] [place] [address] 32725-
3012  14 2017  Anxiety disorder, unspecified F41.9

 

 [place] [address] [place] [address] [place] [address] 97410-
6686  23 Oct, 2017   

 

 [place] [address] [place] [address] [place] [address] 79808-
4103  17 Oct, 2017  Anxiety disorder, unspecified F41.9

 

 [facility]  [address]65100[place] [address]-
1767  09 Oct, 2017   

 

 [facility] [address] [facility] [address] [id number][place] [id number]  
21 Sep, 2017  Dysuria R30.0

 

 [facility]  [address] [address]-
8124  19 Sep, 2017  Anxiety disorder, unspecified F41.9

 

 [facility]  [id number] [address]-
6560  22 Aug, 2017  Anxiety disorder, unspecified F41.9

 

 [facility]  [id number] [place] [address]-
[id number]    Anxiety disorder, unspecified F41.9

 

 [facility]  [id number] [address]-
5371  10 Jul, 2017   

 

 [facility]  [address] [place] [address]-
1544    Anxiety disorder, unspecified F41.9

 

 [facility]  [address] [place] [address]-
9963  30 May, 2017  Anxiety disorder, unspecified F41.9

 

 [facility]  [address]100[place] [address]-
5896  02 May, 2017  Anxiety disorder, unspecified F41.9

 

 [facility]  [address]100[place] [address]-
3331    Anxiety disorder, unspecified F41.9

 

 [facility]  [id number] [address]-
8270  07 Mar, 2017   

 

 [facility]  [address] [place] [address]-
4039  07 Mar, 2017  Anxiety disorder, unspecified F41.9

 

 [facility]  [id number] [address]-
8166  22 Feb, 2017  Well woman exam Z01.419 ; Cervical cancer screening Z12.4 
and Breast cancer screening Z12.39

 

 [place] [address] [address]-
9[address]    Anxiety disorder, unspecified F41.9

 

 [place] [address] [id number] [place] [address]-
4706    Eustachian tube dysfunction H69.80 and Pharyngitis, 
unspecified etiology J02.9

 

 [place] [address] [id number] [place] [address]-
0313    Anxiety disorder, unspecified F41.9

 

 [place] [address] [address]-
7395  05 Dec, 2016  Anxiety disorder, unspecified F41.9

 

 [place] [address] [address]-
9963    Anxiety disorder, unspecified F41.9

 

 [place] [address] [address]-
6111  30 Sep, 2016  Anxiety disorder, unspecified F41.9

 

 [place] [address] [address]-
0665  20 Sep, 2016  Irritable bowel syndrome with diarrhea K58.0 ; Hypertension 
I10 ; Family history of diabetes mellitus Z83.3 and Visual changes H53.9

 

 [place] [address] [place] [id number] [place] [address] 60[address]-
6050  26 Aug, 2016  Anxiety disorder, unspecified F41.9

 

 [place] [id number] [place] [address] 19986-
9170  09 Aug, 2016  RLQ abdominal pain R10.31

 

 [place] [address] [address]-
2733  04 Aug, 2016  RLQ abdominal pain R10.31 and Varicose veins of both lower 
extremities I83.93

 

 [place] [address] [address]1986-
5410    Anxiety disorder, unspecified F41.9

 

 [facility]  [id number] [place] [address] 75327-
5048     

 

 [facility]  [id number] [place] [address] 70478-
9499     

 

 [facility]  [address] [place] [address] 26920-
8349    Knee pain M25.569

 

 [facility]  [address] [place] [address] 91434-
5452     

 

 [facility]  [address] [place] [address] 19474-
3197  27 May, 2016  Anxiety disorder, unspecified F41.9

 

 [facility]  [address] [place] [address] 41059-
3697     

 

 [facility]  [address] [place] [address] 91587-
8728     

 

 [facility]  [address] [place] [address] 02745-
5314  30 Mar, 2016   

 

 [facility]  [address] [place] [address] 85151-
4505  29 Mar, 2016   

 

 [facility]  [address] [place] [address] 05613-
1522    Hypertension I10 ; Dysthymia F34.1 ; Knee pain M25.569 and 
Eustachian tube dysfunction H69.80

 

 [facility]  [address] [address]-
1305     

 

 [facility]  [address] [place] [address] 69726-
3304     

 

 [facility]  [address]65100[place] [address]-
7017  02 Dec, 2015   

 

 [facility]  [address] [place] [address]-
6136  01 Dec, 2015   

 

 [facility]  [address] [place] [address] 12519-
0221     

 

 [facility]  [address] [place] [address] 90994-
3982     

 

 [facility]  [address] [place] [address] 40177-
8514  30 Oct, 2015   

 

 [facility]  [address] [facility] [address] [id number] [id number]  
23 Sep, 2015  Dental examination V72.2

 

 [facility]  [address] [place] [address] [id number] [place] [id number]  
15 Sep, 2015  Allergic rhinitis 477.9 and Chronic serous otitis media of both 
ears 381.10

 

 [facility]  [address] [place] [address] 73886-
7295  21 Aug, 2015   

 

 [facility]  [address] [place] [address] [id number][place] [id number]  
08 Aug, 2015  Sinusitis 473.9 and Bronchitis 490

 

 [facility]  [address] [place] [address] 81019-
4549     

 

 [facility]  [address] [place] [address] 91830-
9547     

 

 [facility]  [address] [place] [address] 77383-
1930     

 

 [facility]  [address] [place] [address] 30954-
6894     

 

 [facility]  [address] [place] [address] 730105-
5451     

 

 [facility]  [address] [place] [address] 85226-
2391  03 Mar, 2015   

 

 [facility]  [address]00565100[place] [address]1029-
0620  03 Mar, 2015   

 

 [facility] [place] [facility]  [address] [id number] [place] [address]-
4677     

 

 [facility] [place] [facility]  [address] [id number] [place] [address]-
0523     

 

 [facility] [place] [facility]  [address] [id number] [place] [address]-
1796     

 

 [facility] [place] [facility]  [address] [id number] [place] [address]-
1988     

 

 [facility] [place] [facility]  [address] [id number] [place] [address]-
6981     

 

 [facility] [place] [facility]  [address] [id number] [place] [address]-
3389     

 

 [facility] [place] [facility]  [address] [id number] [place] [address]-
0651     

 

 [facility] [place] [facility]  [address] [id number] [place] [address]-
5971     

 

 [facility] [place] [facility]  [address] [id number] [place] [address]-
3166     

 

 [facility] [place] [facility]  [address] [id number] [place] [address]-
2451     

 

 [facility] [place] [facility]  [address] [id number] [place] [address]-
3560  30 Dec, 2014   

 

 [facility] [place] [facility]  [address] [id number] [place] [address]-
7206  29 Dec, 2014   

 

 [facility] [place] [facility]  [address] [id number] [place] [address]-
9273  29 Dec, 2014   

 

 [facility] [place] [facility]  [address] [id number] [place] [address]-
7526  03 Dec, 2014   

 

 [facility] [place] [facility]  [address] [id number] [place] [address]-
4736  03 Dec, 2014   

 

 [facility] [place] [facility]  [address] [id number] [place] [address]-
0059     

 

 [facility] [place] [facility]  [address] [id number] [place] [address]-
6403     

 

 [facility] [place] [facility]  [address] [id number] [place] [address]-
6104  23 Oct, 2014   

 

 [facility] [place] [facility]  [address] [id number] [place] [address]-
0427  23 Oct, 2014   

 

 [facility] [place] [facility]  [address] [id number] [place] [address]-
0101  15 Oct, 2014   

 

 [facility] [place] [facility]  [address] [id number] [place] [address]-
4486  15 Oct, 2014   

 

 [facility] [place] [facility]  [address] [id number] [place] [address]-
5593  22 Sep, 2014   

 

 [facility] [place] [facility]  [address] [id number] [place] [address]-
5270  22 Sep, 2014   

 

 [facility] [place] [facility]  [address] [id number] [place] [address]-
9541  10 Sep, 2014   

 

 [facility] [place] [facility]  [address] [id number] [place] [address]-
3346  10 Sep, 2014   

 

 [facility] [place] [facility]  [address] [id number] [place] [address]-
9565  03 Sep, 2014   

 

 [facility] [place] [facility]  [address] [id number] [place] [address]-
8025  03 Sep, 2014   

 

 [facility] [place] [facility]  [address] [id number] [place] [address]-
5798  29 Aug, 2014   

 

 [facility] [place] [facility]  [address] [id number] [place] [address]-
5806  29 Aug, 2014   

 

 [facility] [place] [facility]  [address] [id number] [place] [address]-
5376  27 Aug, 2014   

 

 [facility] [place] [facility]  [address] [id number] [place] [address]-
7871  27 Aug, 2014   

 

 [facility] [place] [facility]  [address] [id number] [place] [address]-
7068  22 Aug, 2014   

 

 [facility] [place] [facility]  [address] [id number] [place] [address]-
3860  22 Aug, 2014   

 

 [facility] [place] [facility]  [address] [id number] [place] [address]-
3537     

 

 [facility] [place] [facility]  [address] [id number] [place] [address]-
5132     

 

 [facility] [place] [facility]  [address] [id number] [place] [address]-
9415  27 May, 2014   

 

 [facility] [place] [facility]  [address] [id number] [place] [address]-
1888  27 May, 2014   

 

 [facility] [place] [facility]  [address] [id number] [place] [address]-
8254     

 

 [facility] [place] [facility]  [address] [id number] [place] [address]-
2625     

 

 [facility] [place] [facility]  [address] [id number] [place] [address]-
9985  06 Mar, 2014   

 

 [facility] [place] [facility]  [address] [id number] [place] [address]-
9916  06 Mar, 2014   

 

 [facility] [place] [facility]  [address] [facility] [id number] [place] [address]-
3869     

 

 [facility] [place] [facility]  [address] [id number] [place] [address]-
0767     

 

 [facility] [place] [facility]  [address] [id number] [place] [address]-
6351     

 

 [facility] [place] [facility]  [address] [id number] [place] [address]-
6358     

 

 [facility] [place] [facility]  [address] [id number] [place] [address]-
7508     

 

 [facility] [place] [facility]  [address] [id number] [place]-
3333     

 

 [facility] [place] [facility]  [address] [id number] [place] [address]-
3570     

 

 [facility] [place] [facility]  [address] [id number] [place] [address]-
5997     

 

 [facility] [place] [facility]  [address] [id number] [place] [address]-
0551  15 Oct, 2013   

 

 [facility] [place] [facility]  [address] [id number] [place] [address]-
0196  15 Oct, 2013   

 

 [facility] [place] [facility]  [address] [id number] [place] [address]-
3206  28 Aug, 2013   

 

 [facility] [place] [facility]  [address] [id number] [place] [address]-
7737     

 

 [facility] [place] [facility]  [address] [id number] [place] [address]-
3688  27 Mar, 2013   

 

 [facility] [place] [facility]  [address] [id number] [place] [address]-
0863     

 

 [facility] [place] [facility]  [address] [id number] [place] [address]-
5184     

 

 [facility] [place] [facility]  [address] [id number] [place] [address]-
0738  10 Michel, 2013   

 

 [facility] [place] [facility]  [address] [id number] [place] [address]-
6108  03 Dec, 2012   

 

 [facility] [place] [facility]  [address] [id number] [place] [address]-
7817  03 Dec, 2012   

 

 [facility] [place] [facility]  [address] [id number] [place] [address]-
8023     

 

 [facility] [place] [facility]  [address] [id number] [place] [address]-
3929     

 

 [facility] [place] [facility]  [address] [facility] [id number] [place] [address]-
5069  08 Oct, 2012   

 

 [facility] [place] [facility]  [address] [id number] [place] [address]-
5936  25 Sep, 2012   

 

 [facility]  [address] [id number] [place] [address]-
6896  06 Sep, 2012   

 

 [facility] [place] [facility]  [address] [id number] [place] [address]
2546  10 Aug, 2012   

 

 [facility]  [address] [id number] [place] [address]-
1244     

 

 [facility]  [address] [id number] [place] [address]
2546     

 

 [facility]  [address] [id number] [place] [address]-
2126     

 

 [facility] [place] [facility]  [address] [id number] [place] [address]-
3406     

 

 [facility]  [address] [id number] [place] [address]-
2876     

 

 [facility]  [address] [id number] [place] [address]-
0004     

 

 [facility]  [address] [id number] [place] [address]-
3046     

 

 [facility]  [address] [facility] [id number] [place] [address]-
8386     

 

 [facility]  [address] [id number] [place] [address]-
7276     

 

 [facility]  [address] [id number] [place] [address]-
2546     

 

 [facility] [place] [facility]  [address] [id number] [place] [address]-
8546  06 Dec, 2011   

 

 [facility] [place] [facility]  [address] [id number] [place] [address]-
2546  10 Nov, 2011   

 

 [facility]  [address] [id number] [place] [address]-
1882     

 

 [facility]  [address]65100[place] [address]-
4592  10 May, 2011   

 

 [facility]  [address]00565100[place] [address]-
9820  29 Dec, 2010   

 

 [facility]  [address]00565100[place] [address]-
2687  16 2010   

 

 [facility]  [address]00565100[place] [address]-
6552  18 Oct, 2010   

 

 [facility]  [address]00565100[place] [address]-
4853  14 2010   

 

 [facility]  [address] [place] [address] 989101-
6213  14 Dec, 2009   

 

 [facility]  [address]00565100[place] [address]-
9841  14 Dec, 2009   

 

 [facility]  [address]00565100[place] [address]-
8596  10 Dec, 2009   

 

 [facility]  [address]00565100[place] [address]-
8597  07 Dec, 2009   

 

 [facility]  [address]00565100[place] [address]-
6156     

 

 [facility]  [address]00565100[place] [address]-
8405  10 Nov, 2009   

 

 [facility]  [address]00565100[place] [address]-
4754  15 Apr, 2009   







IMMUNIZATIONS

No Known Immunizations



SOCIAL HISTORY

Never Assessed



REASON FOR VISIT

Controlled Med Refill 18



PLAN OF CARE





VITAL SIGNS





MEDICATIONS







 Medication  Instructions  Dosage  Frequency  Start Date  End Date  Duration  
Status

 

 Alprazolam 1 MG  Orally Twice a day, and 1 tablet at bedtime  0.5 Tablet as 
needed     03 Mar, 2015     28 days  Active







RESULTS

No Results



PROCEDURES

No Known procedures



INSTRUCTIONS





MEDICATIONS ADMINISTERED

No Known Medications



MEDICAL (GENERAL) HISTORY







 Type  Description  Date

 

 Medical History  depression   

 

 Medical History  hx of anxiety   

 

 Medical History  mitral valve prolapse   

 

 Surgical History  tonsillectomy and adenoidectomy   

 

 Surgical History  partial hysterectomy   

 

 Surgical History  jaw surgery  1974

 

 Surgical History  colonoscopy  16

 

 Surgical History  colonoscopy  10/2017

 

 Hospitalization History  for surgery
[facility]

 Created on: 2018



Shereen Shafer

External Reference #: [id number]

: 1960

Sex: Female



Demographics







 Address  [address] [place]  [address]

 

 Preferred Language  Unknown

 

 Marital Status  Unknown

 

 Nondenominational Affiliation  Unknown

 

 Race  Unknown

 

 Ethnic Group  Unknown





Author







 Author  JAVON DE LOS SANTOS

 

 Organization  [facility]

 

 Address  [address] [place]  [address]



 

 Phone  [phone]







Care Team Providers







 Care Team Member Name  Role  Phone

 

 JAVON DE LOS SANTOS  Unavailable  [phone]







PROBLEMS







 Type  Condition  ICD9-CM Code  WSB35-OR Code  Onset Dates  Condition Status  
SNOMED Code

 

 Problem  Hypertension     I10     Active  67019618

 

 Problem  Eustachian tube dysfunction     H69.80     Active  65176811

 

 Problem  Knee pain     M25.569     Active  55646046

 

 Problem  Dysthymia     F34.1     Active  26721659

 

 Problem  Other chronic pain     G89.29     Active  04144990

 

 Problem  Acute right-sided low back pain with right-sided sciatica     M54.41 
    Active  521707867

 

 Problem  Pharyngitis, unspecified etiology     J02.9     Active  664292272

 

 Problem  Anxiety disorder, unspecified     F41.9     Active  179291346

 

 Problem  Psoriasis     L40.9     Active  2674971

 

 Problem  Dysuria     R30.0     Active  65162706







ALLERGIES

No Information



ENCOUNTERS







 Encounter  Location  Date  Diagnosis

 

 [place] [address] [place] [address] 90953-
3374     

 

 [place] [id number] [place] [address] 91473-
1560  02 May, 2018  Pain in right knee M25.561 ; Pain in left knee M25.562 ; 
Other chronic pain G89.29 ; Anxiety disorder, unspecified F41.9 ; Acute right-
sided low back pain with right-sided sciatica M54.41 and BMI 40.0-44.9, adult 
Z68.41

 

 [place] [id number] [place] [address] 76404-
4428    Anxiety disorder, unspecified F41.9

 

 [place] [id number] [place] [address] 01892-
3359  26 Mar, 2018  Anxiety disorder, unspecified F41.9

 

 [facility]  [address]65100[place] [address]-
8431  06 Mar, 2018  Anxiety disorder, unspecified F41.9

 

 [facility]  [address] [place] [address] 93810-
6801    Anxiety disorder, unspecified F41.9

 

 [facility]  [address] [place] [address] 87115-
6691     

 

 [facility]  [address] [place] [address] 76434-
0447    Anxiety disorder, unspecified F41.9

 

 [place] [id number] [place] [address] 02240-
1376  12 Dec, 2017  Anxiety disorder, unspecified F41.9

 

 [place] [id number] [place] [address] 62321-
2505    Anxiety disorder, unspecified F41.9 ; Hypertension I10 ; 
Encounter for screening mammogram for breast cancer Z12.31 ; Psoriasis L40.9 
and BMI 40.0-44.9, adult Z68.41

 

 [place] [address] [place] [address] 48941-
6776    Anxiety disorder, unspecified F41.9

 

 [place] [address]00565100[place] [address]-
8718  23 Oct, 2017   

 

 [facility]  [address] [place] [address] 04397-
8618  17 Oct, 2017  Anxiety disorder, unspecified F41.9

 

 [facility]  [address] [place] [address] 27478-
9700  09 Oct, 2017   

 

 [address] [id number] [address]  
21 Sep, 2017  Dysuria R30.0

 

 [facility]  [address]65100[place] [address]-
5505  19 Sep, 2017  Anxiety disorder, unspecified F41.9

 

 [facility]  [address]00565100[place] [address]-
5521  22 Aug, 2017  Anxiety disorder, unspecified F41.9

 

 [facility]  [address]00565100[place] [address]-
3516    Anxiety disorder, unspecified F41.9

 

 [facility]  [id number] [place] [address] 442960-
7247  10 Jul, 2017   

 

 [facility]  [address] [place] [address] 047302-
3028    Anxiety disorder, unspecified F41.9

 

 [facility]  [id number] [place] [address] 81133-
4586  30 May, 2017  Anxiety disorder, unspecified F41.9

 

 [facility]  [address] [place] [address] 17017-
0543  02 May, 2017  Anxiety disorder, unspecified F41.9

 

 [facility]  [address]00565100[place] [address]-
8395    Anxiety disorder, unspecified F41.9

 

 [facility]  [id number] [address]-
0536  07 Mar, 2017   

 

 [facility]  [address]00565100[place] [address]-
8267  07 Mar, 2017  Anxiety disorder, unspecified F41.9

 

 [facility]  [address]00565100[place] [address]-
7068    Well woman exam Z01.419 ; Cervical cancer screening Z12.4 
and Breast cancer screening Z12.39

 

 [facility]  [address]00565100[place] [address]-
0086    Anxiety disorder, unspecified F41.9

 

 [facility]  [address]00565100[place] [address]-
8116    Eustachian tube dysfunction H69.80 and Pharyngitis, 
unspecified etiology J02.9

 

 [place] [id number] [place] [address] 94797-
8126    Anxiety disorder, unspecified F41.9

 

 [place] [id number] [place] [address] 19678-
5355  05 Dec, 2016  Anxiety disorder, unspecified F41.9

 

 [place] [address] [address]-
4248    Anxiety disorder, unspecified F41.9

 

 [place] [id number] [place] [address] 624[address]-
8081  30 Sep, 2016  Anxiety disorder, unspecified F41.9

 

 [place] [id number] [place] [address] 13571-
6125  20 Sep, 2016  Irritable bowel syndrome with diarrhea K58.0 ; Hypertension 
I10 ; Family history of diabetes mellitus Z83.3 and Visual changes H53.9

 

 [place] [id number] [place] [address] 60871-
8106  26 Aug, 2016  Anxiety disorder, unspecified F41.9

 

 [place] [id number] [place] [address] 96129-
8554  09 Aug, 2016  RLQ abdominal pain R10.31

 

 [place] [id number] [place] [address] 06019-
8993  04 Aug, 2016  RLQ abdominal pain R10.31 and Varicose veins of both lower 
extremities I83.93

 

 [place] [id number] [place] [address] 58637-
3513    Anxiety disorder, unspecified F41.9

 

 [place] [id number] [place] [address] 32415-
7744     

 

 [place] [address] [place] [address] 24507-
9371     

 

 [place] [address] [place] [address] 38779-
5712    Knee pain M25.569

 

 [facility]  [id number] [place] [address] 65718-
4524     

 

 [facility]  [address] [place] [address] 766126-
6943  27 May, 2016  Anxiety disorder, unspecified F41.9

 

 [facility]  [id number] [place] [address] 198853-
8019     

 

 [facility]  [id number] [place] [address] 260936-
3306     

 

 [facility]  [id number] [place] [address] 309030-
8206  30 Mar, 2016   

 

 [facility]  [id number] [place] [address] 17933-
8805  29 Mar, 2016   

 

 [facility]  [id number] [place] [address] 82059-
5319    Hypertension I10 ; Dysthymia F34.1 ; Knee pain M25.569 and 
Eustachian tube dysfunction H69.80

 

 [facility]  [id number] [place] [address] [phone]
[phone]     

 

 [facility]  [id number] [place] [address] [phone]
[phone]     

 

 [facility]  [id number] [place] [address] [phone]
[phone]  02 Dec, 2015   

 

 [facility]  [id number] [place] [address] [phone]
[phone]  01 Dec, 2015   

 

 [facility]  [id number] [place] [address] [phone]
[phone]     

 

 [facility]  [id number] [place] [address] [phone]-
[phone]     

 

 [facility]  [id number] [place] [address] [phone]
[phone]  30 Oct, 2015   

 

 [facility]  [address] [id number] [id number]  
23 Sep, 2015  Dental examination V72.2

 

 [facility] [address] [id number] [id number]  
15 Sep, 2015  Allergic rhinitis 477.9 and Chronic serous otitis media of both 
ears 381.10

 

 [facility] [facility]  [address] [place] [address]92-
5296  21 Aug, 2015   

 

 [facility]  [address] [facility] [address] [id number] [id number]  
08 Aug, 2015  Sinusitis 473.9 and Bronchitis 490

 

 [facility]  [id number] [place] [address] 09074-
8003     

 

 [facility]  [id number] [place] [address] 35625-
7737     

 

 [facility]  [id number] [place] [address] 22427-
1898     

 

 [facility]  [id number] [place] [address] 42458-
4120     

 

 [facility]  [id number] [place] [address] 59925-
8156     

 

 [facility]  [address]00565100[place] [address]-
1098  03 Mar, 2015   

 

 [facility]  [id number] [place] [address] 16247-
1467  03 Mar, 2015   

 

 [facility]  [address] [place] [address] 75626-
2391     

 

 [facility]  [id number] [place] [address] 98341-
8176     

 

 [facility]  [address]00565100[place] [address]-
0336     

 

 [facility]  [id number] [place] [address] 36871-
7681     

 

 [facility] [place] [facility]  [address] [id number] [place] [address]-
3467     

 

 [facility] [place] [facility]  [address] [facility] [id number] [place] [address]-
8643     

 

 [facility] [place] [facility]  [address] [facility] [id number] [place] [address]-
5994     

 

 [facility] [place] [facility]  [address] [facility] [id number] [place] [address]-
8076     

 

 [facility] [place] [facility]  [address] [facility] [id number] [place] [address]-
5259     

 

 [facility] [place] [facility]  [address] [facility] [id number] [place] [address]-
4930     

 

 [facility] [place] [facility]  [address] [id number] [address]-
5200  30 Dec, 2014   

 

 [facility] [place] [facility]  [address] [facility] [id number] [place] [address]-
7472  29 Dec, 2014   

 

 [facility] [place] [facility]  [address] [facility] [id number] [place] [address]-
4769  29 Dec, 2014   

 

 [facility] [place] [facility]  [address] [facility] [id number] [place] [address]-
0551  03 Dec, 2014   

 

 [facility] [place] [facility]  [address] [facility] [id number] [place] [address]-
3681  03 Dec, 2014   

 

 [facility] [place] [facility]  [address] [facility] [id number] [place] [address]-
2853     

 

 [facility] [place] [facility]  [address] [id number] [place] [address]-
5959     

 

 [facility] [place] [facility]  [address] [facility] [id number] [place] [address]-
5566  23 Oct, 2014   

 

 [facility] [place] [facility]  [address] [facility] [id number] [place] [address]-
9956  23 Oct, 2014   

 

 [facility] [place] [facility]  [address] [id number] [place] [address]-
3864  15 Oct, 2014   

 

 [facility] [place] [facility]  [address] [id number] [place] [address]-
3081  15 Oct, 2014   

 

 [facility] [place] [facility]  [address] [id number] [place] [address]-
5258  22 Sep, 2014   

 

 [facility] [place] [facility]  [address] [id number] [place] [address]-
5986  22 Sep, 2014   

 

 [facility] [place] [facility]  [address] [id number] [place] [address]
2540  10 Sep, 2014   

 

 [facility] [place] [facility]  [address] [id number] [place] [address]-
3220  10 Sep, 2014   

 

 [facility] [place] [facility]  [address] [id number] [place] [address]-
2867  03 Sep, 2014   

 

 [facility] [place] [facility]  [address] [id number] [place] [address]-
0871  03 Sep, 2014   

 

 [facility] [place] [facility]  [address] [id number] [place] [address]-
7772  29 Aug, 2014   

 

 [facility] [place] [facility]  [address] [id number] [place] [address]-
8856  29 Aug, 2014   

 

 [facility] [place] [facility]  [address] [id number] [place] [address]-
2654  27 Aug, 2014   

 

 [facility] [place] [facility]  [address] [id number] [place] [address]-
7720  27 Aug, 2014   

 

 [facility] [place] [facility]  [address] [id number] [place] [address]-
3799  22 Aug, 2014   

 

 [facility] [place] [facility]  [address] [id number] [place] [address]-
5519  22 Aug, 2014   

 

 [facility] [place] [facility]  [address] [id number] [place] [address]-
0272     

 

 [facility] [place] [facility]  [address] [id number] [place] [address]-
8527     

 

 [facility] [place] [facility]  [address] [id number] [place] [address]-
6647  27 May, 2014   

 

 [facility] [place] [facility]  [address] [facility] [id number] [place] [address]-
0704  27 May, 2014   

 

 [facility] [place] [facility]  [address] [facility] [id number] [place] [address]-
2945     

 

 [facility] [place] [facility]  [address] [facility] [id number] [place] [address]-
9474     

 

 [facility] [place] [facility]  [address] [facility] [id number] [place] [address]-
7138  06 Mar, 2014   

 

 [facility] [place] [facility]  [address] [facility] [id number] [place] [address]-
5976  06 Mar, 2014   

 

 [facility] [place] [facility]  [address] [facility] [id number] [place] [address]-
7336     

 

 [facility] [place] [facility]  [address] [facility] [id number] [place] [address]-
5538     

 

 [facility] [place] [facility]  [address] [facility] [id number] [place] [address]-
2290     

 

 [facility] [place] [facility]  [address] [facility] [id number] [place] [address]-
0552     

 

 [facility] [place] [facility]  [address] [facility] [id number] [place] [address]-
4318     

 

 [facility] [place] [facility]  [address] [facility] [id number] [place] [address]-
8425     

 

 [facility] [place] [facility]  [address] [facility] [id number] [place] [address]-
5879     

 

 [facility] [place] [facility]  [address] [facility] [id number] [place] [address]-
3175     

 

 [facility] [place] [facility]  [address] [id number] [place] [address]-
6496  15 Oct, 2013   

 

 [facility] [place] [facility]  [address] [id number] [place] [address]-
2596  15 Oct, 2013   

 

 [facility] [place] [facility]  [address] [id number] [place] [address]-
4045  28 Aug, 2013   

 

 [facility] [place] [facility]  [address] [id number] [place] [address]-
7177     

 

 [facility] [place] [facility]  [address] [id number] [place] [address]-
9810  27 Mar, 2013   

 

 [facility] [place] [facility]  [address] [id number] [place] [address]-
1916     

 

 [facility] [place] [facility]  [address] [id number] [place] [address]-
9649     

 

 [facility] [place] [facility]  [address] [id number] [place] [address]-
7101  10 Michel, 2013   

 

 [facility] [place] [facility]  [address] [id number] [place] [address]-
5630  03 Dec, 2012   

 

 [facility] [place] [facility]  [address] [id number] [place] [address]-
6915  03 Dec, 2012   

 

 [facility] [place] [facility]  [address] [id number] [place] [address]-
4452     

 

 [facility] [place] [facility]  [address] [id number] [place] [address]-
7449     

 

 [facility] [place] [facility]  [address] [id number] [place] [address]-
0733  08 Oct, 2012   

 

 [facility] [place] [facility]  [address] [id number] [place]
8177  25 Sep, 2012   

 

 [facility] [place] [facility]  [address] [id number] [place] [address]-
9830  06 Sep, 2012   

 

 [facility] [place] [facility]  [address] [id number] [place] [address]-
0001  10 Aug, 2012   

 

 [facility] [place] [facility]  [address] [id number] [place] [address]-
0787     

 

 [facility]  [address] [id number] [place] [address]-
7017     

 

 [facility] [place] [facility]  [address] [id number] [place] [address]-
5826     

 

 [facility] [place] [facility]  [address] [id number] [place] [address]-
5026     

 

 [facility] [place] [facility]  [address] [id number] [place] [address]-
8734     

 

 [facility] [place] [facility]  [address] [id number] [place] [address]-
8227     

 

 [facility] [place] [facility]  [address] [id number] [place] [address]-
5510     

 

 [facility]  [address] [id number] [place] [address]-
7284     

 

 [facility] [place] [facility]  [address] [id number] [place] [address]-
6770     

 

 [facility]  [address] [id number] [place] [address]-
4406     

 

 [facility] [place] [facility]  [address] [facility] [id number] [place] [address]-
2900  06 Dec, 2011   

 

 [facility] [place] [facility]  [address] [id number] [place] [address]-
9554  10 Nov, 2011   

 

 [facility] [place] [facility]  [address] [id number] [place] [address]-
0067  14 2011   

 

 [facility] [place] [facility]  [address] [id number] [place] [address]-
1979  10 May, 2011   

 

 [facility] [place] [facility]  [address] [id number] [place] [address]-
0870  29 Dec, 2010   

 

 [facility] [place] [facility]  [address] [id number] [place] [address]-
2704  16 2010   

 

 [facility] [place] [facility]  [address] [id number] [address]-
2162  18 Oct, 2010   

 

 [facility]  [address]00565100[place] [address]-
2234  14 2010   

 

 [facility]  [address]00565100[place] [address]-
3280  14 Dec, 2009   

 

 [facility]  [address]00565100[place] [address]-
9254  14 Dec, 2009   

 

 [facility]  [address]00565100[place] [address]-
8779  10 Dec, 2009   

 

 [facility]  [address] [place] [address]-
1915  07 Dec, 2009   

 

 [facility]  [address]00565100[place] [address]-
7394     

 

 [facility]  [address]00565100[place] [address]-
9429  10 Nov, 2009   

 

 [facility]  [address] [id number] [address]-
6581  15 Apr, 2009   







IMMUNIZATIONS

No Known Immunizations



SOCIAL HISTORY

Never Assessed



REASON FOR VISIT

Refill request



PLAN OF CARE





VITAL SIGNS





MEDICATIONS







 Medication  Instructions  Dosage  Frequency  Start Date  End Date  Duration  
Status

 

 Propranolol HCl 20 MG     1 tablet Twice a day Orally           90  Active







RESULTS

No Results



PROCEDURES

No Known procedures



INSTRUCTIONS





MEDICATIONS ADMINISTERED

No Known Medications



MEDICAL (GENERAL) HISTORY







 Type  Description  Date

 

 Medical History  depression   

 

 Medical History  hx of anxiety   

 

 Medical History  mitral valve prolapse   

 

 Surgical History  tonsillectomy and adenoidectomy   

 

 Surgical History  partial hysterectomy   

 

 Surgical History  jaw surgery  1974

 

 Surgical History  colonoscopy  16

 

 Surgical History  colonoscopy  10/2017

 

 Hospitalization History  for surgery
[facility]

 Created on: 2017



Shereen Shafer

External Reference #: [id number]

: 1960

Sex: Female



Demographics







 Address  [address] [place]  [address]

 

 Preferred Language  Unknown

 

 Marital Status  Unknown

 

 [employer] Affiliation  Unknown

 

 Race  Unknown

 

 Ethnic Group  Unknown





Author







 Author  JAVON DE LOS SANTOS

 

 [facility]

 

 Address  [address] [place]  [address]



 

 Phone  [phone]







Care Team Providers







 Care Team Member Name  Role  Phone

 

 JAVON DE LOS SANTOS  Unavailable  [phone]







PROBLEMS







 Type  Condition  ICD9-CM Code  TLB68-YK Code  Onset Dates  Condition Status  
SNOMED Code

 

 Problem  Knee pain     M25.569     Active  11709170

 

 Problem  Dysuria     R30.0     Active  49094071

 

 Problem  Pharyngitis, unspecified etiology     J02.9     Active  587912501

 

 Problem  Dysthymia     F34.1     Active  84629650

 

 Problem  Eustachian tube dysfunction     H69.80     Active  80364472

 

 Problem  Anxiety disorder, unspecified     F41.9     Active  351001292

 

 Problem  Hypertension     I10     Active  51703956







ALLERGIES

No Information



SOCIAL HISTORY

Never Assessed



PLAN OF CARE





VITAL SIGNS





MEDICATIONS







 Medication  Instructions  Dosage  Frequency  Start Date  End Date  Duration  
Status

 

 Alprazolam 1 MG  Orally Twice a day, and 1 tablet at bedtime  0.5 Tablet as 
needed     03 Mar, 2015     28 days  Active







RESULTS

No Results



PROCEDURES

No Known procedures



IMMUNIZATIONS

No Known Immunizations



MEDICAL (GENERAL) HISTORY







 Type  Description  Date

 

 Medical History  depression   

 

 Medical History  hx of anxiety   

 

 Medical History  mitral valve prolapse   

 

 Surgical History  tonsillectomy and adenoidectomy   

 

 Surgical History  partial hysterectomy   

 

 Surgical History  jaw surgery  1974

 

 Surgical History  colonoscopy  16

 

 Hospitalization History  for surgery
[facility]

 Created on: 2018



Shereen Shafer

External Reference #: [id number]

: 1960

Sex: Female



Demographics







 Address  [address] [place]  [address]

 

 Preferred Language  Unknown

 

 Marital Status  Unknown

 

 [employer] Affiliation  Unknown

 

 Race  Unknown

 

 Ethnic Group  Unknown





Author







 Author  JAVON DE LOS SANTOS

 

 Organization  [facility]

 

 Address  [address] [place]  [address]



 

 Phone  [phone]







Care Team Providers







 Care Team Member Name  Role  Phone

 

 JAVON DE LOS SANTOS  Unavailable  [phone]







PROBLEMS







 Type  Condition  ICD9-CM Code  JAA19-MY Code  Onset Dates  Condition Status  
SNOMED Code

 

 Problem  Hypertension     I10     Active  23157350

 

 Problem  Eustachian tube dysfunction     H69.80     Active  79367035

 

 Problem  Knee pain     M25.569     Active  16220450

 

 Problem  Dysthymia     F34.1     Active  27394103

 

 Problem  Other chronic pain     G89.29     Active  44544404

 

 Problem  Acute right-sided low back pain with right-sided sciatica     M54.41 
    Active  971650679

 

 Problem  Pharyngitis, unspecified etiology     J02.9     Active  776614443

 

 Problem  Anxiety disorder, unspecified     F41.9     Active  786268121

 

 Problem  Psoriasis     L40.9     Active  6909520

 

 Problem  Dysuria     R30.0     Active  32180839







ALLERGIES

No Information



ENCOUNTERS







 Encounter  Location  Date  Diagnosis

 

 [address] [place] [address]-
0045     

 

 [id number] [place] [address] 26129-
1402  25 May, 2018  Anxiety disorder, unspecified F41.9 and Pain in right knee 
M25.561

 

 [id number] [place] [address] 81138-
8396  02 May, 2018  Pain in right knee M25.561 ; Pain in left knee M25.562 ; 
Other chronic pain G89.29 ; Anxiety disorder, unspecified F41.9 ; Acute right-
sided low back pain with right-sided sciatica M54.41 and BMI 40.0-44.9, adult 
Z68.41

 

 [place] [id number] [place] [address] 35556-
0055    Anxiety disorder, unspecified F41.9

 

 [facility]  [address]00565100[place], [place] [address]-
2846  26 Mar, 2018  Anxiety disorder, unspecified F41.9

 

 [facility]  [address]00565100[place], [place] [address]-
0518  06 Mar, 2018  Anxiety disorder, unspecified F41.9

 

 [facility]  [address] [place] [address] 16866-
9373    Anxiety disorder, unspecified F41.9

 

 [facility]  [address] [place] [address] 22536-
1969     

 

 [facility]  [id number] [place] [address] 8[address]-
9914    Anxiety disorder, unspecified F41.9

 

 [facility]  [id number] [place] [address] 85045-
9176  12 Dec, 2017  Anxiety disorder, unspecified F41.9

 

 [facility]  [address]00565100[place], [place] [address]-
2521    Anxiety disorder, unspecified F41.9 ; Hypertension I10 ; 
Encounter for screening mammogram for breast cancer Z12.31 ; Psoriasis L40.9 
and BMI 40.0-44.9, adult Z68.41

 

 [facility]  [address]65100[place], [place] [address]-
1703    Anxiety disorder, unspecified F41.9

 

 [facility]  [address]00565100[place], [place] [address]-
7622  23 Oct, 2017   

 

 [facility]  [address] [place] [address]-
0025  17 Oct, 2017  Anxiety disorder, unspecified F41.9

 

 [facility]  [id number] [address]-
9078  09 Oct, 2017   

 

 [address] [id number] [id number]  
21 Sep, 2017  Dysuria R30.0

 

 [facility]  [id number] [place] [address] 00326-
8827  19 Sep, 2017  Anxiety disorder, unspecified F41.9

 

 [facility]  [id number] [place] [address] 526[address]-
4044  22 Aug, 2017  Anxiety disorder, unspecified F41.9

 

 [facility]  [id number] [place] [address] 74169-
0327    Anxiety disorder, unspecified F41.9

 

 [facility]  [id number] [place] [address] 13100-
6849  10 Jul, 2017   

 

 [facility]  [id number] [place] [address] 68148-
6288    Anxiety disorder, unspecified F41.9

 

 [facility]  [id number] [place] [address] 8[address]-
9425  30 May, 2017  Anxiety disorder, unspecified F41.9

 

 [facility]  [address] [place] [address] 88767-
1755  02 May, 2017  Anxiety disorder, unspecified F41.9

 

 [facility]  [id number] [place] [address] 42616-
9336    Anxiety disorder, unspecified F41.9

 

 [facility]  [address]00565100[place], [place] [address]-
9190  07 Mar, 2017   

 

 [facility]  [id number] [place] [address] 72484-
6359  07 Mar, 2017  Anxiety disorder, unspecified F41.9

 

 [facility]  [id number] [place] [address] 00770-
3262    Well woman exam Z01.419 ; Cervical cancer screening Z12.4 
and Breast cancer screening Z12.39

 

 [facility]  [address] [place] [address] 50148-
6441    Anxiety disorder, unspecified F41.9

 

 [facility]  [id number] [place] [address] 24146-
5767    Eustachian tube dysfunction H69.80 and Pharyngitis, 
unspecified etiology J02.9

 

 [place] [id number] [place] [address] 9[address]-
2365    Anxiety disorder, unspecified F41.9

 

 [place] [address] [address]-
2855  05 Dec, 2016  Anxiety disorder, unspecified F41.9

 

 [place] [address] [address]-
5301    Anxiety disorder, unspecified F41.9

 

 [place] [id number] [place] [address] 7[address]41-
6900  30 Sep, 2016  Anxiety disorder, unspecified F41.9

 

 [place] [address] [address]-
6417  20 Sep, 2016  Irritable bowel syndrome with diarrhea K58.0 ; Hypertension 
I10 ; Family history of diabetes mellitus Z83.3 and Visual changes H53.9

 

 [place] [id number] [place] [address]-
3161  26 Aug, 2016  Anxiety disorder, unspecified F41.9

 

 [place] [id number] [place] [address] 42539-
7487  09 Aug, 2016  RLQ abdominal pain R10.31

 

 [place] [id number] [place] [address] 02382-
2807  04 Aug, 2016  RLQ abdominal pain R10.31 and Varicose veins of both lower 
extremities I83.93

 

 [place] [id number] [place] [address] 63764-
6600    Anxiety disorder, unspecified F41.9

 

 [place] [id number] [place] [address] 54186-
9580     

 

 [place] [id number] [place] [address] 99654-
3398     

 

 [facility]  [id number] [place] [address] 89656-
8429    Knee pain M25.569

 

 [facility]  [id number] [place] [address] 77515-
5140     

 

 [facility]  [id number] [place] [address] 19540-
0126  27 May, 2016  Anxiety disorder, unspecified F41.9

 

 [facility]  [id number] [place] [address] 23977-
0356     

 

 [facility]  [id number] [place] [address] 88090-
0009     

 

 [facility]  [id number] [place] [address] 59314-
1278  30 Mar, 2016   

 

 [facility]  [id number] [place] [address] 56878-
8047  29 Mar, 2016   

 

 [facility]  [id number] [place] [address] 60829-
7015    Hypertension I10 ; Dysthymia F34.1 ; Knee pain M25.569 and 
Eustachian tube dysfunction H69.80

 

 [facility]  [id number] [place] [address] 87383-
2497     

 

 [facility]  [address] [place] [address]-
9297     

 

 [facility]  [id number] [place] [address] 803724-
0607  02 Dec, 2015   

 

 [facility]  [id number] [place] [address] 51020-
3097  01 Dec, 2015   

 

 [facility]  [address]00565[address], [place] [address]-
3247     

 

 [facility]  [address]00565[address], [place] [address]-
2786     

 

 [facility]  [id number] [place] [address] 31162-
4488  30 Oct, 2015   

 

 [facility]  [address] [id number] [id number]  
23 Sep, 2015  Dental examination V72.2

 

 [facility]  [address] [place] [address] AVE [id number], [place] [id number]  
15 Sep, 2015  Allergic rhinitis 477.9 and Chronic serous otitis media of both 
ears 381.10

 

 [facility]  [id number] [place] [phone]
[phone]  21 Aug, 2015   

 

 [facility]  [address] [id number] [id number]  
08 Aug, 2015  Sinusitis 473.9 and Bronchitis 490

 

 [facility]  [id number] [place] [phone]
[phone]     

 

 [facility]  [id number] [place] [phone]
[phone]     

 

 [facility]  [id number] [place] [place] [address]83-
1039     

 

 [facility]  [id number] [place] [address]-
4130     

 

 [facility]  [address] [place] [address] 0[address]-
6908     

 

 [facility]  [address] [place] [address] 0[address]65-
8978  03 Mar, 2015   

 

 [facility]  [id number] [place] [address] 38157-
6817  03 Mar, 2015   

 

 [facility]  [address] [id number], [place] [address]-
1356     

 

 [facility]  [address] [place] [address] 49583-
1131     

 

 [facility]  [address] [id number] [place] [address]-
2188     

 

 [facility] [place] [facility]  [address] [id number] [place] [address]-
0898     

 

 [facility] [place] [facility]  [address] [id number] [place] [address]-
1051     

 

 [facility] [place] [facility]  [address] [id number] [place] [address]-
9600     

 

 [facility] [place] [facility]  [address] [id number] [place] [address]-
9032     

 

 [facility] [place] [facility]  [address] [id number] [place] [address]-
1643     

 

 [facility] [place] [facility]  [address] [id number] [place] [address]-
5393     

 

 [facility] [place] [facility]  [address] [id number] [place] [address]-
3247     

 

 [facility] [place] [facility]  [address] [id number] [place] [address]-
4264  30 Dec, 2014   

 

 [facility] [place] [facility]  [address] [id number] [place] [address]-
2241  29 Dec, 2014   

 

 [facility] [place] [facility]  [address] [id number] [place] [address]-
4474  29 Dec, 2014   

 

 [facility] [place] [facility]  [address] [id number] [place] [address]-
7860  03 Dec, 2014   

 

 [facility] [place] [facility]  [address] [id number] [place] [address]-
5319  03 Dec, 2014   

 

 [facility] [place] [facility]  [address] [id number] [place] [address]-
0324     

 

 [facility] [place] [facility]  [address] [id number] [place] [address]-
7558     

 

 [facility] [place] [facility]  [address] [id number] [place] [address]-
5059  23 Oct, 2014   

 

 [facility] [place] [facility]  [address] [id number] [place] [address]-
6200  23 Oct, 2014   

 

 [facility] [place] [facility]  [address] [id number] [place] [address]-
7295  15 Oct, 2014   

 

 [facility] [place] [facility]  [address] [id number] [place] [address]-
1496  15 Oct, 2014   

 

 [facility] [place] [facility]  [address] [id number] [place] [address]-
7471  22 Sep, 2014   

 

 [facility] [place] [facility]  [address] [id number] [place] [address]-
0300  22 Sep, 2014   

 

 [facility] [place] [facility]  [address] [id number] [place] [address]-
0067  10 Sep, 2014   

 

 [facility] [place] [facility]  [address] [id number] [place] [address]-
9764  10 Sep, 2014   

 

 [facility] [place] [facility]  [address] [id number] [place] [address]-
1603  03 Sep, 2014   

 

 [facility] [place] [facility]  [address] [id number] [place] [address]-
4394  03 Sep, 2014   

 

 [facility] [place] [facility]  [address] [id number] [place] [address]-
6763  29 Aug, 2014   

 

 [facility] [place] [facility]  [address] [id number] [place] [address]-
8545  29 Aug, 2014   

 

 [facility] [place] [facility]  [address] [id number] [place] [address]-
4475  27 Aug, 2014   

 

 [facility] [place] [facility]  [address] [id number] [place] [address]-
2048  27 Aug, 2014   

 

 [facility] [place] [facility]  [address] [id number] [place] [address]-
6812  22 Aug, 2014   

 

 [facility] [place] [facility]  [address] [id number] [place] [address]-
3574  22 Aug, 2014   

 

 [facility] [place] [facility]  [address] [id number] [place] [address]-
2989     

 

 [facility] [place] [facility]  [address] [id number] [place] [address]-
5903     

 

 [facility] [place] [facility]  [address] [id number] [place] [address]-
3960  27 May, 2014   

 

 [facility] [place] [facility]  [address] [id number] [place] [address]-
3467  27 May, 2014   

 

 [facility] [place] [facility]  [address] [id number] [place] [address]-
4369     

 

 [facility] [place] [facility]  [address] [id number] [place] [address]-
5998     

 

 [facility] [place] [facility]  [address] [facility] [id number] [place] [address]-
2341  06 Mar, 2014   

 

 [facility] [place] [facility]  [address] [id number] [place] [address]-
2665  06 Mar, 2014   

 

 [facility] [place] [facility]  [address] [id number] [place] [address]-
4885     

 

 [facility] [place] [facility]  [address] [id number] [place] [address]-
3106     

 

 [facility] [place] [facility]  [address] [facility] [id number] [place] [address]-
7170     

 

 [facility] [place] [facility]  [address] [id number] [place] [address]-
5577     

 

 [facility] [place] [facility]  [address] [id number] [place] [address]-
2030     

 

 [facility] [place] [facility]  [address] [id number] [place] [address]-
8964     

 

 [facility] [place] [facility]  [address] [facility] [id number] [place] [address]-
4058     

 

 [facility] [place] [facility]  [address] [facility] [id number] [address]-
6150     

 

 [facility]  [address] [id number] [place] [address]-
1090  15 Oct, 2013   

 

 [facility] [place] [facility]  [address] [id number] [place] [address]-
7341  15 Oct, 2013   

 

 [facility] [place] [facility]  [address] [id number] [place] [address]-
4876  28 Aug, 2013   

 

 [facility] [place] [facility]  [address] [id number] [place] [address]-
3205     

 

 [facility] [place] [facility]  [address] [id number] [place] [address]-
7109  27 Mar, 2013   

 

 [facility] [place] [facility]  [address] [id number] [place] [address]-
7134     

 

 [facility]  [address] [facility] [id number] [place] [address]-
4871     

 

 [facility] [place] [facility]  [address] [facility] [id number] [place] [address]-
1159  10 Michel, 2013   

 

 [facility] [place] [facility]  [address] [facility] [id number] [place] [address]-
0354  03 Dec, 2012   

 

 [facility] [place] [facility]  [address] [facility] [id number] [place] [address]-
7771  03 Dec, 2012   

 

 [facility] [place] [facility]  [address] [facility] [id number] [place] [address]-
3628     

 

 [facility] [place] [facility]  [address] [id number], [place] [address]-
3811     

 

 [facility] [place] [facility]  [address] [id number] [place] [address]-
0151  08 Oct, 2012   

 

 [facility] [place] [facility]  [address] [facility] [id number] [place] [address]-
6908  25 Sep, 2012   

 

 [facility] [place] [facility]  [address] [facility] [id number] [place] [address]-
7158  06 Sep, 2012   

 

 [facility] [place] [facility]  [address] [id number] [place] [address]-
5069  10 Aug, 2012   

 

 [facility] [place] [facility]  [address] [id number] [place] [address]-
9121     

 

 [facility] [place] [facility]  [address] [id number] [place] [address]
2546     

 

 [facility] [place] [facility]  [address] [id number] [place] [address]-
9603     

 

 [facility] [place] [facility]  [address] [id number] [place] [address]-
2446     

 

 [facility] [place] [facility]  [address] [id number] [place] [address]-
4916     

 

 [facility] [place] [facility]  [address] [id number] [place] [address]-
6056     

 

 [facility] [place] [facility]  [address] [id number] [place] [address]-
7826     

 

 [facility] [place] [facility]  [address] [id number] [place] [address]-
2401     

 

 [facility] [place] [facility]  [address] [id number] [place] [address]-
8356     

 

 [facility] [place] [facility]  [address] [id number] [place] [address]-
6153     

 

 [facility] [place] [facility]  [address] [id number] [place] [address]-
4406  06 Dec, 2011   

 

 [facility] [place] [facility]  [address] [id number] [place] [address]-
8755  10 Nov, 2011   

 

 [facility] [place] [facility]  [address] [id number] [place] [address]-
7656  14 2011   

 

 [facility] [place] [facility]  [address] [id number] [place] [address]-
6547  10 May, 2011   

 

 [facility] [place] [facility]  [address] [id number], [place] [address]-
3665  29 Dec, 2010   

 

 [facility]  [address] [facility] [id number], [place] [address]-
0645  16 2010   

 

 [facility]  [address] [facility] [id number][place] [address]-
3739  18 Oct, 2010   

 

 [facility]  [address] [facility] [id number] [address]17[place]
1480  14 2010   

 

 [facility]  [address] [facility] [id number][place] [address]-
3090  14 Dec, 2009   

 

 [facility]  [address] [facility] [id number]-
2453  14 Dec, 2009   

 

 [facility]  [address] [facility] [id number], [place] [address]-
0571  10 Dec, 2009   

 

 [facility]  [address]00565100[place], [place] [address]-
2995  07 Dec, 2009   

 

 [facility]  [address]00565100[place], [place] [address]-
8213     

 

 [facility]  [address] [id number]306[place]-
2719  10 Nov, 2009   

 

 [facility]  [address] [id number] [address]-
6173  15 Apr, 2009   







IMMUNIZATIONS

No Known Immunizations



SOCIAL HISTORY

Never Assessed



REASON FOR VISIT

Controlled Med Refill 01/10/18



PLAN OF CARE





VITAL SIGNS





MEDICATIONS







 Medication  Instructions  Dosage  Frequency  Start Date  End Date  Duration  
Status

 

 Alprazolam 1 MG  Orally Twice a day, and 1 tablet at bedtime  0.5 Tablet as 
needed     03 Mar, 2015     28 days  Active







RESULTS

No Results



PROCEDURES

No Known procedures



INSTRUCTIONS





MEDICATIONS ADMINISTERED

No Known Medications



MEDICAL (GENERAL) HISTORY







 Type  Description  Date

 

 Medical History  depression   

 

 Medical History  hx of anxiety   

 

 Medical History  mitral valve prolapse   

 

 Surgical History  tonsillectomy and adenoidectomy   

 

 Surgical History  partial hysterectomy   

 

 Surgical History  jaw surgery  1974

 

 Surgical History  colonoscopy  16

 

 Surgical History  colonoscopy  10/2017

 

 Hospitalization History  for surgery
[facility]

 Created on: 2018



Shereen Shafer

External Reference #: [id number]

: 1960

Sex: Female



Demographics







 Address  [address] [place]  [address]

 

 Preferred Language  Unknown

 

 Marital Status  Unknown

 

 Jew Affiliation  Unknown

 

 Race  Unknown

 

 Ethnic Group  Unknown





Author







 Author  JAVON DE LOS SANTOS

 

 Organization  [facility]

 

 Address  [address] [place]  [address]



 

 Phone  [phone]







Care Team Providers







 Care Team Member Name  Role  Phone

 

 JAVON DE LOS SANTOS  Unavailable  [phone]







PROBLEMS







 Type  Condition  ICD9-CM Code  XHP83-DU Code  Onset Dates  Condition Status  
SNOMED Code

 

 Problem  Pharyngitis, unspecified etiology     J02.9     Active  638071778

 

 Problem  Psoriasis     L40.9     Active  7240383

 

 Problem  Dysuria     R30.0     Active  01222863

 

 Problem  Essential hypertension     I10     Active  95189111

 

 Problem  Coronary artery disease involving native coronary artery of native 
heart with angina pectoris     I25.119     Active  [id number]

 

 Problem  Acute right-sided low back pain with right-sided sciatica     M54.41 
    Active  642241796

 

 Problem  Other chronic pain     G89.29     Active  86865321

 

 Problem  Mitral valve insufficiency, unspecified etiology     I34.0     Active
  83961127

 

 Problem  Bilateral claudication of lower limb     I73.9     Active  46654984

 

 Problem  Eustachian tube dysfunction     H69.80     Active  05654673

 

 Problem  Dysthymia     F34.1     Active  80815634

 

 Problem  Violation of controlled substance agreement     Z91.14    
Active  025255343

 

 Problem  Hypertension     I10     Active  44080957

 

 Problem  Knee pain     M25.569     Active  87886703

 

 Problem  Anxiety disorder, unspecified     F41.9     Active  876252565







ALLERGIES

No Information



ENCOUNTERS







 Encounter  Location  Date  Diagnosis

 

 [facility]  [address] [facility] [id number] [address]-
7833  12 Dec, 2018   

 

 [facility]  [address] [id number] [address]-
2908  12 Dec, 2018   

 

 [facility]  [address] [id number] [address]-
7215  03 Dec, 2018   

 

 [facility]  [address] [id number], [place] [address]-
6867    Left hip pain M25.552 ; Left lower quadrant pain R10.32 ; 
Psoriasis L40.9 ; Encounter for screening mammogram for breast cancer Z12.31 
and BMI 40.0-44.9, adult Z68.41

 

 [address]00565100[place], [place] [address]-
5054  12 Sep, 2018  Dyspnea on exertion R06.09 ; Essential hypertension I10 ; 
Bilateral claudication of lower limb I73.9 ; Mitral valve insufficiency, 
unspecified etiology I34.0 ; Suspected sleep apnea R29.818 ; Tobacco use Z72.0 
and Coronary artery disease involving native coronary artery of native heart 
with angina pectoris I25.119

 

 [address] [id number] [address]  
  Shortness of breath R06.02 and Anxiety disorder, unspecified F41.9

 

 [address] [place] [address]-
4244    Anxiety disorder, unspecified F41.9

 

 [address] [place] [address] 90110-
4284    Anxiety disorder, unspecified F41.9 ; Shortness of breath 
R06.02 ; Therapeutic drug monitoring Z51.81 ; Family history of diabetes 
mellitus Z83.3 ; BMI 40.0-44.9, adult Z68.41 and Tobacco abuse Z72.0

 

 [address]00565100[place] [address]-
0716  25 May, 2018  Anxiety disorder, unspecified F41.9 and Pain in right knee 
M25.561

 

 [address] [place] [address] 83467-
0403  02 May, 2018  Pain in right knee M25.561 ; Pain in left knee M25.562 ; 
Other chronic pain G89.29 ; Anxiety disorder, unspecified F41.9 ; Acute right-
sided low back pain with right-sided sciatica M54.41 and BMI 40.0-44.9, adult 
Z68.41

 

 [id number] [address]-
1000    Anxiety disorder, unspecified F41.9

 

 [facility]  [id number] [place] [address] 517975-
2333  26 Mar, 2018  Anxiety disorder, unspecified F41.9

 

 [facility]  [id number] [place] [address] 36874-
1717  06 Mar, 2018  Anxiety disorder, unspecified F41.9

 

 [facility]  [id number] [place] [address]-
5562    Anxiety disorder, unspecified F41.9

 

 [id number] [place] [address] 58146-
7518     

 

 [id number] [place] [address] 24449-
1957    Anxiety disorder, unspecified F41.9

 

 [id number] [place] [address] 30656-
4208  12 Dec, 2017  Anxiety disorder, unspecified F41.9

 

 [id number] [place] [address]-
7641    Anxiety disorder, unspecified F41.9 ; Hypertension I10 ; 
Encounter for screening mammogram for breast cancer Z12.31 ; Psoriasis L40.9 
and BMI 40.0-44.9, adult Z68.41

 

 [address] [place] [address]-
3826    Anxiety disorder, unspecified F41.9

 

 [address] [place] [address] 547[address]-
6883  23 Oct, 2017   

 

 [id number] [place] [address]-
5587  17 Oct, 2017  Anxiety disorder, unspecified F41.9

 

 [facility]  [address] [place] [address] 41623-
5988  09 Oct, 2017   

 

 [id number] [id number]  
21 Sep, 2017  Dysuria R30.0

 

 [facility]  [address]00565100[place], [place] [address]-
1105  19 Sep, 2017  Anxiety disorder, unspecified F41.9

 

 [facility]  [address]00565100[place], [place] [address]-
4504  22 Aug, 2017  Anxiety disorder, unspecified F41.9

 

 [facility]  [address]00565100[place], [place] [address]-
6623    Anxiety disorder, unspecified F41.9

 

 [facility]  [address] [place] [address] 615[address]-
9108  10 Jul, 2017   

 

 [facility]  [address] [place] [address] 37647-
4744    Anxiety disorder, unspecified F41.9

 

 [facility]  [address] [place] [address] 17246-
4855  30 May, 2017  Anxiety disorder, unspecified F41.9

 

 [facility]  [address] [place] [address] 73550-
8219  02 May, 2017  Anxiety disorder, unspecified F41.9

 

 [facility]  [address]00565100[place], [place] [address]-
7356    Anxiety disorder, unspecified F41.9

 

 [facility]  [address]00565100[place], [place] [address]-
5820  07 Mar, 2017   

 

 [facility]  [address]00565100[place], [place] [address]-
3650  07 Mar, 2017  Anxiety disorder, unspecified F41.9

 

 [facility]  [address] [place] [address] 40[address]-
9731    Well woman exam Z01.419 ; Cervical cancer screening Z12.4 
and Breast cancer screening Z12.39

 

 [facility]  [address] [place] [address] 76737-
9050    Anxiety disorder, unspecified F41.9

 

 [id number] [place] [address] 413[address]-
2489    Eustachian tube dysfunction H69.80 and Pharyngitis, 
unspecified etiology J02.9

 

 [id number] [place] [address] 41177-
5214    Anxiety disorder, unspecified F41.9

 

 [id number] [place] [address] 57488-
9086  05 Dec, 2016  Anxiety disorder, unspecified F41.9

 

 [id number] [place] [address] 93825-
9123    Anxiety disorder, unspecified F41.9

 

 [id number] [place] [address] 84314-
4553  30 Sep, 2016  Anxiety disorder, unspecified F41.9

 

 [id number] [place] [address] 8[address]-
3427  20 Sep, 2016  Irritable bowel syndrome with diarrhea K58.0 ; Hypertension 
I10 ; Family history of diabetes mellitus Z83.3 and Visual changes H53.9

 

 [id number] [place] [address] 03724-
8034  26 Aug, 2016  Anxiety disorder, unspecified F41.9

 

 [id number] [place] [address] 29583-
1020  09 Aug, 2016  RLQ abdominal pain R10.31

 

 [id number] [place] [address] 12601-
1371  04 Aug, 2016  RLQ abdominal pain R10.31 and Varicose veins of both lower 
extremities I83.93

 

 [id number] [place] [address] 63974-
0474    Anxiety disorder, unspecified F41.9

 

 [id number] [place] [address] 71257-
3696     

 

 [facility]  [address] [place] [address] 93737-
3273     

 

 [facility]  [id number] [place] [address] 62934-
1026    Knee pain M25.569

 

 [facility]  [id number] [place] [address] 24727-
7221     

 

 [facility]  [id number] [place] [address] 666[address]-
5368  27 May, 2016  Anxiety disorder, unspecified F41.9

 

 [facility]  [id number] [place] [address] 27715-
4123     

 

 [facility]  [id number] [place] [address] 282476-
3577     

 

 [facility]  [id number] [place] [address] 68400-
9797  30 Mar, 2016   

 

 [facility]  [id number] [place] [address] 180779-
7311  29 Mar, 2016   

 

 [facility]  [id number] [place] [address] 297442-
0169    Hypertension I10 ; Dysthymia F34.1 ; Knee pain M25.569 and 
Eustachian tube dysfunction H69.80

 

 [facility]  [id number] [place] [address] 72073-
4706     

 

 [facility]  [id number] [place] [address] 18820-
8323     

 

 [facility]  [id number] [place] [address] 70712-
9626  02 Dec, 2015   

 

 [facility]  [id number] [place] [address] 90058-
9916  01 Dec, 2015   

 

 [facility]  [id number] [place] [address] 57708-
5506     

 

 [facility]  [address]00565100[place], [place] [address]-
4219     

 

 [facility]  [address]00565100[place], [place] [address]-
3039  30 Oct, 2015   

 

 [facility]  [address] [id number] [id number]  
23 Sep, 2015  Dental examination V72.2

 

 [facility]  [address] [id number] [id number]  
15 Sep, 2015  Allergic rhinitis 477.9 and Chronic serous otitis media of both 
ears 381.10

 

 [facility]  [id number] [place] [phone]
[phone]  21 Aug, 2015   

 

 [facility]  [address] [id number] [id number]  
08 Aug, 2015  Sinusitis 473.9 and Bronchitis 490

 

 [facility]  [id number] [place] [phone]
[phone]     

 

 [facility]  [address] [place] [phone]
[phone]     

 

 [facility]  [id number] [place] [id number]
[phone]     

 

 [facility]  [id number] [address]-
[address]     

 

 [facility]  [id number] [address]-
0613     

 

 [facility]  [id number] [id number]-
[phone]  03 Mar, 2015   

 

 [facility]  [address] [id number] [place] [address] 161929-
1507  03 Mar, 2015   

 

 [facility] [place] [facility]  [address]00565100[place], [place] [address]-
8141     

 

 [facility] [place] [facility]  [id number] [place] [address]-
9781     

 

 [facility] [place] [facility]  [address] [id number] [place] [address]-
2299     

 

 [facility] [place] [facility]  [address] [id number] [place] [address]-
2036     

 

 [facility] [place] [facility]  [address] [facility] [id number] [place] [address]-
1570     

 

 [facility] [place] [facility]  [address] [id number] [place] [address]-
0762     

 

 [facility] [place] [facility]  [address] [id number] [place] [address]-
9198     

 

 [facility] [place] [facility]  [address] [id number] [place] [address]-
4901     

 

 [facility] [place] [facility]  [address] [facility] [id number] [place] [address]-
8196     

 

 [facility] [place] [facility]  [address] [id number] [place] [address]-
4244     

 

 [facility] [place] [facility]  [address] [id number] [place] [address]-
3505  30 Dec, 2014   

 

 [facility] [place] [facility]  [address] [facility] [id number] [place] [address]-
0673  29 Dec, 2014   

 

 [facility] [place] [facility]  [address] [id number] [place] [address]-
5008  29 Dec, 2014   

 

 [facility] [place] [facility]  [address] [id number] [place] [address]-
6731  03 Dec, 2014   

 

 [facility] [place] [facility]  [address] [id number] [place] [address]-
4960  03 Dec, 2014   

 

 [facility] [place] [facility]  [address] [id number] [place] [address]-
7604     

 

 [facility] [place] [facility]  [address] [facility] [id number] [place] [address]-
5719     

 

 [facility] [place] [facility]  [address] [id number] [place] [address]-
1855  23 Oct, 2014   

 

 [facility] [place] [facility]  [address] [id number] [place] [address]-
4829  23 Oct, 2014   

 

 [facility] [place] [facility]  [address] [id number] [place] [address]-
5526  15 Oct, 2014   

 

 [facility] [place] [facility]  [address] [id number] [place] [address]-
2556  15 Oct, 2014   

 

 [facility] [place] [facility]  [address] [id number] [place] [address]-
7600  22 Sep, 2014   

 

 [facility] [place] [facility]  [address] [id number] [place] [address]-
9382  22 Sep, 2014   

 

 [facility] [place] [facility]  [address] [id number] [place] [address]-
5258  10 Sep, 2014   

 

 [facility] [place] [facility]  [address] [id number] [place] [address]-
5570  10 Sep, 2014   

 

 [facility] [place] [facility]  [address] [id number] [place] [address]-
8440  03 Sep, 2014   

 

 [facility] [place] [facility]  [address] [id number] [place] [address]-
0991  03 Sep, 2014   

 

 [facility] [place] [facility]  [address] [id number] [place] [address]-
4567  29 Aug, 2014   

 

 [facility] [place] [facility]  [address] [id number] [place] [address]-
3792  29 Aug, 2014   

 

 [facility] [place] [facility]  [address] [id number] [place] [address]-
2549  27 Aug, 2014   

 

 [facility] [place] [facility]  [address] [id number] [place] [address]-
7448  27 Aug, 2014   

 

 [facility] [place] [facility]  [address] [id number] [place] [address]-
1978  22 Aug, 2014   

 

 [facility] [place] [facility]  [address] [id number] [place] [address]-
7857  22 Aug, 2014   

 

 [facility] [place] [facility]  [address] [id number] [place] [address]-
7739     

 

 [facility] [place] [facility]  [address] [id number] [place] [address]-
0503     

 

 [facility] [place] [facility]  [address] [id number] [place] [address]-
9694  27 May, 2014   

 

 [facility] [place] [facility]  [address] [id number] [place] [address]-
0630  27 May, 2014   

 

 [facility] [place] [facility]  [address] [id number] [place] [address]-
8599     

 

 [facility] [place] [facility]  [address] [id number] [place] [address]-
6895     

 

 [facility] [place] [facility]  [address] [id number] [place] [address]-
7965  06 Mar, 2014   

 

 [facility] [place] [facility]  [address] [id number] [place] [address]-
4934  06 Mar, 2014   

 

 [facility] [place] [facility]  [address] [id number] [place] [address]-
8670     

 

 [facility] [place] [facility]  [address] [id number] [place] [address]-
1813     

 

 [facility] [place] [facility]  [address] [id number] [place] [address]-
1254     

 

 [facility] [place] [facility]  [address] [id number] [place] [address]-
4510     

 

 [facility] [place] [facility]  [address] [id number] [place] [address]-
3112     

 

 [facility] [place] [facility]  [address] [id number] [place] [address]-
5828     

 

 [facility] [place] [facility]  [address] [id number] [place] [address]-
1686     

 

 [facility] [place] [facility]  [address] [id number] [place] [address]-
0034  13 2014   

 

 [facility] [place] [facility]  [address] [id number] [place] [address]-
5891  15 Oct, 2013   

 

 [facility] [place] [facility]  [address] [id number] [place] [address]-
0622  15 Oct, 2013   

 

 [facility] [place] [facility]  [address] [id number] [place] [address]-
8052  28 Aug, 2013   

 

 [facility] [place] [facility]  [address] [id number] [place] [address]-
0043     

 

 [facility] [place] [facility]  [address] [id number] [place] [address]-
0253  27 Mar, 2013   

 

 [facility] [place] [facility]  [address] [id number] [place] [address]-
9940     

 

 [facility] [place] [facility]  [address] [id number] [place] [address]-
4188     

 

 [facility] [place] [facility]  [address] [id number] [place] [address]-
0186  10 Michel, 2013   

 

 [facility] [place] [facility]  [address] [id number] [place] [address]-
0336  03 Dec, 2012   

 

 [facility] [place] [facility]  [address] [id number] [place] [address]-
1113  03 Dec, 2012   

 

 [facility] [place] [facility]  [address] [id number] [place] [address]-
8475     

 

 [facility] [place] [facility]  [address] [id number] [place] [address]-
5716     

 

 [facility] [place] [facility]  [address] [id number] [place] [address]-
8338  08 Oct, 2012   

 

 [facility] [place] [facility]  [address] [id number] [place] [address]-
1226  25 Sep, 2012   

 

 [facility] [place] [facility]  [address] [id number] [place] [address]-
3442  06 Sep, 2012   

 

 [facility]  [address] [id number] [place] [address]-
7993  10 Aug, 2012   

 

 [facility] [place] [facility]  [address] [id number] [place] [address]-
3006     

 

 [facility] [place] [facility]  [address] [id number] [place] [address]-
9959     

 

 [facility] [place] [facility]  [address] [id number] [place] [address]-
8893     

 

 [facility] [place] [facility]  [address] [id number] [place] [address]-
0706     

 

 [facility] [place] [facility]  [address] [id number] [place] [address]-
6375     

 

 [facility] [place] [facility]  [address] [id number] [place] [address]-
3108     

 

 [facility] [place] [facility]  [address] [id number] [place] [address]-
0676     

 

 [facility] [place] [facility]  [address] [id number] [place] [address]-
3375     

 

 [facility] [place] [facility]  [address] [id number] [place] [address]-
7969     

 

 [facility] [place] [facility]  [address] [id number] [address]-
7648     

 

 [facility] [place] [facility]  [address] [id number] [address]-
3572  06 Dec, 2011   

 

 [facility] [place] [facility]  [address] [id number] [place] [address]-
5849  10 Nov, 2011   

 

 [facility] [place] [facility]  [address] [id number] [place] [address]-
0925     

 

 [facility] [place] [facility]  [address] [id number] [place] [address]-
1610  10 May, 2011   

 

 [facility] [place] [facility]  [address] [address]-
6765  29 Dec, 2010   

 

 [facility]  [address], [place] [address]-
6705  16 2010   

 

 [facility]  [address] [address]-
6751  18 Oct, 2010   

 

 [facility]  [address], [place] [address]-
8687  14 2010   

 

 [facility]  [address], [place] [address]-
7875  14 Dec, 2009   

 

 [facility]  [address] [address]-
0089  14 Dec, 2009   

 

 [facility]  [address], [place] [address]-
0014  10 Dec, 2009   

 

 [facility]  [address], [place] [address]-
0503  07 Dec, 2009   

 

 [facility]  [address], [place] [address]-
1022     

 

 [facility]  [address], [place] [address]-
3859  10 Nov, 2009   

 

 [facility]  [id number], [place] [address]-
4667  15 2009   







IMMUNIZATIONS

No Known Immunizations



SOCIAL HISTORY

Never Assessed



REASON FOR VISIT





PLAN OF CARE





VITAL SIGNS





MEDICATIONS

Unknown Medications



RESULTS

No Results



PROCEDURES

No Known procedures



INSTRUCTIONS





MEDICATIONS ADMINISTERED

No Known Medications



MEDICAL (GENERAL) HISTORY







 Type  Description  Date

 

 Medical History  depression   

 

 Medical History  hx of anxiety   

 

 Medical History  mitral valve prolapse   

 

 Surgical History  tonsillectomy and adenoidectomy   

 

 Surgical History  partial hysterectomy   

 

 Surgical History  jaw surgery  1974

 

 Surgical History  colonoscopy  16

 

 Surgical History  colonoscopy  10/2017

 

 Hospitalization History  for surgery
[facility]

 Created on: 2018



Shereen Shafer

External Reference #: [id number]

: 1960

Sex: Female



Demographics







 Address  [address] [place]  [address]

 

 Preferred Language  Unknown

 

 Marital Status  Unknown

 

 Amish Affiliation  Unknown

 

 Race  Unknown

 

 Ethnic Group  Unknown





Author







 Author  JAVON DE LOS SANTOS

 

 Organization  [facility]

 

 Address  [address] [place]  [address]



 

 Phone  [phone]







Care Team Providers







 Care Team Member Name  Role  Phone

 

 JAVON DE LOS SANTOS  Unavailable  [phone]







PROBLEMS







 Type  Condition  ICD9-CM Code  YPR66-LV Code  Onset Dates  Condition Status  
SNOMED Code

 

 Problem  Dysuria     R30.0     Active  57919972

 

 Problem  Acute right-sided low back pain with right-sided sciatica     M54.41 
    Active  571286265

 

 Problem  Psoriasis     L40.9     Active  1900621

 

 Problem  Thyroid nodule     E04.1     Active  949500455

 

 Problem  Coronary artery disease involving native coronary artery of native 
heart with angina pectoris     I25.119     Active  [id number]

 

 Problem  Essential hypertension     I10     Active  82552305

 

 Problem  Other chronic pain     G89.29     Active  36922681

 

 Problem  Mitral valve insufficiency, unspecified etiology     I34.0     Active
  12338670

 

 Problem  Bilateral claudication of lower limb     I73.9     Active  28678689

 

 Problem  Violation of controlled substance agreement     Z91.14    
Active  584867154

 

 Problem  Dysthymia     F34.1     Active  04203393

 

 Problem  Hypertension     I10     Active  42303135

 

 Problem  Knee pain     M25.569     Active  01981825

 

 Problem  Anxiety disorder, unspecified     F41.9     Active  898708793

 

 Problem  Eustachian tube dysfunction     H69.80     Active  98040718

 

 Problem  Pharyngitis, unspecified etiology     J02.9     Active  846967671







ALLERGIES

No Information



ENCOUNTERS







 Encounter  Location  Date  Diagnosis

 

 [facility]  [address]100[place], [place] [address]-
6900  12 Dec, 2018   

 

 [facility]  [address] [place] [address] 269[address]-
0589  12 Dec, 2018   

 

 [facility]  [address]100[place], [place] [address]-
7131  04 Dec, 2018  Thyroid nodule E04.1

 

 [facility]  [id number], [place] [address]-
4527  04 Dec, 2018   

 

 [address] [place] [address] 7[address]28-
9404  03 Dec, 2018   

 

 [address] [place] [address] 82176-
0255  14 2018  Left hip pain M25.552 ; Left lower quadrant pain R10.32 ; 
Psoriasis L40.9 ; Encounter for screening mammogram for breast cancer Z12.31 
and BMI 40.0-44.9, adult Z68.41

 

 [address] [place] [address] 72582-
0585  12 Sep, 2018  Dyspnea on exertion R06.09 ; Essential hypertension I10 ; 
Bilateral claudication of lower limb I73.9 ; Mitral valve insufficiency, 
unspecified etiology I34.0 ; Suspected sleep apnea R29.818 ; Tobacco use Z72.0 
and Coronary artery disease involving native coronary artery of native heart 
with angina pectoris I25.119

 

 [address] [id number] [id number]  
  Shortness of breath R06.02 and Anxiety disorder, unspecified F41.9

 

 [address] [place] [address] 77353-
6219    Anxiety disorder, unspecified F41.9

 

 [id number] [place] [address] 922[address]-
9502    Anxiety disorder, unspecified F41.9 ; Shortness of breath 
R06.02 ; Therapeutic drug monitoring Z51.81 ; Family history of diabetes 
mellitus Z83.3 ; BMI 40.0-44.9, adult Z68.41 and Tobacco abuse Z72.0

 

 [address] [place] [address] 00938-
3459  25 May, 2018  Anxiety disorder, unspecified F41.9 and Pain in right knee 
M25.561

 

 [id number] [place] [address] 00007-
6649  02 May, 2018  Pain in right knee M25.561 ; Pain in left knee M25.562 ; 
Other chronic pain G89.29 ; Anxiety disorder, unspecified F41.9 ; Acute right-
sided low back pain with right-sided sciatica M54.41 and BMI 40.0-44.9, adult 
Z68.41

 

 [address] [address] [address]-
6365    Anxiety disorder, unspecified F41.9

 

 [address] [address] [address]-
7926  26 Mar, 2018  Anxiety disorder, unspecified F41.9

 

 [address] [address]-
8716  06 Mar, 2018  Anxiety disorder, unspecified F41.9

 

 [address] [address] [address]-
4747    Anxiety disorder, unspecified F41.9

 

 [address] [address]-
5405     

 

 [address] [address] [address]-
7957    Anxiety disorder, unspecified F41.9

 

 [id number] [place] [address] 99449-
1581  12 Dec, 2017  Anxiety disorder, unspecified F41.9

 

 [id number] [place] [address] 55[address]-
0291    Anxiety disorder, unspecified F41.9 ; Hypertension I10 ; 
Encounter for screening mammogram for breast cancer Z12.31 ; Psoriasis L40.9 
and BMI 40.0-44.9, adult Z68.41

 

 [address] [address] 7[address]-
2216  14 2017  Anxiety disorder, unspecified F41.9

 

 [address] 7[address]-
5398  23 Oct, 2017   

 

 [facility]  [address]65[address], [place] [address]-
5176  17 Oct, 2017  Anxiety disorder, unspecified F41.9

 

 [facility]  [address], [place] [address]-
6492  09 Oct, 2017   

 

 [address] [facility] [id number] [id number]  
21 Sep, 2017  Dysuria R30.0

 

 [facility]  [address]65100[place], [place] [address]-
0280  19 Sep, 2017  Anxiety disorder, unspecified F41.9

 

 [facility]  [address] [place] [address] 65[address]-
8336  22 Aug, 2017  Anxiety disorder, unspecified F41.9

 

 [facility]  [address] [place] [address] 86651-
8583    Anxiety disorder, unspecified F41.9

 

 [facility]  [address], [place] [address]-
2167  10 Jul, 2017   

 

 [facility]  [address] [place] [address] 22132-
8169    Anxiety disorder, unspecified F41.9

 

 [facility]  [address] [place] [address] 08757-
3155  30 May, 2017  Anxiety disorder, unspecified F41.9

 

 [facility]  [address] [place] [address] 98268-
1385  02 May, 2017  Anxiety disorder, unspecified F41.9

 

 [facility]  [address]00565100[place], [place] [address]-
9111    Anxiety disorder, unspecified F41.9

 

 [facility]  [address]100[place] [address]-
3836  07 Mar, 2017   

 

 [facility]  [address], [place] [address]-
7659  07 Mar, 2017  Anxiety disorder, unspecified F41.9

 

 [address] [place] [address] 32497-
5489   2017  Well woman exam Z01.419 ; Cervical cancer screening Z12.4 
and Breast cancer screening Z12.39

 

 [address] [place] [address] 47715-
8139    Anxiety disorder, unspecified F41.9

 

 [address] [place] [address] 90515-
7647    Eustachian tube dysfunction H69.80 and Pharyngitis, 
unspecified etiology J02.9

 

 [address] [place] [address] 93274-
1674    Anxiety disorder, unspecified F41.9

 

 [address] [place] [address] 94873-
4548  05 Dec, 2016  Anxiety disorder, unspecified F41.9

 

 [address] [place] [address] 27920-
4707    Anxiety disorder, unspecified F41.9

 

 [address] [place] [address] 17047-
9114  30 Sep, 2016  Anxiety disorder, unspecified F41.9

 

 [address] [place] [address] 03675-
0083  20 Sep, 2016  Irritable bowel syndrome with diarrhea K58.0 ; Hypertension 
I10 ; Family history of diabetes mellitus Z83.3 and Visual changes H53.9

 

 [address] [place] [address] 94470-
7313  26 Aug, 2016  Anxiety disorder, unspecified F41.9

 

 [address] [place] [address] 15702-
8405  09 Aug, 2016  RLQ abdominal pain R10.31

 

 [place] [address] [place] [address] 46766-
2045  04 Aug, 2016  RLQ abdominal pain R10.31 and Varicose veins of both lower 
extremities I83.93

 

 [facility]  [address] [place] [address] 00348-
2703    Anxiety disorder, unspecified F41.9

 

 [facility]  [address] [place] [address] 87714-
1636     

 

 [facility]  [address] [place] [address] 06892-
5968     

 

 [facility]  [address] [place] [address] 37060-
5777    Knee pain M25.569

 

 [facility]  [address] [place] [address] 21761-
0941     

 

 [facility]  [address] [place] [address] 07720-
3428  27 May, 2016  Anxiety disorder, unspecified F41.9

 

 [facility]  [id number] [place] [address] 07258-
8758     

 

 [facility]  [address] [place] [address] 67797-
9584     

 

 [facility]  [address] [place] [address] 68297-
8519  30 Mar, 2016   

 

 [facility]  [id number] [place] [address] 02852-
7870  29 Mar, 2016   

 

 [facility]  [address] [place] [address] 83840-
0582    Hypertension I10 ; Dysthymia F34.1 ; Knee pain M25.569 and 
Eustachian tube dysfunction H69.80

 

 [facility]  [id number] [place] [address] 66907-
1015     

 

 [facility]  [address] [place] [address] 92982-
5203     

 

 [facility]  [address]65[address], [place] [address]-
5879  02 Dec, 2015   

 

 [facility]  [address] [place] [address] 58383-
5629  01 Dec, 2015   

 

 [facility]  [address]00565100[place], [place] [address]-
0147     

 

 [facility]  [id number] [place] [address] 68033-
8990     

 

 [facility]  [address], [place] [address]-
0847  30 Oct, 2015   

 

 [facility]  [id number] [place] [id number]  
23 Sep, 2015  Dental examination V72.2

 

 [facility]  [address] [id number] [id number]  
15 Sep, 2015  Allergic rhinitis 477.9 and Chronic serous otitis media of both 
ears 381.10

 

 [facility]  [address]65[address], [place] [address]-
1985  21 Aug, 2015   

 

 [facility]  [address] [id number] [address]  
08 Aug, 2015  Sinusitis 473.9 and Bronchitis 490

 

 [facility]  [address]00565[address], [place] [address]-
4060     

 

 [facility]  [address]00565[address], [place] [address]-
3427     

 

 [facility]  [address]65[address], [place] [address]-
7393     

 

 [facility]  [id number] [place] [address] 10980-
1383     

 

 [facility]  [id number] [address]-
3472     

 

 [facility]  [id number], [place] [address]  03 Mar, 2015   

 

 [facility] [place] [facility]  [address] [id number] [place] [address]-
8649  03 Mar, 2015   

 

 [facility] [place] [facility]  [address] [id number] [place] [address]-
7047     

 

 [facility] [place] [facility]  [address] [id number] [place] [address]-
5066     

 

 [facility] [place] [facility]  [address] [id number] [place] [address]-
2655     

 

 [facility] [place] [facility]  [address] [id number] [place] [address]-
2576     

 

 [facility] [place] [facility]  [address] [id number] [place] [address]-
8046     

 

 [facility] [place] [facility]  [address] [id number] [place] [address]-
9021     

 

 [facility] [place] [facility]  [address] [id number] [place] [address]-
2984     

 

 [facility] [place] [facility]  [address] [id number] [place] [address]-
3115     

 

 [facility] [place] [facility]  [address] [id number] [place] [address]-
7583     

 

 [facility] [place] [facility]  [address] [id number] [place] [address]-
0556     

 

 [facility] [place] [facility]  [address] [id number] [place] [address]-
4705  30 Dec, 2014   

 

 [facility] [place] [facility]  [address] [id number] [place] [address]-
8336  29 Dec, 2014   

 

 [facility] [place] [facility]  [address] [id number] [place] [address]-
1926  29 Dec, 2014   

 

 [facility] [place] [facility]  [address] [id number] [place] [address]-
2546  03 Dec, 2014   

 

 [facility] [place] [facility]  [address] [id number] [place] [address]-
0767  03 Dec, 2014   

 

 [facility] [place] [facility]  [address] [id number] [place] [address]-
6626     

 

 [facility] [place] [facility]  [address] [id number] [place] [address]-
6056     

 

 [facility] [place] [facility]  [address] [id number] [place] [address]-
0135  23 Oct, 2014   

 

 [facility] [place] [facility]  [address] [id number] [place] [address]-
3419  23 Oct, 2014   

 

 [facility] [place] [facility]  [address] [id number] [place] [address]
8697  15 Oct, 2014   

 

 [facility] [place] [facility]  [address] [id number] [place] [address]-
6079  15 Oct, 2014   

 

 [facility] [place] [facility]  [address] [id number] [place] [address]-
2541  22 Sep, 2014   

 

 [facility] [place] [facility]  [address] [id number] [place] [address]-
5813  22 Sep, 2014   

 

 [facility] [place] [facility]  [address] [id number] [place] [address]-
0110  10 Sep, 2014   

 

 [facility] [place] [facility]  [address] [id number] [place] [address]-
4485  10 Sep, 2014   

 

 [facility] [place] [facility]  [address] [id number] [place] [address]-
6748  03 Sep, 2014   

 

 [facility] [place] [facility]  [address] [id number] [place] [address]-
9855  03 Sep, 2014   

 

 [facility] [place] [facility]  [address] [id number] [place] [address]-
3388  29 Aug, 2014   

 

 [facility] [place] [facility]  [address] [id number] [place] [address]-
2118  29 Aug, 2014   

 

 [facility] [place] [facility]  [address] [id number] [place] [address]-
8290  27 Aug, 2014   

 

 [facility] [place] [facility]  [address] [id number] [place] [address]-
7547  27 Aug, 2014   

 

 [facility] [place] [facility]  [address] [id number] [place] [address]-
1677  22 Aug, 2014   

 

 [facility] [place] [facility]  [address] [id number] [place] [address]-
4632  22 Aug, 2014   

 

 [facility] [place] [facility]  [address] [id number] [place] [address]-
7868     

 

 [facility] [place] [facility]  [address] [id number] [place] [address]-
6251     

 

 [facility] [place] [facility]  [address] [id number] [place] [address]-
5195  27 May, 2014   

 

 [facility] [place] [facility]  [address] [id number] [place] [address]-
6729  27 May, 2014   

 

 [facility] [place] [facility]  [address] [id number] [place] [address]-
2572     

 

 [facility] [place] [facility]  [address] [id number] [place] [address]-
1372     

 

 [facility] [place] [facility]  [address] [id number] [place] [address]-
0323  06 Mar, 2014   

 

 [facility] [place] [facility]  [address] [id number] [place] [address]-
3505  06 Mar, 2014   

 

 [facility] [place] [facility]  [address] [id number] [place] [address]-
6736     

 

 [facility] [place] [facility]  [address] [id number] [place] [address]-
9362     

 

 [facility] [place] [facility]  [address] [id number] [place] [address]-
4438     

 

 [facility] [place] [facility]  [address] [id number] [place] [address]-
9548     

 

 [facility] [place] [facility]  [address] [id number] [place] [address]-
4093     

 

 [facility]  [address] [id number] [place] [address]-
4698     

 

 [facility] [place] [facility]  [address] [id number] [place] [address]-
0626     

 

 [facility] [place] [facility]  [address] [id number] [place] [address]-
9826     

 

 [facility] [place] [facility]  [address] [id number] [place] [address]-
6709  15 Oct, 2013   

 

 [facility] [place] [facility]  [address] [id number] [place] [address]-
2504  15 Oct, 2013   

 

 [facility] [place] [facility]  [address] [id number] [place] [address]-
0218  28 Aug, 2013   

 

 [facility] [place] [facility]  [address] [facility] [id number] [place] [address]-
4757     

 

 [facility] [place] [facility]  [address] [id number] [place] [address]-
4666  27 Mar, 2013   

 

 [facility] [place] [facility]  [address] [id number] [place] [address]-
0069     

 

 [facility] [place] [facility]  [address] [facility] [id number] [place] [address]-
3816     

 

 [facility] [place] [facility]  [address] [facility] [id number] [place] [address]-
0156  10 Michel, 2013   

 

 [facility] [place] [facility]  [address] [facility] [id number] [place] [address]-
9224  03 Dec, 2012   

 

 [facility] [place] [facility]  [address] [id number] [place]
9109  03 Dec, 2012   

 

 [facility] [place] [facility]  [address] [facility] [id number] [place] [address]-
5016     

 

 [facility] [place] [facility]  [address] [facility] [id number] [place] [address]-
1571     

 

 [facility] [place] [facility]  [address] [id number] [place] [address]-
3398  08 Oct, 2012   

 

 [facility] [place] [facility]  [address] [id number] [place] [address]-
0785  25 Sep, 2012   

 

 [facility] [place] [facility]  [address] [id number] [place] [address]-
8676  06 Sep, 2012   

 

 [facility] [place] [facility]  [address] [id number] [place] [address]-
2168  10 Aug, 2012   

 

 [facility] [place] [facility]  [address] [id number] [place] [address]-
3270     

 

 [facility] [place] [facility]  [address] [id number] [place] [address]-
4852     

 

 [facility] [place] [facility]  [address] [id number] [place] [address]-
6142     

 

 [facility] [place] [facility]  [address] [id number] [place] [address]-
7356     

 

 [facility] [place] [facility]  [address] [id number] [place] [address]-
4484     

 

 [facility] [place] [facility]  [address] [facility] [id number] [place] [address]-
1676     

 

 [facility] [place] [facility]  [address] [id number] [place] [address]-
7214     

 

 [facility] [place] [facility]  [address] [id number] [place] [address]-
2724     

 

 [facility] [place] [facility]  [address] [id number] [place] [address]-
2526     

 

 [facility] [place] [facility]  [address] [id number] [place] [address]-
9717     

 

 [facility] [place] [facility]  [address] [id number] [place] [address]-
5055  06 Dec, 2011   

 

 [facility] [place] [facility]  [address] [id number] [address]-
5111  10 2011   

 

 [facility]  [address] [facility] [id number][place]-
8429  14 2011   

 

 [facility]  [address] [facility] [id number], [place] [address]-
4140  10 May, 2011   

 

 [facility]  [address]65[address] [address]
1303  29 Dec, 2010   

 

 [facility]  [address] [facility] [id number], [place] [address]-
2602  16 2010   

 

 [facility]  [address] [facility] [id number], [place] [address]-
6894  18 Oct, 2010   

 

 [facility]  [id number] [place] [address]-
0842  14 2010   

 

 [facility]  [address]00565100[place], [place] [address]-
1717  14 Dec, 2009   

 

 [facility]  [address]00565100[place], [place] [address]-
2525  14 Dec, 2009   

 

 [facility]  [address]00565100[place], [place] [address]-
8592  10 Dec, 2009   

 

 [facility]  [address]00565100[place], [place] [address]-
2846  07 Dec, 2009   

 

 [facility]  [address] [id number], [place] [address]-
7466  25 2009   

 

 [facility]  [address]00565100[place], [place] [address]-
3270  10 2009   

 

 [facility]  [address] [id number], [place] [address]-
2842  15 2009   







IMMUNIZATIONS

No Known Immunizations



SOCIAL HISTORY

Never Assessed



REASON FOR VISIT

CT Chest Results



PLAN OF CARE







 Activity  Details









  









 Pending Test  Ultrasound : Thyroid 



 



VITAL SIGNS





MEDICATIONS

Unknown Medications



RESULTS

No Results



PROCEDURES

No Known procedures



INSTRUCTIONS





MEDICATIONS ADMINISTERED

No Known Medications



MEDICAL (GENERAL) HISTORY







 Type  Description  Date

 

 Medical History  depression   

 

 Medical History  hx of anxiety   

 

 Medical History  mitral valve prolapse   

 

 Surgical History  tonsillectomy and adenoidectomy   

 

 Surgical History  partial hysterectomy   

 

 Surgical History  jaw surgery  1974

 

 Surgical History  colonoscopy  16

 

 Surgical History  colonoscopy  10/2017

 

 Hospitalization History  for surgery
[facility]

 Created on: 2017



Shereen Shafer

External Reference #: [id number]

: 1960

Sex: Female



Demographics







 Address  [address] [place]  [address]

 

 Preferred Language  Unknown

 

 Marital Status  Unknown

 

 Christian Affiliation  Unknown

 

 Race  Unknown

 

 Ethnic Group  Unknown





Author







 Author  JAVON DE LOS SANTOS

 

 [facility]

 

 Address  [address] [place]  [address]



 

 Phone  [phone]







Care Team Providers







 Care Team Member Name  Role  Phone

 

 JAVON DE LOS SANTOS  Unavailable  [phone]







PROBLEMS







 Type  Condition  ICD9-CM Code  TKF99-AI Code  Onset Dates  Condition Status  
SNOMED Code

 

 Problem  Knee pain     M25.569     Active  40416551

 

 Problem  Dysuria     R30.0     Active  55048585

 

 Problem  Pharyngitis, unspecified etiology     J02.9     Active  959392846

 

 Problem  Dysthymia     F34.1     Active  44778217

 

 Problem  Eustachian tube dysfunction     H69.80     Active  75841689

 

 Problem  Anxiety disorder, unspecified     F41.9     Active  711889232

 

 Problem  Hypertension     I10     Active  73823409







ALLERGIES

No Known Allergies



SOCIAL HISTORY

Never Assessed



PLAN OF CARE







 Activity  Details









  









 Follow Up  prn Reason:







VITAL SIGNS







 Height  67 in  2017

 

 Weight  262.3 lbs  2017

 

 Temperature  99.4 degrees Fahrenheit  2017

 

 Heart Rate  84 bpm  2017

 

 Respiratory Rate  20   2017

 

 BMI  41.08 kg/m2  2017

 

 Blood pressure systolic  130 mmHg  2017

 

 Blood pressure diastolic  84 mmHg  2017







MEDICATIONS







 Medication  Instructions  Dosage  Frequency  Start Date  End Date  Duration  
Status

 

 Fluoxetine HCl 20 MG     TAKE 3 CAPSULES BY MOUTH ONCE DAILY           30  
Active

 

 Propranolol HCl 20 MG     1 tablet Twice a day Orally           90  Active

 

 Bentyl 20 mg  Orally 4 times a day before meals and bedtime  1 tablet     04 
Aug, 2016        Active

 

 Alprazolam 1 MG  Orally Twice a day, and 1 tablet at bedtime  0.5 Tablet as 
needed     03 Mar, 2015     28 days  Active

 

 Fiber                    Active

 

 Flonase Allergy Relief 50 MCG/ACT  Nasally 2 times a day  1 spray in each 
nostril  12h  25 2016        Active

 

 ProAir  (90 Base) MCG/ACT  Inhalation every 4 hrs  2 puffs as needed  
4h  08 Aug, 2015        Active

 

 tramadol 50 mg  by oral route 3 times a day  1 tablet as needed  8h  23 Apr, 
2014     28 days  Active

 

 Probiotic                    Active







RESULTS







 Name  Result  Date  Reference Range

 

 PAP TEST W/ HPV REGARDLESS     2017   

 

 DIAGNOSIS:         

 

 Specimen adequacy:         

 

 Clinician provided ICD10:         

 

 Performed by:         

 

 QC reviewed by:         

 

 .  .      

 

 Pathologist provided ICD10:         

 

 Note:         

 

 HPV, high-risk  Negative     Negative

 

 PDF Report     2017   

 

 PDF Report1  [facility]      

 

 Mammogram, Bilateral Screening     2017   







PROCEDURES







 Procedure  Date Ordered  Result  Body Site

 

 SPECIMEN HANDLING  2017      







IMMUNIZATIONS

No Known Immunizations



MEDICAL (GENERAL) HISTORY







 Type  Description  Date

 

 Medical History  depression   

 

 Medical History  hx of anxiety   

 

 Medical History  mitral valve prolapse   

 

 Surgical History  tonsillectomy and adenoidectomy   

 

 Surgical History  partial hysterectomy   

 

 Surgical History  jaw surgery  1974

 

 Surgical History  colonoscopy  16

 

 Hospitalization History  for surgery
[facility]

 Created on: 2018



Shereen Shafer

External Reference #: [id number]

: 1960

Sex: Female



Demographics







 Address  [address] [place]  [address]

 

 Preferred Language  Unknown

 

 Marital Status  Unknown

 

 Congregational Affiliation  Unknown

 

 Race  Unknown

 

 Ethnic Group  Unknown





Author







 Author  JAVON DE LOS SANTOS

 

 Organization  [facility]

 

 Address  [address] [place]  [address]



 

 Phone  [phone]







Care Team Providers







 Care Team Member Name  Role  Phone

 

 JAVON DE LOS SANTOS  Unavailable  [phone]







PROBLEMS







 Type  Condition  ICD9-CM Code  GQM14-XT Code  Onset Dates  Condition Status  
SNOMED Code

 

 Problem  Hypertension     I10     Active  10485480

 

 Problem  Eustachian tube dysfunction     H69.80     Active  71611185

 

 Problem  Knee pain     M25.569     Active  65746826

 

 Problem  Dysthymia     F34.1     Active  27363611

 

 Problem  Other chronic pain     G89.29     Active  74681709

 

 Problem  Acute right-sided low back pain with right-sided sciatica     M54.41 
    Active  669895889

 

 Problem  Pharyngitis, unspecified etiology     J02.9     Active  281857389

 

 Problem  Anxiety disorder, unspecified     F41.9     Active  721197698

 

 Problem  Psoriasis     L40.9     Active  5930332

 

 Problem  Dysuria     R30.0     Active  77389770







ALLERGIES

No Known Allergies



ENCOUNTERS







 Encounter  Location  Date  Diagnosis

 

 [place] [id number] [place] [address]-
2482     

 

 [place] [id number] [place] [address]-
5745  25 May, 2018  Anxiety disorder, unspecified F41.9 and Pain in right knee 
M25.561

 

 [place] [id number] [place] [address]-
1035  02 May, 2018  Pain in right knee M25.561 ; Pain in left knee M25.562 ; 
Other chronic pain G89.29 ; Anxiety disorder, unspecified F41.9 ; Acute right-
sided low back pain with right-sided sciatica M54.41 and BMI 40.0-44.9, adult 
Z68.41

 

 [place] [id number] [place] [address]-
8892    Anxiety disorder, unspecified F41.9

 

 [facility]  [address]00565100[place] [address]-
3620  26 Mar, 2018  Anxiety disorder, unspecified F41.9

 

 [facility]  [address]00565100[place], [place] [address]-
4667  06 Mar, 2018  Anxiety disorder, unspecified F41.9

 

 [facility]  [address] [place] [address]-
7813    Anxiety disorder, unspecified F41.9

 

 [facility]  [address] [place] [address]-
9275     

 

 [facility]  [id number] [place] [address] 19104-
1262    Anxiety disorder, unspecified F41.9

 

 [facility]  [address] [place] [address] 16177-
0977  12 Dec, 2017  Anxiety disorder, unspecified F41.9

 

 [facility]  [address]00565100[place], [place] [address]-
3484    Anxiety disorder, unspecified F41.9 ; Hypertension I10 ; 
Encounter for screening mammogram for breast cancer Z12.31 ; Psoriasis L40.9 
and BMI 40.0-44.9, adult Z68.41

 

 [place] [address]00565100[place], [place] [address]-
8743    Anxiety disorder, unspecified F41.9

 

 [facility]  [address]00565100[place], [place] [address]-
7669  23 Oct, 2017   

 

 [facility]  [address] [place] [address]-
7174  17 Oct, 2017  Anxiety disorder, unspecified F41.9

 

 [facility]  [id number] [address]-
4220  09 Oct, 2017   

 

 [address] [id number] [address]  
21 Sep, 2017  Dysuria R30.0

 

 [facility]  [address] [place] [address]-
0915  19 Sep, 2017  Anxiety disorder, unspecified F41.9

 

 [facility]  [address] [place] [address]-
7134  22 Aug, 2017  Anxiety disorder, unspecified F41.9

 

 [facility]  [id number] [place] [address] 16755-
2961    Anxiety disorder, unspecified F41.9

 

 [facility]  [id number] [place] [address] 275[address]-
9869  10 Jul, 2017   

 

 [facility]  [id number] [place] [address] 454[address]-
2873    Anxiety disorder, unspecified F41.9

 

 [facility]  [id number] [place] [address] 0[address]-
4493  30 May, 2017  Anxiety disorder, unspecified F41.9

 

 [facility]  [address]00565100[place], [place] [address]-
7260  02 May, 2017  Anxiety disorder, unspecified F41.9

 

 [facility]  [address] [place] [address] 7[address]-
7015    Anxiety disorder, unspecified F41.9

 

 [facility]  [address]00565100[place], [place] [address]-
3025  07 Mar, 2017   

 

 [facility]  [id number] [place] [address]-
2670  07 Mar, 2017  Anxiety disorder, unspecified F41.9

 

 [facility]  [address] [place] [address] 92[address]-
8782    Well woman exam Z01.419 ; Cervical cancer screening Z12.4 
and Breast cancer screening Z12.39

 

 [facility]  [address]00565100[place], [place] [address]-
4867    Anxiety disorder, unspecified F41.9

 

 [facility]  [id number] [place] [address] 17827-
2118    Eustachian tube dysfunction H69.80 and Pharyngitis, 
unspecified etiology J02.9

 

 [place] [id number] [place] [address] 393[address]-
5506    Anxiety disorder, unspecified F41.9

 

 [place] [address] [address]-
4451  05 Dec, 2016  Anxiety disorder, unspecified F41.9

 

 [place] [address] [address]-
4384    Anxiety disorder, unspecified F41.9

 

 [place] [id number] [place] [address] 05[address]-
9635  30 Sep, 2016  Anxiety disorder, unspecified F41.9

 

 [place] [address] [address]-
7093  20 Sep, 2016  Irritable bowel syndrome with diarrhea K58.0 ; Hypertension 
I10 ; Family history of diabetes mellitus Z83.3 and Visual changes H53.9

 

 [place] [id number] [place] [address]-
1396  26 Aug, 2016  Anxiety disorder, unspecified F41.9

 

 [place] [id number] [place] [address] 95[address]-
4734  09 Aug, 2016  RLQ abdominal pain R10.31

 

 [place] [id number] [place] [address] 19568-
3767  04 Aug, 2016  RLQ abdominal pain R10.31 and Varicose veins of both lower 
extremities I83.93

 

 [place] [id number] [place] [address] 99093-
7599    Anxiety disorder, unspecified F41.9

 

 [place] [id number] [place] [address] 07244-
7617     

 

 [facility]  [id number] [place] [address] 536[address]-
5860     

 

 [facility]  [id number] [place] [address] 79409-
1326    Knee pain M25.569

 

 [facility]  [id number] [place] [address] 41629-
7918     

 

 [facility]  [id number] [place] [address] 95712-
1302  27 May, 2016  Anxiety disorder, unspecified F41.9

 

 [facility]  [id number] [place] [address] 73910-
0432     

 

 [facility]  [id number] [place] [address] 99532-
9936     

 

 [facility]  [id number] [place] [address] 68666-
4931  30 Mar, 2016   

 

 [facility]  [id number] [place] [address] 03050-
3668  29 Mar, 2016   

 

 [facility]  [id number] [place] [address] 87821-
4643    Hypertension I10 ; Dysthymia F34.1 ; Knee pain M25.569 and 
Eustachian tube dysfunction H69.80

 

 [facility]  [id number] [place] [address] 01993-
0605     

 

 [facility]  [id number] [place] [address] 96230-
1775     

 

 [facility]  [id number] [place] [address] 594564-
0611  02 Dec, 2015   

 

 [facility]  [id number] [place] [address] 552021-
3643  01 Dec, 2015   

 

 [facility]  [id number] [place] [address] 27963-
2459     

 

 [facility]  [address]00565100[place], [place] [address]-
2546     

 

 [facility]  [id number] [place] [address] 54099-
7984  30 Oct, 2015   

 

 [facility]  [address] [facility] [address] [id number] [id number]  
23 Sep, 2015  Dental examination V72.2

 

 [facility]  [address] [place] [address] AVE [id number] [place] [id number]  
15 Sep, 2015  Allergic rhinitis 477.9 and Chronic serous otitis media of both 
ears 381.10

 

 [facility]  [id number] [place] [address] 92078-
8866  21 Aug, 2015   

 

 [facility]  [address] [facility] [address] [id number] [place] [id number]  
08 Aug, 2015  Sinusitis 473.9 and Bronchitis 490

 

 [facility]  [id number] [place] [address] 42450-
0396     

 

 [facility]  [id number] [place] [address] 64344-
7149     

 

 [facility]  [id number] [place] [address] 83178-
9456     

 

 [facility]  [id number] [place] [address] 60927-
3161     

 

 [facility]  [id number] [place] [address] 18181-
9389     

 

 [facility]  [address] [place] [address] 27778
2546  03 Mar, 2015   

 

 [facility]  [id number] [place] [address] 68001-
0386  03 Mar, 2015   

 

 [facility]  [id number] [place] [address] 54612-
2546     

 

 [facility]  [id number] [place] [address] 64007-
4101     

 

 [facility] [place] [facility]  [address] [id number] [place] [address]-
3782     

 

 [facility] [place] [facility]  [address] [id number] [place] [address]-
6174     

 

 [facility] [place] [facility]  [address] [id number] [place] [address]-
8674     

 

 [facility] [place] [facility]  [address] [id number] [place] [address]-
[address]     

 

 [facility] [place] [facility]  [address] [id number] [place] [address]-
[address]     

 

 [facility] [place] [facility]  [address] [id number] [place] [address]-
[address]     

 

 [facility] [place] [facility]  [address] [id number] [place] [address]-
[address]     

 

 [facility] [facility]  [address] [id number] [place] [address]-
5609     

 

 [facility] [place] [facility]  [address] [id number] [place] [address]-
6688  30 Dec, 2014   

 

 [facility] [place] [facility]  [address] [id number] [place] [address]-
1747  29 Dec, 2014   

 

 [facility] [place] [facility]  [address] [id number] [place] [address]-
2344  29 Dec, 2014   

 

 [facility] [place] [facility]  [address] [id number] [address]-
5627  03 Dec, 2014   

 

 [facility] [place] [facility]  [address] [id number] [place] [address]-
1867  03 Dec, 2014   

 

 [facility] [place] [facility]  [address] [id number] [place] [address]-
1782     

 

 [facility] [place] [facility]  [address] [id number] [address]-
8218     

 

 [facility] [place] [facility]  [address] [id number] [address]-
1075  23 Oct, 2014   

 

 [facility] [place] [facility]  [address] [id number] [place] [address]-
1562  23 Oct, 2014   

 

 [facility] [place] [facility]  [address] [id number] [place] [address]-
9942  15 Oct, 2014   

 

 [facility] [place] [facility]  [address] [id number] [place] [address]-
9216  15 Oct, 2014   

 

 [facility] [place] [facility]  [address] [id number] [place] [address]-
1636  22 Sep, 2014   

 

 [facility] [place] [facility]  [address] [id number] [place] [address]-
9903  22 Sep, 2014   

 

 [facility] [place] [facility]  [address] [id number] [place] [address]-
9262  10 Sep, 2014   

 

 [facility] [place] [facility]  [address] [id number] [place] [address]-
8700  10 Sep, 2014   

 

 [facility] [place] [facility]  [address] [id number] [place] [address]-
3518  03 Sep, 2014   

 

 [facility] [place] [facility]  [address] [id number] [place] [address]-
5680  03 Sep, 2014   

 

 [facility] [place] [facility]  [address] [id number] [place] [address]-
3966  29 Aug, 2014   

 

 [facility] [place] [facility]  [address] [id number] [place] [address]-
9391  29 Aug, 2014   

 

 [facility] [place] [facility]  [address] [id number] [place] [address]-
3933  27 Aug, 2014   

 

 [facility] [place] [facility]  [address] [id number] [place] [address]-
3261  27 Aug, 2014   

 

 [facility] [place] [facility]  [address] [id number] [place] [address]-
6560  22 Aug, 2014   

 

 [facility] [place] [facility]  [address] [id number] [place] [address]-
8385  22 Aug, 2014   

 

 [facility] [place] [facility]  [address] [id number] [place] [address]-
3207     

 

 [facility] [place] [facility]  [address] [id number] [place] [address]-
2935     

 

 [facility] [place] [facility]  [address] [id number] [place] [address]-
5581  27 May, 2014   

 

 [facility] [place] [facility]  [address] [id number] [place] [address]-
1053  27 May, 2014   

 

 [facility] [facility]  [address] [id number] [place] [address]-
0132     

 

 [facility] [facility]  [address] [id number] [place] [address]-
2421     

 

 [facility] [place] [facility]  [address] [facility] [id number] [place] [address]-
4066  06 Mar, 2014   

 

 [facility] [place] [facility]  [address] [id number] [place] [address]-
8594  06 Mar, 2014   

 

 [facility] [place] [facility]  [address] [id number] [place] [address]-
9647     

 

 [facility] [place] [facility]  [address] [id number] [place] [address]-
1745     

 

 [facility] [facility]  [address] [facility]note 2] [id number] [place] [address]-
0391     

 

 [facility] [place] [facility]  [address] [id number] [place] [address]-
8257     

 

 [facility] [place] [facility]  [address] [id number] [place] [address]-
6765     

 

 [facility] [place] [facility]  [address] [id number] [place] [address]-
1215     

 

 [facility]  [address] [id number] [place] [address]-
4674     

 

 [facility]  [address] [facility]note 2[facility] [id number][place] [address]-
0424     

 

 [facility]  [address] [id number] [place] [address]-
4023  15 Oct, 2013   

 

 [facility]  [address] [id number] [place] [address]-
1949  15 Oct, 2013   

 

 [facility] [facility]  [address] [id number] [place] [address]-
8279  28 Aug, 2013   

 

 [facility] [facility]  [address] [id number] [place] [address]-
5336     

 

 [facility]  [address] [id number] [place] [address]-
1568  27 Mar, 2013   

 

 [facility] [facility]  [address] [id number] [place] [address]-
4217     

 

 [facility]  [address] [facility]note 2] [id number] [place] [address]-
1039     

 

 [facility]  [address] [id number] [place] [address]-
0478  10 Michel, 2013   

 

 [facility]  [address] [facility]note 2] [id number] [place] [address]-
1865  03 Dec, 2012   

 

 [facility] [facility]  [address] [facility]note 2] [id number] [place] [address]-
5525  03 Dec, 2012   

 

 [facility]  [address] [id number] [place] [address]-
8248     

 

 [facility]  [address] [id number] [place] [address]-
3081     

 

 [facility]  [address] [id number] [place] [address]-
1139  08 Oct, 2012   

 

 [facility]  [address] [id number] [place] [address]-
9353  25 Sep, 2012   

 

 [facility]  [address] [facility]note 2[facility] [id number] [place] [address]-
0485  06 Sep, 2012   

 

 [facility]  [address] [id number] [place] [address]-
5802  10 Aug, 2012   

 

 [facility]  [address] [id number] [place] [address]-
2639     

 

 [facility]  [address] [id number] [place] [address]-
2546     

 

 [facility]  [address] [id number] [place] [address]-
1762     

 

 [facility]  [address] [id number] [place] [address]-
6422     

 

 [facility] [facility]  [address] [id number] [place] [address]-
5866     

 

 [facility]  [address] [id number] [place] [address]-
7216     

 

 [facility]  [address] [id number] [place] [address]-
4446     

 

 [facility]  [address] [id number] [place] [address]-
0422     

 

 [facility]  [address] [id number] [place] [address]-
5886     

 

 [facility]  [address] [id number] [place] [address]-
3846     

 

 [facility]  [address] [id number] [place] [address]-
0936  06 Dec, 2011   

 

 [facility]  [address] [id number] [place] [address]-
5124  10 Nov, 2011   

 

 [facility]  [address] [id number] [place] [address]-
4806  14 2011   

 

 [facility]  [address] [id number] [place] [address]-
4224  10 May, 2011   

 

 [facility]  [address] [id number][place] [address]-
9599  29 Dec, 2010   

 

 [facility]  [address]00565100[place], [place] [address]-
3408  16 2010   

 

 [facility]  [address]00565100[place], [place] [address]-
7780  18 Oct, 2010   

 

 [facility]  [address]00565100[place], [place] [address]-
3874  14 2010   

 

 [facility]  [address]00565100[place], [place] [address]-
6696  14 Dec, 2009   

 

 [facility]  [address]00565100[place], [place] [address]-
7597  14 Dec, 2009   

 

 [facility]  [address]00565100[place], [place] [address]-
4157  10 Dec, 2009   

 

 [facility]  [address]00565100[place], [place] [address]-
5658  07 Dec, 2009   

 

 [facility]  [address]00565100[place], [place] [address]-
8644     

 

 [facility]  [address]00565100[place], [place] [address]-
9289  10 Nov, 2009   

 

 [facility]  [address]00565100[place], [place] [address]-
2701  15 Apr, 2009   







IMMUNIZATIONS

No Known Immunizations



SOCIAL HISTORY

Never Assessed



REASON FOR VISIT

Anxiety--JENNIFER Healy MA



PLAN OF CARE







 Activity  Details









  









 Follow Up  6 Months Reason:hypertension







VITAL SIGNS







 Height  67 in  2017

 

 Weight  275.0 lbs  2017

 

 Temperature  98.9 degrees Fahrenheit  2017

 

 Heart Rate  88 bpm  2017

 

 Respiratory Rate  22   2017

 

 BMI  43.07 kg/m2  2017

 

 Blood pressure systolic  130 mmHg  2017

 

 Blood pressure diastolic  88 mmHg  2017







MEDICATIONS







 Medication  Instructions  Dosage  Frequency  Start Date  End Date  Duration  
Status

 

 Betamethasone Dipropionate 0.05 %  Externally twice weekly  1 application to 
affected area             Active

 

 Alprazolam 1 MG  Orally Twice a day, and 1 tablet at bedtime  0.5 Tablet as 
needed     03 Mar, 2015     28 days  Active

 

 Propranolol HCl 20 MG     1 tablet Twice a day Orally           90  Active

 

 Fluoxetine HCl 20 MG     TAKE 3 CAPSULES BY MOUTH ONCE DAILY           30  
Active







RESULTS







 Name  Result  Date  Reference Range

 

 Mammogram, Bilateral Screening     2017   







PROCEDURES

No Known procedures



INSTRUCTIONS





MEDICATIONS ADMINISTERED

No Known Medications



MEDICAL (GENERAL) HISTORY







 Type  Description  Date

 

 Medical History  depression   

 

 Medical History  hx of anxiety   

 

 Medical History  mitral valve prolapse   

 

 Surgical History  tonsillectomy and adenoidectomy   

 

 Surgical History  partial hysterectomy   

 

 Surgical History  jaw surgery  1974

 

 Surgical History  colonoscopy  16

 

 Surgical History  colonoscopy  10/2017

 

 Hospitalization History  for surgery
[facility]

 Created on: 2018



Shereen Shafer

External Reference #: [id number]

: 1960

Sex: Female



Demographics







 Address  [address] [place]  [address]

 

 Preferred Language  Unknown

 

 Marital Status  Unknown

 

 Catholic Affiliation  Unknown

 

 Race  Unknown

 

 Ethnic Group  Unknown





Author







 Author  JAVON DE LOS SANTOS

 

 Organization  [facility]

 

 Address  [address] [place]  [address]



 

 Phone  [phone]







Care Team Providers







 Care Team Member Name  Role  Phone

 

 JAVON DE LOS SANTOS  Unavailable  [phone]







PROBLEMS







 Type  Condition  ICD9-CM Code  KLS69-LR Code  Onset Dates  Condition Status  
SNOMED Code

 

 Problem  Hypertension     I10     Active  21432752

 

 Problem  Eustachian tube dysfunction     H69.80     Active  10482777

 

 Problem  Knee pain     M25.569     Active  50565052

 

 Problem  Violation of controlled substance agreement     Z91.14    
Active  843261984

 

 Problem  Dysthymia     F34.1     Active  80681218

 

 Problem  Other chronic pain     G89.29     Active  76081371

 

 Problem  Acute right-sided low back pain with right-sided sciatica     M54.41 
    Active  197924206

 

 Problem  Pharyngitis, unspecified etiology     J02.9     Active  743027860

 

 Problem  Anxiety disorder, unspecified     F41.9     Active  738933602

 

 Problem  Psoriasis     L40.9     Active  2365316

 

 Problem  Dysuria     R30.0     Active  10993144







ALLERGIES

No Information



ENCOUNTERS







 Encounter  Location  Date  Diagnosis

 

 [place] [address] [place] [address]-
6785  12 Sep, 2018   

 

 [place] [address] [place] [id number] [place] [address]-
9282    Anxiety disorder, unspecified F41.9

 

 [place] [address] [place] [address]-
9421    Anxiety disorder, unspecified F41.9 ; Shortness of breath 
R06.02 ; Therapeutic drug monitoring Z51.81 ; Family history of diabetes 
mellitus Z83.3 ; BMI 40.0-44.9, adult Z68.41 and Tobacco abuse Z72.0

 

 [place] [address] [place] [id number] [place] [address]-
7283  25 May, 2018  Anxiety disorder, unspecified F41.9 and Pain in right knee 
M25.561

 

 [place] [address] [address]-
0390  02 May, 2018  Pain in right knee M25.561 ; Pain in left knee M25.562 ; 
Other chronic pain G89.29 ; Anxiety disorder, unspecified F41.9 ; Acute right-
sided low back pain with right-sided sciatica M54.41 and BMI 40.0-44.9, adult 
Z68.41

 

 [place] [address] [place] [id number] [place] [address]-
8627    Anxiety disorder, unspecified F41.9

 

 [place] [address] [address]-
8567  26 Mar, 2018  Anxiety disorder, unspecified F41.9

 

 [place] [address] [address]-
5938  06 Mar, 2018  Anxiety disorder, unspecified F41.9

 

 [place] [address] [place] [id number] [place] [address]-
6700    Anxiety disorder, unspecified F41.9

 

 [place] [address] [place] [id number] [place] [address]-
1222     

 

 [place] [address] [place] [id number] [place] [address]-
0941    Anxiety disorder, unspecified F41.9

 

 [place] [address] [place] [id number] [place] [address]-
8287  12 Dec, 2017  Anxiety disorder, unspecified F41.9

 

 [place] [address] [address]-
8510    Anxiety disorder, unspecified F41.9 ; Hypertension I10 ; 
Encounter for screening mammogram for breast cancer Z12.31 ; Psoriasis L40.9 
and BMI 40.0-44.9, adult Z68.41

 

 [place] [address] [place] [address]-
0948    Anxiety disorder, unspecified F41.9

 

 [facility]  [address] [place] [address]-
0816  23 Oct, 2017   

 

 [facility]  [address] [id number] [place] [address]-
6520  17 Oct, 2017  Anxiety disorder, unspecified F41.9

 

 [facility]  [address] [place] [id number] [place] [address] 298[address]-
8001  09 Oct, 2017   

 

 [facility] [place] [address] [facility] [id number] [address]  
21 Sep, 2017  Dysuria R30.0

 

 [facility]  [address] [id number] [place] [address] 358[address]-
2666  19 Sep, 2017  Anxiety disorder, unspecified F41.9

 

 [facility]  [address] [place] [id number] [place] [address] 447[address]-
8055  22 Aug, 2017  Anxiety disorder, unspecified F41.9

 

 [facility]  [address] [place] [address]-
2808    Anxiety disorder, unspecified F41.9

 

 [facility]  [address] [place] [address]-
5059  10 Jul, 2017   

 

 [facility]  [address] [place] [address]-
1032    Anxiety disorder, unspecified F41.9

 

 [facility]  [address] [place] [id number] [place] [address] 52[address]-
2997  30 May, 2017  Anxiety disorder, unspecified F41.9

 

 [facility]  [address] [id number] [place] [address] 71265-
2730  02 May, 2017  Anxiety disorder, unspecified F41.9

 

 [facility]  [address] [place] [address] 647[address]-
7626    Anxiety disorder, unspecified F41.9

 

 [facility]  [address] [place] [id number] [place] [address] 7[address]-
5460  07 Mar, 2017   

 

 [place] [address] [place] [id number] [place] [address]-
3026  07 Mar, 2017  Anxiety disorder, unspecified F41.9

 

 [place] [address] [place] [id number] [place] [address] 9[address]-
3866    Well woman exam Z01.419 ; Cervical cancer screening Z12.4 
and Breast cancer screening Z12.39

 

 [place] [address] [place] [id number] [place] [address] 39[address]-
8790    Anxiety disorder, unspecified F41.9

 

 [place] [address] [address]-
0881    Eustachian tube dysfunction H69.80 and Pharyngitis, 
unspecified etiology J02.9

 

 [place] [address] [address]-
0307    Anxiety disorder, unspecified F41.9

 

 [place] [address] [place] [id number] [place] [address] 18065-
7903  05 Dec, 2016  Anxiety disorder, unspecified F41.9

 

 [place] [address] [place] [id number] [place] [address] 84[address]-
4698    Anxiety disorder, unspecified F41.9

 

 [place] [address] [place] [id number] [place] [address] 701[address]-
5707  30 Sep, 2016  Anxiety disorder, unspecified F41.9

 

 [place] [address] [place] [id number] [place] [address] 29[address]-
1419  20 Sep, 2016  Irritable bowel syndrome with diarrhea K58.0 ; Hypertension 
I10 ; Family history of diabetes mellitus Z83.3 and Visual changes H53.9

 

 [place] [address] [place] [id number] [place] [address] 63394-
5394  26 Aug, 2016  Anxiety disorder, unspecified F41.9

 

 [place] [address] [place] [id number] [place] [address] 71924-
6646  09 Aug, 2016  RLQ abdominal pain R10.31

 

 [facility]  [address] [place] [id number] [place] [address]-
4415  04 Aug, 2016  RLQ abdominal pain R10.31 and Varicose veins of both lower 
extremities I83.93

 

 [facility]  [address] [place] [id number] [place] [address] 04228-
6745    Anxiety disorder, unspecified F41.9

 

 [facility]  [address] [place] [id number] [place] [address] 33783-
2769     

 

 [facility]  [address] [place] [address] [place] [address] 81307-
4987     

 

 [facility]  [address] [place] [id number] [place] [address] 51041-
7072    Knee pain M25.569

 

 [facility]  [address] [place] [address] [place] [address] 965[address]-
8850     

 

 [facility]  [address] [place] [id number] [place] [address] 44109-
6244  27 May, 2016  Anxiety disorder, unspecified F41.9

 

 [facility]  [address] [place] [address] [place] [address] 95907-
7383     

 

 [facility]  [address] [place] [address] [place] [address] 97951-
4678     

 

 [facility]  [address] [place] [id number] [place] [address] 10259-
1540  30 Mar, 2016   

 

 [facility]  [address] [place] [id number] [place] [address] 77407-
8448  29 Mar, 2016   

 

 [facility]  [address] [place] [address] [place] [address] 47055-
2590    Hypertension I10 ; Dysthymia F34.1 ; Knee pain M25.569 and 
Eustachian tube dysfunction H69.80

 

 [facility]  [address] [place] [id number] [place] [address] 68419-
1079     

 

 [facility]  [address] [place] [id number] [address]-
8799     

 

 [facility]  [address]00565100[place], [place] [address]-
3773  02 Dec, 2015   

 

 [facility]  [address]00565100[place], [place] [address]-
7706  01 Dec, 2015   

 

 [facility]  [address]00565100[place], [place] [address]-
9387     

 

 [facility]  [address]00565100[place], [place] [address]-
7274     

 

 [facility]  [address]00565100[place], [place] [address]-
3279  30 Oct, 2015   

 

 [facility]  [address] [id number] [address]  
23 Sep, 2015  Dental examination V72.2

 

 [facility]  [address] [id number] [address]  
15 Sep, 2015  Allergic rhinitis 477.9 and Chronic serous otitis media of both 
ears 381.10

 

 [facility]  [address]00565[address], [place] [address]-
6405  21 Aug, 2015   

 

 [facility]  [address] [place] [address] AVE [id number] [address]  
08 Aug, 2015  Sinusitis 473.9 and Bronchitis 490

 

 [facility]  [address] [place] [id number] [address]-
1525     

 

 [facility]  [address] [place] [id number]253-
6861     

 

 [facility]  [address]00565100[place], [place] [address]-
1718     

 

 [facility]  [address] [place] [id number] [address]454-
4007     

 

 [facility]  [address]65[address] [address]-
3606  13 2015   

 

 [facility]  [address] [id number] [place] [address]-
5704  03 Mar, 2015   

 

 [facility]  [address] [id number] [place] [address]-
2546  03 Mar, 2015   

 

 [facility]  [address] [id number] [place] [address]-
3856     

 

 [facility]  [address] [id number] [place] [address]-
4490     

 

 [facility]  [address] [id number] [place] [address]-
7255     

 

 [facility]  [address] [id number] [place] [address]-
7446     

 

 [facility]  [address] [id number] [place] [address]-
7489     

 

 [facility]  [address] [id number] [place] [address]-
1744     

 

 [facility]  [address] [id number] [place] [address]-
8826     

 

 [facility]  [address] [id number] [place] [address]-
9067     

 

 [facility]  [address] [id number] [place] [address]-
5285     

 

 [facility]  [address] [id number] [place] [address]-
2546     

 

 [facility] [place] [facility]  [address] [id number] [place] [address]-
6746  30 Dec, 2014   

 

 [facility] [place] [facility]  [address] [id number] [place] [address]-
2546  29 Dec, 2014   

 

 [facility] [facility]  [address] [id number] [place] [address]-
1231  29 Dec, 2014   

 

 [facility] [place] [facility]  [address] [id number] [place] [address]-
4910  03 Dec, 2014   

 

 [facility] [place] [facility]  [address] [id number] [place] [address]-
0439  03 Dec, 2014   

 

 [facility] [place] [facility]  [address] [id number] [place] [address]-
[address]43     

 

 [facility] [place] [facility]  [address] [id number] [place] [address]-
1873     

 

 [facility] [place] [facility]  [address] [id number] [place] [address]-
1334  23 Oct, 2014   

 

 [facility] [place] [facility]  [address] [id number] [place] [address]-
6720  23 Oct, 2014   

 

 [facility] [place] [facility]  [address] [id number] [place] [address]-
7785  15 Oct, 2014   

 

 [facility] [place] [facility]  [address] [id number] [place] [address]-
7606  15 Oct, 2014   

 

 [facility] [place] [facility]  [address] [id number] [place] [address]-
9977  22 Sep, 2014   

 

 [facility] [place] [facility]  [address] [id number] [place] [address]-
2772  22 Sep, 2014   

 

 [facility] [place] [facility]  [address] [id number] [place] [address]-
3484  10 Sep, 2014   

 

 [facility] [place] [facility]  [address] [id number] [address]-
4802  10 Sep, 2014   

 

 [facility] [place] [facility]  [address] [id number] [place] [address]-
2402  03 Sep, 2014   

 

 [facility] [place] [facility]  [address] [id number] [place] [address]-
8458  03 Sep, 2014   

 

 [facility] [place] [facility]  [address] [address] [id number][place] [address]-
1322  29 Aug, 2014   

 

 [facility] [place] [facility]  [address] [id number] [address]-
9625  29 Aug, 2014   

 

 [facility] [place] [facility]  [address] [id number] [place] [address]-
3353  27 Aug, 2014   

 

 [facility] [place] [facility]  [address] [id number] [place] [address]-
4119  27 Aug, 2014   

 

 [facility] [place] [facility]  [address] [id number] [place] [address]-
0273  22 Aug, 2014   

 

 [facility] [place] [facility]  [address] [id number] [place] [address]-
1591  22 Aug, 2014   

 

 [facility] [place] [facility]  [address] [id number] [place] [address]-
0202     

 

 [facility] [place] [facility]  [address] [id number] [place] [address]-
9664     

 

 [facility] [place] [facility]  [address] [id number] [place] [address]-
8505  27 May, 2014   

 

 [facility] [place] [facility]  [address] [id number] [place] [address]-
9907  27 May, 2014   

 

 [facility] [place] [facility]  [address] [id number] [place] [address]-
0422     

 

 [facility] [place] [facility]  [address] [id number] [place] [address]-
0991     

 

 [facility] [place] [facility]  [address] [id number] [place] [address]-
3539  06 Mar, 2014   

 

 [facility] [place] [facility]  [address] [id number] [place] [address]-
9239  06 Mar, 2014   

 

 [facility] [place] [facility]  [address] [id number] [place] [address]-
7858     

 

 [facility] [place] [facility]  [address] [id number] [place] [address]-
5318     

 

 [facility] [place] [facility]  [address] [id number] [place] [address]-
3683     

 

 [facility] [place] [facility]  [address] [id number] [place] [address]-
2765     

 

 [facility] [place] [facility]  [address] [id number] [place] [address]-
3306     

 

 [facility] [place] [facility]  [address] [id number] [place]
4676     

 

 [facility] [place] [facility]  [address] [id number] [place] [address]-
9516     

 

 [facility] [place] [facility]  [address] [id number] [place] [address]-
8234     

 

 [facility] [place] [facility]  [address] [id number] [place] [address]-
0215  15 Oct, 2013   

 

 [facility] [place] [facility]  [address] [id number] [place] [address]-
6533  15 Oct, 2013   

 

 [facility] [place] [facility]  [address] [id number] [place] [address]-
9652  28 Aug, 2013   

 

 [facility] [place] [facility]  [address] [id number] [place] [address]-
5431     

 

 [facility] [place] [facility]  [address] [id number] [place] [address]-
5857  27 Mar, 2013   

 

 [facility] [place] [facility]  [address] [id number] [place] [address]-
9454     

 

 [facility] [place] [facility]  [address] [id number] [place] [address]-
2435     

 

 [facility] [place] [facility]  [address] [id number] [place] [address]-
3554  10 Michel, 2013   

 

 [facility] [place] [facility]  [address] [id number] [place] [address]
2546  03 Dec, 2012   

 

 [facility] [place] [facility]  [address] [id number] [place] [address]
2546  03 Dec, 2012   

 

 [facility] [place] [facility]  [address] [id number] [place] [address]-
9981     

 

 [facility]  [address] [id number] [place] [address]-
4994     

 

 [facility] [place] [facility]  [address] [id number] [place] [address]-
7078  08 Oct, 2012   

 

 [facility] [place] [facility]  [address] [id number] [place] [address]
2546  25 Sep, 2012   

 

 [facility] [place] [facility]  [address] [id number] [place] [address]
2546  06 Sep, 2012   

 

 [facility] [place] [facility]  [address] [id number] [place] [address]-
2414  10 Aug, 2012   

 

 [facility] [place] [facility]  [address] [id number] [place] [address]-
9596     

 

 [facility]  [address] [id number] [place] [address]-
3016     

 

 [facility] [place] [facility]  [address] [id number][place] [address]-
8606     

 

 [facility] [place] [facility]  [address] [id number] [place] [address]-
2101     

 

 [facility] [place] [facility]  [address] [id number] [place] [address]-
1586     

 

 [facility] [place] [facility]  [address] [id number] [place] [address]-
9426     

 

 [facility] [place] [facility]  [address] [id number][place] [address]-
2546     

 

 [facility] [place] [facility]  [address] [id number] [place] [address]-
3420     

 

 [facility] [place] [facility]  [address] [id number] [place] [address]-
2546     

 

 [facility] [place] [facility]  [address] [id number][place] [address]-
8716     

 

 [facility]  [address] [id number][place] [address]-
3843  06 Dec, 2011   

 

 [facility]  [address] [facility] [id number] [place] [address]-
1718  10 2011   

 

 [facility]  [address] [facility] [id number] [place] [address]-
1834  14 2011   

 

 [facility]  [address] [facility] [id number] [place] [address]-
9928  10 May, 2011   

 

 [facility]  [address] [id number] [place]
2569  29 Dec, 2010   

 

 [facility]  [address] [facility] [id number] [place] [address]-
4021  16 2010   

 

 [facility]  [address] [facility] [id number] [place] [address]-
1231  18 Oct, 2010   

 

 [facility]  [address] [facility] [id number][place] [address]-
5647  14 2010   

 

 [facility]  [address] [facility] [id number][place] [address]-
2245  14 Dec, 2009   

 

 [facility]  [address] [facility] [id number][place] [address]-
4161  14 Dec, 2009   

 

 [facility]  [address] [facility] [id number][place] [address]-
1476  10 Dec, 2009   

 

 [facility]  [address] [facility] [id number], [place] [address]-
1997  07 Dec, 2009   

 

 [facility]  [address] [facility] [id number][place] 480[place]-
5751     

 

 [facility]  [address] [facility] [id number][place] [address]-
4586  10 2009   

 

 [facility]  [address] [facility] [id number] [address]0100-
6273  15 2009   







IMMUNIZATIONS

No Known Immunizations



SOCIAL HISTORY

Never Assessed



REASON FOR VISIT

Controlled Med Refill 18



PLAN OF CARE





VITAL SIGNS





MEDICATIONS







 Medication  Instructions  Dosage  Frequency  Start Date  End Date  Duration  
Status

 

 Alprazolam 1 MG  Orally Twice a day, and 1 tablet at bedtime  0.5 Tablet as 
needed     03 Mar, 2015     28 days  Active







RESULTS

No Results



PROCEDURES

No Known procedures



INSTRUCTIONS





MEDICATIONS ADMINISTERED

No Known Medications



MEDICAL (GENERAL) HISTORY







 Type  Description  Date

 

 Medical History  depression   

 

 Medical History  hx of anxiety   

 

 Medical History  mitral valve prolapse   

 

 Surgical History  tonsillectomy and adenoidectomy   

 

 Surgical History  partial hysterectomy   

 

 Surgical History  jaw surgery  1974

 

 Surgical History  colonoscopy  16

 

 Surgical History  colonoscopy  10/2017

 

 Hospitalization History  for surgery
[facility]

 Created on: 2018



Shereen Shafer

External Reference #: [id number]

: 1960

Sex: Female



Demographics







 Address  [address] [place]  [address]

 

 Preferred Language  Unknown

 

 Marital Status  Unknown

 

 Restorationist Affiliation  Unknown

 

 Race  Unknown

 

 Ethnic Group  Unknown





Author







 Author  JAVON DE LOS SANTOS

 

 Organization  [facility]

 

 Address  [address] [place]  [address]



 

 Phone  [phone]







Care Team Providers







 Care Team Member Name  Role  Phone

 

 JAVON DE LOS SANTOS  Unavailable  [phone]







PROBLEMS







 Type  Condition  ICD9-CM Code  MOY01-YB Code  Onset Dates  Condition Status  
SNOMED Code

 

 Problem  Hypertension     I10     Active  59263402

 

 Problem  Eustachian tube dysfunction     H69.80     Active  55357197

 

 Problem  Knee pain     M25.569     Active  58960519

 

 Problem  Violation of controlled substance agreement     Z91.14    
Active  617585565

 

 Problem  Dysthymia     F34.1     Active  70402885

 

 Problem  Other chronic pain     G89.29     Active  76117792

 

 Problem  Acute right-sided low back pain with right-sided sciatica     M54.41 
    Active  371445662

 

 Problem  Pharyngitis, unspecified etiology     J02.9     Active  949727073

 

 Problem  Anxiety disorder, unspecified     F41.9     Active  632539838

 

 Problem  Psoriasis     L40.9     Active  7079678

 

 Problem  Dysuria     R30.0     Active  03622904







ALLERGIES

No Information



ENCOUNTERS







 Encounter  Location  Date  Diagnosis

 

 [place] [address] [facility] [id number] [place] [address]-
1577  12 Sep, 2018   

 

 [facility] [address]  [id number] [id number]  
  Shortness of breath R06.02 and Anxiety disorder, unspecified F41.9

 

 [facility]  [address] [facility] [id number] [place] [address]-
2246    Anxiety disorder, unspecified F41.9

 

 [place] [address] [facility] [id number] [place] [address]-
0484    Anxiety disorder, unspecified F41.9 ; Shortness of breath 
R06.02 ; Therapeutic drug monitoring Z51.81 ; Family history of diabetes 
mellitus Z83.3 ; BMI 40.0-44.9, adult Z68.41 and Tobacco abuse Z72.0

 

 [id number] [place] [address]-
8480  25 May, 2018  Anxiety disorder, unspecified F41.9 and Pain in right knee 
M25.561

 

 [id number] [place] [address]-
4833  02 May, 2018  Pain in right knee M25.561 ; Pain in left knee M25.562 ; 
Other chronic pain G89.29 ; Anxiety disorder, unspecified F41.9 ; Acute right-
sided low back pain with right-sided sciatica M54.41 and BMI 40.0-44.9, adult 
Z68.41

 

 [address] [id number] [place] [address]-
5633    Anxiety disorder, unspecified F41.9

 

 [address] [id number] [place] [address]-
4364  26 Mar, 2018  Anxiety disorder, unspecified F41.9

 

 [id number] [place] [address] 0[address]-
8875  06 Mar, 2018  Anxiety disorder, unspecified F41.9

 

 [id number] [place] [address] 8[address]-
3819    Anxiety disorder, unspecified F41.9

 

 [address] [place] [id number] [place] [address]-
0641     

 

 [id number] [place] [address] 89[address]-
9420    Anxiety disorder, unspecified F41.9

 

 [id number] [place] [address] 03388-
5457  12 Dec, 2017  Anxiety disorder, unspecified F41.9

 

 [id number] [place] [address] 71[address]-
0005    Anxiety disorder, unspecified F41.9 ; Hypertension I10 ; 
Encounter for screening mammogram for breast cancer Z12.31 ; Psoriasis L40.9 
and BMI 40.0-44.9, adult Z68.41

 

 [facility]  [address] [place] [address]-
1872    Anxiety disorder, unspecified F41.9

 

 [facility]  [address] [id number] [address]-
9758  23 Oct, 2017   

 

 [facility]  [id number] [place] [address]-
6726  17 Oct, 2017  Anxiety disorder, unspecified F41.9

 

 [facility]  [id number] [place] [address] 24525-
7759  09 Oct, 2017   

 

 [place] [id number] [address]  
21 Sep, 2017  Dysuria R30.0

 

 [facility]  [id number] [place] [address] 20580-
3269  19 Sep, 2017  Anxiety disorder, unspecified F41.9

 

 [facility]  [address] [place] [address] 17496-
6872  22 Aug, 2017  Anxiety disorder, unspecified F41.9

 

 [facility]  [address] [place] [address] 47966-
0640    Anxiety disorder, unspecified F41.9

 

 [facility]  [address] [place] [address] 18382-
6817  10 Jul, 2017   

 

 [facility]  [address] [place] [address] 15404-
4991    Anxiety disorder, unspecified F41.9

 

 [facility]  [id number] [place] [address] 29290-
3189  30 May, 2017  Anxiety disorder, unspecified F41.9

 

 [facility]  [address] [place] [address] 21922-
7447  02 May, 2017  Anxiety disorder, unspecified F41.9

 

 [place] [address]00565100[place] [address]-
0877    Anxiety disorder, unspecified F41.9

 

 [id number] [place] [address] 17336-
1502  07 Mar, 2017   

 

 [address] [place] [id number] [place] [address] 01393-
5179  07 Mar, 2017  Anxiety disorder, unspecified F41.9

 

 [place] [id number] [place] [address] 57[address]-
6044    Well woman exam Z01.419 ; Cervical cancer screening Z12.4 
and Breast cancer screening Z12.39

 

 [place] [id number] [place] [address] 77590-
2383    Anxiety disorder, unspecified F41.9

 

 [address] [place] [id number] [place] [address] 42365-
5176    Eustachian tube dysfunction H69.80 and Pharyngitis, 
unspecified etiology J02.9

 

 [address] [place] [id number] [place] [address] 07328-
7402    Anxiety disorder, unspecified F41.9

 

 [place] [id number] [place] [address] 60810-
1841  05 Dec, 2016  Anxiety disorder, unspecified F41.9

 

 [place] [id number] [place] [address] 55013-
8397    Anxiety disorder, unspecified F41.9

 

 [place] [id number] [place] [address] 40499-
1292  30 Sep, 2016  Anxiety disorder, unspecified F41.9

 

 [address] [place] [id number] [place] [address] 85342-
5823  20 Sep, 2016  Irritable bowel syndrome with diarrhea K58.0 ; Hypertension 
I10 ; Family history of diabetes mellitus Z83.3 and Visual changes H53.9

 

 [address] [address] [id number] [address]-
0634  26 Aug, 2016  Anxiety disorder, unspecified F41.9

 

 [facility]  [id number] [place] [address] 99833-
1885  09 Aug, 2016  RLQ abdominal pain R10.31

 

 [facility]  [id number] [place] [address] 42347-
0416  04 Aug, 2016  RLQ abdominal pain R10.31 and Varicose veins of both lower 
extremities I83.93

 

 [facility]  [id number] [place] [address] 70425-
9326    Anxiety disorder, unspecified F41.9

 

 [facility]  [id number] [place] [address] 05917-
2961     

 

 [facility]  [id number] [place] [address] 84196-
1032     

 

 [facility]  [id number] [place] [address] 87746-
1254    Knee pain M25.569

 

 [facility]  [id number] [place] [address] 02635-
8821     

 

 [facility]  [id number] [place] [address] 44662-
2018  27 May, 2016  Anxiety disorder, unspecified F41.9

 

 [facility]  [id number] [place] [address] 45830-
6276     

 

 [facility]  [id number] [place] [address] 03798-
2544     

 

 [facility]  [id number] [place] [address] 59715-
0637  30 Mar, 2016   

 

 [facility]  [id number] [place] [address] 34178-
2546  29 Mar, 2016   

 

 [facility]  [id number] [place] [address] 67474-
7040    Hypertension I10 ; Dysthymia F34.1 ; Knee pain M25.569 and 
Eustachian tube dysfunction H69.80

 

 [facility]  [id number] [place] [address] 78317-
3372     

 

 [facility]  [id number] [place] [address] 826955-
8187     

 

 [facility]  [address] 328934-
5171  02 Dec, 2015   

 

 [facility]  [id number] [place] [address] 819502-
3690  01 Dec, 2015   

 

 [facility]  [id number] [place] [address] 50857-
2483     

 

 [facility]  [id number] [place] [address] 75770-
7454     

 

 [facility]  [id number] [place] [address] 05303-
4021  30 Oct, 2015   

 

 [address] [id number] [address]  
23 Sep, 2015  Dental examination V72.2

 

 [address] [id number][place] [address]  
15 Sep, 2015  Allergic rhinitis 477.9 and Chronic serous otitis media of both 
ears 381.10

 

 [facility]  [id number] [place] [address] 15679-
0316  21 Aug, 2015   

 

 [id number] [place] [id number]  
08 Aug, 2015  Sinusitis 473.9 and Bronchitis 490

 

 [facility]  [id number] [place] [address] 70055-
5166     

 

 [facility]  [id number] [place] [address] 52485-
7536     

 

 [facility]  [id number] [place] [address] 72992-
7356     

 

 [facility] [place] [facility]  [address] [id number] [place] [address]-
3311     

 

 [facility] [place] [facility]  [address] [id number] [place] [address]-
6883     

 

 [facility] [place] [facility]  [address] [id number] [place] [address]-
3308  03 Mar, 2015   

 

 [facility] [place] [facility]  [address] [id number] [place] [address]-
9362  03 Mar, 2015   

 

 [facility] [place] [facility]  [address] [id number] [place] [address]-
8968     

 

 [facility] [place] [facility]  [address] [id number] [place] [address]-
2449     

 

 [facility] [place] [facility]  [address] [id number] [place] [address]-
6135     

 

 [facility] [place] [facility]  [address] [id number] [place] [address]-
4058     

 

 [facility] [place] [facility]  [address] [id number] [place] [address]-
4174     

 

 [facility] [place] [facility]  [address] [id number] [place] [address]-
1555     

 

 [facility] [place] [facility]  [address] [id number] [place] [address]-
9988     

 

 [facility] [place] [facility]  [address] [id number] [place] [address]-
8221     

 

 [facility] [place] [facility]  [address] [id number] [place] [address]-
1227     

 

 [facility] [place] [facility]  [address] [id number] [place] [address]-
2859     

 

 [facility] [place] [facility]  [address] [id number] [place] [address]-
2951  30 Dec, 2014   

 

 [facility] [place] [facility]  [address] [id number] [place] [address]-
8716  29 Dec, 2014   

 

 [facility] [place] [facility]  [address] [id number] [place] [address]-
0302  29 Dec, 2014   

 

 [facility] [place] [facility]  [address] [id number] [place] [address]-
1038  03 Dec, 2014   

 

 [facility] [place] [facility]  [address] [id number] [place] [address]-
3415  03 Dec, 2014   

 

 [facility] [place] [facility]  [address] [id number] [place] [address]-
7511     

 

 [facility] [place] [facility]  [address] [id number] [place] [address]-
5987     

 

 [facility] [place] [facility]  [address] [id number] [place] [address]-
3596  23 Oct, 2014   

 

 [facility] [place] [facility]  [address] [id number] [place] [address]-
4916  23 Oct, 2014   

 

 [facility] [place] [facility]  [address] [id number] [place] [address]-
0615  15 Oct, 2014   

 

 [facility] [place] [facility]  [address] [id number] [place] [address]-
9703  15 Oct, 2014   

 

 [facility] [place] [facility]  [address] [id number] [place] [address]-
3184  22 Sep, 2014   

 

 [facility] [place] [facility]  [address] [id number] [place] [address]-
0447  22 Sep, 2014   

 

 [facility] [place] [facility]  [address] [id number] [place] [address]-
2542  10 Sep, 2014   

 

 [facility] [place] [facility]  [address] [id number] [place] [address]
2541  10 Sep, 2014   

 

 [facility] [place] [facility]  [address] [id number] [place] [address]-
2546  03 Sep, 2014   

 

 [facility] [place] [facility]  [address] [id number] [place] [address]
2542  03 Sep, 2014   

 

 [facility] [place] [facility]  [address] [id number] [place] [address]-
4907  29 Aug, 2014   

 

 [facility] [place] [facility]  [address] [id number] [place] [address]-
6340  29 Aug, 2014   

 

 [facility] [place] [facility]  [address] [id number] [place] [address]-
2491  27 Aug, 2014   

 

 [facility] [place] [facility]  [address] [id number] [place] [address]-
0416  27 Aug, 2014   

 

 [facility] [place] [facility]  [address] [id number] [place] [address]-
1261  22 Aug, 2014   

 

 [facility] [place] [facility]  [address] [id number] [place] [address]-
7993  22 Aug, 2014   

 

 [facility] [place] [facility]  [address] [id number] [place] [address]-
2480     

 

 [facility] [place] [facility]  [address] [id number] [place] [address]-
1569     

 

 [facility] [place] [facility]  [address] [id number] [place] [address]-
1522  27 May, 2014   

 

 [facility] [place] [facility]  [address] [id number] [place] [address]-
8593  27 May, 2014   

 

 [facility] [place] [facility]  [address] [id number] [place] [address]-
6741     

 

 [facility] [place] [facility]  [address] [id number] [place] [address]-
7891     

 

 [facility] [place] [facility]  [address] [id number] [place] [address]-
3631  06 Mar, 2014   

 

 [facility] [place] [facility]  [address] [id number] [place] [address]-
0559  06 Mar, 2014   

 

 [facility] [place] [facility]  [address] [id number] [place] [address]-
8942     

 

 [facility] [place] [facility]  [address] [id number] [place] [address]-
9622     

 

 [facility] [place] [facility]  [address] [id number] [place] [address]-
6276     

 

 [facility] [place] [facility]  [address] [id number] [place] [address]-
5826     

 

 [facility] [place] [facility]  [address] [id number] [place] [address]-
9496     

 

 [facility] [place] [facility]  [address] [id number] [place] [address]-
6313     

 

 [facility] [place] [facility]  [address] [id number] [place] [address]-
9924     

 

 [facility] [place] [facility]  [address] [id number] [place] [address]-
4377     

 

 [facility] [place] [facility]  [address] [facility] [id number] [place] [address]-
9869  15 Oct, 2013   

 

 [facility] [place] [facility]  [address] [id number] [place] [address]-
2482  15 Oct, 2013   

 

 [facility] [place] [facility]  [address] [id number] [place] [address]-
9680  28 Aug, 2013   

 

 [facility] [place] [facility]  [address] [facility] [id number] [place] [address]-
4145     

 

 [facility] [place] [facility]  [address] [id number] [place] [address]-
6067  27 Mar, 2013   

 

 [facility] [place] [facility]  [address] [id number] [place] [address]-
0622     

 

 [facility] [place] [facility]  [address] [id number] [place] [address]-
5105     

 

 [facility] [place] [facility]  [address] [id number] [place] [address]-
1746  10 Michel, 2013   

 

 [facility] [place] [facility]  [address] [id number] [place] [address]-
4375  03 Dec, 2012   

 

 [facility] [place] [facility]  [address] [id number] [place] [address]-
1962  03 Dec, 2012   

 

 [facility] [place] [facility]  [address] [id number] [place] [address]-
1036     

 

 [facility] [place] [facility]  [address] [id number] [place] [address]-
6845     

 

 [facility] [place] [facility]  [address] [id number] [place] [address]-
8304  08 Oct, 2012   

 

 [facility] [place] [facility]  [address] [id number] [place] [address]-
1793  25 Sep, 2012   

 

 [facility] [place] [facility]  [address] [id number] [place] [address]-
6939  06 Sep, 2012   

 

 [facility] [place] [facility]  [address] [id number] [place] [address]-
7108  10 Aug, 2012   

 

 [facility] [place] [facility]  [address] [id number] [place] [address]-
3142     

 

 [facility] [place] [facility]  [address] [id number] [place] [address]-
7063     

 

 [facility] [place] [facility]  [address] [id number] [place] [address]-
3888     

 

 [facility] [place] [facility]  [address] [id number] [place] [address]-
8326     

 

 [facility] [place] [facility]  [address] [id number] [address]-
9332     

 

 [facility] [place] [facility]  [address] [id number] [place] [address]
2548     

 

 [facility] [place] [facility]  [address] [id number] [place] [address]-
0446     

 

 [facility] [place] [facility]  [address] [id number] [place] [address]-
6803     

 

 [facility] [place] [facility]  [address] [id number] [place] [address]-
0633  31 2012   

 

 [facility]  [address] [id number] [place] [address]-
4547  06 2012   

 

 [facility] [place] [facility]  [address] [id number] [place] [address]-
5215  06 Dec, 2011   

 

 [facility] [place] [facility]  [address] [id number] [place] [address]-
3119  10 2011   

 

 [facility] [place] [facility]  [address] [id number] [place] [address]-
6335  14 2011   

 

 [facility] [place] [facility]  [address] [id number] [place] [address]-
4218  10 May, 2011   

 

 [facility] [place] [facility]  [address] [id number] [place] [address]-
4169  29 Dec, 2010   

 

 [facility] [place] [facility]  [address] [id number] [place] [address]-
3540  16 2010   

 

 [facility] [place] [facility]  [address] [id number] [place] [address]-
5828  18 Oct, 2010   

 

 [facility] [place] [facility]  [address] [id number] [place] [address]-
6360  14 2010   

 

 [facility] [place] [facility]  [address] [facility] [id number] [place] [address]-
9293  14 Dec, 2009   

 

 [facility] [place] [facility]  [address] [id number] [place] [address]-
3154  14 Dec, 2009   

 

 [facility] [place] [facility]  [address] [id number] [place] [address]-
8586  10 Dec, 2009   

 

 [facility] [place] [facility]  [address] [id number] [place] [address]-
1452  07 Dec, 2009   

 

 [facility] [place] [facility]  [address] [id number] [place] [address]-
7915  25 2009   

 

 [facility] [place] [facility]  [address] [id number] [place] [address]-
8249  10 2009   

 

 [facility] [place] [facility]  [address] [facility] [id number] [place] [address]-
5398  15 Apr, 2009   







IMMUNIZATIONS

No Known Immunizations



SOCIAL HISTORY

Never Assessed



REASON FOR VISIT

Controlled Med Refill 18



PLAN OF CARE





VITAL SIGNS





MEDICATIONS







 Medication  Instructions  Dosage  Frequency  Start Date  End Date  Duration  
Status

 

 Alprazolam 1 MG  Orally Twice a day, and 1 tablet at bedtime  0.5 Tablet as 
needed     03 Mar, 2015     28 days  Active







RESULTS

No Results



PROCEDURES

No Known procedures



INSTRUCTIONS





MEDICATIONS ADMINISTERED

No Known Medications



MEDICAL (GENERAL) HISTORY







 Type  Description  Date

 

 Medical History  depression   

 

 Medical History  hx of anxiety   

 

 Medical History  mitral valve prolapse   

 

 Surgical History  tonsillectomy and adenoidectomy   

 

 Surgical History  partial hysterectomy   

 

 Surgical History  jaw surgery  1974

 

 Surgical History  colonoscopy  16

 

 Surgical History  colonoscopy  10/2017

 

 Hospitalization History  for surgery
[facility]

 Created on: 2018



Shereen Shafer

External Reference #: [id number]

: 1960

Sex: Female



Demographics







 Address  [address] [place]  [address]

 

 Preferred Language  Unknown

 

 Marital Status  Unknown

 

 Christian Affiliation  Unknown

 

 Race  Unknown

 

 Ethnic Group  Unknown





Author







 Author  JAVON DE LOS SANTOS

 

 Organization  [facility]

 

 Address  [address] [place]  [address]



 

 Phone  [phone]







Care Team Providers







 Care Team Member Name  Role  Phone

 

 JAVON DE LOS SANTOS  Unavailable  [phone]







PROBLEMS







 Type  Condition  ICD9-CM Code  QSU81-BV Code  Onset Dates  Condition Status  
SNOMED Code

 

 Problem  Hypertension     I10     Active  53774113

 

 Problem  Eustachian tube dysfunction     H69.80     Active  69386990

 

 Problem  Knee pain     M25.569     Active  33360533

 

 Problem  Violation of controlled substance agreement     Z91.14    
Active  324394234

 

 Problem  Dysthymia     F34.1     Active  94703791

 

 Problem  Other chronic pain     G89.29     Active  75654881

 

 Problem  Acute right-sided low back pain with right-sided sciatica     M54.41 
    Active  840669242

 

 Problem  Pharyngitis, unspecified etiology     J02.9     Active  266828346

 

 Problem  Anxiety disorder, unspecified     F41.9     Active  330890635

 

 Problem  Psoriasis     L40.9     Active  6734635

 

 Problem  Dysuria     R30.0     Active  73724155







ALLERGIES

No Information



ENCOUNTERS







 Encounter  Location  Date  Diagnosis

 

 [place] [address] [facility] [id number] [address]
8826  12 Sep, 2018   

 

 [facility] [place] [id number]  [id number] [id number]  
  Shortness of breath R06.02 and Anxiety disorder, unspecified F41.9

 

 [facility]  [address] [facility] [id number] [address]
3689    Anxiety disorder, unspecified F41.9

 

 [place] [address] [facility] [id number][place] [address]-
8313    Anxiety disorder, unspecified F41.9 ; Shortness of breath 
R06.02 ; Therapeutic drug monitoring Z51.81 ; Family history of diabetes 
mellitus Z83.3 ; BMI 40.0-44.9, adult Z68.41 and Tobacco abuse Z72.0

 

 [place] [address] [place] [id number] [place] [address]-
6469  25 May, 2018  Anxiety disorder, unspecified F41.9 and Pain in right knee 
M25.561

 

 [place] [address] [place] [id number] [place] [address]-
9797  02 May, 2018  Pain in right knee M25.561 ; Pain in left knee M25.562 ; 
Other chronic pain G89.29 ; Anxiety disorder, unspecified F41.9 ; Acute right-
sided low back pain with right-sided sciatica M54.41 and BMI 40.0-44.9, adult 
Z68.41

 

 [place] [address] [place] [id number] [place] [address]-
9392    Anxiety disorder, unspecified F41.9

 

 [place] [address] [place] [id number] [place] [address]-
2356  26 Mar, 2018  Anxiety disorder, unspecified F41.9

 

 [place] [address] [place] [id number] [place] [address] 657[address]-
1739  06 Mar, 2018  Anxiety disorder, unspecified F41.9

 

 [place] [address] [place] [id number] [place] [address] 79[address]-
9749    Anxiety disorder, unspecified F41.9

 

 [place] [address] [place] [id number] [place] [address] 22598-
3327     

 

 [place] [address] [place] [id number] [place] [address] 37645-
1509    Anxiety disorder, unspecified F41.9

 

 [place] [address] [place] [id number] [place] [address]-
3594  12 Dec, 2017  Anxiety disorder, unspecified F41.9

 

 [place] [address] [place] [id number] [place] [address]-
3893    Anxiety disorder, unspecified F41.9 ; Hypertension I10 ; 
Encounter for screening mammogram for breast cancer Z12.31 ; Psoriasis L40.9 
and BMI 40.0-44.9, adult Z68.41

 

 [facility]  [address] [place] [address]-
5869    Anxiety disorder, unspecified F41.9

 

 [facility]  [address] [place] [id number] [address]-
7347  23 Oct, 2017   

 

 [facility]  [address] [place] [id number] [place] [address] 78[address]-
0471  17 Oct, 2017  Anxiety disorder, unspecified F41.9

 

 [facility]  [address] [place] [id number] [place] [address] 18005-
2445  09 Oct, 2017   

 

 [place] [id number] [id number]  
21 Sep, 2017  Dysuria R30.0

 

 [facility]  [address] [place] [id number] [place] [address]37-
7303  19 Sep, 2017  Anxiety disorder, unspecified F41.9

 

 [facility]  [address] [place] [address] 06952-
1343  22 Aug, 2017  Anxiety disorder, unspecified F41.9

 

 [facility]  [address] [place] [address] 615[address]-
1075    Anxiety disorder, unspecified F41.9

 

 [facility]  [address] [place] [address] 39688-
0134  10 Jul, 2017   

 

 [facility]  [address] [place] [address] 49607-
2765    Anxiety disorder, unspecified F41.9

 

 [facility]  [address] [place] [id number] [place] [address] 15182-
3992  30 May, 2017  Anxiety disorder, unspecified F41.9

 

 [facility]  [address] [place] [address] 42995-
4520  02 May, 2017  Anxiety disorder, unspecified F41.9

 

 [place] [address], [place] [address]-
1479    Anxiety disorder, unspecified F41.9

 

 [place] [address] [place] [id number] [place] [address]-
5163  07 Mar, 2017   

 

 [place] [address] [place] [id number] [place] [address] 57551-
7471  07 Mar, 2017  Anxiety disorder, unspecified F41.9

 

 [place] [address] [place] [id number] [place] [address] 44[address]-
9835    Well woman exam Z01.419 ; Cervical cancer screening Z12.4 
and Breast cancer screening Z12.39

 

 [place] [address] [place] [id number] [place] [address] 85050-
5282    Anxiety disorder, unspecified F41.9

 

 [place] [address] [place] [id number] [place] [address] 72[address]-
3974    Eustachian tube dysfunction H69.80 and Pharyngitis, 
unspecified etiology J02.9

 

 [place] [address] [place] [id number] [place] [address] 15261-
1264    Anxiety disorder, unspecified F41.9

 

 [place] [address] [place] [id number] [place] [address] 34878-
6388  05 Dec, 2016  Anxiety disorder, unspecified F41.9

 

 [place] [address] [place] [id number] [place] [address] 01789-
1616    Anxiety disorder, unspecified F41.9

 

 [place] [address] [place] [id number] [place] [address] 14952-
4118  30 Sep, 2016  Anxiety disorder, unspecified F41.9

 

 [place] [address] [place] [id number] [place] [address] 76604-
6288  20 Sep, 2016  Irritable bowel syndrome with diarrhea K58.0 ; Hypertension 
I10 ; Family history of diabetes mellitus Z83.3 and Visual changes H53.9

 

 [place] [address] [id number] [address]-
4135  26 Aug, 2016  Anxiety disorder, unspecified F41.9

 

 [facility]  [address] [place] [id number] [place] [address] 58796-
5060  09 Aug, 2016  RLQ abdominal pain R10.31

 

 [facility]  [address] [place] [id number] [place] [address] 01640-
5234  04 Aug, 2016  RLQ abdominal pain R10.31 and Varicose veins of both lower 
extremities I83.93

 

 [facility]  [address] [place] [id number] [place] [address] 72632-
9585    Anxiety disorder, unspecified F41.9

 

 [facility]  [address] [place] [id number] [place] [address] 77754-
7005     

 

 [facility]  [address] [place] [id number] [place] [address] 67501-
4296     

 

 [facility]  [id number] [place] [address] 94481-
0743    Knee pain M25.569

 

 [facility]  [address] [place] [id number] [place] [address] 74072-
9821     

 

 [facility]  [address] [place] [id number] [place] [address] 28564-
3887  27 May, 2016  Anxiety disorder, unspecified F41.9

 

 [facility]  [id number] [place] [address] 91794-
8726     

 

 [facility]  [address] [place] [id number] [place] [address] 03697-
2547     

 

 [facility]  [address] [place] [address] [id number] [place] [address] 46152-
6128  30 Mar, 2016   

 

 [facility]  [address] [place] [id number] [place] [address] 08057-
2546  29 Mar, 2016   

 

 [facility]  [address] [place] [id number] [place] [address] 59101-
4326    Hypertension I10 ; Dysthymia F34.1 ; Knee pain M25.569 and 
Eustachian tube dysfunction H69.80

 

 [facility]  [address] [place] [address] [place] [address] 81827-
0438     

 

 [facility]  [address] [place] [id number] [place] [address] 711894-
6295     

 

 [facility]  [address] 824667-
7293  02 Dec, 2015   

 

 [facility]  [address] [place] [id number] [place] [address] 172067-
8156  01 Dec, 2015   

 

 [facility]  [address] [place] [id number] [place] [address] 77546-
0402     

 

 [facility]  [address] [place] [id number] [place] [address] 05434-
3358     

 

 [facility]  [address] [place] [id number] [place] [address] 27720-
6457  30 Oct, 2015   

 

 [address] [id number] [id number]  
23 Sep, 2015  Dental examination V72.2

 

 [address] [id number] [address]  
15 Sep, 2015  Allergic rhinitis 477.9 and Chronic serous otitis media of both 
ears 381.10

 

 [facility]  [address] [place] [id number] [place] [address] 17896-
3836  21 Aug, 2015   

 

 [id number] [place] [id number]  
08 Aug, 2015  Sinusitis 473.9 and Bronchitis 490

 

 [facility]  [address] [place] [id number] [place] [address] 81602-
3446     

 

 [facility]  [address] [place] [id number] [place] [address] 47847-
5126     

 

 [facility]  [address] [place] [id number] [place] [address] 51071-
1650     

 

 [facility] [place] [facility]  [address] [id number] [place] [address]-
9157     

 

 [facility] [place] [facility]  [address] [id number] [place] [address]-
9446     

 

 [facility] [place] [facility]  [address] [id number] [place] [address]-
2752  03 Mar, 2015   

 

 [facility] [place] [facility]  [address] [id number] [place] [address]-
9638  03 Mar, 2015   

 

 [facility] [place] [facility]  [address] [id number] [place] [address]-
8586     

 

 [facility] [place] [facility]  [address] [id number] [place] [address]-
8041     

 

 [facility] [place] [facility]  [address] [id number] [place] [address]-
6557     

 

 [facility] [place] [facility]  [address] [id number][place] [address]-
6966     

 

 [facility] [place] [facility]  [address] [id number] [place] [address]-
2948     

 

 [facility] [place] [facility]  [address] [id number] [place] [address]-
6274     

 

 [facility] [place] [facility]  [address] [id number], [place] [address]-
8738     

 

 [facility] [place] [facility]  [address] [id number], [place] [address]-
4711     

 

 [facility] [place] [facility]  [address] [id number], [place] [address]-
9517     

 

 [facility] [place] [facility]  [address] [id number][place] [address]-
2555     

 

 [facility] [place] [facility]  [address] [id number] [place] [address]-
3796  30 Dec, 2014   

 

 [facility] [place] [facility]  [address] [id number] [place] [address]-
8966  29 Dec, 2014   

 

 [facility] [place] [facility]  [address] [id number] [place] [address]-
0061  29 Dec, 2014   

 

 [facility] [place] [facility]  [address] [id number] [place] [address]-
0625  03 Dec, 2014   

 

 [facility] [place] [facility]  [address] [id number] [place] [address]-
0142  03 Dec, 2014   

 

 [facility] [place] [facility]  [address] [id number] [place] [address]-
2902     

 

 [facility] [place] [facility]  [address] [id number] [place] [address]-
9373     

 

 [facility] [place] [facility]  [address] [id number] [place] [address]-
3434  23 Oct, 2014   

 

 [facility] [place] [facility]  [address] [id number] [place] [address]-
9580  23 Oct, 2014   

 

 [facility] [place] [facility]  [address] [id number] [place] [address]-
9613  15 Oct, 2014   

 

 [facility] [place] [facility]  [address] [id number] [place] [address]-
2943  15 Oct, 2014   

 

 [facility] [place] [facility]  [address] [id number] [place] [address]-
9788  22 Sep, 2014   

 

 [facility] [place] [facility]  [address] [id number] [place] [address]-
0534  22 Sep, 2014   

 

 [facility] [place] [facility]  [address] [id number] [place] [address]-
2544  10 Sep, 2014   

 

 [facility] [place] [facility]  [address] [id number] [place] [address]
2540  10 Sep, 2014   

 

 [facility] [place] [facility]  [address] [id number] [place] [address]-
2546  03 Sep, 2014   

 

 [facility] [place] [facility]  [address] [id number] [place] [address]
2547  03 Sep, 2014   

 

 [facility] [place] [facility]  [address] [id number] [place] [address]-
6069  29 Aug, 2014   

 

 [facility] [place] [facility]  [address] [id number] [place] [address]-
3075  29 Aug, 2014   

 

 [facility] [place] [facility]  [address] [id number] [place] [address]-
1822  27 Aug, 2014   

 

 [facility] [place] [facility]  [address] [id number] [place] [address]-
2087  27 Aug, 2014   

 

 [facility] [place] [facility]  [address] [id number] [place] [address]-
9149  22 Aug, 2014   

 

 [facility] [place] [facility]  [address] [id number] [place] [address]-
1570  22 Aug, 2014   

 

 [facility] [place] [facility]  [address] [id number] [place] [address]-
5283     

 

 [facility] [place] [facility]  [address] [id number] [place] [address]-
2530     

 

 [facility] [place] [facility]  [address] [id number] [place] [address]-
8330  27 May, 2014   

 

 [facility] [place] [facility]  [address] [id number] [place] [address]-
8048  27 May, 2014   

 

 [facility] [place] [facility]  [address] [id number] [place] [address]-
0266     

 

 [facility] [place] [facility]  [address] [id number] [place] [address]-
6089     

 

 [facility] [place] [facility]  [address] [id number] [place] [address]-
9633  06 Mar, 2014   

 

 [facility] [place] [facility]  [address] [id number] [place] [address]-
7274  06 Mar, 2014   

 

 [facility] [place] [facility]  [address] [id number] [place] [address]-
9514     

 

 [facility] [place] [facility]  [address] [id number] [place] [address]-
6148     

 

 [facility] [place] [facility]  [address] [id number] [place] [address]-
6196     

 

 [facility] [place] [facility]  [address] [id number] [place] [address]-
1446     

 

 [facility] [place] [facility]  [address] [id number] [place] [address]-
7156     

 

 [facility] [place] [facility]  [address] [id number] [place] [address]-
6422     

 

 [facility] [place] [facility]  [address] [id number] [place] [address]-
9403     

 

 [facility] [place] [facility]  [address] [id number] [place] [address]-
5174     

 

 [facility] [place] [facility]  [address] [facility] [id number] [place] [address]-
4011  15 Oct, 2013   

 

 [facility] [place] [facility]  [address] [id number] [place] [address]-
0368  15 Oct, 2013   

 

 [facility] [place] [facility]  [address] [id number] [place] [address]-
2539  28 Aug, 2013   

 

 [facility] [place] [facility]  [address] [facility] [id number] [place] [address]-
2198     

 

 [facility] [place] [facility]  [address] [id number] [place] [address]-
8948  27 Mar, 2013   

 

 [facility] [place] [facility]  [address] [id number] [place] [address]-
4305     

 

 [facility] [place] [facility]  [address] [id number] [place] [address]-
4270     

 

 [facility] [place] [facility]  [address] [id number] [place] [address]-
9626  10 Michel, 2013   

 

 [facility] [place] [facility]  [address] [id number] [place] [address]-
1301  03 Dec, 2012   

 

 [facility] [place] [facility]  [address] [id number] [place] [address]-
1650  03 Dec, 2012   

 

 [facility] [place] [facility]  [address] [id number] [place] [address]-
9631     

 

 [facility] [place] [facility]  [address] [id number] [place] [address]-
0414     

 

 [facility] [place] [facility]  [address] [id number] [place] [address]-
4811  08 Oct, 2012   

 

 [facility] [place] [facility]  [address] [id number] [place] [address]-
1547  25 Sep, 2012   

 

 [facility] [place] [facility]  [address] [id number] [place] [address]-
6672  06 Sep, 2012   

 

 [facility] [place] [facility]  [address] [id number] [place] [address]-
3112  10 Aug, 2012   

 

 [facility] [place] [facility]  [address] [id number] [place] [address]-
9975     

 

 [facility] [place] [facility]  [address] [id number] [place] [address]-
7154     

 

 [facility] [place] [facility]  [address] [id number] [place] [address]-
1837     

 

 [facility] [place] [facility]  [address] [id number] [place] [address]-
6066     

 

 [facility] [place] [facility]  [address] [id number], [place] [address]-
8299     

 

 [facility] [place] [facility]  [address] [id number] [place] [address]
2548     

 

 [facility] [place] [facility]  [address] [id number], [place] [address]-
5806     

 

 [facility] [place] [facility]  [address] [id number] [place] [address]-
9973     

 

 [facility] [place] [facility]  [address] [id number] [place] [address]-
5168  31 2012   

 

 [facility]  [address] [id number] [place] [address]-
6829  06 2012   

 

 [facility] [place] [facility]  [address] [id number] [place] [address]-
0963  06 Dec, 2011   

 

 [facility] [place] [facility]  [address] [id number] [place] [address]-
8742  10 2011   

 

 [facility] [place] [facility]  [address] [id number] [place] [address]-
1507  14 2011   

 

 [facility] [place] [facility]  [address] [id number] [place] [address]-
0251  10 May, 2011   

 

 [facility] [place] [facility]  [address] [id number] [place] [address]-
6763  29 Dec, 2010   

 

 [facility] [place] [facility]  [address] [id number] [place] [address]-
3284  16 2010   

 

 [facility] [place] [facility]  [address] [id number] [place] [address]-
4988  18 Oct, 2010   

 

 [facility] [place] [facility]  [address] [id number] [place] [address]-
8125  14 2010   

 

 [facility] [place] [facility]  [address] [facility] [id number] [place] [address]-
6802  14 Dec, 2009   

 

 [facility] [place] [facility]  [address] [id number] [place] [address]-
9801  14 Dec, 2009   

 

 [facility] [place] [facility]  [address] [id number] [place] [address]-
9999  10 Dec, 2009   

 

 [facility] [place] [facility]  [address] [id number] [place] [address]-
1812  07 Dec, 2009   

 

 [facility] [place] [facility]  [address] [id number] [place] [address]-
8067  25 2009   

 

 [facility] [place] [facility]  [address] [id number] [place] [address]-
6358  10 2009   

 

 [facility] [place] [facility]  [address] [facility] [id number] [place] [address]-
8547  15 Apr, 2009   







IMMUNIZATIONS

No Known Immunizations



SOCIAL HISTORY

Never Assessed



REASON FOR VISIT

Controlled Med Refill 18



PLAN OF CARE





VITAL SIGNS





MEDICATIONS







 Medication  Instructions  Dosage  Frequency  Start Date  End Date  Duration  
Status

 

 Alprazolam 1 MG  Orally Twice a day, and 1 tablet at bedtime  0.5 Tablet as 
needed     03 Mar, 2015     28 days  Active







RESULTS

No Results



PROCEDURES

No Known procedures



INSTRUCTIONS





MEDICATIONS ADMINISTERED

No Known Medications



MEDICAL (GENERAL) HISTORY







 Type  Description  Date

 

 Medical History  depression   

 

 Medical History  hx of anxiety   

 

 Medical History  mitral valve prolapse   

 

 Surgical History  tonsillectomy and adenoidectomy   

 

 Surgical History  partial hysterectomy   

 

 Surgical History  jaw surgery  1974

 

 Surgical History  colonoscopy  16

 

 Surgical History  colonoscopy  10/2017

 

 Hospitalization History  for surgery
[facility]

 Created on: 2018



Shereen Shafer

External Reference #: [id number]

: 1960

Sex: Female



Demographics







 Address  [address] [place]  [address]

 

 Preferred Language  Unknown

 

 Marital Status  Unknown

 

 Ep[employer] Affiliation  Unknown

 

 Race  Unknown

 

 Ethnic Group  Unknown





Author







 Author  JAVON DE LOS SANTOS

 

 Organization  [facility]

 

 Address  [address] [place]  [address]



 

 Phone  [phone]







Care Team Providers







 Care Team Member Name  Role  Phone

 

 JAVON DE LOS SANTOS  Unavailable  [phone]







PROBLEMS







 Type  Condition  ICD9-CM Code  ZBQ79-ZV Code  Onset Dates  Condition Status  
SNOMED Code

 

 Problem  Hypertension     I10     Active  22205450

 

 Problem  Eustachian tube dysfunction     H69.80     Active  80148960

 

 Problem  Knee pain     M25.569     Active  29160078

 

 Problem  Violation of controlled substance agreement     Z91.14    
Active  569919701

 

 Problem  Dysthymia     F34.1     Active  20136610

 

 Problem  Other chronic pain     G89.29     Active  30848304

 

 Problem  Acute right-sided low back pain with right-sided sciatica     M54.41 
    Active  602784689

 

 Problem  Pharyngitis, unspecified etiology     J02.9     Active  996194328

 

 Problem  Anxiety disorder, unspecified     F41.9     Active  730148947

 

 Problem  Psoriasis     L40.9     Active  3027886

 

 Problem  Dysuria     R30.0     Active  74049762







ALLERGIES

No Information



ENCOUNTERS







 Encounter  Location  Date  Diagnosis

 

 [place] [address] [facility] [id number] [address]
4268  12 Sep, 2018   

 

 [facility] [place] [id number]  [id number] [id number]  
  Shortness of breath R06.02 and Anxiety disorder, unspecified F41.9

 

 [facility]  [address] [facility] [id number] [address]
1639    Anxiety disorder, unspecified F41.9

 

 [place] [address] [facility] [id number] [address]-
2097    Anxiety disorder, unspecified F41.9 ; Shortness of breath 
R06.02 ; Therapeutic drug monitoring Z51.81 ; Family history of diabetes 
mellitus Z83.3 ; BMI 40.0-44.9, adult Z68.41 and Tobacco abuse Z72.0

 

 [place] [address] [place] [id number] [place] [address]69-
0142  25 May, 2018  Anxiety disorder, unspecified F41.9 and Pain in right knee 
M25.561

 

 [place] [address] [place] [id number] [place] [address] 03873-
5281  02 May, 2018  Pain in right knee M25.561 ; Pain in left knee M25.562 ; 
Other chronic pain G89.29 ; Anxiety disorder, unspecified F41.9 ; Acute right-
sided low back pain with right-sided sciatica M54.41 and BMI 40.0-44.9, adult 
Z68.41

 

 [place] [address] [place] [id number] [place] [address] 08761-
6127    Anxiety disorder, unspecified F41.9

 

 [place] [address] [place] [id number] [place] [address] 97473-
1003  26 Mar, 2018  Anxiety disorder, unspecified F41.9

 

 [place] [address] [place] [id number] [place] [address] 13740-
4897  06 Mar, 2018  Anxiety disorder, unspecified F41.9

 

 [place] [address] [place] [id number] [place] [address] 26186-
4344    Anxiety disorder, unspecified F41.9

 

 [place] [address] [place] [id number] [place] [address] 90987-
9465     

 

 [place] [address] [place] [id number] [place] [address] 06515-
1449    Anxiety disorder, unspecified F41.9

 

 [place] [address] [place] [id number] [place] [address] 76349-
3162  12 Dec, 2017  Anxiety disorder, unspecified F41.9

 

 [place] [address] [place] [id number] [place] [address] 44615-
1996    Anxiety disorder, unspecified F41.9 ; Hypertension I10 ; 
Encounter for screening mammogram for breast cancer Z12.31 ; Psoriasis L40.9 
and BMI 40.0-44.9, adult Z68.41

 

 [facility]  [address] [place] [address] 40899-
0670    Anxiety disorder, unspecified F41.9

 

 [facility]  [address] [id number] [address]-
8579  23 Oct, 2017   

 

 [facility]  [address] [place] [id number] [place] [address] 18686-
8773  17 Oct, 2017  Anxiety disorder, unspecified F41.9

 

 [facility]  [address] [place] [id number] [place] [address] 86678-
4951  09 Oct, 2017   

 

 [place] [id number] [id number]  
21 Sep, 2017  Dysuria R30.0

 

 [facility]  [address] [place] [id number] [place] [address] 22089-
1232  19 Sep, 2017  Anxiety disorder, unspecified F41.9

 

 [facility]  [address] [place] [address] 47007-
1883  22 Aug, 2017  Anxiety disorder, unspecified F41.9

 

 [facility]  [address] [place] [address] 48498-
9678    Anxiety disorder, unspecified F41.9

 

 [facility]  [address] [place] [address] 01679-
1167  10 Jul, 2017   

 

 [facility]  [address] [place] [address] 69754-
0532    Anxiety disorder, unspecified F41.9

 

 [facility]  [address] [place] [id number] [place] [address] 32142-
1499  30 May, 2017  Anxiety disorder, unspecified F41.9

 

 [facility]  [address] [place] [address] 04243-
6674  02 May, 2017  Anxiety disorder, unspecified F41.9

 

 [place] [address] [address]-
3676    Anxiety disorder, unspecified F41.9

 

 [place] [address] [place] [id number] [place] [address] 54758-
0046  07 Mar, 2017   

 

 [place] [address] [place] [id number] [place] [address] 10277-
5399  07 Mar, 2017  Anxiety disorder, unspecified F41.9

 

 [place] [address] [place] [id number] [place] [address] 53200-
9364    Well woman exam Z01.419 ; Cervical cancer screening Z12.4 
and Breast cancer screening Z12.39

 

 [place] [address] [place] [id number] [place] [address] 36688-
4999    Anxiety disorder, unspecified F41.9

 

 [place] [address] [place] [id number] [place] [address] 92140-
8014    Eustachian tube dysfunction H69.80 and Pharyngitis, 
unspecified etiology J02.9

 

 [place] [address] [place] [id number] [place] [address] 22137-
3881    Anxiety disorder, unspecified F41.9

 

 [place] [address] [place] [id number] [place] [address] 84827-
1335  05 Dec, 2016  Anxiety disorder, unspecified F41.9

 

 [place] [address] [place] [id number] [place] [address] 29294-
4449    Anxiety disorder, unspecified F41.9

 

 [place] [address] [place] [id number] [place] [address] 10765-
5661  30 Sep, 2016  Anxiety disorder, unspecified F41.9

 

 [place] [address] [place] [id number] [place] [address] 33747-
2828  20 Sep, 2016  Irritable bowel syndrome with diarrhea K58.0 ; Hypertension 
I10 ; Family history of diabetes mellitus Z83.3 and Visual changes H53.9

 

 [place] [address] [id number] [address]-
7258  26 Aug, 2016  Anxiety disorder, unspecified F41.9

 

 [facility]  [address] [place] [id number] [place] [address] 39725-
1753  09 Aug, 2016  RLQ abdominal pain R10.31

 

 [facility]  [address] [place] [id number] [place] [address] 98514-
3054  04 Aug, 2016  RLQ abdominal pain R10.31 and Varicose veins of both lower 
extremities I83.93

 

 [facility]  [address] [place] [id number] [place] [address] 14344-
3291    Anxiety disorder, unspecified F41.9

 

 [facility]  [address] [place] [id number] [place] [address] 49339-
9687     

 

 [facility]  [address] [place] [id number] [place] [address] 63241-
3324     

 

 [facility]  [address] [place] [id number] [place] [address] 07039-
0476    Knee pain M25.569

 

 [facility]  [address] [place] [id number] [place] [address] 25900-
7917     

 

 [facility]  [address] [place] [id number] [place] [address] 84872-
5018  27 May, 2016  Anxiety disorder, unspecified F41.9

 

 [facility]  [address] [place] [id number] [place] [address] 89086-
3836     

 

 [facility]  [address] [place] [id number] [place] [address] 75134-
254     

 

 [facility]  [address] [place] [id number] [place] [address] 89939-
5843  30 Mar, 2016   

 

 [facility]  [address] [place] [id number] [place] [address] 98287-
2546  29 Mar, 2016   

 

 [facility]  [address] [place] [id number] [place] [address] 81779-
5946    Hypertension I10 ; Dysthymia F34.1 ; Knee pain M25.569 and 
Eustachian tube dysfunction H69.80

 

 [facility]  [address] [place] [id number] [place] [address] 15302-
1622     

 

 [facility]  [address] [place] [id number] [place] [address] 855167-
3379     

 

 [facility]  [address] 411525-
2781  02 Dec, 2015   

 

 [facility]  [address] [place] [id number] [place] [address] 631435-
1421  01 Dec, 2015   

 

 [facility]  [address] [place] [id number] [place] [address] 63445-
0392     

 

 [facility]  [address] [place] [id number] [place] [address] 35306-
2661     

 

 [facility]  [address] [place] [id number] [place] [address] 93394-
8069  30 Oct, 2015   

 

 [address] [id number] [id number]  
23 Sep, 2015  Dental examination V72.2

 

 [address] [id number] [id number]  
15 Sep, 2015  Allergic rhinitis 477.9 and Chronic serous otitis media of both 
ears 381.10

 

 [facility]  [address] [place] [id number] [place] [address] 87252-
2846  21 Aug, 2015   

 

 [id number] [place] [address] [id number]  
08 Aug, 2015  Sinusitis 473.9 and Bronchitis 490

 

 [facility]  [address] [place] [id number] [place] [address] 35441-
4136     

 

 [facility]  [address] [place] [id number] [place] [address] 27378-
2336     

 

 [facility]  [address] [place] [id number] [place] [address] 16199-
5827     

 

 [facility] [place] [facility]  [address] [id number] [place] [address]-
1169     

 

 [facility] [place] [facility]  [address] [id number] [place] [address]-
7702     

 

 [facility] [place] [facility]  [address] [id number] [place] [address]-
9399  03 Mar, 2015   

 

 [facility] [place] [facility]  [address] [id number] [place] [address]-
0010  03 Mar, 2015   

 

 [facility] [place] [facility]  [address] [id number] [place] [address]-
3173     

 

 [facility] [place] [facility]  [address] [id number] [place] [address]-
3206     

 

 [facility] [place] [facility]  [address] [id number] [place] [address]-
6534     

 

 [facility] [place] [facility]  [address] [id number] [address]-
8370     

 

 [facility] [place] [facility]  [address] [id number] [place] [address]-
8337     

 

 [facility] [place] [facility]  [address] [id number] [place] [address]-
4202     

 

 [facility] [place] [facility]  [address] [id number][place] [address]-
7051     

 

 [facility] [place] [facility]  [address] [id number][place] [address]-
2232     

 

 [facility] [place] [facility]  [address] [id number][place] [address]-
5762     

 

 [facility] [place] [facility]  [address] [id number][place] [address]-
3621     

 

 [facility] [place] [facility]  [address] [id number] [place] [address]-
2510  30 Dec, 2014   

 

 [facility] [place] [facility]  [address] [id number] [place] [address]-
9959  29 Dec, 2014   

 

 [facility] [place] [facility]  [address] [id number] [place] [address]-
3089  29 Dec, 2014   

 

 [facility] [place] [facility]  [address] [id number] [place] [address]-
8936  03 Dec, 2014   

 

 [facility] [place] [facility]  [address] [id number] [place] [address]-
1913  03 Dec, 2014   

 

 [facility] [place] [facility]  [address] [id number] [place] [address]-
8987     

 

 [facility] [place] [facility]  [address] [id number] [place] [address]-
9195     

 

 [facility] [place] [facility]  [address] [id number] [place] [address]-
2897  23 Oct, 2014   

 

 [facility] [place] [facility]  [address] [id number] [place] [address]-
2936  23 Oct, 2014   

 

 [facility] [place] [facility]  [address] [id number] [place] [address]-
5208  15 Oct, 2014   

 

 [facility] [place] [facility]  [address] [id number] [place] [address]-
6218  15 Oct, 2014   

 

 [facility] [place] [facility]  [address] [id number] [place] [address]-
1305  22 Sep, 2014   

 

 [facility] [place] [facility]  [address] [id number] [place] [address]-
9130  22 Sep, 2014   

 

 [facility] [place] [facility]  [address] [id number] [place] [address]-
2549  10 Sep, 2014   

 

 [facility] [place] [facility]  [address] [id number] [place] [address]
2540  10 Sep, 2014   

 

 [facility] [place] [facility]  [address] [id number] [place] [address]-
2546  03 Sep, 2014   

 

 [facility] [place] [facility]  [address] [id number] [place] [address]
2549  03 Sep, 2014   

 

 [facility] [place] [facility]  [address] [id number] [place] [address]-
7718  29 Aug, 2014   

 

 [facility] [place] [facility]  [address] [id number] [place] [address]-
3107  29 Aug, 2014   

 

 [facility] [place] [facility]  [address] [id number] [place] [address]-
7513  27 Aug, 2014   

 

 [facility] [place] [facility]  [address] [id number] [place] [address]-
3741  27 Aug, 2014   

 

 [facility] [place] [facility]  [address] [id number] [place] [address]-
0997  22 Aug, 2014   

 

 [facility] [place] [facility]  [address] [id number] [place] [address]-
3125  22 Aug, 2014   

 

 [facility] [place] [facility]  [address] [id number] [place] [address]-
5813     

 

 [facility] [place] [facility]  [address] [id number] [place] [address]-
1557     

 

 [facility] [place] [facility]  [address] [id number] [place] [address]-
5190  27 May, 2014   

 

 [facility] [place] [facility]  [address] [id number] [place] [address]-
4101  27 May, 2014   

 

 [facility] [place] [facility]  [address] [id number] [place] [address]-
5590     

 

 [facility] [place] [facility]  [address] [id number] [place] [address]-
1195     

 

 [facility] [place] [facility]  [address] [id number] [place] [address]-
6041  06 Mar, 2014   

 

 [facility] [place] [facility]  [address] [id number] [place] [address]-
5364  06 Mar, 2014   

 

 [facility] [place] [facility]  [address] [id number] [place] [address]-
6626     

 

 [facility] [place] [facility]  [address] [id number] [place] [address]-
1669     

 

 [facility] [place] [facility]  [address] [id number] [place] [address]-
2936     

 

 [facility] [place] [facility]  [address] [id number] [place] [address]-
3676     

 

 [facility] [place] [facility]  [address] [id number] [place] [address]-
1676     

 

 [facility] [place] [facility]  [address] [id number] [place] [address]-
8086     

 

 [facility] [place] [facility]  [address] [id number] [place] [address]-
2734     

 

 [facility] [place] [facility]  [address] [id number] [place] [address]-
0966     

 

 [facility] [place] [facility]  [address] [facility] [id number] [place] [address]-
2810  15 Oct, 2013   

 

 [facility] [place] [facility]  [address] [id number] [place] [address]-
6405  15 Oct, 2013   

 

 [facility] [place] [facility]  [address] [id number] [place] [address]-
7535  28 Aug, 2013   

 

 [facility] [place] [facility]  [address] [facility] [id number] [place] [address]-
4395     

 

 [facility] [place] [facility]  [address] [id number] [place] [address]-
4373  27 Mar, 2013   

 

 [facility] [place] [facility]  [address] [id number] [place] [address]-
1047     

 

 [facility] [place] [facility]  [address] [id number] [place] [address]-
4982     

 

 [facility] [place] [facility]  [address] [id number] [place] [address]-
6166  10 Michel, 2013   

 

 [facility] [place] [facility]  [address] [id number] [place] [address]-
1324  03 Dec, 2012   

 

 [facility] [place] [facility]  [address] [id number] [place] [address]-
0601  03 Dec, 2012   

 

 [facility] [place] [facility]  [address] [id number] [place] [address]-
3499     

 

 [facility] [place] [facility]  [address] [id number] [place] [address]-
8993     

 

 [facility] [place] [facility]  [address] [id number] [place] [address]-
6568  08 Oct, 2012   

 

 [facility] [place] [facility]  [address] [id number] [place] [address]-
9722  25 Sep, 2012   

 

 [facility] [place] [facility]  [address] [id number] [place] [address]-
5477  06 Sep, 2012   

 

 [facility] [place] [facility]  [address] [id number] [place] [address]-
9366  10 Aug, 2012   

 

 [facility] [place] [facility]  [address] [id number] [place] [address]-
6474     

 

 [facility] [place] [facility]  [address] [id number] [place] [address]-
3585     

 

 [facility] [place] [facility]  [address] [id number] [place] [address]-
9494     

 

 [facility] [place] [facility]  [address] [id number] [place] [address]-
2626     

 

 [facility] [place] [facility]  [address] [id number] [address]-
3886     

 

 [facility] [place] [facility]  [address] [id number] [place] [address]
2542     

 

 [facility] [place] [facility]  [address] [id number][place] [address]-
4946     

 

 [facility] [place] [facility]  [address] [id number] [place] [address]-
[address]     

 

 [facility] [place] [facility]  [address] [id number] [place] [address]-
[address]  31 2012   

 

 [facility]  [address] [id number] [place] [address]-
2022  06 2012   

 

 [facility] [place] [facility]  [address] [id number] [place] [address]-
1023  06 Dec, 2011   

 

 [facility] [place] [facility]  [address] [id number] [place] [address]-
8284  10 2011   

 

 [facility] [place] [facility]  [address] [id number] [place] [address]-
4937  14 2011   

 

 [facility] [place] [facility]  [address] [id number] [place] [address]-
0234  10 May, 2011   

 

 [facility] [place] [facility]  [address] [id number] [place] [address]-
8847  29 Dec, 2010   

 

 [facility] [place] [facility]  [address] [id number] [place] [address]-
5632  16 2010   

 

 [facility] [place] [facility]  [address] [id number] [place] [address]-
6641  18 Oct, 2010   

 

 [facility] [place] [facility]  [address] [id number] [place] [address]-
4483  14 2010   

 

 [facility] [place] [facility]  [address] [facility] [id number] [place] [address]-
7045  14 Dec, 2009   

 

 [facility] [place] [facility]  [address] [id number] [place] [address]-
5168  14 Dec, 2009   

 

 [facility] [place] [facility]  [address] [id number] [place] [address]-
7061  10 Dec, 2009   

 

 [facility] [place] [facility]  [address] [id number] [place] [address]-
7138  07 Dec, 2009   

 

 [facility] [place] [facility]  [address] [id number] [place] [address]-
0048  25 2009   

 

 [facility] [place] [facility]  [address] [id number] [place] [address]-
2107  10 2009   

 

 [facility] [place] [facility]  [address] [facility] [id number] [place] [address]-
0460  15 Apr, 2009   







IMMUNIZATIONS

No Known Immunizations



SOCIAL HISTORY

Never Assessed



REASON FOR VISIT

Controlled refill request



PLAN OF CARE





VITAL SIGNS





MEDICATIONS

Unknown Medications



RESULTS

No Results



PROCEDURES

No Known procedures



INSTRUCTIONS





MEDICATIONS ADMINISTERED

No Known Medications



MEDICAL (GENERAL) HISTORY







 Type  Description  Date

 

 Medical History  depression   

 

 Medical History  hx of anxiety   

 

 Medical History  mitral valve prolapse   

 

 Surgical History  tonsillectomy and adenoidectomy   

 

 Surgical History  partial hysterectomy   

 

 Surgical History  jaw surgery  1974

 

 Surgical History  colonoscopy  16

 

 Surgical History  colonoscopy  10/2017

 

 Hospitalization History  for surgery
[facility]

 Created on: 2018



Shereen Shafer

External Reference #: [id number]

: 1960

Sex: Female



Demographics







 Address  [address] [place]  [address]

 

 Preferred Language  Unknown

 

 Marital Status  Unknown

 

 Sabianist Affiliation  Unknown

 

 Race  Unknown

 

 Ethnic Group  Unknown





Author







 Author  JAVON DE LOS SANTOS

 

 Organization  [facility]

 

 Address  [address] [place]  [address]



 

 Phone  [phone]







Care Team Providers







 Care Team Member Name  Role  Phone

 

 JAVON DE LOS SANTOS  Unavailable  [phone]







PROBLEMS







 Type  Condition  ICD9-CM Code  XBS39-JE Code  Onset Dates  Condition Status  
SNOMED Code

 

 Problem  Hypertension     I10     Active  50438800

 

 Problem  Eustachian tube dysfunction     H69.80     Active  12800419

 

 Problem  Knee pain     M25.569     Active  04360208

 

 Problem  Violation of controlled substance agreement     Z91.14    
Active  724274418

 

 Problem  Dysthymia     F34.1     Active  88668215

 

 Problem  Other chronic pain     G89.29     Active  47876216

 

 Problem  Acute right-sided low back pain with right-sided sciatica     M54.41 
    Active  462543862

 

 Problem  Pharyngitis, unspecified etiology     J02.9     Active  175114768

 

 Problem  Anxiety disorder, unspecified     F41.9     Active  167010019

 

 Problem  Psoriasis     L40.9     Active  0791577

 

 Problem  Dysuria     R30.0     Active  07047656







ALLERGIES

No Information



ENCOUNTERS







 Encounter  Location  Date  Diagnosis

 

 [place] [address] [facility] [id number] [address]
4090  12 Sep, 2018   

 

 [facility] [place] [id number]  [id number] [id number]  
  Shortness of breath R06.02 and Anxiety disorder, unspecified F41.9

 

 [facility]  [address] [facility] [id number], [place] [address]-
2817    Anxiety disorder, unspecified F41.9

 

 [place] [address] [facility] [id number], [place] [address]-
2290    Anxiety disorder, unspecified F41.9 ; Shortness of breath 
R06.02 ; Therapeutic drug monitoring Z51.81 ; Family history of diabetes 
mellitus Z83.3 ; BMI 40.0-44.9, adult Z68.41 and Tobacco abuse Z72.0

 

 [place] [address] [place] [id number] [place] [address] 88737-
8643  25 May, 2018  Anxiety disorder, unspecified F41.9 and Pain in right knee 
M25.561

 

 [place] [address] [place] [id number] [place] [address] 36909-
9583  02 May, 2018  Pain in right knee M25.561 ; Pain in left knee M25.562 ; 
Other chronic pain G89.29 ; Anxiety disorder, unspecified F41.9 ; Acute right-
sided low back pain with right-sided sciatica M54.41 and BMI 40.0-44.9, adult 
Z68.41

 

 [place] [address] [place] [id number] [place] [address] 95864-
2154    Anxiety disorder, unspecified F41.9

 

 [place] [address] [place] [id number] [place] [address] 38291-
7283  26 Mar, 2018  Anxiety disorder, unspecified F41.9

 

 [place] [address] [place] [id number] [place] [address] 92894-
9934  06 Mar, 2018  Anxiety disorder, unspecified F41.9

 

 [place] [address] [place] [id number] [place] [address] 83364-
9643    Anxiety disorder, unspecified F41.9

 

 [place] [address] [place] [id number] [place] [address] 62572-
8125     

 

 [place] [address] [place] [id number] [place] [address] 61918-
6211    Anxiety disorder, unspecified F41.9

 

 [place] [address] [place] [id number] [place] [address] 36670-
2128  12 Dec, 2017  Anxiety disorder, unspecified F41.9

 

 [place] [address] [place] [id number] [place] [address] 44435-
6086    Anxiety disorder, unspecified F41.9 ; Hypertension I10 ; 
Encounter for screening mammogram for breast cancer Z12.31 ; Psoriasis L40.9 
and BMI 40.0-44.9, adult Z68.41

 

 [facility]  [address] [place] [address] 80773-
7379    Anxiety disorder, unspecified F41.9

 

 [facility]  [address] [id number] [address]-
2394  23 Oct, 2017   

 

 [facility]  [address] [id number] [place] [address] 82752-
1263  17 Oct, 2017  Anxiety disorder, unspecified F41.9

 

 [facility]  [id number] [place] [address] 84050-
5925  09 Oct, 2017   

 

 [place] [id number] [id number]  
21 Sep, 2017  Dysuria R30.0

 

 [facility]  [id number] [place] [address] 70597-
4539  19 Sep, 2017  Anxiety disorder, unspecified F41.9

 

 [facility]  [id number] [place] [address] 05729-
3586  22 Aug, 2017  Anxiety disorder, unspecified F41.9

 

 [facility]  [id number] [place] [address] 95912-
2643    Anxiety disorder, unspecified F41.9

 

 [facility]  [address] [place] [address] 34520-
2385  10 Jul, 2017   

 

 [facility]  [address] [place] [address] 37720-
3258    Anxiety disorder, unspecified F41.9

 

 [facility]  [address] [id number] [place] [address] 57334-
6848  30 May, 2017  Anxiety disorder, unspecified F41.9

 

 [facility]  [address] [place] [address] 02134-
0605  02 May, 2017  Anxiety disorder, unspecified F41.9

 

 [place] [address] [address]-
0628    Anxiety disorder, unspecified F41.9

 

 [place] [address] [place] [id number] [place] [address] 81297-
0737  07 Mar, 2017   

 

 [place] [address] [place] [id number] [place] [address] 72555-
1941  07 Mar, 2017  Anxiety disorder, unspecified F41.9

 

 [place] [address] [place] [id number] [place] [address] 90417-
2568    Well woman exam Z01.419 ; Cervical cancer screening Z12.4 
and Breast cancer screening Z12.39

 

 [place] [address] [place] [id number] [place] [address] 72674-
7657    Anxiety disorder, unspecified F41.9

 

 [place] [address] [place] [id number] [place] [address] 30701-
8791    Eustachian tube dysfunction H69.80 and Pharyngitis, 
unspecified etiology J02.9

 

 [place] [address] [place] [id number] [place] [address] 99013-
8580    Anxiety disorder, unspecified F41.9

 

 [place] [address] [place] [id number] [place] [address] 78354-
5444  05 Dec, 2016  Anxiety disorder, unspecified F41.9

 

 [place] [address] [place] [id number] [place] [address] 57156-
9353    Anxiety disorder, unspecified F41.9

 

 [place] [address] [place] [id number] [place] [address] 78329-
1517  30 Sep, 2016  Anxiety disorder, unspecified F41.9

 

 [place] [address] [place] [id number] [place] [address] 98901-
0919  20 Sep, 2016  Irritable bowel syndrome with diarrhea K58.0 ; Hypertension 
I10 ; Family history of diabetes mellitus Z83.3 and Visual changes H53.9

 

 [place] [address] [id number] [address]-
5867  26 Aug, 2016  Anxiety disorder, unspecified F41.9

 

 [facility]  [id number] [place] [address] 55542-
6327  09 Aug, 2016  RLQ abdominal pain R10.31

 

 [facility]  [id number] [place] [address] 74685-
3121  04 Aug, 2016  RLQ abdominal pain R10.31 and Varicose veins of both lower 
extremities I83.93

 

 [facility]  [id number] [place] [address] 55441-
1031    Anxiety disorder, unspecified F41.9

 

 [facility]  [id number] [place] [address] 96395-
0259     

 

 [facility]  [id number] [place] [address] 69278-
4013     

 

 [facility]  [id number] [place] [address] 61444-
2497    Knee pain M25.569

 

 [facility]  [id number] [place] [address] 41638-
2187     

 

 [facility]  [id number] [place] [address] 29626-
7577  27 May, 2016  Anxiety disorder, unspecified F41.9

 

 [facility]  [id number] [place] [address] 38471-
5326     

 

 [facility]  [id number] [place] [address] 49222-
254     

 

 [facility]  [id number] [place] [address] 62281-
2371  30 Mar, 2016   

 

 [facility]  [id number] [place] [address] 90988-
2546  29 Mar, 2016   

 

 [facility]  [id number] [place] [address] 05785-
4883    Hypertension I10 ; Dysthymia F34.1 ; Knee pain M25.569 and 
Eustachian tube dysfunction H69.80

 

 [facility]  [id number] [place] [address] 17902-
7957     

 

 [facility]  [id number] [place] [address] 511334-
9183     

 

 [facility]  [address] 121898-
5513  02 Dec, 2015   

 

 [facility]  [address] [id number] [place] [address] 404908-
0292  01 Dec, 2015   

 

 [facility]  [address] [id number] [place] [address] 63615-
1774     

 

 [facility]  [id number] [place] [address] 57628-
0513     

 

 [facility]  [id number] [place] [address] 08348-
5850  30 Oct, 2015   

 

 [address] [id number] [id number]  
23 Sep, 2015  Dental examination V72.2

 

 [address] [id number] [id number]  
15 Sep, 2015  Allergic rhinitis 477.9 and Chronic serous otitis media of both 
ears 381.10

 

 [facility]  [id number] [place] [address] 70634-
7146  21 Aug, 2015   

 

 [id number] [place] [address] [id number]  
08 Aug, 2015  Sinusitis 473.9 and Bronchitis 490

 

 [facility]  [id number] [place] [address] 25084-
5266     

 

 [facility]  [id number] [place] [address] 88783-
8696     

 

 [facility]  [id number] [place] [address] 38311-
6085     

 

 [facility] [place] [facility]  [address] [id number] [place] [address]-
0346     

 

 [facility] [place] [facility]  [address] [id number] [place] [address]-
2225     

 

 [facility] [place] [facility]  [address] [id number] [place] [address]-
7233  03 Mar, 2015   

 

 [facility] [place] [facility]  [address] [id number] [place] [address]-
8468  03 Mar, 2015   

 

 [facility] [place] [facility]  [address] [id number] [place] [address]-
6755     

 

 [facility] [place] [facility]  [address] [id number] [place] [address]-
6633     

 

 [facility] [place] [facility]  [address] [id number] [place] [address]-
0411     

 

 [facility] [place] [facility]  [address] [id number][place] [address]-
7688     

 

 [facility] [place] [facility]  [address] [id number] [place] [address]-
1667     

 

 [facility] [place] [facility]  [address] [id number] [place] [address]-
4445     

 

 [facility] [place] [facility]  [address] [id number][place] [address]-
2205     

 

 [facility] [place] [facility]  [address] [id number][place] [address]-
1860     

 

 [facility] [place] [facility]  [address] [id number][place] [address]-
8334     

 

 [facility] [place] [facility]  [address] [id number][place] [address]-
3892     

 

 [facility] [place] [facility]  [address] [id number] [place] [address]-
0053  30 Dec, 2014   

 

 [facility] [place] [facility]  [address] [id number] [place] [address]-
4362  29 Dec, 2014   

 

 [facility] [place] [facility]  [address] [id number] [place] [address]-
8846  29 Dec, 2014   

 

 [facility] [place] [facility]  [address] [id number] [place] [address]-
4164  03 Dec, 2014   

 

 [facility] [place] [facility]  [address] [id number] [place] [address]-
6147  03 Dec, 2014   

 

 [facility] [place] [facility]  [address] [id number] [place] [address]-
1254     

 

 [facility] [place] [facility]  [address] [id number] [place] [address]-
9192     

 

 [facility] [place] [facility]  [address] [id number] [place] [address]-
2466  23 Oct, 2014   

 

 [facility] [place] [facility]  [address] [id number] [place] [address]-
1986  23 Oct, 2014   

 

 [facility] [place] [facility]  [address] [id number] [place] [address]-
6555  15 Oct, 2014   

 

 [facility] [place] [facility]  [address] [id number] [place] [address]-
8981  15 Oct, 2014   

 

 [facility] [place] [facility]  [address] [id number] [place] [address]-
0560  22 Sep, 2014   

 

 [facility] [place] [facility]  [address] [id number] [place] [address]-
0076  22 Sep, 2014   

 

 [facility] [place] [facility]  [address] [id number] [place] [address]-
2545  10 Sep, 2014   

 

 [facility] [place] [facility]  [address] [id number] [place] [address]
2547  10 Sep, 2014   

 

 [facility] [place] [facility]  [address] [id number] [place] [address]-
2546  03 Sep, 2014   

 

 [facility] [place] [facility]  [address] [id number] [place] [address]
2548  03 Sep, 2014   

 

 [facility] [place] [facility]  [address] [id number] [place] [address]-
0174  29 Aug, 2014   

 

 [facility] [place] [facility]  [address] [id number] [place] [address]-
0777  29 Aug, 2014   

 

 [facility] [place] [facility]  [address] [id number] [place] [address]-
9354  27 Aug, 2014   

 

 [facility] [place] [facility]  [address] [id number] [place] [address]-
9245  27 Aug, 2014   

 

 [facility] [place] [facility]  [address] [id number] [place] [address]-
0348  22 Aug, 2014   

 

 [facility] [place] [facility]  [address] [id number] [place] [address]-
4448  22 Aug, 2014   

 

 [facility] [place] [facility]  [address] [id number] [place] [address]-
2216     

 

 [facility] [place] [facility]  [address] [id number] [place] [address]-
1395     

 

 [facility] [place] [facility]  [address] [id number] [place] [address]-
3291  27 May, 2014   

 

 [facility] [place] [facility]  [address] [id number] [place] [address]-
6585  27 May, 2014   

 

 [facility] [place] [facility]  [address] [id number] [place] [address]-
0389     

 

 [facility] [place] [facility]  [address] [id number] [place] [address]-
0723     

 

 [facility] [place] [facility]  [address] [id number] [place] [address]-
9332  06 Mar, 2014   

 

 [facility] [place] [facility]  [address] [id number] [place] [address]-
2979  06 Mar, 2014   

 

 [facility] [place] [facility]  [address] [id number] [place] [address]-
3448     

 

 [facility] [place] [facility]  [address] [id number] [place] [address]-
7846     

 

 [facility] [place] [facility]  [address] [id number] [place] [address]-
8466     

 

 [facility] [place] [facility]  [address] [id number] [place] [address]-
7386     

 

 [facility] [place] [facility]  [address] [id number] [place] [address]-
5126     

 

 [facility] [place] [facility]  [address] [id number] [place] [address]-
9792     

 

 [facility] [place] [facility]  [address] [id number] [place] [address]-
4063     

 

 [facility] [place] [facility]  [address] [id number] [place] [address]-
8953     

 

 [facility] [place] [facility]  [address] [facility] [id number] [place] [address]-
0348  15 Oct, 2013   

 

 [facility] [place] [facility]  [address] [id number] [place] [address]-
2970  15 Oct, 2013   

 

 [facility] [place] [facility]  [address] [id number] [place] [address]-
5864  28 Aug, 2013   

 

 [facility] [place] [facility]  [address] [facility] [id number] [place] [address]-
1449     

 

 [facility] [place] [facility]  [address] [id number] [place] [address]-
5687  27 Mar, 2013   

 

 [facility] [place] [facility]  [address] [id number] [place] [address]-
8332     

 

 [facility] [place] [facility]  [address] [id number] [place] [address]-
6012     

 

 [facility] [place] [facility]  [address] [id number] [place] [address]-
4356  10 Michel, 2013   

 

 [facility] [place] [facility]  [address] [id number] [place] [address]-
4111  03 Dec, 2012   

 

 [facility] [place] [facility]  [address] [id number] [place] [address]-
6444  03 Dec, 2012   

 

 [facility] [place] [facility]  [address] [id number] [place] [address]-
1890     

 

 [facility] [place] [facility]  [address] [id number] [place] [address]-
0783     

 

 [facility] [place] [facility]  [address] [id number] [place] [address]-
7966  08 Oct, 2012   

 

 [facility] [place] [facility]  [address] [id number] [place] [address]-
4110  25 Sep, 2012   

 

 [facility] [place] [facility]  [address] [id number] [place] [address]-
2892  06 Sep, 2012   

 

 [facility] [place] [facility]  [address] [id number] [place] [address]-
4435  10 Aug, 2012   

 

 [facility] [place] [facility]  [address] [id number] [place] [address]-
5012     

 

 [facility] [place] [facility]  [address] [id number] [place] [address]-
5484     

 

 [facility] [place] [facility]  [address] [id number] [place] [address]-
8746     

 

 [facility] [place] [facility]  [address] [id number] [place] [address]-
8356     

 

 [facility] [place] [facility]  [address] [id number], [place] [address]-
4153     

 

 [facility] [place] [facility]  [address] [id number] [place] [address]
2542     

 

 [facility] [place] [facility]  [address] [id number][place] [address]-
2286     

 

 [facility] [place] [facility]  [address] [id number] [place] [address]-
2850     

 

 [facility] [place] [facility]  [address] [id number] [place] [address]-
0667  31 2012   

 

 [facility]  [address] [id number] [place] [address]-
6429  06 2012   

 

 [facility] [place] [facility]  [address] [id number] [place] [address]-
7100  06 Dec, 2011   

 

 [facility] [place] [facility]  [address] [id number] [place] [address]-
3664  10 2011   

 

 [facility] [place] [facility]  [address] [id number] [place] [address]-
1964  14 2011   

 

 [facility] [place] [facility]  [address] [id number] [place] [address]-
0312  10 May, 2011   

 

 [facility] [place] [facility]  [address] [id number] [place] [address]-
0455  29 Dec, 2010   

 

 [facility] [place] [facility]  [address] [id number] [place] [address]-
6129  16 2010   

 

 [facility] [place] [facility]  [address] [id number] [place] [address]-
8983  18 Oct, 2010   

 

 [facility] [place] [facility]  [address] [id number] [place] [address]-
2401  14 2010   

 

 [facility] [place] [facility]  [address] [facility] [id number] [place] [address]-
5956  14 Dec, 2009   

 

 [facility] [place] [facility]  [address] [id number] [place] [address]-
1626  14 Dec, 2009   

 

 [facility] [place] [facility]  [address] [id number] [place] [address]-
2395  10 Dec, 2009   

 

 [facility] [place] [facility]  [address] [id number] [place] [address]-
4775  07 Dec, 2009   

 

 [facility] [place] [facility]  [address] [id number] [place] [address]-
3328  25 2009   

 

 [facility] [place] [facility]  [address] [id number] [place] [address]-
6472  10 2009   

 

 [facility] [place] [facility]  [address] [facility] [id number] [place] [address]-
2157  15 Apr, 2009   







IMMUNIZATIONS

No Known Immunizations



SOCIAL HISTORY

Never Assessed



REASON FOR VISIT

Lab (walk-in)  bferrisma



PLAN OF CARE





VITAL SIGNS





MEDICATIONS

Unknown Medications



RESULTS

No Results



PROCEDURES







 Procedure  Date Ordered  Result  Body Site

 

 ROUTINE VENIPUNCTURE  2018  N/A   

 

 EKG, TRACING (IN-HOUSE)  2018  NSR   

 

 COMPREHEN METABOLIC PANEL  2018      

 

 LIPID PANEL  2018      

 

 ELECTROCARDIOGRAM, TRACING  2018      

 

 ASSAY THYROID STIM HORMONE  2018      







INSTRUCTIONS





MEDICATIONS ADMINISTERED

No Known Medications



MEDICAL (GENERAL) HISTORY







 Type  Description  Date

 

 Medical History  depression   

 

 Medical History  hx of anxiety   

 

 Medical History  mitral valve prolapse   

 

 Surgical History  tonsillectomy and adenoidectomy   

 

 Surgical History  partial hysterectomy   

 

 Surgical History  jaw surgery  1974

 

 Surgical History  colonoscopy  16

 

 Surgical History  colonoscopy  10/2017

 

 Hospitalization History  for surgery
[facility]

 Created on: 2018



Shereen Shafer

External Reference #: [id number]

: 1960

Sex: Female



Demographics







 Address  [address] [place]  [address]

 

 Preferred Language  Unknown

 

 Marital Status  Unknown

 

 Mu-ism Affiliation  Unknown

 

 Race  Unknown

 

 Ethnic Group  Unknown





Author







 Author  JAVON DE LOS SANTOS

 

 Organization  [facility]

 

 Address  [address] [place]  [address]



 

 Phone  [phone]







Care Team Providers







 Care Team Member Name  Role  Phone

 

 JAVON DE LOS SANTOS  Unavailable  [phone]







PROBLEMS







 Type  Condition  ICD9-CM Code  OBE55-SU Code  Onset Dates  Condition Status  
SNOMED Code

 

 Problem  Hypertension     I10     Active  17069592

 

 Problem  Eustachian tube dysfunction     H69.80     Active  81899450

 

 Problem  Knee pain     M25.569     Active  07248306

 

 Problem  Violation of controlled substance agreement     Z91.14    
Active  710628315

 

 Problem  Dysthymia     F34.1     Active  59448185

 

 Problem  Other chronic pain     G89.29     Active  95882846

 

 Problem  Acute right-sided low back pain with right-sided sciatica     M54.41 
    Active  818884029

 

 Problem  Pharyngitis, unspecified etiology     J02.9     Active  436778648

 

 Problem  Anxiety disorder, unspecified     F41.9     Active  178556230

 

 Problem  Psoriasis     L40.9     Active  1947090

 

 Problem  Dysuria     R30.0     Active  17352977







ALLERGIES

No Known Allergies



ENCOUNTERS







 Encounter  Location  Date  Diagnosis

 

 [place] [address] [facility] [id number] [address]  12 Sep, 2018   

 

 [facility] [place] [address]  [id number] [id number]  
  Shortness of breath R06.02 and Anxiety disorder, unspecified F41.9

 

 [facility]  [address] [facility] [id number] [address]-
7066    Anxiety disorder, unspecified F41.9

 

 [place] [address] [facility] [id number], [place] [address]-
1348    Anxiety disorder, unspecified F41.9 ; Shortness of breath 
R06.02 ; Therapeutic drug monitoring Z51.81 ; Family history of diabetes 
mellitus Z83.3 ; BMI 40.0-44.9, adult Z68.41 and Tobacco abuse Z72.0

 

 [place] [id number] [place] [address] 19507-
7409  25 May, 2018  Anxiety disorder, unspecified F41.9 and Pain in right knee 
M25.561

 

 [place] [id number] [place] [address] 63838-
6848  02 May, 2018  Pain in right knee M25.561 ; Pain in left knee M25.562 ; 
Other chronic pain G89.29 ; Anxiety disorder, unspecified F41.9 ; Acute right-
sided low back pain with right-sided sciatica M54.41 and BMI 40.0-44.9, adult 
Z68.41

 

 [place] [id number] [place] [address] 09431-
8574    Anxiety disorder, unspecified F41.9

 

 [place] [id number] [place] [address] 38517-
3788  26 Mar, 2018  Anxiety disorder, unspecified F41.9

 

 [place] [id number] [place] [address] 02285-
7807  06 Mar, 2018  Anxiety disorder, unspecified F41.9

 

 [place] [id number] [place] [address] 52861-
8534    Anxiety disorder, unspecified F41.9

 

 [place] [id number] [place] [address] 69988-
5471     

 

 [place] [id number] [place] [address] 29013-
9350    Anxiety disorder, unspecified F41.9

 

 [place] [id number] [place] [address] 48941-
5320  12 Dec, 2017  Anxiety disorder, unspecified F41.9

 

 [place] [id number] [place] [address] 88502-
8786    Anxiety disorder, unspecified F41.9 ; Hypertension I10 ; 
Encounter for screening mammogram for breast cancer Z12.31 ; Psoriasis L40.9 
and BMI 40.0-44.9, adult Z68.41

 

 [facility]  [id number] [place] [address] 78062-
5892    Anxiety disorder, unspecified F41.9

 

 [facility]  [id number] [place] [address] 57568-
3470  23 Oct, 2017   

 

 [facility]  [id number] [place] [address] 39250-
5087  17 Oct, 2017  Anxiety disorder, unspecified F41.9

 

 [facility]  [id number] [place] [address] 59084-
8618  09 Oct, 2017   

 

 [place] [id number] [id number]  
21 Sep, 2017  Dysuria R30.0

 

 [facility]  [id number] [place] [address] 53635-
1341  19 Sep, 2017  Anxiety disorder, unspecified F41.9

 

 [facility]  [address] [place] [address] 12425-
5093  22 Aug, 2017  Anxiety disorder, unspecified F41.9

 

 [facility]  [address] [place] [address] 63208-
8770    Anxiety disorder, unspecified F41.9

 

 [place] [address] [place] [address] 27126-
4646  10 Jul, 2017   

 

 [facility]  [id number] [place] [address] 07166-
2547    Anxiety disorder, unspecified F41.9

 

 [facility]  [id number] [place] [address] 83588-
9897  30 May, 2017  Anxiety disorder, unspecified F41.9

 

 [facility]  [address] [place] [address] 38008-
9227  02 May, 2017  Anxiety disorder, unspecified F41.9

 

 [place] [address] [address]-
2976    Anxiety disorder, unspecified F41.9

 

 [place] [id number] [place] [address] 01735-
1078  07 Mar, 2017   

 

 [place] [id number] [place] [address] 31169-
8781  07 Mar, 2017  Anxiety disorder, unspecified F41.9

 

 [place] [id number] [place] [address] 85493-
5321    Well woman exam Z01.419 ; Cervical cancer screening Z12.4 
and Breast cancer screening Z12.39

 

 [place] [id number] [place] [address] 27525-
3001    Anxiety disorder, unspecified F41.9

 

 [place] [id number] [place] [address] 25504-
0138    Eustachian tube dysfunction H69.80 and Pharyngitis, 
unspecified etiology J02.9

 

 [place] [id number] [place] [address] 21712-
6509    Anxiety disorder, unspecified F41.9

 

 [place] [address] [place] [address] 36537-
7154  05 Dec, 2016  Anxiety disorder, unspecified F41.9

 

 [place] [id number] [place] [address] 48734-
4395    Anxiety disorder, unspecified F41.9

 

 [place] [id number] [place] [address] 81411-
4892  30 Sep, 2016  Anxiety disorder, unspecified F41.9

 

 [place] [address] [place] [address] 31815-
1309  20 Sep, 2016  Irritable bowel syndrome with diarrhea K58.0 ; Hypertension 
I10 ; Family history of diabetes mellitus Z83.3 and Visual changes H53.9

 

 [place] [address] [id number] [address]-
5375  26 Aug, 2016  Anxiety disorder, unspecified F41.9

 

 [facility]  [id number] [place] [address] 95679-
6856  09 Aug, 2016  RLQ abdominal pain R10.31

 

 [facility]  [id number] [place] [address] 79600-
1876  04 Aug, 2016  RLQ abdominal pain R10.31 and Varicose veins of both lower 
extremities I83.93

 

 [facility]  [id number] [place] [address] 95067-
3373    Anxiety disorder, unspecified F41.9

 

 [facility]  [id number] [place] [address] 34309-
1526     

 

 [facility]  [id number] [place] [address] 02454-
6249     

 

 [facility]  [id number] [place] [address] 09401-
7247    Knee pain M25.569

 

 [facility]  [id number] [place] [address] 54447-
4788     

 

 [facility]  [id number] [place] [address] 57911-
9938  27 May, 2016  Anxiety disorder, unspecified F41.9

 

 [facility]  [id number] [place] [address] 36051-
2446     

 

 [facility]  [id number] [place] [address] 08776-
2545     

 

 [facility]  [id number] [place] [address] 94661-
7216  30 Mar, 2016   

 

 [facility]  [id number] [place] [address] 19441
2546  29 Mar, 2016   

 

 [facility]  [id number] [place] [address] 61848-
2805    Hypertension I10 ; Dysthymia F34.1 ; Knee pain M25.569 and 
Eustachian tube dysfunction H69.80

 

 [facility]  [id number] [place] [address] 60597-
0690     

 

 [facility]  [id number] [place] [address] 616553-
4024     

 

 [facility]  [address] 799967-
5124  02 Dec, 2015   

 

 [facility]  [id number] [place] [address] 50229-
2287  01 Dec, 2015   

 

 [facility]  [id number] [place] [address] 471878-
6247     

 

 [facility]  [id number] [place] [address] 75219-
5283     

 

 [facility]  [id number] [place] [address] 78712-
5763  30 Oct, 2015   

 

 [address] [id number] [id number]  
23 Sep, 2015  Dental examination V72.2

 

 [id number] [place] [id number]  
15 Sep, 2015  Allergic rhinitis 477.9 and Chronic serous otitis media of both 
ears 381.10

 

 [facility]  [id number] [place] [address] 53272-
4116  21 Aug, 2015   

 

 [address] [id number] [place] [address] [id number]  
08 Aug, 2015  Sinusitis 473.9 and Bronchitis 490

 

 [facility]  [id number] [place] [address] 43052-
0946     

 

 [facility]  [id number] [place] [address] 72960-
0506     

 

 [facility]  [id number] [place] [address] 06678-
2493     

 

 [facility] [place] [facility]  [address] [id number] [place] [address]-
8728     

 

 [facility] [place] [facility]  [address] [id number] [place] [address]-
6666     

 

 [facility] [place] [facility]  [address] [id number] [place] [address]-
2546  03 Mar, 2015   

 

 [facility] [place] [facility]  [address] [id number] [place] [address]-
2546  03 Mar, 2015   

 

 [facility] [place] [facility]  [address] [id number] [place] [address]-
4423     

 

 [facility] [place] [facility]  [address] [id number] [place] [address]-
2096     

 

 [facility] [place] [facility]  [address] [id number] [place] [address]-
3804     

 

 [facility] [place] [facility]  [address] [id number] [address]-
2099     

 

 [facility] [place] [facility]  [address] [id number][place] [address]-
3919     

 

 [facility] [place] [facility]  [address] [id number][place] [address]-
2730     

 

 [facility] [place] [facility]  [address] [id number][place] [address]-
4338     

 

 [facility] [place] [facility]  [address] [id number][place] [address]-
1309     

 

 [facility] [place] [facility]  [address] [id number][place] [address]-
7199     

 

 [facility] [place] [facility]  [address] [id number][place] [address]-
7716     

 

 [facility] [place] [facility]  [address] [id number] [place] [address]-
4836  30 Dec, 2014   

 

 [facility] [place] [facility]  [address] [id number] [place] [address]-
9146  29 Dec, 2014   

 

 [facility] [place] [facility]  [address] [id number] [place] [address]-
0437  29 Dec, 2014   

 

 [facility] [place] [facility]  [address] [id number] [place] [address]-
2590  03 Dec, 2014   

 

 [facility] [place] [facility]  [address] [id number] [place] [address]-
0720  03 Dec, 2014   

 

 [facility] [place] [facility]  [address] [id number] [place] [address]-
2716     

 

 [facility] [place] [facility]  [address] [id number] [place] [address]-
6243     

 

 [facility] [place] [facility]  [address] [id number] [place] [address]-
7383  23 Oct, 2014   

 

 [facility] [place] [facility]  [address] [id number] [place] [address]-
0437  23 Oct, 2014   

 

 [facility] [place] [facility]  [address] [id number] [place] [address]-
4491  15 Oct, 2014   

 

 [facility] [place] [facility]  [address] [id number] [place] [address]-
6783  15 Oct, 2014   

 

 [facility] [place] [facility]  [address] [id number] [place] [address]-
9637  22 Sep, 2014   

 

 [facility] [place] [facility]  [address] [id number] [place] [address]-
2540  22 Sep, 2014   

 

 [facility] [place] [facility]  [address] [id number] [place] [address]-
254  10 Sep, 2014   

 

 [facility] [place] [facility]  [address] [id number] [place] [address]
2548  10 Sep, 2014   

 

 [facility] [place] [facility]  [address] [id number] [place] [address]-
2543  03 Sep, 2014   

 

 [facility] [place] [facility]  [address] [id number] [place] [address]
2545  03 Sep, 2014   

 

 [facility] [place] [facility]  [address] [id number] [place] [address]-
1546  29 Aug, 2014   

 

 [facility] [place] [facility]  [address] [id number] [place] [address]-
1693  29 Aug, 2014   

 

 [facility] [place] [facility]  [address] [id number] [place] [address]-
2008  27 Aug, 2014   

 

 [facility] [place] [facility]  [address] [id number] [place] [address]-
4406  27 Aug, 2014   

 

 [facility] [place] [facility]  [address] [id number] [place] [address]-
0197  22 Aug, 2014   

 

 [facility] [place] [facility]  [address] [id number] [place] [address]-
0447  22 Aug, 2014   

 

 [facility] [place] [facility]  [address] [id number] [place] [address]-
7429     

 

 [facility] [place] [facility]  [address] [id number] [place] [address]-
5151     

 

 [facility] [place] [facility]  [address] [id number] [place] [address]-
6460  27 May, 2014   

 

 [facility] [place] [facility]  [address] [id number] [place] [address]-
7770  27 May, 2014   

 

 [facility] [place] [facility]  [address] [id number] [place] [address]-
6750     

 

 [facility] [place] [facility]  [address] [id number] [place] [address]-
6311     

 

 [facility] [place] [facility]  [address] [id number] [place] [address]-
1578  06 Mar, 2014   

 

 [facility] [place] [facility]  [address] [id number] [place] [address]-
4316  06 Mar, 2014   

 

 [facility] [place] [facility]  [address] [id number] [place]-
0855     

 

 [facility] [place] [facility]  [address] [id number] [place] [address]-
0538     

 

 [facility] [place] [facility]  [address] [id number] [place] [address]-
5506     

 

 [facility] [place] [facility]  [address] [id number] [place] [address]-
5016     

 

 [facility] [place] [facility]  [address] [id number] [place] [address]-
1196     

 

 [facility] [place] [facility]  [address] [id number] [place] [address]-
6856     

 

 [facility] [place] [facility]  [address] [id number] [place] [address]-
0486     

 

 [facility] [place] [facility]  [address] [id number] [place] [address]-
1871     

 

 [facility] [place] [facility]  [address] [facility] [id number] [place] [address]-
1578  15 Oct, 2013   

 

 [facility] [place] [facility]  [address] [id number] [place] [address]-
8130  15 Oct, 2013   

 

 [facility] [place] [facility]  [address] [id number] [place] [address]-
4554  28 Aug, 2013   

 

 [facility] [place] [facility]  [address] [facility] [id number] [place] [address]-
5482     

 

 [facility] [place] [facility]  [address] [id number] [place] [address]
2546  27 Mar, 2013   

 

 [facility] [place] [facility]  [address] [id number] [place] [address]
2542     

 

 [facility] [place] [facility]  [address] [id number] [place] [address]-
2831     

 

 [facility] [place] [facility]  [address] [id number] [place] [address]
2546  10 Michel, 2013   

 

 [facility] [place] [facility]  [address] [id number] [place] [address]-
5346  03 Dec, 2012   

 

 [facility] [place] [facility]  [address] [id number] [place] [address]-
2006  03 Dec, 2012   

 

 [facility] [place] [facility]  [address] [id number] [place] [address]-
1526     

 

 [facility] [place] [facility]  [address] [id number] [place] [address]-
1956     

 

 [facility] [place] [facility]  [address] [id number] [place] [address]-
8336  08 Oct, 2012   

 

 [facility] [place] [facility]  [address] [id number] [place] [address]-
0106  25 Sep, 2012   

 

 [facility] [place] [facility]  [address] [id number] [place] [address]-
1556  06 Sep, 2012   

 

 [facility] [place] [facility]  [address] [id number] [place] [address]-
6746  10 Aug, 2012   

 

 [facility] [place] [facility]  [address] [id number] [address]-
9166     

 

 [facility] [place] [facility]  [address] [id number] [place] [address]-
6796     

 

 [facility] [place] [facility]  [address] [id number][place] [address]-
1066     

 

 [facility] [place] [facility]  [address] [id number] [place] [address]-
2356     

 

 [facility] [place] [facility]  [address] [id number][place] [address]-
9406     

 

 [facility] [place] [facility]  [address] [id number] [place] [address]-
6936     

 

 [facility] [place] [facility]  [address] [id number][place] [address]-
7506     

 

 [facility] [place] [facility]  [address] [id number] [place] [address]-
0126     

 

 [facility] [place] [facility]  [address] [id number] [place] [address]-
5515  31 2012   

 

 [facility]  [address] [id number] [place] [address]-
4146  06 2012   

 

 [facility] [place] [facility]  [address] [id number] [place] [address]-
8082  06 Dec, 2011   

 

 [facility] [place] [facility]  [address] [id number] [place] [address]-
8317  10 2011   

 

 [facility] [place] [facility]  [address] [id number] [place] [address]-
0384  14 2011   

 

 [facility] [place] [facility]  [address] [id number] [place] [address]-
6677  10 May, 2011   

 

 [facility] [place] [facility]  [address] [id number] [place] [address]-
6669  29 Dec, 2010   

 

 [facility] [place] [facility]  [address] [id number] [place] [address]-
3771  16 2010   

 

 [facility] [place] [facility]  [address] [id number] [place] [address]-
0254  18 Oct, 2010   

 

 [facility] [place] [facility]  [address] [id number] [place] [address]-
7339  14 2010   

 

 [facility] [place] [facility]  [address] [id number] [place] [address]-
7166  14 Dec, 2009   

 

 [facility] [place] [facility]  [address] [id number] [place] [address]-
1704  14 Dec, 2009   

 

 [facility] [place] [facility]  [address] [id number] [place] [address]-
5520  10 Dec, 2009   

 

 [facility] [place] [facility]  [address] [id number] [place] [address]-
3164  07 Dec, 2009   

 

 [facility] [place] [facility]  [address] [id number] [place] [address]-
3767  25 2009   

 

 [facility] [place] [facility]  [address] [id number] [place] [address]-
5150  10 2009   

 

 [facility] [place] [facility]  [address] [facility] [id number] [place] [address]-
0632  15 Apr, 2009   







IMMUNIZATIONS

No Known Immunizations



SOCIAL HISTORY

Never Assessed



REASON FOR VISIT

upper back pain along with right hip pain-Primo DE JESUS



PLAN OF CARE







 Activity  Details









  









 Follow Up  2 Months Reason:back pain







VITAL SIGNS







 Height  67 in  2018

 

 Weight  272.8 lbs  2018

 

 Temperature  98.4 degrees Fahrenheit  2018

 

 Heart Rate  66 bpm  2018

 

 Respiratory Rate  20   2018

 

 BMI  42.72 kg/m2  2018

 

 Blood pressure systolic  130 mmHg  2018

 

 Blood pressure diastolic  82 mmHg  2018







MEDICATIONS







 Medication  Instructions  Dosage  Frequency  Start Date  End Date  Duration  
Status

 

 Tramadol HCl 50 mg  Orally 2 times a day  1 tablet as needed  12h  02 May, 
2018  30 May, 2018  28 days  Active

 

 Alprazolam 1 MG  Orally Twice a day, and 1 tablet at bedtime  0.5 Tablet as 
needed     03 Mar, 2015     28 days  Active

 

 Betamethasone Dipropionate 0.05 %  Externally twice weekly  1 application to 
affected area             Active

 

 Propranolol HCl 20 MG     1 tablet Twice a day Orally           90  Active

 

 Cyclobenzaprine HCl 10 mg  Orally at bed time  1 tablet     02 May, 2018  12 
May, 2018  10 days  Active

 

 Fluoxetine HCl 20 MG     TAKE 3 CAPSULES BY MOUTH ONCE DAILY           30  
Active







RESULTS







 Name  Result  Date  Reference Range

 

 Xray : Spine, Lumbar 2-3 views (IN HOUSE)     2018   

 

 Xray : Knee, Left 3 views (IN HOUSE)     2018   

 

 Xray : Knee, Right 3 views (IN HOUSE)     2018   







PROCEDURES







 Procedure  Date Ordered  Result  Body Site

 

 09 PANEL (PROFILE 1)  May 02, 2018      

 

 X-RAY EXAM OF KNEE, 3  May 02, 2018      

 

 X-RAY EXAM OF LOWER SPINE  May 02, 2018      







INSTRUCTIONS





MEDICATIONS ADMINISTERED

No Known Medications



MEDICAL (GENERAL) HISTORY







 Type  Description  Date

 

 Medical History  depression   

 

 Medical History  hx of anxiety   

 

 Medical History  mitral valve prolapse   

 

 Surgical History  tonsillectomy and adenoidectomy   

 

 Surgical History  partial hysterectomy   

 

 Surgical History  jaw surgery  1974

 

 Surgical History  colonoscopy  16

 

 Surgical History  colonoscopy  10/2017

 

 Hospitalization History  for surgery
[facility]

 Created on: 2018



Shereen Shafer

External Reference #: [id number]

: 1960

Sex: Female



Demographics







 Address  [address] [place]  [address]

 

 Preferred Language  Unknown

 

 Marital Status  Unknown

 

 Religion Affiliation  Unknown

 

 Race  Unknown

 

 Ethnic Group  Unknown





Author







 Author  JAVON DE LOS SANTOS

 

 Organization  [facility]

 

 Address  [address] [place]  [address]



 

 Phone  [phone]







Care Team Providers







 Care Team Member Name  Role  Phone

 

 JAVON DE LOS SANTOS  Unavailable  [phone]







PROBLEMS







 Type  Condition  ICD9-CM Code  SVW56-UN Code  Onset Dates  Condition Status  
SNOMED Code

 

 Problem  Hypertension     I10     Active  18886923

 

 Problem  Eustachian tube dysfunction     H69.80     Active  92160113

 

 Problem  Knee pain     M25.569     Active  23878469

 

 Problem  Dysthymia     F34.1     Active  33850193

 

 Problem  Other chronic pain     G89.29     Active  58408133

 

 Problem  Acute right-sided low back pain with right-sided sciatica     M54.41 
    Active  882824164

 

 Problem  Pharyngitis, unspecified etiology     J02.9     Active  408443867

 

 Problem  Anxiety disorder, unspecified     F41.9     Active  854550774

 

 Problem  Psoriasis     L40.9     Active  2860842

 

 Problem  Dysuria     R30.0     Active  70852000







ALLERGIES

No Information



ENCOUNTERS







 Encounter  Location  Date  Diagnosis

 

 [place] [address] [place] [phone]-
[phone]     

 

 [place] [address] [place] [id number] [place] [phone]
[phone]  25 May, 2018  Anxiety disorder, unspecified F41.9 and Pain in right knee 
M25.561

 

 [place] [address] [id number] [place] [phone]-
[phone]  02 May, 2018  Pain in right knee M25.561 ; Pain in left knee M25.562 ; 
Other chronic pain G89.29 ; Anxiety disorder, unspecified F41.9 ; Acute right-
sided low back pain with right-sided sciatica M54.41 and BMI 40.0-44.9, adult 
Z68.41

 

 [place] [address] [place] [id number] [place] [phone]-
[phone]    Anxiety disorder, unspecified F41.9

 

 [facility]  [id number], [place] [address]-
2036  26 Mar, 2018  Anxiety disorder, unspecified F41.9

 

 [facility]  [id number] [address]-
1620  06 Mar, 2018  Anxiety disorder, unspecified F41.9

 

 [facility]  [address] [place] [phone]
[phone]    Anxiety disorder, unspecified F41.9

 

 [facility]  [address] [place] [phone]-
[phone]     

 

 [facility]  [id number] [place] [phone]-
[phone]    Anxiety disorder, unspecified F41.9

 

 [facility]  [id number] [place] [phone]-
[phone]  12 Dec, 2017  Anxiety disorder, unspecified F41.9

 

 [facility]  [address]00565100[place], [place] [address]-
7066    Anxiety disorder, unspecified F41.9 ; Hypertension I10 ; 
Encounter for screening mammogram for breast cancer Z12.31 ; Psoriasis L40.9 
and BMI 40.0-44.9, adult Z68.41

 

 [facility]  [address]00565100[place], [place] [address]-
7401    Anxiety disorder, unspecified F41.9

 

 [facility]  [address]00565100[place], [place] [address]-
3798  23 Oct, 2017   

 

 [facility]  [address] [place] [address]-
4471  17 Oct, 2017  Anxiety disorder, unspecified F41.9

 

 [facility]  [id number] [address]-
4936  09 Oct, 2017   

 

 [address] [id number] [place] [id number]  
21 Sep, 2017  Dysuria R30.0

 

 [facility]  [id number] [place] [address] 84389-
8840  19 Sep, 2017  Anxiety disorder, unspecified F41.9

 

 [facility]  [id number] [place] [address] 74132-
6399  22 Aug, 2017  Anxiety disorder, unspecified F41.9

 

 [facility]  [id number] [place] [address] 77558-
8545    Anxiety disorder, unspecified F41.9

 

 [facility]  [id number] [place] [address] 32120-
5586  10 Jul, 2017   

 

 [facility]  [id number] [place] [address] 65860-
2261    Anxiety disorder, unspecified F41.9

 

 [facility]  [id number] [place] [address] 86471-
1953  30 May, 2017  Anxiety disorder, unspecified F41.9

 

 [facility]  [address] [place] [address] 52617-
8387  02 May, 2017  Anxiety disorder, unspecified F41.9

 

 [facility]  [id number] [place] [address] 34324-
2772    Anxiety disorder, unspecified F41.9

 

 [facility]  [address]005[address]100[place], [place] [address]-
8432  07 Mar, 2017   

 

 [facility]  [id number] [place] [address] 43667-
0649  07 Mar, 2017  Anxiety disorder, unspecified F41.9

 

 [facility]  [id number] [place] [address] 20300-
2191    Well woman exam Z01.419 ; Cervical cancer screening Z12.4 
and Breast cancer screening Z12.39

 

 [facility]  [address] [place] [address] 96173-
7133    Anxiety disorder, unspecified F41.9

 

 [facility]  [id number] [place] [address] 10947-
4780    Eustachian tube dysfunction H69.80 and Pharyngitis, 
unspecified etiology J02.9

 

 [place] [id number] [place] [address] 82373-
9078    Anxiety disorder, unspecified F41.9

 

 [place] [address] [address]-
3588  05 Dec, 2016  Anxiety disorder, unspecified F41.9

 

 [place] [address] [address]-
9218    Anxiety disorder, unspecified F41.9

 

 [place] [address] [id number] [place] [address] 37111-
2214  30 Sep, 2016  Anxiety disorder, unspecified F41.9

 

 [place] [address] [address]-
9846  20 Sep, 2016  Irritable bowel syndrome with diarrhea K58.0 ; Hypertension 
I10 ; Family history of diabetes mellitus Z83.3 and Visual changes H53.9

 

 [place] [id number] [place] [address] 24117-
4245  26 Aug, 2016  Anxiety disorder, unspecified F41.9

 

 [place] [id number] [place] [address] 49037-
8991  09 Aug, 2016  RLQ abdominal pain R10.31

 

 [place] [id number] [place] [address] 04859-
6129  04 Aug, 2016  RLQ abdominal pain R10.31 and Varicose veins of both lower 
extremities I83.93

 

 [place] [id number] [place] [address] 22565-
2497    Anxiety disorder, unspecified F41.9

 

 [place] [id number] [place] [address] 44799-
3114     

 

 [place] [id number] [place] [address] 04614-
5279     

 

 [facility]  [id number] [place] [address] 44151-
9156    Knee pain M25.569

 

 [facility]  [id number] [place] [address] 86838-
0864     

 

 [facility]  [id number] [place] [address] 03198-
3095  27 May, 2016  Anxiety disorder, unspecified F41.9

 

 [facility]  [id number] [place] [address] 17301-
9803     

 

 [facility]  [id number] [place] [address] 59017-
7818     

 

 [facility]  [id number] [place] [address] 72415-
2078  30 Mar, 2016   

 

 [facility]  [id number] [place] [address] 27938-
5633  29 Mar, 2016   

 

 [facility]  [id number] [place] [address] 353[address]-
9250    Hypertension I10 ; Dysthymia F34.1 ; Knee pain M25.569 and 
Eustachian tube dysfunction H69.80

 

 [facility]  [id number] [place] [address] 11479-
3790     

 

 [facility]  [address] [id number] [address]-
9399     

 

 [facility]  [id number] [place] [address] 185390-
4670  02 Dec, 2015   

 

 [facility]  [id number] [place] [address] 67780-
7858  01 Dec, 2015   

 

 [facility]  [address]00565100[place], [place] [address]-
4355     

 

 [facility]  [address]00565100[place], [place] [address]-
0186     

 

 [facility]  [id number] [place] [address] 28289-
7425  30 Oct, 2015   

 

 [facility]  [address] [id number] [place] [id number]  
23 Sep, 2015  Dental examination V72.2

 

 [facility]  [address] [place] [address] AVE [id number] [place] [id number]  
15 Sep, 2015  Allergic rhinitis 477.9 and Chronic serous otitis media of both 
ears 381.10

 

 [facility]  [address] [place] [address] 74472-
5983  21 Aug, 2015   

 

 [facility]  [address] [id number] [place] [id number]  
08 Aug, 2015  Sinusitis 473.9 and Bronchitis 490

 

 [facility]  [id number] [place] [address] 38126-
3363     

 

 [facility]  [id number] [place] [address] 54651-
4503     

 

 [facility]  [id number] [place] [address] 90707-
8805     

 

 [facility]  [address] [place] [address] 51090-
0975     

 

 [facility]  [address] [place] [address] 87471-
7360     

 

 [facility]  [address] [place] [address] 71964-
9245  03 Mar, 2015   

 

 [facility]  [id number] [place] [address] 98085-
8068  03 Mar, 2015   

 

 [facility]  [address] 6442665[address] [address]-
1306     

 

 [facility]  [address] [place] [address] 69383-
0611     

 

 [facility]  [address] [id number] [place] [address]-
1683     

 

 [facility] [place] [facility]  [address] [id number] [place] [address]-
6635     

 

 [facility] [place] [facility]  [address] [id number] [place] [address]-
0387     

 

 [facility] [place] [facility]  [address] [id number] [place] [address]-
8147     

 

 [facility] [place] [facility]  [address] [id number] [place] [address]-
8562     

 

 [facility] [place] [facility]  [address] [id number] [place] [address]-
7730     

 

 [facility] [place] [facility]  [address] [id number] [place] [address]-
4589     

 

 [facility] [place] [facility]  [address] [id number] [place] [address]-
7683     

 

 [facility] [place] [facility]  [address] [id number] [place] [address]-
9147  30 Dec, 2014   

 

 [facility] [place] [facility]  [address] [id number] [place] [address]-
8979  29 Dec, 2014   

 

 [facility] [place] [facility]  [address] [id number] [place] [address]-
1782  29 Dec, 2014   

 

 [facility] [place] [facility]  [address] [id number] [place] [address]-
8236  03 Dec, 2014   

 

 [facility] [place] [facility]  [address] [id number] [place] [address]-
5736  03 Dec, 2014   

 

 [facility] [place] [facility]  [address] [id number] [place] [address]-
2191     

 

 [facility] [place] [facility]  [address] [id number] [place] [address]-
9574     

 

 [facility] [place] [facility]  [address] [id number] [place] [address]-
6360  23 Oct, 2014   

 

 [facility] [place] [facility]  [address] [id number] [place] [address]-
1213  23 Oct, 2014   

 

 [facility] [place] [facility]  [address] [id number] [place] [address]-
4959  15 Oct, 2014   

 

 [facility] [place] [facility]  [address] [id number] [place] [address]-
2806  15 Oct, 2014   

 

 [facility] [place] [facility]  [address] [id number] [place] [address]-
2952  22 Sep, 2014   

 

 [facility] [place] [facility]  [address] [id number] [place] [address]-
6928  22 Sep, 2014   

 

 [facility] [place] [facility]  [address] [id number] [place] [address]-
3615  10 Sep, 2014   

 

 [facility] [place] [facility]  [address] [id number] [place] [address]-
4172  10 Sep, 2014   

 

 [facility] [place] [facility]  [address] [id number] [place] [address]-
3793  03 Sep, 2014   

 

 [facility] [place] [facility]  [address] [id number] [place] [address]-
7514  03 Sep, 2014   

 

 [facility] [place] [facility]  [address] [id number] [place] [address]-
7184  29 Aug, 2014   

 

 [facility] [place] [facility]  [address] [id number] [place] [address]-
3886  29 Aug, 2014   

 

 [facility] [place] [facility]  [address] [id number] [place] [address]-
6349  27 Aug, 2014   

 

 [facility] [place] [facility]  [address] [id number] [place] [address]-
4352  27 Aug, 2014   

 

 [facility] [place] [facility]  [address] [id number] [place] [address]-
3280  22 Aug, 2014   

 

 [facility] [place] [facility]  [address] [id number] [place] [address]-
0660  22 Aug, 2014   

 

 [facility] [place] [facility]  [address] [id number] [place] [address]-
3404     

 

 [facility] [place] [facility]  [address] [id number] [place] [address]-
6349     

 

 [facility] [place] [facility]  [address] [id number] [place] [address]-
4481  27 May, 2014   

 

 [facility] [place] [facility]  [address] [id number] [place] [address]-
3403  27 May, 2014   

 

 [facility] [place] [facility]  [address] [id number] [place] [address]-
0300     

 

 [facility] [place] [facility]  [address] [id number] [place] [address]-
3695     

 

 [facility] [place] [facility]  [address] [facility] [id number] [place] [address]-
0526  06 Mar, 2014   

 

 [facility] [place] [facility]  [address] [id number] [place] [address]-
5801  06 Mar, 2014   

 

 [facility] [place] [facility]  [address] [id number] [place] [address]-
3683     

 

 [facility] [place] [facility]  [address] [id number] [place] [address]-
6243     

 

 [facility] [place] [facility]  [address] [facility] [id number] [place] [address]-
8227     

 

 [facility] [place] [facility]  [address] [id number] [place] [address]-
3228     

 

 [facility] [place] [facility]  [address] [id number] [place] [address]-
5726     

 

 [facility] [place] [facility]  [address] [id number] [place] [address]-
0786     

 

 [facility] [place] [facility]  [address] [facility] [id number] [place] [address]-
9693     

 

 [facility] [place] [facility]  [address] [facility] [id number][place] [address]-
6338     

 

 [facility]  [address] [id number] [place] [address]-
9617  15 Oct, 2013   

 

 [facility] [place] [facility]  [address] [id number] [place] [address]-
7071  15 Oct, 2013   

 

 [facility] [place] [facility]  [address] [id number] [place] [address]-
6842  28 Aug, 2013   

 

 [facility] [place] [facility]  [address] [id number] [place] [address]-
8164     

 

 [facility] [place] [facility]  [address] [id number] [place] [address]-
8814  27 Mar, 2013   

 

 [facility] [place] [facility]  [address] [id number] [place] [address]-
3501     

 

 [facility]  [address] [facility] [id number] [place] [address]-
1717     

 

 [facility] [place] [facility]  [address] [facility] [id number] [place] [address]-
2786  10 Michel, 2013   

 

 [facility] [place] [facility]  [address] [facility] [id number] [place] [address]-
1312  03 Dec, 2012   

 

 [facility] [place] [facility]  [address] [facility] [id number] [place] [address]-
1032  03 Dec, 2012   

 

 [facility] [place] [facility]  [address] [facility] [id number] [place] [address]-
7581     

 

 [facility] [place] [facility]  [address] [id number][place] [address]-
8552     

 

 [facility] [place] [facility]  [address] [id number] [place] [address]-
7671  08 Oct, 2012   

 

 [facility] [place] [facility]  [address] [facility] [id number] [place] [address]-
4692  25 Sep, 2012   

 

 [facility] [place] [facility]  [address] [facility] [id number] [place] [address]-
5989  06 Sep, 2012   

 

 [facility] [place] [facility]  [address] [id number] [place] [address]-
6359  10 Aug, 2012   

 

 [facility] [place] [facility]  [address] [id number] [place] [address]-
7456     

 

 [facility] [place] [facility]  [address] [id number] [place] [address]
2546     

 

 [facility] [place] [facility]  [address] [id number] [place] [address]-
5076     

 

 [facility] [place] [facility]  [address] [id number] [place] [address]-
4896     

 

 [facility] [place] [facility]  [address] [id number] [place] [address]-
6776     

 

 [facility] [place] [facility]  [address] [id number] [place] [address]-
6436     

 

 [facility] [place] [facility]  [address] [id number] [place] [address]-
3096     

 

 [facility] [place] [facility]  [address] [id number] [place] [address]-
6120     

 

 [facility] [place] [facility]  [address] [id number] [place] [address]-
8526     

 

 [facility] [place] [facility]  [address] [id number] [place] [address]-
9665     

 

 [facility] [place] [facility]  [address] [id number] [place] [address]-
0136  06 Dec, 2011   

 

 [facility] [place] [facility]  [address] [id number] [place] [address]-
0168  10 Nov, 2011   

 

 [facility] [place] [facility]  [address] [id number] [place] [address]-
8586  14 2011   

 

 [facility] [place] [facility]  [address] [id number] [place] [address]-
9411  10 May, 2011   

 

 [facility] [place] [facility]  [address] [id number], [place] [address]-
2023  29 Dec, 2010   

 

 [facility]  [address] [facility] [id number][place] [address]-
1503  16 2010   

 

 [facility]  [address] [facility] [id number], [place] [address]-
6500  18 Oct, 2010   

 

 [facility]  [address] [facility] [id number], [place] 3324[place]-
1422  14 2010   

 

 [facility]  [address] [facility] [id number], [place] [address]-
8361  14 Dec, 2009   

 

 [facility]  [address] [facility] [id number], [place] 583583-
9852  14 Dec, 2009   

 

 [facility]  [address]100[place], [place] [address]-
1190  10 Dec, 2009   

 

 [facility]  [address]100[place], [place] [address]-
0812  07 Dec, 2009   

 

 [facility]  [address]100[place], [place] [address]-
0643     

 

 [facility]  [id number] 5665[place]-
4610  10 Nov, 2009   

 

 [facility]  [address] [id number] [address]-
0567  15 Apr, 2009   







IMMUNIZATIONS

No Known Immunizations



SOCIAL HISTORY

Never Assessed



REASON FOR VISIT

Controlled Med Refill 17



PLAN OF CARE





VITAL SIGNS





MEDICATIONS







 Medication  Instructions  Dosage  Frequency  Start Date  End Date  Duration  
Status

 

 Alprazolam 1 MG  Orally Twice a day, and 1 tablet at bedtime  0.5 Tablet as 
needed     03 Mar, 2015     28 days  Active







RESULTS

No Results



PROCEDURES

No Known procedures



INSTRUCTIONS





MEDICATIONS ADMINISTERED

No Known Medications



MEDICAL (GENERAL) HISTORY







 Type  Description  Date

 

 Medical History  depression   

 

 Medical History  hx of anxiety   

 

 Medical History  mitral valve prolapse   

 

 Surgical History  tonsillectomy and adenoidectomy   

 

 Surgical History  partial hysterectomy   

 

 Surgical History  jaw surgery  1974

 

 Surgical History  colonoscopy  16

 

 Surgical History  colonoscopy  10/2017

 

 Hospitalization History  for surgery
[facility]

 Created on: 2018



Shereen Shafer

External Reference #: [id number]

: 1960

Sex: Female



Demographics







 Address  [address] [place]  [address]

 

 Preferred Language  Unknown

 

 Marital Status  Unknown

 

 Confucianist Affiliation  Unknown

 

 Race  Unknown

 

 Ethnic Group  Unknown





Author







 Author  JAVON DE LOS SANTOS

 

 Organization  [facility]

 

 Address  [address] [place]  [address]



 

 Phone  [phone]







Care Team Providers







 Care Team Member Name  Role  Phone

 

 JAVON DE LOS SANTOS  Unavailable  [phone]







PROBLEMS







 Type  Condition  ICD9-CM Code  WOP80-AY Code  Onset Dates  Condition Status  
SNOMED Code

 

 Problem  Bilateral claudication of lower limb     I73.9     Active  25532285

 

 Problem  Coronary artery disease involving native coronary artery of native 
heart with angina pectoris     I25.119     Active  [id number]

 

 Problem  Mitral valve insufficiency, unspecified etiology     I34.0     Active
  11869544

 

 Problem  Thyroid nodule     E04.1     Active  656136534

 

 Problem  Violation of controlled substance agreement     Z91.14    
Active  649822933

 

 Problem  Overactive bladder     N32.81     Active  873055033

 

 Problem  Obstructive sleep apnea     G47.33     Active  27256179

 

 Problem  Essential hypertension     I10     Active  11970179

 

 Problem  Primary insomnia     F51.01     Active  6291265

 

 Problem  Sleep apnea in adult     G47.30     Active  86436642

 

 Problem  Hypertension     I10     Active  84943039

 

 Problem  Knee pain     M25.569     Active  80881131

 

 Problem  Eustachian tube dysfunction     H69.80     Active  12312482

 

 Problem  Dysthymia     F34.1     Active  09907169

 

 Problem  Dysuria     R30.0     Active  66980153

 

 Problem  Psoriasis     L40.9     Active  8989533

 

 Problem  Anxiety disorder, unspecified     F41.9     Active  725941837

 

 Problem  Acute right-sided low back pain with right-sided sciatica     M54.41 
    Active  390900814

 

 Problem  Pharyngitis, unspecified etiology     J02.9     Active  308127647

 

 Problem  Other chronic pain     G89.29     Active  29423943







ALLERGIES

No Known Allergies



ENCOUNTERS







 Encounter  Location  Date  Diagnosis

 

 [facility]  [address] [facility] [id number] [address]-
6566     

 

 [facility]  [address] [facility] [id number][place] [address]-
1278  12 Dec, 2018  Primary insomnia F51.01 ; Sleep apnea in adult G47.30 ; 
Thyroid nodule E04.1 ; Dysfunction of right eustachian tube H69.81 ; Overactive 
bladder N32.81 and BMI 40.0-44.9, adult Z68.41

 

 [place] [address] [place] [address]-
8672  12 Dec, 2018  Shortness of breath R06.02 ; Hypertension I10 ; Other 
hyperlipidemia E78.49 ; Obstructive sleep apnea G47.33 and BMI 40.0-44.9, adult 
Z68.41

 

 [place] [id number] [place] [address]-
0150  04 Dec, 2018  Thyroid nodule E04.1

 

 [place] [address] [place] [id number] [place] [address] 17782-
6550  04 Dec, 2018   

 

 [place] [id number] [place] [address]258-
0913  03 Dec, 2018   

 

 [place] [id number] [place] [address]-
7910  14 2018  Left hip pain M25.552 ; Left lower quadrant pain R10.32 ; 
Psoriasis L40.9 ; Encounter for screening mammogram for breast cancer Z12.31 
and BMI 40.0-44.9, adult Z68.41

 

 [place] [id number] [place] [address]-
9582  12 Sep, 2018  Dyspnea on exertion R06.09 ; Essential hypertension I10 ; 
Bilateral claudication of lower limb I73.9 ; Mitral valve insufficiency, 
unspecified etiology I34.0 ; Suspected sleep apnea R29.818 ; Tobacco use Z72.0 
and Coronary artery disease involving native coronary artery of native heart 
with angina pectoris I25.119

 

 [place] [address]  [id number] [id number]  
  Shortness of breath R06.02 and Anxiety disorder, unspecified F41.9

 

 [place] [id number] [place] [address] [phone]
[phone]    Anxiety disorder, unspecified F41.9

 

 [place] [id number] [place] [id number] [place] [address] [phone]
[phone]    Anxiety disorder, unspecified F41.9 ; Shortness of breath 
R06.02 ; Therapeutic drug monitoring Z51.81 ; Family history of diabetes 
mellitus Z83.3 ; BMI 40.0-44.9, adult Z68.41 and Tobacco abuse Z72.0

 

 [place] [address] [place] [id number] [place] [address] [phone]
[phone]  25 May, 2018  Anxiety disorder, unspecified F41.9 and Pain in right knee 
M25.561

 

 [place] [address] [place] [id number] [place] [address] [phone]
[phone]  02 May, 2018  Pain in right knee M25.561 ; Pain in left knee M25.562 ; 
Other chronic pain G89.29 ; Anxiety disorder, unspecified F41.9 ; Acute right-
sided low back pain with right-sided sciatica M54.41 and BMI 40.0-44.9, adult 
Z68.41

 

 [place] [address] [place] [id number] [place] [address] [phone]
[phone]    Anxiety disorder, unspecified F41.9

 

 [place] [address] [place] [id number] [place] [address] [phone]
[phone]  26 Mar, 2018  Anxiety disorder, unspecified F41.9

 

 [place] [address] [place] [id number] [place] [address] [phone]
[phone]  06 Mar, 2018  Anxiety disorder, unspecified F41.9

 

 [place] [address] [place] [id number] [place] [address] [phone]
[phone]    Anxiety disorder, unspecified F41.9

 

 [place] [address] [place] [id number] [place] [address] [phone]
[phone]     

 

 [place] [address] [place] [id number] [place] [address] [phone]
[phone]    Anxiety disorder, unspecified F41.9

 

 [place] [address] [address]-
1848  12 Dec, 2017  Anxiety disorder, unspecified F41.9

 

 [place] [id number] [place] [address]34-
7945    Anxiety disorder, unspecified F41.9 ; Hypertension I10 ; 
Encounter for screening mammogram for breast cancer Z12.31 ; Psoriasis L40.9 
and BMI 40.0-44.9, adult Z68.41

 

 [place] [address] [place] [address] 59816-
5208    Anxiety disorder, unspecified F41.9

 

 [place] [id number] [place] [address] 41980-
5151  23 Oct, 2017   

 

 [place] [id number] [place] [address] 74881-
2829  17 Oct, 2017  Anxiety disorder, unspecified F41.9

 

 [place] [id number] [place] [address] 45836-
5840  09 Oct, 2017   

 

 [address] [id number] [id number]  
21 Sep, 2017  Dysuria R30.0

 

 [place] [address] [address]-
9031  19 Sep, 2017  Anxiety disorder, unspecified F41.9

 

 [place] [address] [place] [address] 63218-
7559  22 Aug, 2017  Anxiety disorder, unspecified F41.9

 

 [place] [address] [place] [address] 80602-
9376    Anxiety disorder, unspecified F41.9

 

 [place] [address] [place] [address] 73289-
5226  10 Jul, 2017   

 

 [place] [address] [place] [address] 75063-
0214    Anxiety disorder, unspecified F41.9

 

 [place] [address]100[place] [address]-
4533  30 May, 2017  Anxiety disorder, unspecified F41.9

 

 [facility]  [id number] [place] [address] 56068-
4101  02 May, 2017  Anxiety disorder, unspecified F41.9

 

 [facility]  [address]100[place] [address]-
3731    Anxiety disorder, unspecified F41.9

 

 [facility]  [id number] [place] [address] 78246-
0024  07 Mar, 2017   

 

 [place] [id number] [place] [address] 774982-
8580  07 Mar, 2017  Anxiety disorder, unspecified F41.9

 

 [place] [id number] [place] [address] 24834-
3265    Well woman exam Z01.419 ; Cervical cancer screening Z12.4 
and Breast cancer screening Z12.39

 

 [place] [address] [place] [address] 38721-
0287    Anxiety disorder, unspecified F41.9

 

 [place] [id number] [place] [address] 69560-
3409    Eustachian tube dysfunction H69.80 and Pharyngitis, 
unspecified etiology J02.9

 

 [place] [address] [place] [address] 07092-
6284    Anxiety disorder, unspecified F41.9

 

 [place] [address]100[place] [address]-
6898  05 Dec, 2016  Anxiety disorder, unspecified F41.9

 

 [place] [address] [place] [address] 289938-
4050    Anxiety disorder, unspecified F41.9

 

 [place] [address]100[place] [address]-
0535  30 Sep, 2016  Anxiety disorder, unspecified F41.9

 

 [facility]  [id number] [place] [address] 65407-
2408  20 Sep, 2016  Irritable bowel syndrome with diarrhea K58.0 ; Hypertension 
I10 ; Family history of diabetes mellitus Z83.3 and Visual changes H53.9

 

 [facility]  [id number] [place] [address] 85515-
2953  26 Aug, 2016  Anxiety disorder, unspecified F41.9

 

 [facility]  [id number] [place] [address] 44392-
5398  09 Aug, 2016  RLQ abdominal pain R10.31

 

 [facility]  [address] [address]-
4256  04 Aug, 2016  RLQ abdominal pain R10.31 and Varicose veins of both lower 
extremities I83.93

 

 [facility]  [id number] [place] [address] 48691-
8538    Anxiety disorder, unspecified F41.9

 

 [facility]  [id number] [place] [address] 28136-
1627     

 

 [facility]  [id number] [place] [address] 10570-
5620     

 

 [facility]  [id number] [place] [address] 36246-
0330    Knee pain M25.569

 

 [facility]  [id number] [place] [address] 03517-
6611     

 

 [facility]  [id number] [place] [address] 89675-
0278  27 May, 2016  Anxiety disorder, unspecified F41.9

 

 [facility]  [id number] [place] [address] 15178-
7326     

 

 [facility]  [id number] [place] [address] 68361-
4479     

 

 [facility]  [id number] [place] [address] 07657-
5576  30 Mar, 2016   

 

 [facility]  [id number] [place] [address] 60305-
9148  29 Mar, 2016   

 

 [facility]  [id number] [place] [address] 14931-
1473    Hypertension I10 ; Dysthymia F34.1 ; Knee pain M25.569 and 
Eustachian tube dysfunction H69.80

 

 [facility]  [id number] [place] [address] 47407-
1055     

 

 [facility]  [id number] [place] [address] 81455-
7553     

 

 [facility]  [id number] [place] [address] 64874-
6001  02 Dec, 2015   

 

 [facility]  [id number] [place] [address] 35895-
3092  01 Dec, 2015   

 

 [facility]  [id number] [place] [address] 99358-
2307     

 

 [facility]  [id number] [place] [address] 37168-
7742     

 

 [facility]  [id number] [place] [address] 15094-
5721  30 Oct, 2015   

 

 [facility][1]  [address] [facility] [id number][place] [address]  
23 Sep, 2015  Dental examination V72.2

 

 [facility][1]  [address] [facility] [address] [id number] [id number]  
15 Sep, 2015  Allergic rhinitis 477.9 and Chronic serous otitis media of both 
ears 381.10

 

 [facility]  [id number] [place] [address] 11651-
5646  21 Aug, 2015   

 

 [facility][1]  [address] [id number][place] [id number]  
08 Aug, 2015  Sinusitis 473.9 and Bronchitis 490

 

 [facility]  [id number] [place] [address]-
6606     

 

 [facility]  [address] [id number] [place] [address]-
5068     

 

 [facility] [place] [facility]  [address] [id number] [place] [address]-
2546     

 

 [facility] [place] [facility]  [address] [id number] [place] [address]-
7196     

 

 [facility] [place] [facility]  [address] [id number] [place] [address]-
2047     

 

 [facility] [place] [facility]  [address] [id number] [place] [address]-
4789  03 Mar, 2015   

 

 [facility] [place] [facility]  [address] [id number] [place] [address]-
8866  03 Mar, 2015   

 

 [facility] [place] [facility]  [address] [id number] [place] [address]-
4948     

 

 [facility]  [address] [id number] [place] [address]-
2157     

 

 [facility] [place] [facility]  [address] [id number] [place] [address]-
5217     

 

 [facility]  [address] [id number] [place] [address]-
0017     

 

 [facility] [place] [facility]  [address] [id number] [place] [address]-
3622     

 

 [facility] [place] [facility]  [address] [id number] [place] [address]-
9419     

 

 [facility] [place] [facility]  [address] [id number] [place] [address]-
3346     

 

 [facility] [place] [facility]  [address] [id number] [place] [address]-
2546     

 

 [facility] [place] [facility]  [address] [id number] [place] [address]-
4469     

 

 [facility] [place] [facility]  [address] [id number] [place]-
0588     

 

 [facility] [place] [facility]  [address] [id number] [place] [address]-
6158  30 Dec, 2014   

 

 [facility] [place] [facility]  [address] [id number] [place] [address]-
3659  29 Dec, 2014   

 

 [facility] [place] [facility]  [address] [id number] [place] [address]-
8268  29 Dec, 2014   

 

 [facility] [place] [facility]  [address] [id number] [place]-
7325  03 Dec, 2014   

 

 [facility] [place] [facility]  [address] [id number] [place] [address]-
4254  03 Dec, 2014   

 

 [facility] [place] [facility]  [address] [id number] [place] [address]-
8836     

 

 [facility] [place] [facility]  [address] [id number] [place] [address]-
4616     

 

 [facility] [place] [facility]  [address] [id number] [place] [address]-
4632  23 Oct, 2014   

 

 [facility] [place] [facility]  [address] [id number] [place] [address]-
3488  23 Oct, 2014   

 

 [facility] [place] [facility]  [address] [id number] [place] [address]-
1299  15 Oct, 2014   

 

 [facility] [place] [facility]  [address] [id number] [place] [address]-
5991  15 Oct, 2014   

 

 [facility] [place] [facility]  [address] [id number] [place] [address]-
7304  22 Sep, 2014   

 

 [facility] [place] [facility]  [address] [id number] [place] [address]-
0200  22 Sep, 2014   

 

 [facility] [place] [facility]  [address] [id number] [place]-
9112  10 Sep, 2014   

 

 [facility] [place] [facility]  [address] [id number] [place] [address]-
8417  10 Sep, 2014   

 

 [facility] [place] [facility]  [address] [id number] [place] [address]-
9802  03 Sep, 2014   

 

 [facility] [place] [facility]  [address] [id number] [place] [address]-
3594  03 Sep, 2014   

 

 [facility] [place] [facility]  [address] [id number] [place] [address]-
4516  29 Aug, 2014   

 

 [facility] [place] [facility]  [address] [id number] [place] [address]-
6327  29 Aug, 2014   

 

 [facility] [place] [facility]  [address] [id number] [place] [address]-
8482  27 Aug, 2014   

 

 [facility] [place] [facility]  [address] [id number] [place] [address]-
6182  27 Aug, 2014   

 

 [facility] [place] [facility]  [address] [id number] [place] [address]-
5088  22 Aug, 2014   

 

 [facility] [place] [facility]  [address] [id number] [place] [address]-
2652  22 Aug, 2014   

 

 [facility] [place] [facility]  [address] [id number] [place] [address]-
6409     

 

 [facility] [place] [facility]  [address] [id number] [place] [address]-
6239     

 

 [facility] [place] [facility]  [address] [id number] [place] [address]-
0106  27 May, 2014   

 

 [facility] [place] [facility]  [address] [id number] [place] [address]-
8720  27 May, 2014   

 

 [facility] [place] [facility]  [address] [id number] [place] [address]-
5007     

 

 [facility] [place] [facility]  [address] [id number] [place] [address]-
7569     

 

 [facility] [place] [facility]  [address] [id number] [place] [address]-
0201  06 Mar, 2014   

 

 [facility] [place] [facility]  [address] [id number] [place] [address]-
9354  06 Mar, 2014   

 

 [facility] [place] [facility]  [address] [id number] [place] [address]-
7345     

 

 [facility] [place] [facility]  [address] [id number] [place] [address]-
0186     

 

 [facility] [place] [facility]  [address] [id number] [place] [address]-
4765     

 

 [facility] [place] [facility]  [address] [id number] [place] [address]-
8705     

 

 [facility] [place] [facility]  [address] [id number] [place] [address]-
4539     

 

 [facility] [place] [facility]  [address] [facility] [id number] [place] [address]-
8083     

 

 [facility] [place] [facility]  [address] [id number] [place] [address]-
7606     

 

 [facility] [place] [facility]  [address] [id number] [place] [address]-
3874     

 

 [facility] [place] [facility]  [address] [facility] [id number] [place] [address]-
8593  15 Oct, 2013   

 

 [facility] [place] [facility]  [address] [id number] [place] [address]-
8551  15 Oct, 2013   

 

 [facility] [place] [facility]  [address] [id number] [address]-
0755  28 Aug, 2013   

 

 [facility] [place] [facility]  [address] [id number] [place] [address]-
2542     

 

 [facility] [place] [facility]  [address] [id number] [place] [address]-
2544  27 Mar, 2013   

 

 [facility] [place] [facility]  [address] [id number] [place] [address]-
2172     

 

 [facility] [place] [facility]  [address] [id number] [place] [address]-
3538     

 

 [facility]  [address] [id number] [place] [address]-
3772  10 Michel, 2013   

 

 [facility] [place] [facility]  [address] [id number] [place] [address]-
5246  03 Dec, 2012   

 

 [facility] [place] [facility]  [address] [id number] [place] [address]-
7686  03 Dec, 2012   

 

 [facility] [place] [facility]  [address] [id number] [place] [address]-
6477     

 

 [facility] [place] [facility]  [address] [id number] [place] [address]-
5491     

 

 [facility] [place] [facility]  [address] [id number] [place] [address]-
0336  08 Oct, 2012   

 

 [facility] [place] [facility]  [address] [id number] [place] [address]-
8516  25 Sep, 2012   

 

 [facility] [place] [facility]  [address] [id number] [place] [address]-
1307  06 Sep, 2012   

 

 [facility] [place] [facility]  [address] [id number] [place] [address]-
1187  10 Aug, 2012   

 

 [facility] [place] [facility]  [address] [facility] [id number] [place] [address]-
1467     

 

 [facility] [place] [facility]  [address] [id number] [place] [address]-
6311     

 

 [facility] [place] [facility]  [address] [id number] [place] [address]-
2546     

 

 [facility] [place] [facility]  [address] [id number] [place] [address]-
4566     

 

 [facility] [place] [facility]  [address] [id number] [place] [address]-
6686     

 

 [facility] [place] [facility]  [address] [id number] [place] [address]-
1816     

 

 [facility] [place] [facility]  [address] [id number] [place] [address]-
2152     

 

 [facility] [place] [facility]  [address] [id number] [place] [address]-
6227     

 

 [facility] [place] [facility]  [address] [id number] [place] [address]-
5217     

 

 [facility] [place] [facility]  [address] [id number] [place] [address]-
7763     

 

 [facility] [place] [facility]  [address] [id number] [place] [address]-
8242  06 Dec, 2011   

 

 [facility] [place] [facility]  [address] [id number] [place] [address]-
5341  10 2011   

 

 [facility] [place] [facility]  [address] [id number] [place] [address]-
8305  14 2011   

 

 [facility] [place] [facility]  [address] [id number] [place] [address]-
5602  10 May, 2011   

 

 [facility] [place] [facility]  [address] [id number] [place] [address]-
9528  29 Dec, 2010   

 

 [facility] [place] [facility]  [address] [id number][place] [address]-
6762  16 2010   

 

 [facility] [place] [facility]  [address] [id number] [place] [address]-
9377  18 Oct, 2010   

 

 [facility] [place] [facility]  [address] [id number][place] [address]-
6972  14 2010   

 

 [facility] [place] [facility]  [address] [id number] [place] [address]-
8719  14 Dec, 2009   

 

 [facility] [place] [facility]  [address] [id number] [place] [address]-
8655  14 Dec, 2009   

 

 [facility] [place] [facility]  [address] [id number][place] [address]-
5467  10 Dec, 2009   

 

 [facility] [place] [facility]  [address] [id number][place] [address]-
2546  07 Dec, 2009   

 

 [facility]  [address] [facility] [id number][place] [address]
2546     

 

 [facility]  [address] [facility] [id number][place] [address]
2546  10 2009   

 

 [facility]  [address] [facility] [id number], [place] [address]
2546  15 Apr, 2009   







IMMUNIZATIONS

No Known Immunizations



SOCIAL HISTORY

Never Assessed



REASON FOR VISIT

shortness of breath f/u, Pt also c/o cough, sore throat, runny nose.  Pt states 
she would also like something for overactive bladder  LIZA Wilcox



PLAN OF CARE







 Activity  Details









  









 Follow Up  4 Weeks Reason:insomnia







VITAL SIGNS







 Height  67 in  2018

 

 Weight  269.5 lbs  2018

 

 Temperature  98.8 degrees Fahrenheit  2018

 

 Heart Rate  74 bpm  2018

 

 Respiratory Rate  18   2018

 

 Oximetry  on room air:97 %  2018

 

 BMI  42.21 kg/m2  2018

 

 Blood pressure systolic  134 mmHg  2018

 

 Blood pressure diastolic  82 mmHg  2018







MEDICATIONS







 Medication  Instructions  Dosage  Frequency  Start Date  End Date  Duration  
Status

 

 Propranolol HCl 20 MG     1 tablet Twice a day Orally           90  Active

 

 Ambien 5 MG  Orally Once a day  1 tablet at bedtime  24h  12 Dec, 2018     28 
days  Active

 

 Betamethasone Dipropionate 0.05 %  Externally twice weekly  1 application to 
affected area             Active

 

 Fluoxetine HCl 20 MG     TAKE 3 CAPSULES BY MOUTH ONCE DAILY           30  
Active

 

 Oxybutynin Chloride 5 MG  Orally Once a day at hs  1 tablet     12 Dec, 2018  
   30 day(s)  Active

 

 PredniSONE 20 MG  Orally Once a day  1 tablet  24h  12 Dec, 2018     5 days  
Active







RESULTS

No Results



PROCEDURES

No Known procedures



INSTRUCTIONS





MEDICATIONS ADMINISTERED

No Known Medications



MEDICAL (GENERAL) HISTORY







 Type  Description  Date

 

 Medical History  depression   

 

 Medical History  hx of anxiety   

 

 Medical History  mitral valve prolapse   

 

 Surgical History  tonsillectomy and adenoidectomy   

 

 Surgical History  partial hysterectomy   

 

 Surgical History  jaw surgery  1974

 

 Surgical History  colonoscopy  16

 

 Surgical History  colonoscopy  10/2017

 

 Hospitalization History  for surgery
[facility]

 Created on: 2018



Shereen Shafer

External Reference #: [id number]

: 1960

Sex: Female



Demographics







 Address  [address] [place]  [address]

 

 Preferred Language  Unknown

 

 Marital Status  Unknown

 

 Advent Affiliation  Unknown

 

 Race  Unknown

 

 Ethnic Group  Unknown





Author







 Author  SIMONE COLIN

 

 [facility]

 

 Address  [address] [place]  [address]



 

 Phone  [phone]







Care Team Providers







 Care Team Member Name  Role  Phone

 

 SIMONE COLIN  Unavailable  [phone]







PROBLEMS







 Type  Condition  ICD9-CM Code  MUF60-GL Code  Onset Dates  Condition Status  
SNOMED Code

 

 Problem  Eustachian tube dysfunction     H69.80     Active  44479382

 

 Problem  Knee pain     M25.569     Active  95727634

 

 Problem  Psoriasis     L40.9     Active  0612109

 

 Problem  Dysuria     R30.0     Active  73501030

 

 Problem  Hypertension     I10     Active  83966594

 

 Problem  Dysthymia     F34.1     Active  00397080

 

 Problem  Pharyngitis, unspecified etiology     J02.9     Active  242907566

 

 Problem  Anxiety disorder, unspecified     F41.9     Active  [id number]







ALLERGIES

No Known Allergies



ENCOUNTERS







 Encounter  Location  Date  Diagnosis

 

 [place] [id number] [place] [address]-
5452    Anxiety disorder, unspecified F41.9

 

 [place] [id number] [place] [address]-
3724  26 Mar, 2018  Anxiety disorder, unspecified F41.9

 

 [place] [id number] [place] [address]-
9544  06 Mar, 2018  Anxiety disorder, unspecified F41.9

 

 [facility]  [id number] [place] [address]-
6246    Anxiety disorder, unspecified F41.9

 

 [address] [place] [id number] [place] [address] 7[address]-
7321     

 

 [facility]  [id number] [place] [address]-
9296    Anxiety disorder, unspecified F41.9

 

 [place] [address] [address] [address]-
1319  12 Dec, 2017  Anxiety disorder, unspecified F41.9

 

 [place] [id number] [place] [address]-
6936    Anxiety disorder, unspecified F41.9 ; Hypertension I10 ; 
Encounter for screening mammogram for breast cancer Z12.31 ; Psoriasis L40.9 
and BMI 40.0-44.9, adult Z68.41

 

 [place] [address] [place] [address] [phone]
[phone]    Anxiety disorder, unspecified F41.9

 

 [place] [address] [place] [id number] [place] [address] [phone]
[phone]  23 Oct, 2017   

 

 [place] [address] [place] [id number] [place] [address] [phone]
[phone]  17 Oct, 2017  Anxiety disorder, unspecified F41.9

 

 [place] [address] [place] [id number] [place] [address] [phone]
[phone]  09 Oct, 2017   

 

 [address] [id number] [id number]  
21 Sep, 2017  Dysuria R30.0

 

 [place] [id number], [place] [phone]-
[phone]  19 Sep, 2017  Anxiety disorder, unspecified F41.9

 

 [place] [address] [place] [address] [phone]
[phone]  22 Aug, 2017  Anxiety disorder, unspecified F41.9

 

 [place] [address] [place] [address] [phone]
[phone]    Anxiety disorder, unspecified F41.9

 

 [place] [address] [place] [address] [phone]
[phone]  10 Jul, 2017   

 

 [place] [address] [place] [address] [phone]
[phone]    Anxiety disorder, unspecified F41.9

 

 [place] [address], [place] [phone]-
[phone]  30 May, 2017  Anxiety disorder, unspecified F41.9

 

 [facility]  [address] [place] [id number] [place] [address] [phone]
[phone]  02 May, 2017  Anxiety disorder, unspecified F41.9

 

 [facility]  [id number] [phone]-
[phone]    Anxiety disorder, unspecified F41.9

 

 [facility]  [address] [place] [id number] [place] [address] [phone]
[phone]  07 Mar, 2017   

 

 [place] [address] [place] [id number] [place] [address] [phone]
[phone]  07 Mar, 2017  Anxiety disorder, unspecified F41.9

 

 [place] [address] [place] [id number] [place] [address] [phone]-
[phone]    Well woman exam Z01.419 ; Cervical cancer screening Z12.4 
and Breast cancer screening Z12.39

 

 [place] [address] [place] [address] [phone]
[phone]    Anxiety disorder, unspecified F41.9

 

 [place] [address] [place] [address] [phone]-
[phone]    Eustachian tube dysfunction H69.80 and Pharyngitis, 
unspecified etiology J02.9

 

 [place] [address], [place] [address]-
6789    Anxiety disorder, unspecified F41.9

 

 [place] [id number], [place] [phone]-
[phone]  05 Dec, 2016  Anxiety disorder, unspecified F41.9

 

 [place] [address] [place] [address] 138492-
0019    Anxiety disorder, unspecified F41.9

 

 [facility]  [address] [address]-
7468  30 Sep, 2016  Anxiety disorder, unspecified F41.9

 

 [facility]  [id number] [place] [address] 79234-
3783  20 Sep, 2016  Irritable bowel syndrome with diarrhea K58.0 ; Hypertension 
I10 ; Family history of diabetes mellitus Z83.3 and Visual changes H53.9

 

 [facility]  [id number] [place] [address] 76191-
9238  26 Aug, 2016  Anxiety disorder, unspecified F41.9

 

 [facility]  [id number] [place] [address] 90927-
3946  09 Aug, 2016  RLQ abdominal pain R10.31

 

 [facility]  [address] [address]-
9753  04 Aug, 2016  RLQ abdominal pain R10.31 and Varicose veins of both lower 
extremities I83.93

 

 [facility]  [id number] [place] [address] 16261-
7176    Anxiety disorder, unspecified F41.9

 

 [facility]  [id number] [place] [address] 27820-
2796     

 

 [facility]  [id number] [place] [address] 35092-
9223     

 

 [facility]  [id number] [place] [address] 81804-
9018    Knee pain M25.569

 

 [facility]  [id number] [place] [address] 03845-
4711     

 

 [facility]  [id number] [place] [address] 43412-
8112  27 May, 2016  Anxiety disorder, unspecified F41.9

 

 [facility]  [id number] [place] [address] 31725-
2764     

 

 [facility]  [id number] [place] [address] 88719-
4805     

 

 [facility]  [address] [place] [address]-
8037  30 Mar, 2016   

 

 [facility]  [id number] [place] [address] 29608-
9783  29 Mar, 2016   

 

 [facility]  [id number] [place] [address] 49159-
0330    Hypertension I10 ; Dysthymia F34.1 ; Knee pain M25.569 and 
Eustachian tube dysfunction H69.80

 

 [facility]  [id number] [place] [address] 67620-
2100     

 

 [facility]  [id number] [place] [address] 71887-
6601     

 

 [facility]  [id number] [place] [address] 73053-
3726  02 Dec, 2015   

 

 [facility]  [id number] [place] [address] 01094-
5609  01 Dec, 2015   

 

 [facility]  [id number] [place] [address] 11326-
1172     

 

 [facility]  [id number] [place] [address] 87540-
0049     

 

 [facility]  [id number] [place] [address] 04996-
6671  30 Oct, 2015   

 

 [facility]  [address] [id number] [id number]  
23 Sep, 2015  Dental examination V72.2

 

 [facility]  [address] [id number] [id number]  
15 Sep, 2015  Allergic rhinitis 477.9 and Chronic serous otitis media of both 
ears 381.10

 

 [facility]  [id number] [place] [address] 10411-
9556  21 Aug, 2015   

 

 [facility]  [address] [id number] [id number]  
08 Aug, 2015  Sinusitis 473.9 and Bronchitis 490

 

 [facility] [place] [facility]  [address] [facility] [id number] [place] [address]-
9036     

 

 [facility] [place] [facility]  [address] [id number] [place] [address]-
6649     

 

 [facility] [place] [facility]  [address] [id number] [place] [address]-
2546     

 

 [facility] [place] [facility]  [address] [id number] [place] [address]-
8476     

 

 [facility] [place] [facility]  [address] [id number] [place] [address]-
9422     

 

 [facility] [place] [facility]  [address] [id number] [place] [address]-
1704  03 Mar, 2015   

 

 [facility] [place] [facility]  [address] [id number] [place] [address]-
5446  03 Mar, 2015   

 

 [facility] [place] [facility]  [address] [id number] [place] [address]-
5133     

 

 [facility] [place] [facility]  [address] [id number] [place] [address]-
8498     

 

 [facility] [place] [facility]  [address] [id number] [place] [address]-
8976     

 

 [facility] [place] [facility]  [address] [id number] [place] [address]-
5254     

 

 [facility] [place] [facility]  [address] [id number] [place] [address]-
0346     

 

 [facility] [place] [facility]  [address] [id number] [place] [address]-
0288     

 

 [facility] [place] [facility]  [address] [id number] [place] [address]-
1856     

 

 [facility] [place] [facility]  [address] [id number] [place] [address]-
2546     

 

 [facility] [place] [facility]  [address] [id number] [place] [address]-
3493     

 

 [facility] [place] [facility]  [address] [id number] [place] [address]-
2615     

 

 [facility] [place] [facility]  [address] [id number] [place] [address]-
9437  30 Dec, 2014   

 

 [facility] [place] [facility]  [address] [id number] [place] [address]-
1313  29 Dec, 2014   

 

 [facility] [place] [facility]  [address] [id number] [place] [address]-
8926  29 Dec, 2014   

 

 [facility] [place] [facility]  [address] [id number] [place] [address]-
8348  03 Dec, 2014   

 

 [facility] [place] [facility]  [address] [id number] [place] [address]-
5632  03 Dec, 2014   

 

 [facility] [place] [facility]  [address] [id number] [place] [address]-
1564     

 

 [facility] [place] [facility]  [address] [id number] [place] [address]-
0269     

 

 [facility] [place] [facility]  [address] [id number] [place] [address]-
7901  23 Oct, 2014   

 

 [facility] [place] [facility]  [address] [id number] [place] [address]-
3545  23 Oct, 2014   

 

 [facility] [place] [facility]  [address] [id number] [place] [address]-
7057  15 Oct, 2014   

 

 [facility] [place] [facility]  [address] [id number][place] [address]-
1907  15 Oct, 2014   

 

 [facility] [place] [facility]  [address] [id number] [place] [address]-
3567  22 Sep, 2014   

 

 [facility] [place] [facility]  [address] [id number] [place] [address]-
4029  22 Sep, 2014   

 

 [facility] [place] [facility]  [address] [id number] [place] [address]-
7437  10 Sep, 2014   

 

 [facility] [place] [facility]  [address] [id number] [place] [address]-
7211  10 Sep, 2014   

 

 [facility] [place] [facility]  [address] [id number] [place] [address]-
9205  03 Sep, 2014   

 

 [facility] [place] [facility]  [address] [id number] [place] [address]-
9831  03 Sep, 2014   

 

 [facility] [place] [facility]  [address] [id number] [place] [address]-
3871  29 Aug, 2014   

 

 [facility] [place] [facility]  [address] [id number] [place] [address]-
5230  29 Aug, 2014   

 

 [facility] [place] [facility]  [address] [id number] [place] [address]-
9703  27 Aug, 2014   

 

 [facility] [place] [facility]  [address] [id number] [place] [address]-
7382  27 Aug, 2014   

 

 [facility] [place] [facility]  [address] [id number] [place] [address]-
0481  22 Aug, 2014   

 

 [facility] [place] [facility]  [address] [id number] [place] [address]-
3261  22 Aug, 2014   

 

 [facility] [place] [facility]  [address] [id number] [place] [address]-
0747     

 

 [facility] [place] [facility]  [address] [id number] [place] [address]-
6220     

 

 [facility] [place] [facility]  [address] [id number] [place] [address]-
0182  27 May, 2014   

 

 [facility] [place] [facility]  [address] [id number] [place] [address]-
8959  27 May, 2014   

 

 [facility] [place] [facility]  [address] [id number] [place] [address]-
8413     

 

 [facility] [place] [facility]  [address] [id number] [place] [address]-
1661     

 

 [facility] [place] [facility]  [address] [id number] [place] [address]-
6698  06 Mar, 2014   

 

 [facility] [place] [facility]  [address] [id number] [place] [address]-
6213  06 Mar, 2014   

 

 [facility] [place] [facility]  [address] [id number] [place] [address]-
6081     

 

 [facility] [place] [facility]  [address] [id number] [place] [address]-
6357     

 

 [facility] [place] [facility]  [address] [id number] [place] [address]-
5564     

 

 [facility] [place] [facility]  [address] [id number] [place] [address]-
3188     

 

 [facility] [place] [facility]  [address] [id number] [place] [address]-
5160     

 

 [facility] [place] [facility]  [address] [id number] [place] [address]-
4127     

 

 [facility] [place] [facility]  [address] [id number] [place] [address]-
0740     

 

 [facility] [place] [facility]  [address] [id number] [place] [address]-
8808     

 

 [facility] [place] [facility]  [address] [facility] [id number] [place] [address]-
4634  15 Oct, 2013   

 

 [facility] [place] [facility]  [address] [id number] [place] [address]-
9090  15 Oct, 2013   

 

 [facility] [place] [facility]  [address] [id number][place] [address]-
2241  28 Aug, 2013   

 

 [facility] [place] [facility]  [address] [id number] [place] [address]-
0362     

 

 [facility] [place] [facility]  [address] [id number] [place] [address]-
2544  27 Mar, 2013   

 

 [facility] [place] [facility]  [address] [id number] [place] [address]-
7235     

 

 [facility] [place] [facility]  [address] [id number] [place] [address]-
6526     

 

 [facility]  [address] [id number] [place] [address]-
2461  10 Mcihel, 2013   

 

 [facility] [place] [facility]  [address] [id number] [place] [address]-
5636  03 Dec, 2012   

 

 [facility] [place] [facility]  [address] [facility] [id number] [place] [address]-
5246  03 Dec, 2012   

 

 [facility] [place] [facility]  [address] [id number] [place] [address]-
2137     

 

 [facility] [place] [facility]  [address] [id number] [place] [address]-
6089     

 

 [facility] [place] [facility]  [address] [facility] [id number] [place] [address]-
2546  08 Oct, 2012   

 

 [facility] [place] [facility]  [id number] [place] [address]-
2596  25 Sep, 2012   

 

 [facility] [place] [facility]  [address] [id number] [place] [address]-
0659  06 Sep, 2012   

 

 [facility] [place] [facility]  [address] [facility] [id number] [place] [address]-
8038  10 Aug, 2012   

 

 [facility] [place] [facility]  [address] [facility] [id number] [place] [address]-
6617     

 

 [facility] [place] [facility]  [address] [facility] [id number] [place] [address]-
3226     

 

 [facility] [place] [facility]  [address] [facility] [id number][place] [address]-
2546     

 

 [facility] [place] [facility]  [address] [id number] [place] [address]-
6386     

 

 [facility] [place] [facility]  [address] [facility] [id number] [place] [address]-
2546     

 

 [facility] [place] [facility]  [address] [facility] [id number] [place] [address]-
8096     

 

 [facility] [place] [facility]  [address] [id number] [place] [address]-
2333  06 2012   

 

 [facility] [place] [facility]  [address] [id number] [place] [address]-
8106     

 

 [facility] [place] [facility]  [address] [id number] [place] [address]-
8006     

 

 [facility] [place] [facility]  [address] [id number] [place] [address]-
2829     

 

 [facility] [place] [facility]  [address] [id number] [place] [address]-
9817  06 Dec, 2011   

 

 [facility] [place] [facility]  [address] [id number] [place] [address]-
1737  10 2011   

 

 [facility] [place] [facility]  [address] [id number] [place] [address]-
6427  14 2011   

 

 [facility] [place] [facility]  [address] [id number] [place] [address]-
5738  10 May, 2011   

 

 [facility] [place] [facility]  [address] [id number] [place] [address]-
1518  29 Dec, 2010   

 

 [facility] [place] [facility]  [address] [id number], [place] [address]-
5765  16 2010   

 

 [facility] [place] [facility]  [address] [id number], [place] [address]-
4290  18 Oct, 2010   

 

 [facility] [place] [facility]  [address] [id number], [place] [address]-
2192  14 2010   

 

 [facility] [place] [facility]  [address] [id number] [place] [address]-
9063  14 Dec, 2009   

 

 [facility] [place] [facility]  [address] [id number], [place] [address]-
6486  14 Dec, 2009   

 

 [facility] [place] [facility]  [address] [id number], [place] [address]-
6831  10 Dec, 2009   

 

 [facility] [place] [facility]  [address] [id number], [place] [address]-
6876  07 Dec, 2009   

 

 [facility]  [address] [facility] [id number] [place] [address]-
1776     

 

 [facility]  [address] [facility] [id number][place] [address]-
4496  10 Nov, 2009   

 

 [facility]  [address] [facility] [id number] [place] [address]-
3766  15 Apr, 2009   







IMMUNIZATIONS

No Known Immunizations



SOCIAL HISTORY

Never Assessed



REASON FOR VISIT

UTI symptoms cammy mcmahon 



PLAN OF CARE







 Activity  Details









  









 Follow Up  prn Reason:







VITAL SIGNS







 Height  67 in  2017

 

 Weight  272.0 lbs  2017

 

 Temperature  97.8 degrees Fahrenheit  2017

 

 Heart Rate  84 bpm  2017

 

 Respiratory Rate  18   2017

 

 BMI  42.60 kg/m2  2017

 

 Blood pressure systolic  138 mmHg  2017

 

 Blood pressure diastolic  86 mmHg  2017







MEDICATIONS







 Medication  Instructions  Dosage  Frequency  Start Date  End Date  Duration  
Status

 

 Alprazolam 1 MG  Orally Twice a day, and 1 tablet at bedtime  0.5 Tablet as 
needed     03 Mar, 2015     28 days  Active

 

 Cipro 500 mg  Orally Twice a day  1 tablet  12h  21 Sep, 2017  26 Sep, 2017  
05 days  Active

 

 Fluoxetine HCl 20 MG     TAKE 3 CAPSULES BY MOUTH ONCE DAILY           30  
Active

 

 Propranolol HCl 20 MG     1 tablet Twice a day Orally           90  Active

 

 Bentyl 20 mg  Orally 4 times a day before meals and bedtime  1 tablet     04 
Aug, 2016        Active







RESULTS

No Results



PROCEDURES







 Procedure  Date Ordered  Result  Body Site

 

 URINALYSIS, AUTO, W/O SCOPE  2017      

 

 URINE CULTURE/COLONY COUNT  2017      







INSTRUCTIONS





MEDICATIONS ADMINISTERED

No Known Medications



MEDICAL (GENERAL) HISTORY







 Type  Description  Date

 

 Medical History  depression   

 

 Medical History  hx of anxiety   

 

 Medical History  mitral valve prolapse   

 

 Surgical History  tonsillectomy and adenoidectomy   

 

 Surgical History  partial hysterectomy   

 

 Surgical History  jaw surgery  1974

 

 Surgical History  colonoscopy  16

 

 Surgical History  colonoscopy  10/2017

 

 Hospitalization History  for surgery
Bill For Surgical Tray: no
Performing Laboratory: [id number]
Expected Date Of Service: 05/27/2020
Billing Type: Third-Party Bill

## 2020-08-03 ENCOUNTER — HOSPITAL ENCOUNTER (OUTPATIENT)
Dept: HOSPITAL 75 - RT | Age: 60
End: 2020-08-03
Attending: NURSE PRACTITIONER
Payer: COMMERCIAL

## 2020-08-03 DIAGNOSIS — R06.00: ICD-10-CM

## 2020-08-03 DIAGNOSIS — R91.8: Primary | ICD-10-CM

## 2020-08-03 PROCEDURE — 94060 EVALUATION OF WHEEZING: CPT

## 2020-08-03 PROCEDURE — 94729 DIFFUSING CAPACITY: CPT

## 2020-08-03 PROCEDURE — 94726 PLETHYSMOGRAPHY LUNG VOLUMES: CPT

## 2021-03-15 ENCOUNTER — HOSPITAL ENCOUNTER (OUTPATIENT)
Dept: HOSPITAL 75 - RAD | Age: 61
End: 2021-03-15
Attending: NURSE PRACTITIONER
Payer: SELF-PAY

## 2021-03-15 DIAGNOSIS — R91.1: ICD-10-CM

## 2021-03-15 DIAGNOSIS — Z87.891: ICD-10-CM

## 2021-03-15 DIAGNOSIS — Z12.2: Primary | ICD-10-CM

## 2021-03-15 PROCEDURE — 71271 CT THORAX LUNG CANCER SCR C-: CPT

## 2021-03-15 NOTE — DIAGNOSTIC IMAGING REPORT
CT Lung Screening



INDICATION:Screening for lung cancer, 42 pack year history of

smoking, quit smoking 3 years prior.



TECHNIQUE: Noncontrast, low-dose CT imaging performed according

to the lung cancer screening protocol. Auto Exposure Controls

were utilize during the CT exam to meet ALARA standards for

radiation dose reduction.



COMPARISON:03/12/2020, 03/11/2019, and 10/17/2018.



FINDINGS:Postsurgical changes at the expected location of the

thyroid gland again noted. No significant adenopathy within the

chest. No aneurysmal dilatation of the thoracic aorta. Mild

scattered vascular calcifications. The heart is within normal

limits in size. No pericardial effusion. No pleural effusion. The

trachea is patent. No pneumothorax. Stable 0.4 cm right upper

lobe pulmonary nodule, unchanged since 2019. The lungs are

otherwise clear. The spleen is enlarged measuring 16.5 cm in AP

dimension. No acute osseous abnormality with scattered osseous

degenerative changes present.



IMPRESSION:Stable benign 0.4 cm right upper lobe pulmonary

nodule, unchanged since 2019. No new pulmonary nodules.



Splenomegaly.



LUNG-RADS CATEGORY:2S: Benign appearance or behavior.

MODIFIER:S: Splenomegaly

Follow-up: Continued annual low-dose CT of the chest in 12

months.



Dictated by: 



  Dictated on workstation # [id number]

## 2021-08-06 ENCOUNTER — HOSPITAL ENCOUNTER (OUTPATIENT)
Dept: HOSPITAL 75 - CARD | Age: 61
End: 2021-08-06
Attending: INTERNAL MEDICINE
Payer: COMMERCIAL

## 2021-08-06 DIAGNOSIS — I51.7: Primary | ICD-10-CM

## 2021-08-06 PROCEDURE — 93225 XTRNL ECG REC<48 HRS REC: CPT

## 2021-08-06 PROCEDURE — 93306 TTE W/DOPPLER COMPLETE: CPT

## 2021-08-06 PROCEDURE — 93226 XTRNL ECG REC<48 HR SCAN A/R: CPT

## 2021-08-10 ENCOUNTER — HOSPITAL ENCOUNTER (OUTPATIENT)
Dept: HOSPITAL 75 - CARD | Age: 61
End: 2021-08-10
Attending: INTERNAL MEDICINE
Payer: COMMERCIAL

## 2021-08-10 VITALS — SYSTOLIC BLOOD PRESSURE: 153 MMHG | DIASTOLIC BLOOD PRESSURE: 78 MMHG

## 2021-08-10 DIAGNOSIS — R06.09: Primary | ICD-10-CM

## 2021-08-10 PROCEDURE — 78452 HT MUSCLE IMAGE SPECT MULT: CPT

## 2021-08-10 PROCEDURE — 93017 CV STRESS TEST TRACING ONLY: CPT

## 2021-08-10 RX ADMIN — Medication PRN ML: at 13:16

## 2021-08-10 RX ADMIN — Medication PRN ML: at 11:30

## 2021-08-10 NOTE — STRESS TEST
DATE OF SERVICE:  08/10/2021



RESTING AND POST REGADENOSON TECHNETIUM-99M TETROFOSMIN SPECT CT IMAGING



Baseline images were carried out after injection of 10.48 mCi of technetium-99m

Tetrofosmin.  This was followed by 29.5 mCi of technetium-99m Tetrofosmin for

stress imaging.  The electrocardiogram showed sinus rhythm at baseline and did

not change significantly with regadenoson infusion.  Review of images at rest

and following stress indicates a small amount of anteroapical ischemia.  Gated

images show normal global left ventricular systolic function with normal

regional wall motion.  Left ventricular ejection fraction is calculated to be

64%.



CONCLUSIONS:

1.  This study is indicative of a small amount of anteroapical ischemia.

2.  Normal regional wall motion.

3.  Normal global left ventricular systolic function with a calculated ejection

fraction of 64%.





Job ID: [id number]

DocumentID: [id number]

Dictated Date:  08/10/2021 17:45:09

Transcription Date: 08/10/2021 19:56:23

Dictated By: NAN CULLEN MD, MA, FACP, FACC,

## 2022-10-26 ENCOUNTER — HOSPITAL ENCOUNTER (OUTPATIENT)
Dept: HOSPITAL 75 - PREOP | Age: 62
LOS: 1 days | Discharge: HOME | End: 2022-10-27
Attending: SURGERY
Payer: MEDICARE

## 2022-10-26 VITALS — HEIGHT: 65.98 IN | BODY MASS INDEX: 47.09 KG/M2 | WEIGHT: 293 LBS

## 2022-10-26 DIAGNOSIS — Z01.818: Primary | ICD-10-CM

## 2022-11-08 ENCOUNTER — HOSPITAL ENCOUNTER (OUTPATIENT)
Dept: HOSPITAL 75 - ENDO | Age: 62
Discharge: HOME | End: 2022-11-08
Attending: SURGERY
Payer: MEDICARE

## 2022-11-08 VITALS — DIASTOLIC BLOOD PRESSURE: 68 MMHG | SYSTOLIC BLOOD PRESSURE: 143 MMHG

## 2022-11-08 VITALS — SYSTOLIC BLOOD PRESSURE: 143 MMHG | DIASTOLIC BLOOD PRESSURE: 68 MMHG

## 2022-11-08 VITALS — HEIGHT: 65.75 IN | WEIGHT: 293 LBS | BODY MASS INDEX: 47.65 KG/M2

## 2022-11-08 VITALS — DIASTOLIC BLOOD PRESSURE: 78 MMHG | SYSTOLIC BLOOD PRESSURE: 150 MMHG

## 2022-11-08 DIAGNOSIS — E66.01: ICD-10-CM

## 2022-11-08 DIAGNOSIS — K62.1: ICD-10-CM

## 2022-11-08 DIAGNOSIS — Z12.11: Primary | ICD-10-CM

## 2022-11-08 DIAGNOSIS — K21.9: ICD-10-CM

## 2022-11-08 DIAGNOSIS — K44.9: ICD-10-CM

## 2022-11-08 DIAGNOSIS — Z86.010: ICD-10-CM

## 2022-11-08 DIAGNOSIS — K29.70: ICD-10-CM

## 2022-11-08 PROCEDURE — 88305 TISSUE EXAM BY PATHOLOGIST: CPT

## 2022-11-08 NOTE — ANESTHESIA-GENERAL POST-OP
MAC


Patient Condition


Mental Status/LOC:  Same as Preop


Cardiovascular:  Satisfactory


Nausea/Vomiting:  Absent


Respiratory:  Satisfactory


Pain:  Controlled


Complications:  Absent





Post Op Complications


Complications


None





Follow Up Care/Instructions


Patient Instructions


None needed.





Anesthesiology Discharge Order


Discharge Order


Patient is doing well, no complaints, stable vital signs, no apparent adverse 

anesthesia problems.   


No complications reported per nursing.











ANNALEE CAN CRNA             Nov 8, 2022 16:09

## 2022-11-08 NOTE — DISCHARGE INST-SIMPLE/STANDARD
Discharge Inst-Standard


Patient Instructions/Follow Up


Plan of Care/Instructions/FU:  


f/u 2 weeks with Dr. Broderick


Activity as Tolerated:  Yes


Discharge Diet:  No Restrictions, Regular Diet











HUNTER BRODERICK DO               Nov 8, 2022 15:35

## 2022-11-08 NOTE — PROGRESS NOTE-PRE OPERATIVE
Pre-Operative Progress Note


Date of Available H&P:  Oct 12, 2022


Date H&P Reviewed:  Nov 8, 2022


Time H&P Reviewed:  13:49


History & Physical:  H&P Reviewed, Patient Examed, No changes noted


Pre-Operative Diagnosis:  GERD, hx of polyps











HUNTER HAMMONDS DO               Nov 8, 2022 13:49

## 2022-11-09 NOTE — OPERATIVE REPORT
DATE OF SERVICE: 11/08/2022



PREOPERATIVE DIAGNOSES:

History of polyps, GERD.



POSTOPERATIVE DIAGNOSES:

Hiatal hernia, rectal polyp.



PROCEDURES:

EGD with biopsies, colonoscopy with snare polypectomy x1.



SURGEON:

Hunter Broderick DO



ANESTHESIA:  Per CRNA.



ESTIMATED BLOOD LOSS:  None.



COMPLICATIONS:  None.



INDICATIONS:  The patient is a 62-year-old female with GERD symptoms and has a 

history of colon polyps.  She understood risks and benefits of procedure and 

wishes to proceed.  Consent was signed in chart.



DESCRIPTION OF PROCEDURE:  The patient was taken to endoscopy suite, placed in 

left lateral position.  Timeout was performed.  The scope was inserted, the 

mouth, down the esophagus, stomach and duodenum without difficulty.  There was 

no pulse, mass or ulceration of the duodenum.  Scope was retracted back to the 

stomach where it was further insufflated.  Biopsy of the antrum was obtained.  

_____ some slight gastritis changes present.  Scope was retroflexed, noting 

hiatal hernia, small sinus.  Scope was returned to its normal position.  Slowly 

withdrawn from distal esophagus.  No polyps, masses or ulcerations.  Biopsy of 

the GE junction was obtained.  Scope was retracted back to completely remove.  A

digital rectal exam was performed.  No palpable pulse, mass or ulcerations.  

Scope was inserted in the rectum and advanced all the way to the cecum with 

minimal difficulty.  Prep was adequate.  The scope was slowly retracted back.  

No polyps, masses or ulcerations in the cecum, ascending, transverse, descending

and sigmoid colon.  Once in the rectum, a small polyp was present, which snare 

polypectomy was performed.  The scope was inserted and retracted multiple times,

noting no other pathology.  Scope was slowly retracted back to completely 

remove.  The patient tolerated the procedure well without complications, taken 

to recovery room in stable condition.



RECOMMENDATIONS:

The patient will need repeat colonoscopy in 5 years.  Any issues before that be 

seen at that time.  We will continue on current medications and await biopsy 

results for her GERD symptoms.  We will likely start her on Protonix.





Job ID: [id number]

DocumentID: [id number]

Dictated Date: 11/08/2022 15:34:35

Transcription Date: 11/09/2022 00:08:00

Dictated By: HUNTER BRODERICK DO

## 2023-07-31 ENCOUNTER — HOSPITAL ENCOUNTER (OUTPATIENT)
Dept: HOSPITAL 75 - CARD | Age: 63
End: 2023-07-31
Attending: NURSE PRACTITIONER
Payer: MEDICARE

## 2023-07-31 DIAGNOSIS — R06.09: Primary | ICD-10-CM

## 2023-07-31 DIAGNOSIS — R07.89: ICD-10-CM

## 2023-07-31 PROCEDURE — 93306 TTE W/DOPPLER COMPLETE: CPT

## 2023-08-18 ENCOUNTER — HOSPITAL ENCOUNTER (OUTPATIENT)
Dept: HOSPITAL 75 - CARD | Age: 63
End: 2023-08-18
Attending: NURSE PRACTITIONER
Payer: MEDICARE

## 2023-08-18 VITALS — DIASTOLIC BLOOD PRESSURE: 87 MMHG | SYSTOLIC BLOOD PRESSURE: 167 MMHG

## 2023-08-18 DIAGNOSIS — R07.89: Primary | ICD-10-CM

## 2023-08-18 PROCEDURE — 93017 CV STRESS TEST TRACING ONLY: CPT

## 2023-08-18 PROCEDURE — 78452 HT MUSCLE IMAGE SPECT MULT: CPT

## 2023-08-18 NOTE — STRESS TEST
DATE OF SERVICE: 08/18/2023



RESTING AND POST-EXERCISE TECHNETIUM-99M TETROFOSMIN SPECT CT IMAGING



ORDERING PHYSICIAN:

LOUIS Wang.



PRIMARY PHYSICIAN:

Dr. Camron Paz.



CLINICAL DIAGNOSIS:

Chest discomfort.



Baseline images were carried out after injection of 10.67 mCi of technetium-99m 

tetrofosmin.  This was followed by 0.4 mg regadenoson and 30.4 mCi of 

technetium-99m tetrofosmin for stress imaging.  The electrocardiogram showed 

sinus rhythm at baseline.  It did not change significantly with regadenoson 

infusion.  The patient tolerated the procedure well.  Review of images at rest 

and following stress does not indicate any distinct perfusion defects consistent

with significant myocardial ischemia.  Some degree of breast attenuation is seen

both at rest and following regadenoson infusion.  Gated images show normal 

global left ventricular systolic function with normal regional wall motion.  

Left ventricular ejection fraction is calculated to be 69%.  Left ventricular 

end-diastolic volume 71 mL, TID is absent (1.01).



CONCLUSIONS:

1.  No evidence of any significant myocardial ischemia or infarction on this 

study.

2.  Normal regional wall motion.

3.  Normal global left ventricular systolic function with a calculated ejection 

fraction 69%.





Job ID: [id number]

DocumentID: [id number]

Dictated Date: 08/18/2023 12:53:04

Transcription Date: 08/18/2023 19:02:00

Dictated By: NAN CULLEN MD; MA; FACP; FACC;